# Patient Record
Sex: MALE | Race: WHITE | NOT HISPANIC OR LATINO | Employment: FULL TIME | URBAN - METROPOLITAN AREA
[De-identification: names, ages, dates, MRNs, and addresses within clinical notes are randomized per-mention and may not be internally consistent; named-entity substitution may affect disease eponyms.]

---

## 2018-06-30 ENCOUNTER — HOSPITAL ENCOUNTER (INPATIENT)
Facility: HOSPITAL | Age: 48
LOS: 1 days | Discharge: HOME | End: 2018-07-01
Attending: EMERGENCY MEDICINE | Admitting: INTERNAL MEDICINE

## 2018-06-30 ENCOUNTER — APPOINTMENT (OUTPATIENT)
Dept: GENERAL RADIOLOGY | Facility: HOSPITAL | Age: 48
End: 2018-06-30

## 2018-06-30 DIAGNOSIS — I50.9 ACUTE CONGESTIVE HEART FAILURE, UNSPECIFIED CONGESTIVE HEART FAILURE TYPE: Primary | ICD-10-CM

## 2018-06-30 DIAGNOSIS — N17.9 ACUTE RENAL FAILURE, UNSPECIFIED ACUTE RENAL FAILURE TYPE (HCC): ICD-10-CM

## 2018-06-30 DIAGNOSIS — I10 UNCONTROLLED HYPERTENSION: ICD-10-CM

## 2018-06-30 DIAGNOSIS — R77.8 ELEVATED TROPONIN: ICD-10-CM

## 2018-06-30 LAB
ALBUMIN SERPL-MCNC: 3.4 G/DL (ref 3.5–5.2)
ALBUMIN/GLOB SERPL: 0.9 G/DL
ALP SERPL-CCNC: 86 U/L (ref 39–117)
ALT SERPL W P-5'-P-CCNC: 17 U/L (ref 1–41)
ANION GAP SERPL CALCULATED.3IONS-SCNC: 11.9 MMOL/L
AST SERPL-CCNC: 10 U/L (ref 1–40)
BASOPHILS # BLD AUTO: 0.03 10*3/MM3 (ref 0–0.2)
BASOPHILS NFR BLD AUTO: 0.3 % (ref 0–1.5)
BILIRUB SERPL-MCNC: 0.6 MG/DL (ref 0.1–1.2)
BUN BLD-MCNC: 30 MG/DL (ref 6–20)
BUN/CREAT SERPL: 16.4 (ref 7–25)
CALCIUM SPEC-SCNC: 9.2 MG/DL (ref 8.6–10.5)
CHLORIDE SERPL-SCNC: 104 MMOL/L (ref 98–107)
CO2 SERPL-SCNC: 26.1 MMOL/L (ref 22–29)
CREAT BLD-MCNC: 1.83 MG/DL (ref 0.76–1.27)
DEPRECATED RDW RBC AUTO: 43.7 FL (ref 37–54)
EOSINOPHIL # BLD AUTO: 0.1 10*3/MM3 (ref 0–0.7)
EOSINOPHIL NFR BLD AUTO: 1.2 % (ref 0.3–6.2)
ERYTHROCYTE [DISTWIDTH] IN BLOOD BY AUTOMATED COUNT: 15 % (ref 11.5–14.5)
GFR SERPL CREATININE-BSD FRML MDRD: 40 ML/MIN/1.73
GLOBULIN UR ELPH-MCNC: 3.7 GM/DL
GLUCOSE BLD-MCNC: 151 MG/DL (ref 65–99)
GLUCOSE BLDC GLUCOMTR-MCNC: 113 MG/DL (ref 70–130)
GLUCOSE BLDC GLUCOMTR-MCNC: 135 MG/DL (ref 70–130)
GLUCOSE BLDC GLUCOMTR-MCNC: 173 MG/DL (ref 70–130)
HBA1C MFR BLD: 5.9 % (ref 4.8–5.6)
HCT VFR BLD AUTO: 43.1 % (ref 40.4–52.2)
HGB BLD-MCNC: 13.2 G/DL (ref 13.7–17.6)
HOLD SPECIMEN: NORMAL
HOLD SPECIMEN: NORMAL
IMM GRANULOCYTES # BLD: 0.02 10*3/MM3 (ref 0–0.03)
IMM GRANULOCYTES NFR BLD: 0.2 % (ref 0–0.5)
LYMPHOCYTES # BLD AUTO: 1.13 10*3/MM3 (ref 0.9–4.8)
LYMPHOCYTES NFR BLD AUTO: 13.2 % (ref 19.6–45.3)
MCH RBC QN AUTO: 24.8 PG (ref 27–32.7)
MCHC RBC AUTO-ENTMCNC: 30.6 G/DL (ref 32.6–36.4)
MCV RBC AUTO: 80.9 FL (ref 79.8–96.2)
MONOCYTES # BLD AUTO: 0.63 10*3/MM3 (ref 0.2–1.2)
MONOCYTES NFR BLD AUTO: 7.3 % (ref 5–12)
NEUTROPHILS # BLD AUTO: 6.67 10*3/MM3 (ref 1.9–8.1)
NEUTROPHILS NFR BLD AUTO: 77.8 % (ref 42.7–76)
NT-PROBNP SERPL-MCNC: ABNORMAL PG/ML (ref 5–450)
PLATELET # BLD AUTO: 233 10*3/MM3 (ref 140–500)
PMV BLD AUTO: 11.1 FL (ref 6–12)
POTASSIUM BLD-SCNC: 4.1 MMOL/L (ref 3.5–5.2)
PROT SERPL-MCNC: 7.1 G/DL (ref 6–8.5)
RBC # BLD AUTO: 5.33 10*6/MM3 (ref 4.6–6)
SODIUM BLD-SCNC: 142 MMOL/L (ref 136–145)
TROPONIN T SERPL-MCNC: 0.03 NG/ML (ref 0–0.03)
WBC NRBC COR # BLD: 8.58 10*3/MM3 (ref 4.5–10.7)
WHOLE BLOOD HOLD SPECIMEN: NORMAL
WHOLE BLOOD HOLD SPECIMEN: NORMAL

## 2018-06-30 PROCEDURE — 93005 ELECTROCARDIOGRAM TRACING: CPT | Performed by: PHYSICIAN ASSISTANT

## 2018-06-30 PROCEDURE — 25010000002 HYDRALAZINE PER 20 MG

## 2018-06-30 PROCEDURE — 84484 ASSAY OF TROPONIN QUANT: CPT | Performed by: PHYSICIAN ASSISTANT

## 2018-06-30 PROCEDURE — 93005 ELECTROCARDIOGRAM TRACING: CPT | Performed by: INTERNAL MEDICINE

## 2018-06-30 PROCEDURE — 99285 EMERGENCY DEPT VISIT HI MDM: CPT

## 2018-06-30 PROCEDURE — 93010 ELECTROCARDIOGRAM REPORT: CPT | Performed by: INTERNAL MEDICINE

## 2018-06-30 PROCEDURE — 25010000002 FUROSEMIDE PER 20 MG: Performed by: INTERNAL MEDICINE

## 2018-06-30 PROCEDURE — 25010000002 FUROSEMIDE PER 20 MG: Performed by: EMERGENCY MEDICINE

## 2018-06-30 PROCEDURE — 99223 1ST HOSP IP/OBS HIGH 75: CPT | Performed by: INTERNAL MEDICINE

## 2018-06-30 PROCEDURE — 71045 X-RAY EXAM CHEST 1 VIEW: CPT

## 2018-06-30 PROCEDURE — 85025 COMPLETE CBC W/AUTO DIFF WBC: CPT | Performed by: PHYSICIAN ASSISTANT

## 2018-06-30 PROCEDURE — 83880 ASSAY OF NATRIURETIC PEPTIDE: CPT | Performed by: PHYSICIAN ASSISTANT

## 2018-06-30 PROCEDURE — 83036 HEMOGLOBIN GLYCOSYLATED A1C: CPT | Performed by: EMERGENCY MEDICINE

## 2018-06-30 PROCEDURE — 80053 COMPREHEN METABOLIC PANEL: CPT | Performed by: PHYSICIAN ASSISTANT

## 2018-06-30 PROCEDURE — 82962 GLUCOSE BLOOD TEST: CPT

## 2018-06-30 RX ORDER — FUROSEMIDE 10 MG/ML
40 INJECTION INTRAMUSCULAR; INTRAVENOUS ONCE
Status: COMPLETED | OUTPATIENT
Start: 2018-06-30 | End: 2018-06-30

## 2018-06-30 RX ORDER — HYDRALAZINE HYDROCHLORIDE 10 MG/1
10 TABLET, FILM COATED ORAL 3 TIMES DAILY
COMMUNITY
End: 2018-07-01 | Stop reason: HOSPADM

## 2018-06-30 RX ORDER — HYDRALAZINE HYDROCHLORIDE 20 MG/ML
INJECTION INTRAMUSCULAR; INTRAVENOUS
Status: COMPLETED
Start: 2018-06-30 | End: 2018-06-30

## 2018-06-30 RX ORDER — SODIUM CHLORIDE 0.9 % (FLUSH) 0.9 %
1-10 SYRINGE (ML) INJECTION AS NEEDED
Status: DISCONTINUED | OUTPATIENT
Start: 2018-06-30 | End: 2018-07-01 | Stop reason: HOSPADM

## 2018-06-30 RX ORDER — NITROGLYCERIN 20 MG/100ML
5-200 INJECTION INTRAVENOUS
Status: DISCONTINUED | OUTPATIENT
Start: 2018-06-30 | End: 2018-07-01 | Stop reason: HOSPADM

## 2018-06-30 RX ORDER — NITROGLYCERIN 0.4 MG/1
0.4 TABLET SUBLINGUAL
Status: DISCONTINUED | OUTPATIENT
Start: 2018-06-30 | End: 2018-07-01 | Stop reason: HOSPADM

## 2018-06-30 RX ORDER — FUROSEMIDE 80 MG
80 TABLET ORAL 2 TIMES DAILY
COMMUNITY
End: 2018-07-01 | Stop reason: HOSPADM

## 2018-06-30 RX ORDER — ECHINACEA PURPUREA EXTRACT 125 MG
2 TABLET ORAL AS NEEDED
Status: DISCONTINUED | OUTPATIENT
Start: 2018-06-30 | End: 2018-07-01 | Stop reason: HOSPADM

## 2018-06-30 RX ORDER — ACETAMINOPHEN 325 MG/1
650 TABLET ORAL EVERY 4 HOURS PRN
Status: DISCONTINUED | OUTPATIENT
Start: 2018-06-30 | End: 2018-07-01 | Stop reason: HOSPADM

## 2018-06-30 RX ORDER — CARVEDILOL 25 MG/1
25 TABLET ORAL EVERY 12 HOURS SCHEDULED
Status: DISCONTINUED | OUTPATIENT
Start: 2018-06-30 | End: 2018-07-01 | Stop reason: HOSPADM

## 2018-06-30 RX ORDER — METOCLOPRAMIDE 5 MG/1
5 TABLET ORAL 4 TIMES DAILY
COMMUNITY
End: 2018-07-01 | Stop reason: HOSPADM

## 2018-06-30 RX ORDER — HYDRALAZINE HYDROCHLORIDE 20 MG/ML
10 INJECTION INTRAMUSCULAR; INTRAVENOUS ONCE
Status: COMPLETED | OUTPATIENT
Start: 2018-06-30 | End: 2018-06-30

## 2018-06-30 RX ORDER — SODIUM CHLORIDE 0.9 % (FLUSH) 0.9 %
10 SYRINGE (ML) INJECTION AS NEEDED
Status: DISCONTINUED | OUTPATIENT
Start: 2018-06-30 | End: 2018-07-01 | Stop reason: HOSPADM

## 2018-06-30 RX ORDER — CARVEDILOL 12.5 MG/1
12.5 TABLET ORAL EVERY 12 HOURS SCHEDULED
Status: DISCONTINUED | OUTPATIENT
Start: 2018-06-30 | End: 2018-06-30

## 2018-06-30 RX ORDER — GLIMEPIRIDE 2 MG/1
2 TABLET ORAL
COMMUNITY
End: 2018-07-01 | Stop reason: HOSPADM

## 2018-06-30 RX ORDER — ISOSORBIDE MONONITRATE 60 MG/1
60 TABLET, EXTENDED RELEASE ORAL
Status: DISCONTINUED | OUTPATIENT
Start: 2018-06-30 | End: 2018-07-01 | Stop reason: HOSPADM

## 2018-06-30 RX ORDER — ASPIRIN 81 MG/1
81 TABLET, CHEWABLE ORAL DAILY
COMMUNITY
End: 2019-01-01 | Stop reason: SDUPTHER

## 2018-06-30 RX ORDER — FUROSEMIDE 10 MG/ML
40 INJECTION INTRAMUSCULAR; INTRAVENOUS EVERY 12 HOURS
Status: DISCONTINUED | OUTPATIENT
Start: 2018-06-30 | End: 2018-07-01

## 2018-06-30 RX ORDER — AMLODIPINE BESYLATE 5 MG/1
5 TABLET ORAL
Status: DISCONTINUED | OUTPATIENT
Start: 2018-06-30 | End: 2018-07-01 | Stop reason: HOSPADM

## 2018-06-30 RX ADMIN — ISOSORBIDE MONONITRATE 60 MG: 60 TABLET, EXTENDED RELEASE ORAL at 17:28

## 2018-06-30 RX ADMIN — HYDRALAZINE HYDROCHLORIDE 10 MG: 20 INJECTION INTRAMUSCULAR; INTRAVENOUS at 12:38

## 2018-06-30 RX ADMIN — CARVEDILOL 12.5 MG: 12.5 TABLET, FILM COATED ORAL at 13:33

## 2018-06-30 RX ADMIN — CARVEDILOL 25 MG: 25 TABLET, FILM COATED ORAL at 19:51

## 2018-06-30 RX ADMIN — HYDRALAZINE HYDROCHLORIDE 75 MG: 50 TABLET, FILM COATED ORAL at 21:51

## 2018-06-30 RX ADMIN — NITROGLYCERIN 10 MCG/MIN: 20 INJECTION INTRAVENOUS at 09:31

## 2018-06-30 RX ADMIN — FUROSEMIDE 40 MG: 10 INJECTION, SOLUTION INTRAMUSCULAR; INTRAVENOUS at 12:42

## 2018-06-30 RX ADMIN — AMLODIPINE BESYLATE 5 MG: 5 TABLET ORAL at 17:28

## 2018-06-30 RX ADMIN — Medication 10 MG: at 12:38

## 2018-06-30 RX ADMIN — FUROSEMIDE 40 MG: 10 INJECTION, SOLUTION INTRAMUSCULAR; INTRAVENOUS at 09:27

## 2018-07-01 ENCOUNTER — APPOINTMENT (OUTPATIENT)
Dept: CARDIOLOGY | Facility: HOSPITAL | Age: 48
End: 2018-07-01
Attending: INTERNAL MEDICINE

## 2018-07-01 VITALS
BODY MASS INDEX: 36.64 KG/M2 | DIASTOLIC BLOOD PRESSURE: 81 MMHG | RESPIRATION RATE: 20 BRPM | SYSTOLIC BLOOD PRESSURE: 127 MMHG | WEIGHT: 261.69 LBS | OXYGEN SATURATION: 95 % | HEIGHT: 71 IN | HEART RATE: 72 BPM | TEMPERATURE: 98 F

## 2018-07-01 LAB
ANION GAP SERPL CALCULATED.3IONS-SCNC: 12.9 MMOL/L
BASOPHILS # BLD AUTO: 0.01 10*3/MM3 (ref 0–0.2)
BASOPHILS NFR BLD AUTO: 0.1 % (ref 0–1.5)
BH CV ECHO MEAS - ACS: 2.1 CM
BH CV ECHO MEAS - AO MEAN PG (FULL): 2 MMHG
BH CV ECHO MEAS - AO MEAN PG: 3 MMHG
BH CV ECHO MEAS - AO ROOT AREA (BSA CORRECTED): 1.3
BH CV ECHO MEAS - AO ROOT AREA: 7.1 CM^2
BH CV ECHO MEAS - AO ROOT DIAM: 3 CM
BH CV ECHO MEAS - AO V2 MAX: 124 CM/SEC
BH CV ECHO MEAS - AO V2 MEAN: 86.1 CM/SEC
BH CV ECHO MEAS - AO V2 VTI: 24.1 CM
BH CV ECHO MEAS - AVA(I,A): 2.2 CM^2
BH CV ECHO MEAS - AVA(I,D): 2.2 CM^2
BH CV ECHO MEAS - BSA(HAYCOCK): 2.5 M^2
BH CV ECHO MEAS - BSA: 2.4 M^2
BH CV ECHO MEAS - BZI_BMI: 36.4 KILOGRAMS/M^2
BH CV ECHO MEAS - BZI_METRIC_HEIGHT: 180.3 CM
BH CV ECHO MEAS - BZI_METRIC_WEIGHT: 118.4 KG
BH CV ECHO MEAS - CONTRAST EF (2CH): 48.4 ML/M^2
BH CV ECHO MEAS - CONTRAST EF 4CH: 37.6 ML/M^2
BH CV ECHO MEAS - EDV(CUBED): 287.5 ML
BH CV ECHO MEAS - EDV(MOD-SP2): 192 ML
BH CV ECHO MEAS - EDV(MOD-SP4): 170 ML
BH CV ECHO MEAS - EDV(TEICH): 223.6 ML
BH CV ECHO MEAS - EF(CUBED): 61.5 %
BH CV ECHO MEAS - EF(MOD-BP): 42 %
BH CV ECHO MEAS - EF(MOD-SP2): 48.4 %
BH CV ECHO MEAS - EF(MOD-SP4): 37.6 %
BH CV ECHO MEAS - EF(TEICH): 51.9 %
BH CV ECHO MEAS - ESV(CUBED): 110.6 ML
BH CV ECHO MEAS - ESV(MOD-SP2): 99 ML
BH CV ECHO MEAS - ESV(MOD-SP4): 106 ML
BH CV ECHO MEAS - ESV(TEICH): 107.5 ML
BH CV ECHO MEAS - FS: 27.3 %
BH CV ECHO MEAS - IVS/LVPW: 1
BH CV ECHO MEAS - IVSD: 1.2 CM
BH CV ECHO MEAS - LAT PEAK E' VEL: 11 CM/SEC
BH CV ECHO MEAS - LV DIASTOLIC VOL/BSA (35-75): 72 ML/M^2
BH CV ECHO MEAS - LV MASS(C)D: 367.9 GRAMS
BH CV ECHO MEAS - LV MASS(C)DI: 155.8 GRAMS/M^2
BH CV ECHO MEAS - LV MEAN PG: 1 MMHG
BH CV ECHO MEAS - LV SYSTOLIC VOL/BSA (12-30): 44.9 ML/M^2
BH CV ECHO MEAS - LV V1 MAX: 85 CM/SEC
BH CV ECHO MEAS - LV V1 MEAN: 49 CM/SEC
BH CV ECHO MEAS - LV V1 VTI: 16.5 CM
BH CV ECHO MEAS - LVIDD: 6.6 CM
BH CV ECHO MEAS - LVIDS: 4.8 CM
BH CV ECHO MEAS - LVLD AP2: 8.8 CM
BH CV ECHO MEAS - LVLD AP4: 9.2 CM
BH CV ECHO MEAS - LVLS AP2: 7.3 CM
BH CV ECHO MEAS - LVLS AP4: 8.4 CM
BH CV ECHO MEAS - LVOT AREA (M): 3.1 CM^2
BH CV ECHO MEAS - LVOT AREA: 3.1 CM^2
BH CV ECHO MEAS - LVOT DIAM: 2 CM
BH CV ECHO MEAS - LVPWD: 1.2 CM
BH CV ECHO MEAS - MED PEAK E' VEL: 6 CM/SEC
BH CV ECHO MEAS - MR MAX PG: 154.2 MMHG
BH CV ECHO MEAS - MR MAX VEL: 620.5 CM/SEC
BH CV ECHO MEAS - MV A DUR: 0.09 SEC
BH CV ECHO MEAS - MV A MAX VEL: 51.8 CM/SEC
BH CV ECHO MEAS - MV DEC SLOPE: 984 CM/SEC^2
BH CV ECHO MEAS - MV DEC TIME: 0.14 SEC
BH CV ECHO MEAS - MV E MAX VEL: 145 CM/SEC
BH CV ECHO MEAS - MV E/A: 2.8
BH CV ECHO MEAS - MV MEAN PG: 3 MMHG
BH CV ECHO MEAS - MV P1/2T MAX VEL: 151 CM/SEC
BH CV ECHO MEAS - MV P1/2T: 44.9 MSEC
BH CV ECHO MEAS - MV V2 MEAN: 70.4 CM/SEC
BH CV ECHO MEAS - MV V2 VTI: 29.6 CM
BH CV ECHO MEAS - MVA P1/2T LCG: 1.5 CM^2
BH CV ECHO MEAS - MVA(P1/2T): 4.9 CM^2
BH CV ECHO MEAS - MVA(VTI): 1.8 CM^2
BH CV ECHO MEAS - PA ACC SLOPE: 5.1 CM/SEC^2
BH CV ECHO MEAS - PA ACC TIME: 0.13 SEC
BH CV ECHO MEAS - PA MAX PG (FULL): 0.99 MMHG
BH CV ECHO MEAS - PA MAX PG: 2.5 MMHG
BH CV ECHO MEAS - PA PR(ACCEL): 18.7 MMHG
BH CV ECHO MEAS - PA V2 MAX: 78.7 CM/SEC
BH CV ECHO MEAS - PVA(V,A): 6.2 CM^2
BH CV ECHO MEAS - PVA(V,D): 6.2 CM^2
BH CV ECHO MEAS - QP/QS: 2.1
BH CV ECHO MEAS - RAP SYSTOLE: 15 MMHG
BH CV ECHO MEAS - RV MAX PG: 1.5 MMHG
BH CV ECHO MEAS - RV MEAN PG: 1 MMHG
BH CV ECHO MEAS - RV V1 MAX: 60.9 CM/SEC
BH CV ECHO MEAS - RV V1 MEAN: 40.6 CM/SEC
BH CV ECHO MEAS - RV V1 VTI: 13.8 CM
BH CV ECHO MEAS - RVOT AREA: 8 CM^2
BH CV ECHO MEAS - RVOT DIAM: 3.2 CM
BH CV ECHO MEAS - RVSP: 45.9 MMHG
BH CV ECHO MEAS - SI(AO): 72.1 ML/M^2
BH CV ECHO MEAS - SI(CUBED): 74.9 ML/M^2
BH CV ECHO MEAS - SI(LVOT): 22 ML/M^2
BH CV ECHO MEAS - SI(MOD-SP2): 39.4 ML/M^2
BH CV ECHO MEAS - SI(MOD-SP4): 27.1 ML/M^2
BH CV ECHO MEAS - SI(TEICH): 49.2 ML/M^2
BH CV ECHO MEAS - SV(AO): 170.4 ML
BH CV ECHO MEAS - SV(CUBED): 176.9 ML
BH CV ECHO MEAS - SV(LVOT): 51.8 ML
BH CV ECHO MEAS - SV(MOD-SP2): 93 ML
BH CV ECHO MEAS - SV(MOD-SP4): 64 ML
BH CV ECHO MEAS - SV(RVOT): 111 ML
BH CV ECHO MEAS - SV(TEICH): 116.1 ML
BH CV ECHO MEAS - TAPSE (>1.6): 1.5 CM2
BH CV ECHO MEAS - TR MAX VEL: 278 CM/SEC
BH CV ECHO MEASUREMENTS AVERAGE E/E' RATIO: 17.06
BH CV VAS BP RIGHT ARM: NORMAL MMHG
BH CV XLRA - RV BASE: 4.3 CM
BH CV XLRA - TDI S': 9 CM/SEC
BUN BLD-MCNC: 30 MG/DL (ref 6–20)
BUN/CREAT SERPL: 13.3 (ref 7–25)
CALCIUM SPEC-SCNC: 8.3 MG/DL (ref 8.6–10.5)
CHLORIDE SERPL-SCNC: 102 MMOL/L (ref 98–107)
CO2 SERPL-SCNC: 25.1 MMOL/L (ref 22–29)
CREAT BLD-MCNC: 2.25 MG/DL (ref 0.76–1.27)
DEPRECATED RDW RBC AUTO: 44 FL (ref 37–54)
EOSINOPHIL # BLD AUTO: 0.13 10*3/MM3 (ref 0–0.7)
EOSINOPHIL NFR BLD AUTO: 1.4 % (ref 0.3–6.2)
ERYTHROCYTE [DISTWIDTH] IN BLOOD BY AUTOMATED COUNT: 15 % (ref 11.5–14.5)
GFR SERPL CREATININE-BSD FRML MDRD: 32 ML/MIN/1.73
GLUCOSE BLD-MCNC: 113 MG/DL (ref 65–99)
GLUCOSE BLDC GLUCOMTR-MCNC: 131 MG/DL (ref 70–130)
GLUCOSE BLDC GLUCOMTR-MCNC: 142 MG/DL (ref 70–130)
HCT VFR BLD AUTO: 36.9 % (ref 40.4–52.2)
HGB BLD-MCNC: 11 G/DL (ref 13.7–17.6)
IMM GRANULOCYTES # BLD: 0 10*3/MM3 (ref 0–0.03)
IMM GRANULOCYTES NFR BLD: 0 % (ref 0–0.5)
LEFT ATRIUM VOLUME INDEX: 50 ML/M2
LV EF 2D ECHO EST: 50 %
LYMPHOCYTES # BLD AUTO: 1.45 10*3/MM3 (ref 0.9–4.8)
LYMPHOCYTES NFR BLD AUTO: 15.1 % (ref 19.6–45.3)
MAXIMAL PREDICTED HEART RATE: 174 BPM
MCH RBC QN AUTO: 24.2 PG (ref 27–32.7)
MCHC RBC AUTO-ENTMCNC: 29.8 G/DL (ref 32.6–36.4)
MCV RBC AUTO: 81.3 FL (ref 79.8–96.2)
MONOCYTES # BLD AUTO: 0.83 10*3/MM3 (ref 0.2–1.2)
MONOCYTES NFR BLD AUTO: 8.7 % (ref 5–12)
NEUTROPHILS # BLD AUTO: 7.16 10*3/MM3 (ref 1.9–8.1)
NEUTROPHILS NFR BLD AUTO: 74.7 % (ref 42.7–76)
PLATELET # BLD AUTO: 216 10*3/MM3 (ref 140–500)
PMV BLD AUTO: 11.5 FL (ref 6–12)
POTASSIUM BLD-SCNC: 3.6 MMOL/L (ref 3.5–5.2)
RBC # BLD AUTO: 4.54 10*6/MM3 (ref 4.6–6)
SODIUM BLD-SCNC: 140 MMOL/L (ref 136–145)
STRESS TARGET HR: 148 BPM
WBC NRBC COR # BLD: 9.58 10*3/MM3 (ref 4.5–10.7)

## 2018-07-01 PROCEDURE — 25010000002 FUROSEMIDE PER 20 MG: Performed by: INTERNAL MEDICINE

## 2018-07-01 PROCEDURE — 93306 TTE W/DOPPLER COMPLETE: CPT | Performed by: INTERNAL MEDICINE

## 2018-07-01 PROCEDURE — 80048 BASIC METABOLIC PNL TOTAL CA: CPT | Performed by: INTERNAL MEDICINE

## 2018-07-01 PROCEDURE — 82962 GLUCOSE BLOOD TEST: CPT

## 2018-07-01 PROCEDURE — 85025 COMPLETE CBC W/AUTO DIFF WBC: CPT | Performed by: INTERNAL MEDICINE

## 2018-07-01 PROCEDURE — 93306 TTE W/DOPPLER COMPLETE: CPT

## 2018-07-01 PROCEDURE — 99239 HOSP IP/OBS DSCHRG MGMT >30: CPT | Performed by: INTERNAL MEDICINE

## 2018-07-01 RX ORDER — CARVEDILOL 25 MG/1
25 TABLET ORAL EVERY 12 HOURS SCHEDULED
Qty: 60 TABLET | Refills: 1 | Status: SHIPPED | OUTPATIENT
Start: 2018-07-01 | End: 2018-08-07

## 2018-07-01 RX ORDER — AMLODIPINE BESYLATE 5 MG/1
5 TABLET ORAL
Qty: 30 TABLET | Refills: 1 | Status: SHIPPED | OUTPATIENT
Start: 2018-07-02 | End: 2018-08-07

## 2018-07-01 RX ORDER — ISOSORBIDE MONONITRATE 60 MG/1
60 TABLET, EXTENDED RELEASE ORAL
Qty: 30 TABLET | Refills: 1 | Status: SHIPPED | OUTPATIENT
Start: 2018-07-02 | End: 2018-08-07

## 2018-07-01 RX ORDER — FUROSEMIDE 40 MG/1
40 TABLET ORAL DAILY
Qty: 30 TABLET | Refills: 1 | Status: SHIPPED | OUTPATIENT
Start: 2018-07-01 | End: 2018-08-07

## 2018-07-01 RX ORDER — FUROSEMIDE 40 MG/1
40 TABLET ORAL DAILY
Status: DISCONTINUED | OUTPATIENT
Start: 2018-07-01 | End: 2018-07-01 | Stop reason: HOSPADM

## 2018-07-01 RX ORDER — HYDRALAZINE HYDROCHLORIDE 25 MG/1
75 TABLET, FILM COATED ORAL EVERY 8 HOURS SCHEDULED
Qty: 90 TABLET | Refills: 3 | Status: SHIPPED | OUTPATIENT
Start: 2018-07-01 | End: 2018-07-17

## 2018-07-01 RX ADMIN — FUROSEMIDE 40 MG: 10 INJECTION, SOLUTION INTRAMUSCULAR; INTRAVENOUS at 00:26

## 2018-07-01 RX ADMIN — AMLODIPINE BESYLATE 5 MG: 5 TABLET ORAL at 09:23

## 2018-07-01 RX ADMIN — CARVEDILOL 25 MG: 25 TABLET, FILM COATED ORAL at 09:23

## 2018-07-01 RX ADMIN — HYDRALAZINE HYDROCHLORIDE 75 MG: 50 TABLET, FILM COATED ORAL at 05:42

## 2018-07-01 RX ADMIN — ISOSORBIDE MONONITRATE 60 MG: 60 TABLET, EXTENDED RELEASE ORAL at 09:23

## 2018-07-01 RX ADMIN — FUROSEMIDE 40 MG: 40 TABLET ORAL at 11:05

## 2018-07-01 NOTE — DISCHARGE INSTR - APPOINTMENTS
Go to Dr. Padmaja Lee's clinical at [address] tomorrow morning at 9am. Please take the prescription for your lab work (BMP) with out to that office.  Make an appointment with Dr. Padmaja Lee for Tuesday July 3rd at 9am for lab work and office visit at 9am and 930am, while in the office tomorrow.    Make a follow-up appointment with [facility] Cece Szymanski in 2 weeks, call them at [phone].

## 2018-07-01 NOTE — DISCHARGE INSTR - DIET
Eat a low potassium Diet, see hand out attached from the [employer] on low potassium diet recommendations.

## 2018-07-01 NOTE — DISCHARGE SUMMARY
Date of Discharge:  7/1/2018  Date of Admit: 6/30/2018    Discharge Diagnosis:Active Problems:    Acute congestive heart failure      Hospital Course:     He was admitted with severe dyspnea due to chf, htn. His echo showed LVEF 50% with severe eccentric MR.      He has CKD and Dr. Lee saw him.  His BP is better.      Procedures Performed         Consults     Date and Time Order Name Status Description    7/1/2018 0919 Inpatient Nephrology Consult Completed     6/30/2018 0928 LCG (on-call MD unless specified) Completed           Pertinent Test Results:   .      Discharge Physical Exam:    General Appearance No acute distress   Neck No adenopathy, supple, trachea midline, no thyromegaly, no carotid bruit, no JVD   Lungs Clear to auscultation,respirations regular, even and unlabored   Heart Regular rhythm and normal rate, normal S1 and S2, no murmur, no gallop, no rub, no click   Chest wall No abnormalities observed   Abdomen Normal bowel sounds, no masses, no hepatomegaly, soft   Extremities Moves all extremities well, no edema, no cyanosis, no redness   Neurological Alert and oriented x 3     Discharge Medications     Discharge Medications      New Medications      Instructions Start Date   amLODIPine 5 MG tablet  Commonly known as:  NORVASC   5 mg, Oral, Every 24 Hours Scheduled   Start Date:  7/2/2018     carvedilol 25 MG tablet  Commonly known as:  COREG   25 mg, Oral, Every 12 Hours Scheduled      isosorbide mononitrate 60 MG 24 hr tablet  Commonly known as:  IMDUR   60 mg, Oral, Every 24 Hours Scheduled   Start Date:  7/2/2018        Changes to Medications      Instructions Start Date   furosemide 40 MG tablet  Commonly known as:  LASIX  What changed:  · medication strength  · how much to take  · when to take this   40 mg, Oral, Daily      hydrALAZINE 25 MG tablet  Commonly known as:  APRESOLINE  What changed:  · medication strength  · how much to take  · when to take this   75 mg, Oral, Every 8 Hours  Scheduled         Continue These Medications      Instructions Start Date   aspirin 81 MG chewable tablet   81 mg, Oral, Daily         Stop These Medications    glimepiride 2 MG tablet  Commonly known as:  AMARYL     metoclopramide 5 MG tablet  Commonly known as:  REGLAN     Unable to find            Discharge Diet:     Activity at Discharge:     Discharge disposition: home    Condition on Discharge: stable    Follow-up Appointments  No future appointments.     See ozzie marquez in 2 weeks at [phone]    Test Results Pending at Discharge       Meggan Hinton MD  07/01/18  10:51 AM    33min spent in reviewing records, discussion and examination of the patient and discussion with other members of the patient's medical team.     Dictated utilizing Dragon dictation

## 2018-07-01 NOTE — CONSULTS
"  Referring Provider: Meggan Hinton MD  Reason for Consultation: DESHAWN    Subjective     Chief complaint   Chief Complaint   Patient presents with   • Shortness of Breath     pt reports soa starting last night. reports hospitalized earlier this year for CHF.    • Generalized Body Aches     pt reports \"my whole body is tense\"       History of present illness:     48 Y/O WM with PMH of HTN , CKD and CHF who presented with increasing dyspnea. He was told recently that he has kidney disease. He was admitted recently in [place] for CHF. BP was 192/126 on arrival. Patient was started on nitro drip initially and then oral medications. BP is much better today but Scr went up to 2.3 mg/dl from 1.8 mg/dl on admission. He wants to go home to get his belongings!. He has a job to start tomorrow and he feels that he has to leave today. No CP or SOA. Feels better.    Past Medical History:   Diagnosis Date   • CHF (congestive heart failure)    • Diabetes mellitus    • Hypertension      History reviewed. No pertinent surgical history.  History reviewed. No pertinent family history.  Social History   Substance Use Topics   • Smoking status: Current Every Day Smoker     Packs/day: 0.50     Types: Cigarettes   • Smokeless tobacco: Not on file   • Alcohol use No     Prescriptions Prior to Admission   Medication Sig Dispense Refill Last Dose   • aspirin 81 MG chewable tablet Chew 81 mg Daily.   6/29/2018 at Unknown time   • furosemide (LASIX) 80 MG tablet Take 80 mg by mouth 2 (Two) Times a Day.   Past Week at Unknown time   • hydrALAZINE (APRESOLINE) 10 MG tablet Take 10 mg by mouth 3 (Three) Times a Day.   Past Week at Unknown time   • metoclopramide (REGLAN) 5 MG tablet Take 5 mg by mouth 4 (Four) Times a Day.   Past Week at Unknown time   • Unable to find 1 each Daily. Med Name: nature's own potassium   6/29/2018 at Unknown time   • glimepiride (AMARYL) 2 MG tablet Take 2 mg by mouth Every Morning Before Breakfast.   More than a " "month at Unknown time     Allergies:  Patient has no known allergies.    Review of Systems    14 points ROS were conducted and were all negative except what is per HPI    Objective     Vital Signs  Temp:  [98 °F (36.7 °C)-98.7 °F (37.1 °C)] 98 °F (36.7 °C)  Heart Rate:  [] 69  Resp:  [20] 20  BP: (122-205)/() 130/84    Flowsheet Rows      First Filed Value   Admission Height  180.3 cm (71\") Documented at 06/30/2018 0801   Admission Weight  121 kg (266 lb) Documented at 06/30/2018 0801           No intake/output data recorded.  I/O last 3 completed shifts:  In: 724 [P.O.:490; I.V.:234]  Out: 3825 [Urine:3825]    Intake/Output Summary (Last 24 hours) at 07/01/18 0959  Last data filed at 07/01/18 0500   Gross per 24 hour   Intake              724 ml   Output             3825 ml   Net            -3101 ml       Physical Exam:     General Appearance:    Alert, cooperative, in no acute distress   Head:    Normocephalic, without obvious abnormality, atraumatic   Eyes:            Lids and lashes normal, conjunctivae and sclerae normal, no   icterus, no pallor, corneas clear, PERRLA   Ears:    Ears appear intact with no abnormalities noted   Throat:   No oral lesions, no thrush, oral mucosa moist   Neck:   No adenopathy, supple, trachea midline, no thyromegaly, no   carotid bruit, no JVD   Back:     No kyphosis present, no scoliosis present, no skin lesions,      erythema or scars, no tenderness to percussion or                   palpation,   range of motion normal   Lungs:     Clear to auscultation,respirations regular, even and                  unlabored    Heart:    Regular rhythm and normal rate, normal S1 and S2, no            murmur, no gallop, no rub, no click   Chest Wall:    No abnormalities observed   Abdomen:     Normal bowel sounds, no masses, no organomegaly, soft        non-tender, non-distended, no guarding, no rebound                tenderness   Rectal:     Deferred   Extremities:   Moves all " "extremities well, no edema, no cyanosis, no             redness   Pulses:   Pulses palpable and equal bilaterally   Skin:   No bleeding, bruising or rash   Lymph nodes:   No palpable adenopathy   Neurologic:   Cranial nerves 2 - 12 grossly intact, sensation intact, DTR       present and equal bilaterally       Results Review:    Results from last 7 days  Lab Units 07/01/18  0544 06/30/18  0844   SODIUM mmol/L 140 142   POTASSIUM mmol/L 3.6 4.1   CHLORIDE mmol/L 102 104   CO2 mmol/L 25.1 26.1   BUN mg/dL 30* 30*   CREATININE mg/dL 2.25* 1.83*   CALCIUM mg/dL 8.3* 9.2   BILIRUBIN mg/dL  --  0.6   ALK PHOS U/L  --  86   ALT (SGPT) U/L  --  17   AST (SGOT) U/L  --  10   GLUCOSE mg/dL 113* 151*       Estimated Creatinine Clearance: 53.8 mL/min (A) (by C-G formula based on SCr of 2.25 mg/dL (H)).            Results from last 7 days  Lab Units 07/01/18  0613 06/30/18  0844   WBC 10*3/mm3 9.58 8.58   HEMOGLOBIN g/dL 11.0* 13.2*   PLATELETS 10*3/mm3 216 233             Active Medications    amLODIPine 5 mg Oral Q24H   carvedilol 25 mg Oral Q12H   furosemide 40 mg Oral Daily   hydrALAZINE 75 mg Oral Q8H   isosorbide mononitrate 60 mg Oral Q24H       nitroglycerin 5-200 mcg/min Last Rate: Stopped (07/01/18 0442)       Assessment/Plan     1. DESHAWN on CKD : Likely due to decreased renal blood flow due to improvement of his BP. Not sure if patient is reliable enough to continue management as an outpatient. He wants to go home. If patient to go home, He will need BMP on Monday and Tuesday with renal follow up On Tuesday of this week! Advised to avoid nephrotoxic meds. Surveillance labs. D/W Dr Hinton and ICU staff    2. CKD III likley due to HTN NS and possibly DGS  3. HTN urgency: BP is much better with current TX.  4. CHF\" Continue to diurese.  5. Tobacco use     Padmaaj Lee MD  07/01/18  9:59 AM              "

## 2018-07-17 ENCOUNTER — OFFICE VISIT (OUTPATIENT)
Dept: CARDIOLOGY | Facility: CLINIC | Age: 48
End: 2018-07-17

## 2018-07-17 VITALS
HEIGHT: 71 IN | HEART RATE: 81 BPM | BODY MASS INDEX: 36.71 KG/M2 | WEIGHT: 262.2 LBS | SYSTOLIC BLOOD PRESSURE: 148 MMHG | DIASTOLIC BLOOD PRESSURE: 80 MMHG

## 2018-07-17 DIAGNOSIS — I34.0 NON-RHEUMATIC MITRAL REGURGITATION: Primary | ICD-10-CM

## 2018-07-17 DIAGNOSIS — F17.210 CIGARETTE SMOKER: ICD-10-CM

## 2018-07-17 DIAGNOSIS — L84 FOOT CALLUS: ICD-10-CM

## 2018-07-17 DIAGNOSIS — I10 ESSENTIAL HYPERTENSION: ICD-10-CM

## 2018-07-17 DIAGNOSIS — N18.30 STAGE III CHRONIC KIDNEY DISEASE (HCC): ICD-10-CM

## 2018-07-17 DIAGNOSIS — I50.41 ACUTE COMBINED SYSTOLIC AND DIASTOLIC CONGESTIVE HEART FAILURE (HCC): ICD-10-CM

## 2018-07-17 PROCEDURE — 99214 OFFICE O/P EST MOD 30 MIN: CPT | Performed by: NURSE PRACTITIONER

## 2018-07-17 PROCEDURE — 93000 ELECTROCARDIOGRAM COMPLETE: CPT | Performed by: NURSE PRACTITIONER

## 2018-07-17 RX ORDER — HYDRALAZINE HYDROCHLORIDE 100 MG/1
100 TABLET, FILM COATED ORAL 3 TIMES DAILY
Qty: 90 TABLET | Refills: 1 | Status: SHIPPED | OUTPATIENT
Start: 2018-07-17 | End: 2018-08-07 | Stop reason: DRUGHIGH

## 2018-07-17 NOTE — PROGRESS NOTES
Date of Office Visit: 2018  Encounter Provider: MINGO Mancini  Place of Service: [facility] CARDIOLOGY  Patient Name: Nico King  :1970    Chief Complaint   Patient presents with   • Congestive Heart Failure   :     HPI: Nico King is a 47 y.o. male who presents today for hospital follow-up.  He is new patient to me and his previous records have been reviewed.  He has a history of heart failure, hypertension, and tobacco use.      He recently moved from [place] to [place] and had been out of his furosemide and hydralazine.  He presented to the [facility] emergency department on  shortness of breath.  His blood pressure was elevated at 192/126 with a heart rate of 121, troponin was 0.034, pro-BMP 15,796 and creatinine 1.83.  Chest x-ray showed cardiomegaly with vascular engorgement.  He was admitted to the hospital for further diuresis.  An echocardiogram was obtained which revealed an EF of 50%, mild left ventricular hypertrophy, aortic valve calcification, left atrium moderately to severely dilated, trace to mild tricuspid valve regurgitation, mild pulmonary hypertension, and severe eccentric mitral valve regurgitation.  His creatinine level was elevated and he was felt to have acute kidney injury on chronic kidney disease he was recommended to have a repeat BMP at the beginning of this week and to follow-up with nephrology on 7/3.  New medications were amlodipine 5 mg daily, carvedilol 25 mg twice daily, isosorbide 60 mg daily, hydralazine 75 mg every 8 hours, and furosemide 40 mg daily.  He was recommended to follow-up with me in 2 weeks.    He presents today for follow-up.  He did follow-up with Dr. Ochoa on 7/3/18 and his blood pressure was 130/96 in their office.  He was diagnosed with stage III moderate chronic kidney disease and he was advised to follow a low-sodium diet, avoid NSAIDs, and blood pressure goal less  than 140/80.    He presents today for follow-up.  He does have chronic lower extremity edema and a cough.  He denies chest pain, shortness of air, PND, orthopnea, palpitations, dizziness, or syncope.  His blood pressure is elevated today in the office and he has been compliant with all of his medications.  He was asking to be referred to a podiatrist for callous/possible fungus.    The following portion of the patient's history were reviewed and updated as appropriate: past medical history, past surgical history, past social history, past family history, allergies, current medications, and problem list.    Past Medical History:   Diagnosis Date   • CHF (congestive heart failure) (CMS/[facility])    • Cigarette smoker    • Diabetes mellitus (CMS/[facility])    • Hypertension    • Mitral regurgitation     severe eccentric per echo 7/2018   • Pleural effusion 06/30/2018    Bilateral, small per x-ray   • Stage III chronic kidney disease        Past Surgical History:   Procedure Laterality Date   • HIP SURGERY         Social History     Social History   • Marital status: Single     Spouse name: N/A   • Number of children: N/A   • Years of education: N/A     Occupational History   • Not on file.     Social History Main Topics   • Smoking status: Current Every Day Smoker     Packs/day: 0.50     Types: Cigarettes   • Smokeless tobacco: Current User     Types: Chew      Comment: caffeine use   • Alcohol use No   • Drug use: No   • Sexual activity: Defer       Family History   Problem Relation Age of Onset   • Hypertension Mother    • Atrial fibrillation Father    • Hypertension Father        Review of Systems   Constitution: Negative for chills, diaphoresis, fever, malaise/fatigue, night sweats, weight gain and weight loss.   HENT: Negative for hearing loss, nosebleeds, sore throat and tinnitus.    Eyes: Negative for blurred vision, double vision, pain and visual disturbance.   Cardiovascular: Positive for leg swelling. Negative for chest  "pain, claudication, cyanosis, dyspnea on exertion, irregular heartbeat, near-syncope, orthopnea, palpitations, paroxysmal nocturnal dyspnea and syncope.   Respiratory: Positive for cough. Negative for hemoptysis, shortness of breath, snoring and wheezing.    Endocrine: Negative for cold intolerance, heat intolerance and polyuria.   Hematologic/Lymphatic: Negative for bleeding problem. Does not bruise/bleed easily.   Skin: Negative for color change, dry skin, flushing and itching.   Musculoskeletal: Negative for falls, joint pain, joint swelling, muscle cramps, muscle weakness and myalgias.   Gastrointestinal: Negative for abdominal pain, constipation, heartburn, melena, nausea and vomiting.   Genitourinary: Negative for dysuria and hematuria.   Neurological: Negative for excessive daytime sleepiness, dizziness, light-headedness, loss of balance, numbness, paresthesias, seizures and vertigo.   Psychiatric/Behavioral: Negative for altered mental status, depression, memory loss and substance abuse. The patient does not have insomnia and is not nervous/anxious.    Allergic/Immunologic: Negative for environmental allergies.       No Known Allergies      Current Outpatient Prescriptions:   •  amLODIPine (NORVASC) 5 MG tablet, Take 1 tablet by mouth Daily., Disp: 30 tablet, Rfl: 1  •  aspirin 81 MG chewable tablet, Chew 81 mg Daily., Disp: , Rfl:   •  carvedilol (COREG) 25 MG tablet, Take 1 tablet by mouth Every 12 (Twelve) Hours., Disp: 60 tablet, Rfl: 1  •  furosemide (LASIX) 40 MG tablet, Take 1 tablet by mouth Daily., Disp: 30 tablet, Rfl: 1  •  hydrALAZINE (APRESOLINE) 25 MG tablet, Take 3 tablets by mouth Every 8 (Eight) Hours., Disp: 90 tablet, Rfl: 3  •  isosorbide mononitrate (IMDUR) 60 MG 24 hr tablet, Take 1 tablet by mouth Daily., Disp: 30 tablet, Rfl: 1      Objective:     Vitals:    07/17/18 1031   BP: 148/80   Pulse: 81   Weight: 119 kg (262 lb 3.2 oz)   Height: 180.3 cm (71\")     Body mass index is 36.57 " kg/m².    PHYSICAL EXAM:    Vitals Reviewed.   General Appearance: No acute distress, well developed and well nourished. Obese.   Eyes: Conjunctiva and lids: No erythema, swelling, or discharge. Sclera non-icteric.   HENT: Atraumatic, normocephalic. External eyes, ears, and nose normal. No hearing loss noted. Mucous membranes normal. Lips not cyanotic. Neck supple with no tenderness.  Respiratory: No signs of respiratory distress. Respiration rhythm and depth normal.   Clear to auscultation. No rales, crackles, rhonchi, or wheezing auscultated.   Cardiovascular:  Jugular Venous Pressure: Normal  Heart Rate and Rhythm: Normal, Heart Sounds: Normal S1 and S2. No S3 or S4 noted.  Murmurs: No murmurs noted. No rubs, thrills, or gallops.   Arterial Pulses: Carotid pulses normal. No carotid bruit noted. Posterior tibialis and dorsalis pedis pulses normal.   Lower Extremities: +1 bilateral lower extremity edema noted.  Gastrointestinal:  Abdomen soft, non-distended, non-tender. Normal bowel sounds. No hepatomegaly.   Musculoskeletal: Normal movement of extremities  Skin and Nails: General appearance normal. No pallor, cyanosis, diaphoresis. Skin temperature normal. No clubbing of fingernails.   Psychiatric: Patient alert and oriented to person, place, and time. Speech and behavior appropriate. Normal mood and affect.       ECG 12 Lead  Date/Time: 7/17/2018 10:26 AM  Performed by: VICTORINA GOLDBERG  Authorized by: VICTORINA GOLDBERG   Comparison: compared with previous ECG from 6/30/2018  Similar to previous ECG  Rhythm: sinus rhythm  Rate: normal  BPM: 81  ST Segments: ST segments normal  T Waves: T waves normal  Other findings: LAE  Clinical impression: non-specific ECG              Assessment:       Diagnosis Plan   1. Non-rheumatic mitral regurgitation     2. Acute combined systolic and diastolic congestive heart failure (CMS/HCC)     3. Stage III chronic kidney disease     4. Essential hypertension     5. Cigarette smoker      6. Foot callus            Plan:       1. Mitral Regurgitation: Noted to be severe per echocardiogram June 2018.  I discussed with Dr. Meggan Hinton and she is going to discuss proceeding with a transesophageal echocardiogram during his office appointment in August.  At some point the valve will need to be repaired/replace.    2.  Acute on chronic systolic and diastolic heart failure: Appears well compensated on his current medication regimen.  He does have lower extremity edema present which may be multifactorial secondary to heart failure, hypertension, chronic kidney disease, obesity, calcium channel blocker, and sodium in his diet.  He would benefit from reduction of sodium in his diet and elevation of legs.    Heart Failure  Assessment  • NYHA class II - There is slight limitation of physical activity. The patient is comfortable at rest, but physical activity results in fatigue, palpitations or shortness of breath.  • Beta blocker prescribed  • Diuretics prescribed  • Left ventricular function is normal by qualitative assessment    Plan  • The patient has received heart failure education on the following topics: dietary sodium restriction, medication instructions, symptom management and weight monitoring        3.  Chronic Kidney Disease: Currently followed by Dr. Ochoa.  Kidney function was repeated at his office on 7/2 which showed a BUN of 37 and creatinine 2.42.    4.  Hypertension: We'll increase his hydralazine to 100 mg 3 times daily for blood pressure goal of less than 140/80 per nephrology's recommendations.    5.  Nicotine dependence: He would highly benefit from abstaining from cigarette smoking.    6.  Foot Callus: He showed me 2 calluses on the bottom of his right foot and has requested a referral to podiatry.    7.  PCP: I have given him the PCP referral line phone number for [facility] to obtain an primary care physician since he is new in the [facility].    8.  Follow up  with Dr. Hinton as scheduled in August.    As always, it has been a pleasure to participate in your patient's care.      Sincerely,         MINGO Campoverde

## 2018-08-07 ENCOUNTER — OFFICE VISIT (OUTPATIENT)
Dept: CARDIOLOGY | Facility: CLINIC | Age: 48
End: 2018-08-07

## 2018-08-07 VITALS
WEIGHT: 250 LBS | BODY MASS INDEX: 35 KG/M2 | SYSTOLIC BLOOD PRESSURE: 160 MMHG | HEIGHT: 71 IN | HEART RATE: 84 BPM | DIASTOLIC BLOOD PRESSURE: 80 MMHG

## 2018-08-07 DIAGNOSIS — F17.210 CIGARETTE SMOKER: ICD-10-CM

## 2018-08-07 DIAGNOSIS — I10 ESSENTIAL HYPERTENSION: ICD-10-CM

## 2018-08-07 DIAGNOSIS — I34.0 NON-RHEUMATIC MITRAL REGURGITATION: Primary | ICD-10-CM

## 2018-08-07 DIAGNOSIS — N18.30 STAGE III CHRONIC KIDNEY DISEASE (HCC): ICD-10-CM

## 2018-08-07 PROBLEM — I50.9 ACUTE CONGESTIVE HEART FAILURE (HCC): Status: RESOLVED | Noted: 2018-06-30 | Resolved: 2018-08-07

## 2018-08-07 PROCEDURE — 93000 ELECTROCARDIOGRAM COMPLETE: CPT | Performed by: INTERNAL MEDICINE

## 2018-08-07 PROCEDURE — 99214 OFFICE O/P EST MOD 30 MIN: CPT | Performed by: INTERNAL MEDICINE

## 2018-08-07 RX ORDER — ISOSORBIDE MONONITRATE 60 MG/1
60 TABLET, EXTENDED RELEASE ORAL
Qty: 90 TABLET | Refills: 3 | Status: SHIPPED | OUTPATIENT
Start: 2018-08-07 | End: 2019-01-01 | Stop reason: SDUPTHER

## 2018-08-07 RX ORDER — CARVEDILOL 25 MG/1
25 TABLET ORAL EVERY 12 HOURS SCHEDULED
Qty: 180 TABLET | Refills: 3 | Status: SHIPPED | OUTPATIENT
Start: 2018-08-07 | End: 2018-08-28 | Stop reason: SDUPTHER

## 2018-08-07 RX ORDER — AMLODIPINE BESYLATE 5 MG/1
5 TABLET ORAL NIGHTLY
Qty: 90 TABLET | Refills: 3 | Status: SHIPPED | OUTPATIENT
Start: 2018-08-07 | End: 2018-08-28 | Stop reason: SDUPTHER

## 2018-08-07 RX ORDER — CARVEDILOL 25 MG/1
25 TABLET ORAL EVERY 12 HOURS SCHEDULED
Qty: 180 TABLET | Refills: 3 | Status: SHIPPED | OUTPATIENT
Start: 2018-08-07 | End: 2018-08-07 | Stop reason: SDUPTHER

## 2018-08-07 RX ORDER — HYDRALAZINE HYDROCHLORIDE 50 MG/1
1 TABLET, FILM COATED ORAL 3 TIMES DAILY
COMMUNITY
Start: 2018-08-02 | End: 2018-08-07

## 2018-08-07 RX ORDER — ISOSORBIDE MONONITRATE 60 MG/1
60 TABLET, EXTENDED RELEASE ORAL
Qty: 90 TABLET | Refills: 3 | Status: SHIPPED | OUTPATIENT
Start: 2018-08-07 | End: 2018-08-07 | Stop reason: SDUPTHER

## 2018-08-07 RX ORDER — FUROSEMIDE 40 MG/1
40 TABLET ORAL 2 TIMES DAILY
Qty: 180 TABLET | Refills: 3 | Status: SHIPPED | OUTPATIENT
Start: 2018-08-07 | End: 2019-01-01 | Stop reason: SDUPTHER

## 2018-08-07 RX ORDER — AMLODIPINE BESYLATE 5 MG/1
5 TABLET ORAL NIGHTLY
Qty: 90 TABLET | Refills: 3 | Status: SHIPPED | OUTPATIENT
Start: 2018-08-07 | End: 2018-08-07 | Stop reason: SDUPTHER

## 2018-08-07 RX ORDER — FUROSEMIDE 40 MG/1
40 TABLET ORAL 2 TIMES DAILY
Qty: 180 TABLET | Refills: 3 | Status: SHIPPED | OUTPATIENT
Start: 2018-08-07 | End: 2018-08-07 | Stop reason: SDUPTHER

## 2018-08-07 RX ORDER — HYDRALAZINE HYDROCHLORIDE 100 MG/1
50 TABLET, FILM COATED ORAL 3 TIMES DAILY
Qty: 90 TABLET | Refills: 11 | COMMUNITY
Start: 2018-08-07 | End: 2019-01-01 | Stop reason: SDUPTHER

## 2018-08-07 NOTE — PROGRESS NOTES
Date of Office Visit: 2018  Encounter Provider: Meggan Hinton MD  Place of Service: [facility] CARDIOLOGY  Patient Name: Nico King  :1970      Patient ID:  Nico King is a 47 y.o. male is here for  followup for MR, HTN.         History of Present Illness    In 2018, he moved from [place] to [place] and had been out of his furosemide and hydralazine.  He presented to the [facility] emergency department on 2018 with shortness of breath.  His blood pressure was elevated at 192/126 with a heart rate of 121, troponin was 0.034, pro-BMP 15,796 and creatinine 1.83.  Chest x-ray showed cardiomegaly with vascular engorgement.  He was admitted to the hospital for further diuresis.  An echocardiogram was obtained which revealed an EF of 50%, mild left ventricular hypertrophy, aortic valve calcification, left atrium moderately to severely dilated, trace to mild tricuspid valve regurgitation, mild pulmonary hypertension, and severe eccentric mitral valve regurgitation.  His creatinine level was elevated and he was felt to have acute kidney injury on chronic kidney disease he was recommended to have a repeat BMP at the beginning of this week and to follow-up with nephrology on 7/3.  New medications were amlodipine 5 mg daily, carvedilol 25 mg twice daily, isosorbide 60 mg daily, hydralazine 75 mg every 8 hours, and furosemide 40 mg daily.       He did follow-up with Dr. Ochoa on 7/3/18 and his blood pressure was 130/96 in their office.  He was diagnosed with stage III moderate chronic kidney disease and he was advised to follow a low-sodium diet, avoid NSAIDs, and blood pressure goal less than 140/80.    He has no chest pain.  He's noticed swelling during the day.  Once he gets home, he urinates quite a bit once he gets his legs up.  He doesn't feels heart racing or skipping.  Had no chest pain or pressure.  He is taking his medications as  directed but he's not avoiding sodium.  He states he will try harder.  He has no orthopnea or PND.     Past Medical History:   Diagnosis Date   • DESHAWN (acute kidney injury) (CMS/[facility])    • CHF (congestive heart failure) (CMS/[facility])    • Cigarette smoker    • Diabetes mellitus (CMS/HCC)    • Foot callus    • Hypertension    • Mitral regurgitation     severe eccentric per echo 7/2018   • Pleural effusion 06/30/2018    Bilateral, small per x-ray   • Stage III chronic kidney disease          Past Surgical History:   Procedure Laterality Date   • HIP SURGERY         Current Outpatient Prescriptions on File Prior to Visit   Medication Sig Dispense Refill   • amLODIPine (NORVASC) 5 MG tablet Take 1 tablet by mouth Daily. 30 tablet 1   • aspirin 81 MG chewable tablet Chew 81 mg Daily.     • carvedilol (COREG) 25 MG tablet Take 1 tablet by mouth Every 12 (Twelve) Hours. 60 tablet 1   • furosemide (LASIX) 40 MG tablet Take 1 tablet by mouth Daily. (Patient taking differently: Take 40 mg by mouth 2 (Two) Times a Day.) 30 tablet 1   • isosorbide mononitrate (IMDUR) 60 MG 24 hr tablet Take 1 tablet by mouth Daily. 30 tablet 1   • [DISCONTINUED] hydrALAZINE (APRESOLINE) 100 MG tablet Take 1 tablet by mouth 3 (Three) Times a Day. 90 tablet 1     No current facility-administered medications on file prior to visit.        Social History     Social History   • Marital status: Single     Spouse name: N/A   • Number of children: N/A   • Years of education: N/A     Occupational History   • Not on file.     Social History Main Topics   • Smoking status: Current Every Day Smoker     Packs/day: 0.50     Types: Cigarettes   • Smokeless tobacco: Current User     Types: Chew      Comment: caffeine use   • Alcohol use No   • Drug use: No   • Sexual activity: Defer     Other Topics Concern   • Not on file     Social History Narrative   • No narrative on file           Review of Systems   Constitution: Negative.   HENT: Negative for congestion.   "  Eyes: Negative for vision loss in left eye and vision loss in right eye.   Respiratory: Negative.  Negative for cough, hemoptysis, shortness of breath, sleep disturbances due to breathing, snoring, sputum production and wheezing.    Endocrine: Negative.    Hematologic/Lymphatic: Negative.    Skin: Negative for poor wound healing and rash.   Musculoskeletal: Negative for falls, gout, muscle cramps and myalgias.   Gastrointestinal: Negative for abdominal pain, diarrhea, dysphagia, hematemesis, melena, nausea and vomiting.   Neurological: Negative for excessive daytime sleepiness, dizziness, headaches, light-headedness, loss of balance, seizures and vertigo.   Psychiatric/Behavioral: Negative for depression and substance abuse. The patient is not nervous/anxious.        Procedures    ECG 12 Lead  Date/Time: 8/7/2018 11:00 AM  Performed by: DYLAN SALMON  Authorized by: DYLAN SALMON   Comparison: compared with previous ECG   Similar to previous ECG  Rhythm: sinus rhythm  Clinical impression: normal ECG                Objective:      Vitals:    08/07/18 1051   BP: 160/80   BP Location: Left arm   Patient Position: Sitting   Pulse: 84   Weight: 113 kg (250 lb)   Height: 180.3 cm (71\")     Body mass index is 34.87 kg/m².    Physical Exam   Constitutional: He is oriented to person, place, and time. He appears well-developed and well-nourished. No distress.   HENT:   Head: Normocephalic and atraumatic.   Eyes: Conjunctivae are normal. No scleral icterus.   Neck: Neck supple. No JVD present. Carotid bruit is not present. No thyromegaly present.   Cardiovascular: Normal rate, regular rhythm, S1 normal, S2 normal, normal heart sounds and intact distal pulses.   No extrasystoles are present. PMI is not displaced.  Exam reveals no gallop.    No murmur heard.  Pulses:       Carotid pulses are 2+ on the right side, and 2+ on the left side.       Radial pulses are 2+ on the right side, and 2+ on the left side.       "  Dorsalis pedis pulses are 2+ on the right side, and 2+ on the left side.        Posterior tibial pulses are 2+ on the right side, and 2+ on the left side.   Pulmonary/Chest: Effort normal and breath sounds normal. No respiratory distress. He has no wheezes. He has no rhonchi. He has no rales. He exhibits no tenderness.   Abdominal: Soft. Bowel sounds are normal. He exhibits no distension, no abdominal bruit and no mass. There is no tenderness.   Musculoskeletal: He exhibits no edema or deformity.   Lymphadenopathy:     He has no cervical adenopathy.   Neurological: He is alert and oriented to person, place, and time. No cranial nerve deficit.   Skin: Skin is warm and dry. No rash noted. He is not diaphoretic. No cyanosis. No pallor. Nails show no clubbing.   Psychiatric: He has a normal mood and affect. Judgment normal.   Vitals reviewed.      Lab Review:       Assessment:      Diagnosis Plan   1. Non-rheumatic mitral regurgitation     2. Essential hypertension     3. Stage III chronic kidney disease     4. Cigarette smoker       1. Hypertension, still elevated.  Increase hydralazine to 100mg TID. Take amlodipine at night.   2. Severe MR, repeat echo in 3 months.   3. Stage 3 CKD.   4. Tobacco use.   5. Probable CONCEPCION, needs sleep study.      Plan:       Refer to internal medicine.  See ozzie in 3 months.

## 2018-08-09 ENCOUNTER — TELEPHONE (OUTPATIENT)
Dept: CARDIOLOGY | Facility: CLINIC | Age: 48
End: 2018-08-09

## 2018-08-09 NOTE — TELEPHONE ENCOUNTER
Please call patient to see if he received a podiatrist appointment.  I placed a referral on his last visit.  Thank you

## 2018-08-16 NOTE — TELEPHONE ENCOUNTER
I left a message for patient to return my call.  If I don't hear from him by Tuesday I will send a letter.

## 2018-08-29 ENCOUNTER — TELEPHONE (OUTPATIENT)
Dept: CARDIOLOGY | Facility: CLINIC | Age: 48
End: 2018-08-29

## 2018-08-29 DIAGNOSIS — L84 FOOT CALLUS: Primary | ICD-10-CM

## 2018-08-29 RX ORDER — AMLODIPINE BESYLATE 5 MG/1
TABLET ORAL
Qty: 30 TABLET | Refills: 1 | Status: SHIPPED | OUTPATIENT
Start: 2018-08-29 | End: 2018-10-17 | Stop reason: SDUPTHER

## 2018-08-29 RX ORDER — CARVEDILOL 25 MG/1
TABLET ORAL
Qty: 60 TABLET | Refills: 1 | Status: SHIPPED | OUTPATIENT
Start: 2018-08-29 | End: 2018-10-17 | Stop reason: SDUPTHER

## 2018-08-29 NOTE — TELEPHONE ENCOUNTER
Referral was placed.  Please ask patient to call me with name of physician and appointment time when he does arrange.  Thank you

## 2018-08-29 NOTE — TELEPHONE ENCOUNTER
"----- Message from Keysha Yan MA sent at 8/28/2018  3:27 PM EDT -----  Regarding: referral to podiatry  Cece,  Can you please put in another referral for Mr. King to see a podiatrist?  Apparently the referral that was placed back in July pt was unable to make the appt.  He would like to call and reschedule but the referral's status is \"closed\"  ThanksAnthony"

## 2018-10-16 ENCOUNTER — TELEPHONE (OUTPATIENT)
Dept: CARDIOLOGY | Facility: CLINIC | Age: 48
End: 2018-10-16

## 2018-10-16 NOTE — TELEPHONE ENCOUNTER
I placed a referral for podiatry in the past.  Please call patient to see if he ever received a podiatry referral appointment.  Thank you

## 2018-10-16 NOTE — TELEPHONE ENCOUNTER
----- Message from MINGO Bueno sent at 8/29/2018  3:50 PM EDT -----    Follow up on podiatry appointment

## 2018-10-17 RX ORDER — CARVEDILOL 25 MG/1
25 TABLET ORAL 2 TIMES DAILY
Qty: 180 TABLET | Refills: 1 | Status: SHIPPED | OUTPATIENT
Start: 2018-10-17 | End: 2019-01-01 | Stop reason: SDUPTHER

## 2018-10-17 RX ORDER — AMLODIPINE BESYLATE 5 MG/1
5 TABLET ORAL DAILY
Qty: 90 TABLET | Refills: 1 | Status: SHIPPED | OUTPATIENT
Start: 2018-10-17 | End: 2019-01-01 | Stop reason: SDUPTHER

## 2018-10-17 NOTE — TELEPHONE ENCOUNTER
Pt returned the call.  He states he did not make the podiatry appointment.  He would like to see his new PCP first.  He has a follow up appointment with you on 11/26/18. -arti

## 2018-11-19 ENCOUNTER — HOSPITAL ENCOUNTER (EMERGENCY)
Facility: HOSPITAL | Age: 48
Discharge: HOME | End: 2018-11-19
Attending: EMERGENCY MEDICINE | Admitting: EMERGENCY MEDICINE

## 2018-11-19 VITALS
BODY MASS INDEX: 39 KG/M2 | TEMPERATURE: 99.1 F | HEART RATE: 83 BPM | DIASTOLIC BLOOD PRESSURE: 87 MMHG | SYSTOLIC BLOOD PRESSURE: 124 MMHG | WEIGHT: 278.6 LBS | RESPIRATION RATE: 16 BRPM | HEIGHT: 71 IN | OXYGEN SATURATION: 96 %

## 2018-11-19 DIAGNOSIS — K04.7 PERIAPICAL ABSCESS WITH FACIAL INVOLVEMENT: Primary | ICD-10-CM

## 2018-11-19 PROCEDURE — 99283 EMERGENCY DEPT VISIT LOW MDM: CPT

## 2018-11-19 RX ORDER — AMOXICILLIN AND CLAVULANATE POTASSIUM 875; 125 MG/1; MG/1
1 TABLET, FILM COATED ORAL 2 TIMES DAILY
Qty: 14 TABLET | Refills: 0 | Status: SHIPPED | OUTPATIENT
Start: 2018-11-19 | End: 2018-11-26

## 2018-11-19 NOTE — DISCHARGE INSTRUCTIONS
[facility] List  [facility]   [address]   [phone]  Based on Income (Monday-Friday)   Appointment ONLY 8am-1pm   $15 minimum    [facility]   [address]   [phone]  Based on Income (Monday-Friday)   [facility]   $12-24 minimum    [facility]   [address]   [phone]  By appointment ONLY   Must call by 8:30 am to obtain an appointment for the next day   $50 minimum    [facility]   [address]   [phone]  Must be homeless to be seen    [facility]   [address]   [phone]  Walk in and appointments   7 days a week 9am-9pm   $83 minimum    [facility]   [address]   [phone]  Walk in and appointments   9am-4pm   $50 minimum   Passport and other insurances accepted    [facility]   [address]   [phone]  Walk in and appointments   9am-4pm   $50 minimum   Passport and other insurances accepted    [address] [place] [address] Dental   [address]   [phone]  Walk in and appointments   9am-4pm   $50 minimum   Passport and other insurances accepted    [facility]   [phone]  Walk in and appointments   9am-4pm   $50 minimum   Passport and other insurances accepted    [address]   [phone]  Walk in and appointments   9am-4pm   $50 minimum   Passport and other insurances accepted

## 2018-11-19 NOTE — ED NOTES
Pt arrives with c/o right cheek/jaw swelling that started yesterday. Pt denies dental pain.      Becka Mejias RN  11/19/18 7947

## 2018-11-19 NOTE — ED NOTES
"Pt c/o R jaw and facial swelling since Saturday, 11/17/18 with subjective fever, denies pain, N/V.  On assessment, r facial swelling apparent; oropharynx pink with moist mucus membranes; open dental sai with brown discoloration noted r second molar; surrounding gum erythematous.  When interrogated, pt states: \"I've had that for years now.\"  Pt denies being seen regularly by dentist.     Ze Albarado RN  11/19/18 0912    "

## 2018-11-19 NOTE — ED NOTES
"On reassessment of pt, pt states: \"I think it popped.\"  When asked for clarification, pt points to R face, states: \"I can feel it draining into my mouth.\"  On reassessment of oropharynx, scant purulent drainage noted from R molar; pt provided with emesis bag for evacuation of discharge.  Pt denies pain or discomfort.     Ze Albarado RN  11/19/18 0932    "

## 2018-11-19 NOTE — ED PROVIDER NOTES
EMERGENCY DEPARTMENT ENCOUNTER    Room Number:  32/32  Date seen:  11/19/2018  Time seen: 9:23 AM  PCP: Provider, No Known  Historian: patient      HPI:  Chief Complaint: R facial swelling  A complete HPI/ROS/PMH/PSH/SH/FH are unobtainable due to: n/a  Context: Nico King is a 48 y.o. male who presents to the ED c/o R facial swelling which he first noticed yesterday.  Pt reports associated drainage which began 15 minutes ago.  Pt c/o subjective fever and nausea, but denies vision changes.  Pt denies diabetes.    Location: R facial swelling  Radiation: none  Quality: swelling  Intensity/Severity: moderate  Duration: 1 day  Onset quality: gradual  Timing: constant  Progression: unchanged  Aggravating Factors: none stated  Alleviating Factors: none stated  Previous Episodes: none  Treatment before arrival: none  Associated Symptoms: subjective fever, nausea, and drainage from site of swelling    PAST MEDICAL HISTORY  Active Ambulatory Problems     Diagnosis Date Noted   • Mitral regurgitation    • Cigarette smoker    • Essential hypertension 07/17/2018   • Stage III chronic kidney disease (CMS/HCC)    • Foot callus 07/17/2018     Resolved Ambulatory Problems     Diagnosis Date Noted   • Acute congestive heart failure (CMS/HCC) 06/30/2018     Past Medical History:   Diagnosis Date   • DESHAWN (acute kidney injury) (CMS/HCC)    • CHF (congestive heart failure) (CMS/HCC)    • Cigarette smoker    • Diabetes mellitus (CMS/HCC)    • Foot callus    • Hypertension    • Mitral regurgitation    • Pleural effusion 06/30/2018   • Stage III chronic kidney disease (CMS/HCC)          PAST SURGICAL HISTORY  Past Surgical History:   Procedure Laterality Date   • HIP SURGERY           FAMILY HISTORY  Family History   Problem Relation Age of Onset   • Hypertension Mother    • Atrial fibrillation Father    • Hypertension Father          SOCIAL HISTORY  Social History     Socioeconomic History   • Marital status: Single     Spouse  name: Not on file   • Number of children: Not on file   • Years of education: Not on file   • Highest education level: Not on file   Social Needs   • Financial resource strain: Not on file   • Food insecurity - worry: Not on file   • Food insecurity - inability: Not on file   • Transportation needs - medical: Not on file   • Transportation needs - non-medical: Not on file   Occupational History   • Not on file   Tobacco Use   • Smoking status: Current Every Day Smoker     Packs/day: 0.50     Types: Cigarettes   • Smokeless tobacco: Current User     Types: Chew   • Tobacco comment: caffeine use   Substance and Sexual Activity   • Alcohol use: No   • Drug use: No   • Sexual activity: Defer   Other Topics Concern   • Not on file   Social History Narrative   • Not on file         ALLERGIES  Patient has no known allergies.        REVIEW OF SYSTEMS  Review of Systems   Constitutional: Positive for fever (subjective).   HENT: Positive for facial swelling (R sided, associated drainage x15 minutes). Negative for sore throat.    Eyes: Negative for visual disturbance.   Respiratory: Negative for shortness of breath.    Cardiovascular: Negative for chest pain.   Gastrointestinal: Positive for nausea. Negative for abdominal pain.   Endocrine: Negative for polyuria.   Genitourinary: Negative for dysuria.   Musculoskeletal: Negative for neck pain.   Skin: Negative for rash.   Neurological: Negative for headaches.   All other systems reviewed and are negative.           PHYSICAL EXAM  ED Triage Vitals   Temp Heart Rate Resp BP SpO2   11/19/18 0840 11/19/18 0840 11/19/18 0840 11/19/18 0840 11/19/18 0840   99.1 °F (37.3 °C) 101 20 146/81 95 %      Temp src Heart Rate Source Patient Position BP Location FiO2 (%)   11/19/18 0840 -- 11/19/18 0840 11/19/18 0840 --   Tympanic  Lying Right arm          GENERAL: not distressed  HENT: nares patent  Swelling to R cheek with active purulent discharge from 1st molar  Multiple carries  EYES: no  scleral icterus, EOMI  CV: regular rhythm, regular rate  RESPIRATORY: normal effort  MUSCULOSKELETAL: no deformity  NEURO: alert, LOCO, FC  SKIN: warm, dry    Vital signs and nursing notes reviewed.              PROCEDURES  Procedures            PROGRESS AND CONSULTS     0935  Initial encounter.  Discussed with pt plan for discharge with augmentin for his periapical abscess.  Discussed no need to perform I&D since pt's abscess is currently draining.  Advised pt f/u with PCP referral and dentist.  Pt understands and agrees with the plan, all questions answered.        MEDICAL DECISION MAKING      MDM  Number of Diagnoses or Management Options  Periapical abscess with facial involvement:   Diagnosis management comments: Right facial abscess with spontaneous drainage. Start abx. Gave good return precautions. D/c home. Follow up with dentist.     Patient Progress  Patient progress: stable             DIAGNOSIS  Final diagnoses:   Periapical abscess with facial involvement         DISPOSITION  DISCHARGE    Patient discharged in stable condition.    Reviewed implications of results, diagnosis, meds, responsibility to follow up, warning signs and symptoms of possible worsening, potential complications and reasons to return to ER.    Patient/Family voiced understanding of above instructions.    Discussed plan for discharge, as there is no emergent indication for admission. Patient referred to primary care provider for BP management due to today's BP. Pt/family is agreeable and understands need for follow up and repeat testing.  Pt is aware that discharge does not mean that nothing is wrong but it indicates no emergency is present that requires admission and they must continue care with follow-up as given below or physician of their choice.     FOLLOW-UP  PATIENT LIAISON [facility] [id number] [phone]  Schedule an appointment as soon as possible for a visit   As needed         Medication List      New  Prescriptions    amoxicillin-clavulanate 875-125 MG per tablet  Commonly known as:  AUGMENTIN  Take 1 tablet by mouth 2 (Two) Times a Day for 7 days.                      Latest Documented Vital Signs:  As of 9:40 AM  BP- 146/81 HR- 101 Temp- 99.1 °F (37.3 °C) (Tympanic) O2 sat- 95%        --  Documentation assistance provided by alessandro Arriaza for Dr. Khari MD.  Information recorded by the scribe was done at my direction and has been verified and validated by me.                 Patricia Arriaza  11/19/18 6367       Renard Lucia II, MD  11/19/18 4803

## 2018-11-26 ENCOUNTER — OFFICE VISIT (OUTPATIENT)
Dept: CARDIOLOGY | Facility: CLINIC | Age: 48
End: 2018-11-26

## 2018-11-26 VITALS
HEIGHT: 71 IN | WEIGHT: 283 LBS | DIASTOLIC BLOOD PRESSURE: 80 MMHG | HEART RATE: 76 BPM | BODY MASS INDEX: 39.62 KG/M2 | SYSTOLIC BLOOD PRESSURE: 170 MMHG

## 2018-11-26 DIAGNOSIS — I10 ESSENTIAL HYPERTENSION: ICD-10-CM

## 2018-11-26 DIAGNOSIS — N18.30 STAGE III CHRONIC KIDNEY DISEASE (HCC): ICD-10-CM

## 2018-11-26 DIAGNOSIS — R29.818 SUSPECTED SLEEP APNEA: ICD-10-CM

## 2018-11-26 DIAGNOSIS — F17.210 CIGARETTE SMOKER: ICD-10-CM

## 2018-11-26 DIAGNOSIS — I34.0 NON-RHEUMATIC MITRAL REGURGITATION: Primary | ICD-10-CM

## 2018-11-26 DIAGNOSIS — L84 FOOT CALLUS: ICD-10-CM

## 2018-11-26 PROCEDURE — 99214 OFFICE O/P EST MOD 30 MIN: CPT | Performed by: NURSE PRACTITIONER

## 2018-11-26 PROCEDURE — 93000 ELECTROCARDIOGRAM COMPLETE: CPT | Performed by: NURSE PRACTITIONER

## 2018-11-26 NOTE — PROGRESS NOTES
Date of Office Visit: 2018  Encounter Provider: MINGO Mancini  Place of Service: [facility] CARDIOLOGY  Patient Name: Nico King  :1970      Chief Complaint   Patient presents with   • Non-rheumatic mitral regurgitation     3 month follow up   :     Dear Dr. Schmitz,     HPI: Nico King is a pleasant 48 y.o. male who presents today for three month follow up.  He has a history of hypertension, severe mitral valve regurgitation, stage III chronic kidney disease, cigarette smoking, and diabetes mellitus.  He was last seen in the office by Dr. Meggan Hinton in August and his blood pressure was elevated 160/80.  She asked him to take his amlodipine at nighttime and increase hydralazine to 100 mg 3 times daily.  She felt that he may need a sleep study and a repeat echocardiogram in 3 months.    He presents today for his three-month follow-up.  His blood pressure today is 170/80.  I discussed with him his medications and he was not aware that Dr. Hinton increased his hydralazine in August.  He saw his nephrologist last week who increased his hydralazine to 100 mg 3 times a day and he has not picked up the new prescription.  He says he follows a low-sodium diet and stated that he could probably walk more often.  He has continued to smoke cigarettes.  He denies chest pain, shortness of air, PND, orthopnea, palpitations, dizziness, or syncope.  He continues to have bilateral lower extremity edema which is unchanged and a cough.  I asked him about possible sleep apnea symptoms and he doesn't know if he snores at nighttime.    Past Medical History:   Diagnosis Date   • DESHAWN (acute kidney injury) (CMS/[facility])    • CHF (congestive heart failure) (CMS/HCC)    • Cigarette smoker    • Diabetes mellitus (CMS/HCC)    • Foot callus    • Hypertension    • Mitral regurgitation     severe eccentric per echo 2018   • Pleural effusion 2018    Bilateral,  small per x-ray   • Stage III chronic kidney disease (CMS/HCC)        Past Surgical History:   Procedure Laterality Date   • HIP SURGERY         Social History     Socioeconomic History   • Marital status: Single     Spouse name: Not on file   • Number of children: Not on file   • Years of education: Not on file   • Highest education level: Not on file   Social Needs   • Financial resource strain: Not on file   • Food insecurity - worry: Not on file   • Food insecurity - inability: Not on file   • Transportation needs - medical: Not on file   • Transportation needs - non-medical: Not on file   Occupational History   • Not on file   Tobacco Use   • Smoking status: Current Every Day Smoker     Packs/day: 0.50     Types: Cigarettes   • Smokeless tobacco: Current User     Types: Chew   • Tobacco comment: caffeine use   Substance and Sexual Activity   • Alcohol use: No   • Drug use: No   • Sexual activity: Defer   Other Topics Concern   • Not on file   Social History Narrative   • Not on file       Family History   Problem Relation Age of Onset   • Hypertension Mother    • Atrial fibrillation Father    • Hypertension Father        The following portion of the patient's history were reviewed and updated as appropriate: past medical history, past surgical history, past social history, past family history, allergies, current medications, and problem list.    Review of Systems   Constitution: Negative for chills, diaphoresis, fever, malaise/fatigue, night sweats, weight gain and weight loss.   HENT: Negative for hearing loss, nosebleeds, sore throat and tinnitus.    Eyes: Negative for blurred vision, double vision, pain and visual disturbance.   Cardiovascular: Positive for leg swelling. Negative for chest pain, claudication, cyanosis, dyspnea on exertion, irregular heartbeat, near-syncope, orthopnea, palpitations, paroxysmal nocturnal dyspnea and syncope.   Respiratory: Positive for cough. Negative for hemoptysis, shortness  "of breath, snoring and wheezing.    Endocrine: Negative for cold intolerance, heat intolerance and polyuria.   Hematologic/Lymphatic: Negative for bleeding problem. Does not bruise/bleed easily.   Skin: Negative for color change, dry skin, flushing and itching.   Musculoskeletal: Negative for falls, joint pain, joint swelling, muscle cramps, muscle weakness and myalgias.   Gastrointestinal: Negative for abdominal pain, constipation, heartburn, melena, nausea and vomiting.   Genitourinary: Negative for dysuria and hematuria.   Neurological: Negative for excessive daytime sleepiness, dizziness, light-headedness, loss of balance, numbness, paresthesias, seizures and vertigo.   Psychiatric/Behavioral: Negative for altered mental status, depression, memory loss and substance abuse. The patient does not have insomnia and is not nervous/anxious.    Allergic/Immunologic: Negative for environmental allergies.       No Known Allergies      Current Outpatient Medications:   •  amLODIPine (NORVASC) 5 MG tablet, Take 1 tablet by mouth Daily., Disp: 90 tablet, Rfl: 1  •  amoxicillin-clavulanate (AUGMENTIN) 875-125 MG per tablet, Take 1 tablet by mouth 2 (Two) Times a Day for 7 days., Disp: 14 tablet, Rfl: 0  •  aspirin 81 MG chewable tablet, Chew 81 mg Daily., Disp: , Rfl:   •  carvedilol (COREG) 25 MG tablet, Take 1 tablet by mouth 2 (Two) Times a Day., Disp: 180 tablet, Rfl: 1  •  furosemide (LASIX) 40 MG tablet, Take 1 tablet by mouth 2 (Two) Times a Day., Disp: 180 tablet, Rfl: 3  •  hydrALAZINE (APRESOLINE) 100 MG tablet, Take 50 mg by mouth 3 (Three) Times a Day., Disp: 90 tablet, Rfl: 11  •  isosorbide mononitrate (IMDUR) 60 MG 24 hr tablet, Take 1 tablet by mouth Daily., Disp: 90 tablet, Rfl: 3        Objective:     Vitals:    11/26/18 0906   BP: 170/80   BP Location: Left arm   Pulse: 76   Weight: 128 kg (283 lb)   Height: 180.3 cm (71\")     Body mass index is 39.47 kg/m².    PHYSICAL EXAM:    Vitals Reviewed.   General " Appearance: No acute distress, well developed and well nourished.  Obese.  Eyes: Conjunctiva and lids: No erythema, swelling, or discharge. Sclera non-icteric.  Glasses.   HENT: Atraumatic, normocephalic. External eyes, ears, and nose normal. No hearing loss noted. Mucous membranes normal. Lips not cyanotic. Neck supple with no tenderness.  Respiratory: No signs of respiratory distress. Respiration rhythm and depth normal.   Clear to auscultation. No rales, crackles, rhonchi, or wheezing auscultated.   Cardiovascular:  Jugular Venous Pressure: Normal  Heart Rate and Rhythm: Normal, Heart Sounds: Normal S1 and S2. No S3 or S4 noted.  Murmurs: No murmurs noted. No rubs, thrills, or gallops.   Arterial Pulses: Carotid pulses normal. No carotid bruit noted. Posterior tibialis and dorsalis pedis pulses normal.   Lower Extremities: +1 pitting lower extremity edema noted.  Gastrointestinal:  Abdomen soft, non-distended, non-tender. Normal bowel sounds. No hepatomegaly.   Musculoskeletal: Normal movement of extremities  Skin and Nails: General appearance normal. No pallor, cyanosis, diaphoresis. Skin temperature normal. No clubbing of fingernails.   Psychiatric: Patient alert and oriented to person, place, and time. Speech and behavior appropriate. Normal mood and affect.       ECG 12 Lead  Date/Time: 11/26/2018 9:08 AM  Performed by: Cece Szymanski APRN  Authorized by: Cece Szymanski APRN   Comparison: compared with previous ECG from 8/7/2018  Similar to previous ECG  Rhythm: sinus rhythm  Rate: normal  BPM: 76  Conduction: conduction normal  ST Segments: ST segments normal  T Waves: T waves normal  QRS axis: normal  Clinical impression: normal ECG              Assessment:       Diagnosis Plan   1. Non-rheumatic mitral regurgitation  Adult Transthoracic Echo Complete W/ Cont if Necessary Per Protocol    Ambulatory Referral to Sleep Medicine   2. Essential hypertension  Ambulatory Referral to Sleep Medicine   3. Stage  III chronic kidney disease (CMS/[facility])  Ambulatory Referral to Sleep Medicine   4. Cigarette smoker     5. Suspected sleep apnea  Ambulatory Referral to Sleep Medicine   6. Foot callus            Plan:       1. Mitral Regurgitation: Severe per echocardiogram June 2018.  Dr. Hinton recommended a repeat echocardiogram and this is scheduled in December.    2. Hypertension: His blood pressure is elevated today.  He did not realize that he was to increase his hydralazine to 100 mg 3 times daily back in August.  He is going to  the new prescription from his nephrologist and start the increase hydralazine.  I have asked him to monitor his blood pressure at home and if it remains elevated above 140/90 needs to contact one of his physicians for further recommendations (nephrology, cardiology, or internal medicine).  I discussed with him how important blood pressure management is for him with his kidney disease and mitral valve disease.  I've also recommended weight loss, low sodium diet, and exercise.    3.  Stage III Chronic Kidney Disease: Followed by Dr. Lee.     4. Cigarette Smoker: He would highly benefit from abstaining from cigarette smoking.    5. Suspected Sleep Apnea: I have placed a referral for sleep apnea evaluation.    6. Foot Callus: On his last visit with me in the summer, he showed me this large foot callus and referred him to podiatry.  He never went to that visit because of the co-pay.  I asked him to let me know if you would like me to place another podiatry referral and he wants to talk about it with his internal medicine doctor.    7.  Follow up with Dr. Hinton in 3 months, unless otherwise needed sooner.    As always, it has been a pleasure to participate in your patient's care. Thank you.       Sincerely,         MINGO Campoverde

## 2018-12-13 ENCOUNTER — TELEPHONE (OUTPATIENT)
Dept: CARDIOLOGY | Facility: CLINIC | Age: 48
End: 2018-12-13

## 2018-12-13 NOTE — TELEPHONE ENCOUNTER
----- Message from MINGO Bueno sent at 11/26/2018 10:00 AM EST -----    Follow up on echo & BP that day  Follow up on sleep apnea referral

## 2018-12-19 NOTE — TELEPHONE ENCOUNTER
He said he never had the echocardiogram completed because he had to go out of town.  He is going to reschedule the echocardiogram now with my .  He has not pursued the sleep apnea evaluation.

## 2019-01-01 ENCOUNTER — HOSPITAL ENCOUNTER (EMERGENCY)
Facility: HOSPITAL | Age: 49
Discharge: LEFT BEFORE DISCHARGE | End: 2019-10-17
Attending: EMERGENCY MEDICINE | Admitting: EMERGENCY MEDICINE

## 2019-01-01 ENCOUNTER — APPOINTMENT (OUTPATIENT)
Dept: GENERAL RADIOLOGY | Facility: HOSPITAL | Age: 49
End: 2019-01-01

## 2019-01-01 VITALS
TEMPERATURE: 97.5 F | HEART RATE: 105 BPM | HEIGHT: 71 IN | WEIGHT: 300 LBS | RESPIRATION RATE: 18 BRPM | BODY MASS INDEX: 42 KG/M2 | SYSTOLIC BLOOD PRESSURE: 209 MMHG | DIASTOLIC BLOOD PRESSURE: 107 MMHG | OXYGEN SATURATION: 98 %

## 2019-01-01 DIAGNOSIS — N17.9 AKI (ACUTE KIDNEY INJURY) (HCC): Primary | ICD-10-CM

## 2019-01-01 DIAGNOSIS — N18.9 CHRONIC KIDNEY DISEASE, UNSPECIFIED CKD STAGE: ICD-10-CM

## 2019-01-01 DIAGNOSIS — I10 HYPERTENSION NOT AT GOAL: ICD-10-CM

## 2019-01-01 DIAGNOSIS — I50.9 ACUTE ON CHRONIC CONGESTIVE HEART FAILURE, UNSPECIFIED HEART FAILURE TYPE (HCC): ICD-10-CM

## 2019-01-01 DIAGNOSIS — Z91.14 NONCOMPLIANCE WITH MEDICATIONS: ICD-10-CM

## 2019-01-01 LAB
ALBUMIN SERPL-MCNC: 3 G/DL (ref 3.5–5.2)
ALBUMIN/GLOB SERPL: 0.8 G/DL
ALP SERPL-CCNC: 106 U/L (ref 39–117)
ALT SERPL W P-5'-P-CCNC: 11 U/L (ref 1–41)
ANION GAP SERPL CALCULATED.3IONS-SCNC: 16.8 MMOL/L (ref 5–15)
AST SERPL-CCNC: 14 U/L (ref 1–40)
BASOPHILS # BLD AUTO: 0.06 10*3/MM3 (ref 0–0.2)
BASOPHILS NFR BLD AUTO: 0.6 % (ref 0–1.5)
BILIRUB SERPL-MCNC: 0.5 MG/DL (ref 0.2–1.2)
BUN BLD-MCNC: 49 MG/DL (ref 6–20)
BUN/CREAT SERPL: 12.6 (ref 7–25)
CALCIUM SPEC-SCNC: 8.6 MG/DL (ref 8.6–10.5)
CHLORIDE SERPL-SCNC: 105 MMOL/L (ref 98–107)
CO2 SERPL-SCNC: 18.2 MMOL/L (ref 22–29)
CREAT BLD-MCNC: 3.9 MG/DL (ref 0.76–1.27)
DEPRECATED RDW RBC AUTO: 43.6 FL (ref 37–54)
EOSINOPHIL # BLD AUTO: 0.16 10*3/MM3 (ref 0–0.4)
EOSINOPHIL NFR BLD AUTO: 1.7 % (ref 0.3–6.2)
ERYTHROCYTE [DISTWIDTH] IN BLOOD BY AUTOMATED COUNT: 15.2 % (ref 12.3–15.4)
GFR SERPL CREATININE-BSD FRML MDRD: 17 ML/MIN/1.73
GLOBULIN UR ELPH-MCNC: 3.9 GM/DL
GLUCOSE BLD-MCNC: 115 MG/DL (ref 65–99)
HCT VFR BLD AUTO: 37 % (ref 37.5–51)
HGB BLD-MCNC: 11.9 G/DL (ref 13–17.7)
IMM GRANULOCYTES # BLD AUTO: 0.03 10*3/MM3 (ref 0–0.05)
IMM GRANULOCYTES NFR BLD AUTO: 0.3 % (ref 0–0.5)
LIPASE SERPL-CCNC: 35 U/L (ref 13–60)
LYMPHOCYTES # BLD AUTO: 1.33 10*3/MM3 (ref 0.7–3.1)
LYMPHOCYTES NFR BLD AUTO: 14.2 % (ref 19.6–45.3)
MCH RBC QN AUTO: 25.9 PG (ref 26.6–33)
MCHC RBC AUTO-ENTMCNC: 32.2 G/DL (ref 31.5–35.7)
MCV RBC AUTO: 80.4 FL (ref 79–97)
MONOCYTES # BLD AUTO: 0.7 10*3/MM3 (ref 0.1–0.9)
MONOCYTES NFR BLD AUTO: 7.5 % (ref 5–12)
NEUTROPHILS # BLD AUTO: 7.1 10*3/MM3 (ref 1.7–7)
NEUTROPHILS NFR BLD AUTO: 75.7 % (ref 42.7–76)
NRBC BLD AUTO-RTO: 0 /100 WBC (ref 0–0.2)
NT-PROBNP SERPL-MCNC: ABNORMAL PG/ML (ref 5–450)
PLATELET # BLD AUTO: 212 10*3/MM3 (ref 140–450)
PMV BLD AUTO: 11.1 FL (ref 6–12)
POTASSIUM BLD-SCNC: 4.3 MMOL/L (ref 3.5–5.2)
PROT SERPL-MCNC: 6.9 G/DL (ref 6–8.5)
RBC # BLD AUTO: 4.6 10*6/MM3 (ref 4.14–5.8)
SODIUM BLD-SCNC: 140 MMOL/L (ref 136–145)
TROPONIN T SERPL-MCNC: 0.1 NG/ML (ref 0–0.03)
WBC NRBC COR # BLD: 9.38 10*3/MM3 (ref 3.4–10.8)

## 2019-01-01 PROCEDURE — 25010000002 HYDRALAZINE PER 20 MG: Performed by: EMERGENCY MEDICINE

## 2019-01-01 PROCEDURE — 85025 COMPLETE CBC W/AUTO DIFF WBC: CPT | Performed by: EMERGENCY MEDICINE

## 2019-01-01 PROCEDURE — 93010 ELECTROCARDIOGRAM REPORT: CPT | Performed by: INTERNAL MEDICINE

## 2019-01-01 PROCEDURE — 25010000002 FUROSEMIDE PER 20 MG: Performed by: EMERGENCY MEDICINE

## 2019-01-01 PROCEDURE — 71046 X-RAY EXAM CHEST 2 VIEWS: CPT

## 2019-01-01 PROCEDURE — 83690 ASSAY OF LIPASE: CPT | Performed by: EMERGENCY MEDICINE

## 2019-01-01 PROCEDURE — 93005 ELECTROCARDIOGRAM TRACING: CPT | Performed by: EMERGENCY MEDICINE

## 2019-01-01 PROCEDURE — 96375 TX/PRO/DX INJ NEW DRUG ADDON: CPT

## 2019-01-01 PROCEDURE — 83880 ASSAY OF NATRIURETIC PEPTIDE: CPT | Performed by: EMERGENCY MEDICINE

## 2019-01-01 PROCEDURE — 84484 ASSAY OF TROPONIN QUANT: CPT | Performed by: EMERGENCY MEDICINE

## 2019-01-01 PROCEDURE — 99284 EMERGENCY DEPT VISIT MOD MDM: CPT

## 2019-01-01 PROCEDURE — 80053 COMPREHEN METABOLIC PANEL: CPT | Performed by: EMERGENCY MEDICINE

## 2019-01-01 PROCEDURE — 96374 THER/PROPH/DIAG INJ IV PUSH: CPT

## 2019-01-01 RX ORDER — ISOSORBIDE MONONITRATE 60 MG/1
60 TABLET, EXTENDED RELEASE ORAL
Qty: 90 TABLET | Refills: 0 | Status: SHIPPED | OUTPATIENT
Start: 2019-01-01 | End: 2020-01-01 | Stop reason: HOSPADM

## 2019-01-01 RX ORDER — FUROSEMIDE 40 MG/1
40 TABLET ORAL 2 TIMES DAILY
Qty: 180 TABLET | Refills: 0 | Status: SHIPPED | OUTPATIENT
Start: 2019-01-01 | End: 2020-01-01 | Stop reason: HOSPADM

## 2019-01-01 RX ORDER — ASPIRIN 81 MG/1
81 TABLET, CHEWABLE ORAL DAILY
Qty: 90 TABLET | Refills: 0 | Status: SHIPPED | OUTPATIENT
Start: 2019-01-01 | End: 2020-01-01

## 2019-01-01 RX ORDER — HYDRALAZINE HYDROCHLORIDE 20 MG/ML
20 INJECTION INTRAMUSCULAR; INTRAVENOUS ONCE
Status: COMPLETED | OUTPATIENT
Start: 2019-01-01 | End: 2019-01-01

## 2019-01-01 RX ORDER — HYDRALAZINE HYDROCHLORIDE 100 MG/1
50 TABLET, FILM COATED ORAL 3 TIMES DAILY
Qty: 90 TABLET | Refills: 0 | Status: SHIPPED | OUTPATIENT
Start: 2019-01-01 | End: 2020-01-01 | Stop reason: HOSPADM

## 2019-01-01 RX ORDER — FUROSEMIDE 10 MG/ML
40 INJECTION INTRAMUSCULAR; INTRAVENOUS ONCE
Status: COMPLETED | OUTPATIENT
Start: 2019-01-01 | End: 2019-01-01

## 2019-01-01 RX ORDER — CARVEDILOL 25 MG/1
25 TABLET ORAL 2 TIMES DAILY
Qty: 180 TABLET | Refills: 0 | Status: SHIPPED | OUTPATIENT
Start: 2019-01-01 | End: 2020-01-01 | Stop reason: HOSPADM

## 2019-01-01 RX ORDER — AMLODIPINE BESYLATE 5 MG/1
5 TABLET ORAL DAILY
Qty: 90 TABLET | Refills: 0 | Status: SHIPPED | OUTPATIENT
Start: 2019-01-01 | End: 2020-01-01 | Stop reason: HOSPADM

## 2019-01-01 RX ORDER — SODIUM CHLORIDE 0.9 % (FLUSH) 0.9 %
10 SYRINGE (ML) INJECTION AS NEEDED
Status: DISCONTINUED | OUTPATIENT
Start: 2019-01-01 | End: 2019-01-01 | Stop reason: HOSPADM

## 2019-01-01 RX ADMIN — HYDRALAZINE HYDROCHLORIDE 20 MG: 20 INJECTION INTRAMUSCULAR; INTRAVENOUS at 14:55

## 2019-01-01 RX ADMIN — FUROSEMIDE 40 MG: 10 INJECTION, SOLUTION INTRAMUSCULAR; INTRAVENOUS at 14:53

## 2019-01-14 ENCOUNTER — TELEPHONE (OUTPATIENT)
Dept: CARDIOLOGY | Facility: CLINIC | Age: 49
End: 2019-01-14

## 2019-01-14 ENCOUNTER — APPOINTMENT (OUTPATIENT)
Dept: SLEEP MEDICINE | Facility: HOSPITAL | Age: 49
End: 2019-01-14
Attending: INTERNAL MEDICINE

## 2019-01-14 NOTE — TELEPHONE ENCOUNTER
----- Message from MINGO Bueno sent at 12/19/2018  3:45 PM EST -----    Follow-up on echocardiogram and blood pressure

## 2019-10-17 NOTE — ED TRIAGE NOTES
Patient presents to er via private vehicle from home.  Patient is reporting dyspnea and abdominal distention for four months.

## 2019-10-17 NOTE — DISCHARGE INSTRUCTIONS
You have elected to leave the hospital today AGAINST MEDICAL ADVICE.  You may return to the emergency department at any time should your symptoms worsen or you would like to be reevaluated.  Your prescription refills have been called into [facility] pharmacy.  Please call your cardiologist and nephrologist tomorrow to arrange close follow-up.

## 2019-10-17 NOTE — ED PROVIDER NOTES
EMERGENCY DEPARTMENT ENCOUNTER    Room Number:  07/07  Date seen:  10/17/2019  Time seen: 1:32 PM  PCP: Provider, No Known  Historian: patient      HPI:  Chief Complaint: shortness of breath  A complete HPI/ROS/PMH/PSH/SH/FH are unobtainable due to: nothing  Context: Nico King is a 49 y.o. male with hx of congestive heart failure, chronic kidney disease, and diabetes mellitus who presents to the ED c/o mild shortness of breath for the past two months. The patient states the shortness of breath was gradual in onset. The patient reports the shortness of breath is constant. The patient states the shortness of breath is exacerbated with lying flat and exertion. The patient also complains of worsening of chronic pedal edema and abdominal distention. The patient denies associated chest pain. The patient states he has been out of all of his prescribed medications for the past two months. The patient states he has not taken his prescribed Hydralazine 50 mg TID, Imdur 60 mg daily, Lasix 40 mg BID, Coreg 25 mg BID, and Norvasc 5 mg daily. The patient denies hx of COPD. The patient admits to smoking. There are no other complaints at this time.      He arrived to the ED with a blood pressure of 223/151.        PAST MEDICAL HISTORY  Active Ambulatory Problems     Diagnosis Date Noted   • Mitral regurgitation    • Cigarette smoker    • Essential hypertension 07/17/2018   • Stage III chronic kidney disease (CMS/HCC)    • Foot callus 07/17/2018     Resolved Ambulatory Problems     Diagnosis Date Noted   • Acute congestive heart failure (CMS/HCC) 06/30/2018     Past Medical History:   Diagnosis Date   • DESHAWN (acute kidney injury) (CMS/HCC)    • CHF (congestive heart failure) (CMS/HCC)    • Cigarette smoker    • Diabetes mellitus (CMS/HCC)    • Foot callus    • Hypertension    • Mitral regurgitation    • Pleural effusion 06/30/2018   • Stage III chronic kidney disease (CMS/HCC)          PAST SURGICAL HISTORY  Past  Surgical History:   Procedure Laterality Date   • HIP SURGERY           FAMILY HISTORY  Family History   Problem Relation Age of Onset   • Hypertension Mother    • Atrial fibrillation Father    • Hypertension Father          SOCIAL HISTORY  Social History     Socioeconomic History   • Marital status: Single     Spouse name: Not on file   • Number of children: Not on file   • Years of education: Not on file   • Highest education level: Not on file   Tobacco Use   • Smoking status: Current Every Day Smoker     Packs/day: 0.50     Types: Cigarettes   • Smokeless tobacco: Current User     Types: Chew   • Tobacco comment: caffeine use   Substance and Sexual Activity   • Alcohol use: No   • Drug use: No   • Sexual activity: Defer         ALLERGIES  Patient has no known allergies.        REVIEW OF SYSTEMS  Review of Systems   Constitutional: Negative for diaphoresis and fever.   HENT: Negative for congestion.    Eyes: Negative for visual disturbance.   Respiratory: Positive for shortness of breath.    Cardiovascular: Positive for leg swelling (of BLE). Negative for chest pain and palpitations.   Gastrointestinal: Positive for abdominal distention. Negative for blood in stool and vomiting.   Endocrine: Negative for polyuria.   Genitourinary: Negative for flank pain.   Musculoskeletal: Negative for joint swelling.   Skin: Negative for wound.   Neurological: Negative for seizures.   Hematological: Negative for adenopathy.   Psychiatric/Behavioral: Negative for sleep disturbance.            PHYSICAL EXAM  ED Triage Vitals [10/17/19 1234]   Temp Heart Rate Resp BP SpO2   97.5 °F (36.4 °C) 108 18 -- 95 %      Temp src Heart Rate Source Patient Position BP Location FiO2 (%)   Tympanic -- -- -- --         GENERAL: no acute distress  HENT: nares patent  EYES: no scleral icterus  CV: regular rhythm, normal rate, no murmur  RESPIRATORY: normal effort, diminished breath sounds at the bases bilaterally  ABDOMEN: soft, mild edema of the  anterior abdominal wall, non-tender throughout  MUSCULOSKELETAL: no deformity, 2+ pitting edema of BLE  NEURO: alert, moves all extremities, follows commands  SKIN: warm, dry    Vital signs and nursing notes reviewed.          LAB RESULTS  Recent Results (from the past 24 hour(s))   Comprehensive Metabolic Panel    Collection Time: 10/17/19  2:53 PM   Result Value Ref Range    Glucose 115 (H) 65 - 99 mg/dL    BUN 49 (H) 6 - 20 mg/dL    Creatinine 3.90 (H) 0.76 - 1.27 mg/dL    Sodium 140 136 - 145 mmol/L    Potassium 4.3 3.5 - 5.2 mmol/L    Chloride 105 98 - 107 mmol/L    CO2 18.2 (L) 22.0 - 29.0 mmol/L    Calcium 8.6 8.6 - 10.5 mg/dL    Total Protein 6.9 6.0 - 8.5 g/dL    Albumin 3.00 (L) 3.50 - 5.20 g/dL    ALT (SGPT) 11 1 - 41 U/L    AST (SGOT) 14 1 - 40 U/L    Alkaline Phosphatase 106 39 - 117 U/L    Total Bilirubin 0.5 0.2 - 1.2 mg/dL    eGFR Non African Amer 17 (L) >60 mL/min/1.73    Globulin 3.9 gm/dL    A/G Ratio 0.8 g/dL    BUN/Creatinine Ratio 12.6 7.0 - 25.0    Anion Gap 16.8 (H) 5.0 - 15.0 mmol/L   Lipase    Collection Time: 10/17/19  2:53 PM   Result Value Ref Range    Lipase 35 13 - 60 U/L   BNP    Collection Time: 10/17/19  2:53 PM   Result Value Ref Range    proBNP 30,621.0 (H) 5.0 - 450.0 pg/mL   Troponin    Collection Time: 10/17/19  2:53 PM   Result Value Ref Range    Troponin T 0.102 (C) 0.000 - 0.030 ng/mL   CBC Auto Differential    Collection Time: 10/17/19  2:53 PM   Result Value Ref Range    WBC 9.38 3.40 - 10.80 10*3/mm3    RBC 4.60 4.14 - 5.80 10*6/mm3    Hemoglobin 11.9 (L) 13.0 - 17.7 g/dL    Hematocrit 37.0 (L) 37.5 - 51.0 %    MCV 80.4 79.0 - 97.0 fL    MCH 25.9 (L) 26.6 - 33.0 pg    MCHC 32.2 31.5 - 35.7 g/dL    RDW 15.2 12.3 - 15.4 %    RDW-SD 43.6 37.0 - 54.0 fl    MPV 11.1 6.0 - 12.0 fL    Platelets 212 140 - 450 10*3/mm3    Neutrophil % 75.7 42.7 - 76.0 %    Lymphocyte % 14.2 (L) 19.6 - 45.3 %    Monocyte % 7.5 5.0 - 12.0 %    Eosinophil % 1.7 0.3 - 6.2 %    Basophil % 0.6 0.0 - 1.5  %    Immature Grans % 0.3 0.0 - 0.5 %    Neutrophils, Absolute 7.10 (H) 1.70 - 7.00 10*3/mm3    Lymphocytes, Absolute 1.33 0.70 - 3.10 10*3/mm3    Monocytes, Absolute 0.70 0.10 - 0.90 10*3/mm3    Eosinophils, Absolute 0.16 0.00 - 0.40 10*3/mm3    Basophils, Absolute 0.06 0.00 - 0.20 10*3/mm3    Immature Grans, Absolute 0.03 0.00 - 0.05 10*3/mm3    nRBC 0.0 0.0 - 0.2 /100 WBC       Ordered the above labs and reviewed the results.        RADIOLOGY  Xr Chest 2 View    Result Date: 10/17/2019  TWO-VIEW CHEST  HISTORY: Shortness of breath.  FINDINGS: There is cardiomegaly with mild vascular congestion and small to moderate-sized bilateral pleural effusions and consistent with congestive heart failure that is actually quite similar to an exam dating back to 06/30/2018.  This report was finalized on 10/17/2019 2:13 PM by Dr. Ovi Maier M.D.        Ordered the above noted radiological studies. Reviewed by me in PACS.          PROCEDURES  Procedures        EKG:           EKG time: 1542  Rhythm/Rate: Sinus tach 105  P waves and WA: Normal  QRS, axis: Normal axis, nonspecific IVCD  ST and T waves: No acute ischemic changes    Interpreted Contemporaneously by me, independently viewed  Similar compared to prior 6/30/2018              MEDICATIONS GIVEN IN ER  Medications   sodium chloride 0.9 % flush 10 mL (not administered)   hydrALAZINE (APRESOLINE) injection 20 mg (20 mg Intravenous Given 10/17/19 1455)   furosemide (LASIX) injection 40 mg (40 mg Intravenous Given 10/17/19 1453)                   PROGRESS AND CONSULTS  ED Course as of Oct 17 1556   Thu Oct 17, 2019   1547 I discussed the patient's laboratory and chest x-ray results including his slightly elevated troponin, worsening renal function compared to 1 year ago.  I recommended admission for blood pressure control, correction of acute kidney injury, and diuresis.  Patient prefers to be discharged home, citing that he has a cat at home that he needs to take care of.   I explained the risk of not being admitted including worsening renal failure, worsening volume overload, respiratory failure, and possible death.  He understands and request to be discharged home.  I will still provide him with prescriptions for his home medications that he has been out of for several weeks.  I advised him to follow-up with his cardiologist, his kidney doctor, and also a primary care physician.  I also recommended that he return to the ER at any time should his symptoms worsen.  He voiced understanding.  He has agreed to sign out AGAINST MEDICAL ADVICE.  [JR]      ED Course User Index  [JR] Jake Sanchez MD     1311 Ordered CXR, EKG, and blood work for further evaluation.    1342 Ordered Lasix to treat his fluid retention and Hydralazine to treat his hypertension.      MEDICAL DECISION MAKING    49-year-old male with history of hypertension, mitral regurgitation, chronic kidney disease, presents today with complaint of orthopnea, swelling.  He has been noncompliant with all of his medications for approximately the last 2 months.  He has diminished breath sounds at the lung bases bilaterally with 2+ pitting edema of the lower extremities.  His chest x-ray also shows small bilateral pleural effusions and mild vascular congestion.  He is not hypoxic on room air.  His labs today remarkable for acute on chronic renal insufficiency.  Slight elevation of his cardiac troponin without ischemic changes on EKG.  This is likely related to volume overload and renal insufficiency.  His BNP is also markedly elevated.  I recommended admission to the hospital for further blood pressure control, diuresis, and correction of acute kidney injury.  Patient is adamant that he would like to be discharged home.  He has agreed to leave AGAINST MEDICAL ADVICE.  Nonetheless I have called in his prescriptions to [facility] pharmacy.  I urged him to follow-up closely with his cardiologist and nephrologist.  Also advised  him to return to the emergency department at any time should he like a further evaluation of his symptoms.    MDM  Number of Diagnoses or Management Options     Amount and/or Complexity of Data Reviewed  Clinical lab tests: ordered and reviewed  Tests in the radiology section of CPT®: ordered and reviewed (Xr Chest 2 View    Result Date: 10/17/2019  Narrative: TWO-VIEW CHEST  HISTORY: Shortness of breath.  FINDINGS: There is cardiomegaly with mild vascular congestion and small to moderate-sized bilateral pleural effusions and consistent with congestive heart failure that is actually quite similar to an exam dating back to 06/30/2018.  This report was finalized on 10/17/2019 2:13 PM by Dr. Ovi Maier M.D.)  Tests in the medicine section of CPT®: reviewed and ordered (See procedure notes for EKG interpretation.)  Decide to obtain previous medical records or to obtain history from someone other than the patient: yes (Epic)  Review and summarize past medical records: yes (The patient was last hospitalized at [facility] in 06/2018 for acute congestive heart failure.)  Independent visualization of images, tracings, or specimens: yes    Patient Progress  Patient progress: stable             DIAGNOSIS  Final diagnoses:   DESHAWN (acute kidney injury) (CMS/HCC)   Chronic kidney disease, unspecified CKD stage   Acute on chronic congestive heart failure, unspecified heart failure type (CMS/[facility])   Hypertension not at goal   Noncompliance with medications         DISPOSITION  Leaving AGAINST MEDICAL ADVICE.            Latest Documented Vital Signs:  As of 3:56 PM  BP- (!) 201/110 HR- 102 Temp- 97.5 °F (36.4 °C) (Tympanic) O2 sat- 98%        --  Documentation assistance provided by alessandro Mcclain for Dr. DAXA Sanchez MD.  Information recorded by the alessandro was done at my direction and has been verified and validated by me.      Please note that portions of this were completed with a voice recognition program.       Sarahi  Katherine  10/17/19 1505       Jake Sanchez MD  10/17/19 [id number]

## 2020-01-01 ENCOUNTER — ANTICOAGULATION VISIT (OUTPATIENT)
Dept: PHARMACY | Facility: HOSPITAL | Age: 50
End: 2020-01-01

## 2020-01-01 ENCOUNTER — APPOINTMENT (OUTPATIENT)
Dept: CARDIOLOGY | Facility: HOSPITAL | Age: 50
End: 2020-01-01

## 2020-01-01 ENCOUNTER — APPOINTMENT (OUTPATIENT)
Dept: CT IMAGING | Facility: HOSPITAL | Age: 50
End: 2020-01-01

## 2020-01-01 ENCOUNTER — TELEPHONE (OUTPATIENT)
Dept: CARDIOLOGY | Facility: CLINIC | Age: 50
End: 2020-01-01

## 2020-01-01 ENCOUNTER — ANESTHESIA EVENT (OUTPATIENT)
Dept: PERIOP | Facility: HOSPITAL | Age: 50
End: 2020-01-01

## 2020-01-01 ENCOUNTER — HOSPITAL ENCOUNTER (INPATIENT)
Facility: HOSPITAL | Age: 50
LOS: 1 days | End: 2020-07-24
Attending: INTERNAL MEDICINE | Admitting: INTERNAL MEDICINE

## 2020-01-01 ENCOUNTER — APPOINTMENT (OUTPATIENT)
Dept: GENERAL RADIOLOGY | Facility: HOSPITAL | Age: 50
End: 2020-01-01

## 2020-01-01 ENCOUNTER — ANCILLARY PROCEDURE (OUTPATIENT)
Dept: PERIOP | Facility: HOSPITAL | Age: 50
End: 2020-01-01

## 2020-01-01 ENCOUNTER — HOSPITAL ENCOUNTER (INPATIENT)
Facility: HOSPITAL | Age: 50
LOS: 14 days | Discharge: NURSING, REHAB OR LONG-TERM CARE | End: 2020-03-26
Attending: EMERGENCY MEDICINE | Admitting: INTERNAL MEDICINE

## 2020-01-01 ENCOUNTER — TELEPHONE (OUTPATIENT)
Dept: PHARMACY | Facility: HOSPITAL | Age: 50
End: 2020-01-01

## 2020-01-01 ENCOUNTER — TELEPHONE (OUTPATIENT)
Dept: CARDIAC REHAB | Facility: HOSPITAL | Age: 50
End: 2020-01-01

## 2020-01-01 ENCOUNTER — HOSPITAL ENCOUNTER (INPATIENT)
Facility: HOSPITAL | Age: 50
LOS: 10 days | End: 2020-07-23
Attending: EMERGENCY MEDICINE | Admitting: HOSPITALIST

## 2020-01-01 ENCOUNTER — APPOINTMENT (OUTPATIENT)
Dept: NEUROLOGY | Facility: HOSPITAL | Age: 50
End: 2020-01-01

## 2020-01-01 ENCOUNTER — ANESTHESIA (OUTPATIENT)
Dept: PERIOP | Facility: HOSPITAL | Age: 50
End: 2020-01-01

## 2020-01-01 ENCOUNTER — TELEPHONE (OUTPATIENT)
Dept: INTERNAL MEDICINE | Age: 50
End: 2020-01-01

## 2020-01-01 ENCOUNTER — OFFICE VISIT (OUTPATIENT)
Dept: CARDIAC SURGERY | Facility: CLINIC | Age: 50
End: 2020-01-01

## 2020-01-01 ENCOUNTER — APPOINTMENT (OUTPATIENT)
Dept: PHARMACY | Facility: HOSPITAL | Age: 50
End: 2020-01-01

## 2020-01-01 ENCOUNTER — OFFICE VISIT (OUTPATIENT)
Dept: CARDIOLOGY | Facility: CLINIC | Age: 50
End: 2020-01-01

## 2020-01-01 ENCOUNTER — APPOINTMENT (OUTPATIENT)
Dept: MRI IMAGING | Facility: HOSPITAL | Age: 50
End: 2020-01-01

## 2020-01-01 ENCOUNTER — APPOINTMENT (OUTPATIENT)
Dept: RESPIRATORY THERAPY | Facility: HOSPITAL | Age: 50
End: 2020-01-01

## 2020-01-01 ENCOUNTER — TELEPHONE (OUTPATIENT)
Dept: CARDIAC SURGERY | Facility: CLINIC | Age: 50
End: 2020-01-01

## 2020-01-01 VITALS
WEIGHT: 231.92 LBS | HEART RATE: 105 BPM | HEIGHT: 71 IN | OXYGEN SATURATION: 98 % | BODY MASS INDEX: 32.47 KG/M2 | RESPIRATION RATE: 24 BRPM | DIASTOLIC BLOOD PRESSURE: 55 MMHG | SYSTOLIC BLOOD PRESSURE: 99 MMHG | TEMPERATURE: 101.2 F

## 2020-01-01 VITALS — OXYGEN SATURATION: 98 % | DIASTOLIC BLOOD PRESSURE: 61 MMHG | SYSTOLIC BLOOD PRESSURE: 91 MMHG | TEMPERATURE: 100.5 F

## 2020-01-01 VITALS
RESPIRATION RATE: 16 BRPM | OXYGEN SATURATION: 96 % | BODY MASS INDEX: 35.17 KG/M2 | SYSTOLIC BLOOD PRESSURE: 126 MMHG | HEIGHT: 71 IN | WEIGHT: 251.2 LBS | TEMPERATURE: 98.5 F | HEART RATE: 87 BPM | DIASTOLIC BLOOD PRESSURE: 79 MMHG

## 2020-01-01 VITALS
DIASTOLIC BLOOD PRESSURE: 102 MMHG | HEART RATE: 89 BPM | BODY MASS INDEX: 34.72 KG/M2 | HEIGHT: 71 IN | WEIGHT: 248 LBS | SYSTOLIC BLOOD PRESSURE: 158 MMHG

## 2020-01-01 VITALS
BODY MASS INDEX: 34.85 KG/M2 | SYSTOLIC BLOOD PRESSURE: 166 MMHG | HEART RATE: 91 BPM | WEIGHT: 248.9 LBS | DIASTOLIC BLOOD PRESSURE: 109 MMHG | HEIGHT: 71 IN

## 2020-01-01 DIAGNOSIS — G89.18 POST-OPERATIVE PAIN: ICD-10-CM

## 2020-01-01 DIAGNOSIS — T45.511A POISONING BY WARFARIN SODIUM, ACCIDENTAL OR UNINTENTIONAL, INITIAL ENCOUNTER: ICD-10-CM

## 2020-01-01 DIAGNOSIS — I10 ESSENTIAL HYPERTENSION: ICD-10-CM

## 2020-01-01 DIAGNOSIS — Z95.2 STATUS POST MITRAL VALVE REPLACEMENT: ICD-10-CM

## 2020-01-01 DIAGNOSIS — I34.0 NONRHEUMATIC MITRAL VALVE REGURGITATION: Primary | ICD-10-CM

## 2020-01-01 DIAGNOSIS — N18.6 ESRD ON HEMODIALYSIS (HCC): ICD-10-CM

## 2020-01-01 DIAGNOSIS — Z99.2 ESRD ON HEMODIALYSIS (HCC): ICD-10-CM

## 2020-01-01 DIAGNOSIS — Z95.2 S/P MVR (MITRAL VALVE REPLACEMENT): Primary | ICD-10-CM

## 2020-01-01 DIAGNOSIS — I50.23 ACUTE ON CHRONIC SYSTOLIC CONGESTIVE HEART FAILURE (HCC): ICD-10-CM

## 2020-01-01 DIAGNOSIS — Z95.2 S/P MVR (MITRAL VALVE REPLACEMENT): ICD-10-CM

## 2020-01-01 DIAGNOSIS — I50.21 SYSTOLIC CHF, ACUTE (HCC): ICD-10-CM

## 2020-01-01 DIAGNOSIS — I34.0 NONRHEUMATIC MITRAL VALVE REGURGITATION: ICD-10-CM

## 2020-01-01 DIAGNOSIS — D73.3 SPLENIC ABSCESS: Primary | ICD-10-CM

## 2020-01-01 DIAGNOSIS — N04.9 ANASARCA ASSOCIATED WITH DISORDER OF KIDNEY: Primary | ICD-10-CM

## 2020-01-01 DIAGNOSIS — Z98.890 STATUS POST TRICUSPID VALVE REPAIR: ICD-10-CM

## 2020-01-01 DIAGNOSIS — Z98.890 S/P TVR (TRICUSPID VALVE REPAIR): ICD-10-CM

## 2020-01-01 DIAGNOSIS — N17.9 AKI (ACUTE KIDNEY INJURY) (HCC): ICD-10-CM

## 2020-01-01 LAB
ABO + RH BLD: NORMAL
ABO GROUP BLD: NORMAL
ACT BLD: 109 SECONDS (ref 82–152)
ACT BLD: 131 SECONDS (ref 82–152)
ACT BLD: 307 SECONDS (ref 82–152)
ACT BLD: 445 SECONDS (ref 82–152)
ACT BLD: 478 SECONDS (ref 82–152)
ALBUMIN SERPL-MCNC: 2.1 G/DL (ref 3.5–5.2)
ALBUMIN SERPL-MCNC: 2.2 G/DL (ref 3.5–5.2)
ALBUMIN SERPL-MCNC: 2.3 G/DL (ref 3.5–5.2)
ALBUMIN SERPL-MCNC: 2.4 G/DL (ref 3.5–5.2)
ALBUMIN SERPL-MCNC: 2.5 G/DL (ref 3.5–5.2)
ALBUMIN SERPL-MCNC: 2.6 G/DL (ref 3.5–5.2)
ALBUMIN SERPL-MCNC: 2.6 G/DL (ref 3.5–5.2)
ALBUMIN SERPL-MCNC: 2.7 G/DL (ref 3.5–5.2)
ALBUMIN SERPL-MCNC: 2.8 G/DL (ref 3.5–5.2)
ALBUMIN SERPL-MCNC: 2.8 G/DL (ref 3.5–5.2)
ALBUMIN SERPL-MCNC: 2.9 G/DL (ref 3.5–5.2)
ALBUMIN/GLOB SERPL: 0.7 G/DL
ALBUMIN/GLOB SERPL: 0.8 G/DL
ALBUMIN/GLOB SERPL: 0.9 G/DL
ALP SERPL-CCNC: 102 U/L (ref 39–117)
ALP SERPL-CCNC: 47 U/L (ref 39–117)
ALP SERPL-CCNC: 55 U/L (ref 39–117)
ALP SERPL-CCNC: 61 U/L (ref 39–117)
ALP SERPL-CCNC: 64 U/L (ref 39–117)
ALP SERPL-CCNC: 68 U/L (ref 39–117)
ALP SERPL-CCNC: 69 U/L (ref 39–117)
ALP SERPL-CCNC: 71 U/L (ref 39–117)
ALP SERPL-CCNC: 77 U/L (ref 39–117)
ALP SERPL-CCNC: 86 U/L (ref 39–117)
ALP SERPL-CCNC: 89 U/L (ref 39–117)
ALP SERPL-CCNC: 89 U/L (ref 39–117)
ALT SERPL W P-5'-P-CCNC: 18 U/L (ref 1–41)
ALT SERPL W P-5'-P-CCNC: 18 U/L (ref 1–41)
ALT SERPL W P-5'-P-CCNC: 24 U/L (ref 1–41)
ALT SERPL W P-5'-P-CCNC: 29 U/L (ref 1–41)
ALT SERPL W P-5'-P-CCNC: 29 U/L (ref 1–41)
ALT SERPL W P-5'-P-CCNC: 35 U/L (ref 1–41)
ALT SERPL W P-5'-P-CCNC: 44 U/L (ref 1–41)
ALT SERPL W P-5'-P-CCNC: 51 U/L (ref 1–41)
ALT SERPL W P-5'-P-CCNC: 54 U/L (ref 1–41)
ALT SERPL W P-5'-P-CCNC: 74 U/L (ref 1–41)
ALT SERPL W P-5'-P-CCNC: 8 U/L (ref 1–41)
ALT SERPL W P-5'-P-CCNC: 84 U/L (ref 1–41)
AMMONIA BLD-SCNC: 29 UMOL/L (ref 16–60)
ANION GAP SERPL CALCULATED.3IONS-SCNC: 11.4 MMOL/L (ref 5–15)
ANION GAP SERPL CALCULATED.3IONS-SCNC: 11.8 MMOL/L (ref 5–15)
ANION GAP SERPL CALCULATED.3IONS-SCNC: 12.6 MMOL/L (ref 5–15)
ANION GAP SERPL CALCULATED.3IONS-SCNC: 13.3 MMOL/L (ref 5–15)
ANION GAP SERPL CALCULATED.3IONS-SCNC: 13.3 MMOL/L (ref 5–15)
ANION GAP SERPL CALCULATED.3IONS-SCNC: 13.6 MMOL/L (ref 5–15)
ANION GAP SERPL CALCULATED.3IONS-SCNC: 13.8 MMOL/L (ref 5–15)
ANION GAP SERPL CALCULATED.3IONS-SCNC: 14.1 MMOL/L (ref 5–15)
ANION GAP SERPL CALCULATED.3IONS-SCNC: 14.1 MMOL/L (ref 5–15)
ANION GAP SERPL CALCULATED.3IONS-SCNC: 14.6 MMOL/L (ref 5–15)
ANION GAP SERPL CALCULATED.3IONS-SCNC: 14.7 MMOL/L (ref 5–15)
ANION GAP SERPL CALCULATED.3IONS-SCNC: 14.7 MMOL/L (ref 5–15)
ANION GAP SERPL CALCULATED.3IONS-SCNC: 15.4 MMOL/L (ref 5–15)
ANION GAP SERPL CALCULATED.3IONS-SCNC: 15.5 MMOL/L (ref 5–15)
ANION GAP SERPL CALCULATED.3IONS-SCNC: 15.9 MMOL/L (ref 5–15)
ANION GAP SERPL CALCULATED.3IONS-SCNC: 16 MMOL/L (ref 5–15)
ANION GAP SERPL CALCULATED.3IONS-SCNC: 16.1 MMOL/L (ref 5–15)
ANION GAP SERPL CALCULATED.3IONS-SCNC: 16.5 MMOL/L (ref 5–15)
ANION GAP SERPL CALCULATED.3IONS-SCNC: 17.2 MMOL/L (ref 5–15)
ANION GAP SERPL CALCULATED.3IONS-SCNC: 17.5 MMOL/L (ref 5–15)
ANION GAP SERPL CALCULATED.3IONS-SCNC: 18 MMOL/L (ref 5–15)
ANION GAP SERPL CALCULATED.3IONS-SCNC: 19.4 MMOL/L (ref 5–15)
ANION GAP SERPL CALCULATED.3IONS-SCNC: 20.5 MMOL/L (ref 5–15)
ANION GAP SERPL CALCULATED.3IONS-SCNC: 21.5 MMOL/L (ref 5–15)
ANION GAP SERPL CALCULATED.3IONS-SCNC: 22.3 MMOL/L (ref 5–15)
ANION GAP SERPL CALCULATED.3IONS-SCNC: 24 MMOL/L (ref 5–15)
ANION GAP SERPL CALCULATED.3IONS-SCNC: 24.4 MMOL/L (ref 5–15)
ANION GAP SERPL CALCULATED.3IONS-SCNC: 8.8 MMOL/L (ref 5–15)
ANISOCYTOSIS BLD QL: ABNORMAL
AORTIC DIMENSIONLESS INDEX: 0.6 (DI)
AORTIC DIMENSIONLESS INDEX: 0.7 (DI)
APTT PPP: 120.7 SECONDS (ref 22.7–35.4)
APTT PPP: 32.6 SECONDS (ref 22.7–35.4)
APTT PPP: 35 SECONDS (ref 22.7–35.4)
APTT PPP: 35 SECONDS (ref 22.7–35.4)
APTT PPP: 35.1 SECONDS (ref 22.7–35.4)
APTT PPP: 35.2 SECONDS (ref 22.7–35.4)
APTT PPP: 36.1 SECONDS (ref 22.7–35.4)
APTT PPP: 37.7 SECONDS (ref 22.7–35.4)
APTT PPP: 37.8 SECONDS (ref 22.7–35.4)
APTT PPP: 38 SECONDS (ref 22.7–35.4)
APTT PPP: 39.3 SECONDS (ref 22.7–35.4)
APTT PPP: 42.4 SECONDS (ref 22.7–35.4)
APTT PPP: 44.9 SECONDS (ref 22.7–35.4)
APTT PPP: 50.3 SECONDS (ref 22.7–35.4)
APTT PPP: 50.6 SECONDS (ref 22.7–35.4)
APTT PPP: 52.2 SECONDS (ref 22.7–35.4)
APTT PPP: 54.3 SECONDS (ref 22.7–35.4)
APTT PPP: 58.5 SECONDS (ref 22.7–35.4)
APTT PPP: 59.8 SECONDS (ref 22.7–35.4)
APTT PPP: 74 SECONDS (ref 22.7–35.4)
APTT PPP: 75.6 SECONDS (ref 22.7–35.4)
APTT PPP: 79.4 SECONDS (ref 22.7–35.4)
APTT PPP: 79.4 SECONDS (ref 22.7–35.4)
APTT PPP: 81.5 SECONDS (ref 22.7–35.4)
ARTERIAL PATENCY WRIST A: ABNORMAL
AST SERPL-CCNC: 121 U/L (ref 1–40)
AST SERPL-CCNC: 146 U/L (ref 1–40)
AST SERPL-CCNC: 16 U/L (ref 1–40)
AST SERPL-CCNC: 16 U/L (ref 1–40)
AST SERPL-CCNC: 18 U/L (ref 1–40)
AST SERPL-CCNC: 23 U/L (ref 1–40)
AST SERPL-CCNC: 31 U/L (ref 1–40)
AST SERPL-CCNC: 36 U/L (ref 1–40)
AST SERPL-CCNC: 47 U/L (ref 1–40)
AST SERPL-CCNC: 48 U/L (ref 1–40)
AST SERPL-CCNC: 9 U/L (ref 1–40)
AST SERPL-CCNC: 91 U/L (ref 1–40)
ATMOSPHERIC PRESS: 750.1 MMHG
ATMOSPHERIC PRESS: 750.9 MMHG
ATMOSPHERIC PRESS: 752.7 MMHG
ATMOSPHERIC PRESS: 758.1 MMHG
B PARAPERT DNA SPEC QL NAA+PROBE: NOT DETECTED
B PARAPERT DNA SPEC QL NAA+PROBE: NOT DETECTED
B PERT DNA SPEC QL NAA+PROBE: NOT DETECTED
B PERT DNA SPEC QL NAA+PROBE: NOT DETECTED
BACTERIA BLD CULT: NORMAL
BACTERIA SPEC AEROBE CULT: ABNORMAL
BACTERIA SPEC AEROBE CULT: NORMAL
BACTERIA SPEC AEROBE CULT: NORMAL
BACTERIA UR QL AUTO: ABNORMAL /HPF
BASE EXCESS BLDA CALC-SCNC: -0.8 MMOL/L (ref 0–2)
BASE EXCESS BLDA CALC-SCNC: -1 MMOL/L (ref -5–5)
BASE EXCESS BLDA CALC-SCNC: -2 MMOL/L (ref 0–2)
BASE EXCESS BLDA CALC-SCNC: -2.3 MMOL/L (ref 0–2)
BASE EXCESS BLDA CALC-SCNC: -3 MMOL/L (ref -5–5)
BASE EXCESS BLDA CALC-SCNC: 0 MMOL/L (ref -5–5)
BASE EXCESS BLDA CALC-SCNC: 0.9 MMOL/L (ref 0–2)
BASE EXCESS BLDA CALC-SCNC: 1 MMOL/L (ref -5–5)
BASE EXCESS BLDA CALC-SCNC: 2 MMOL/L (ref -5–5)
BASOPHILS # BLD AUTO: 0.02 10*3/MM3 (ref 0–0.2)
BASOPHILS # BLD AUTO: 0.02 10*3/MM3 (ref 0–0.2)
BASOPHILS # BLD AUTO: 0.03 10*3/MM3 (ref 0–0.2)
BASOPHILS # BLD AUTO: 0.04 10*3/MM3 (ref 0–0.2)
BASOPHILS # BLD AUTO: 0.05 10*3/MM3 (ref 0–0.2)
BASOPHILS # BLD AUTO: 0.06 10*3/MM3 (ref 0–0.2)
BASOPHILS # BLD AUTO: 0.06 10*3/MM3 (ref 0–0.2)
BASOPHILS # BLD AUTO: 0.07 10*3/MM3 (ref 0–0.2)
BASOPHILS # BLD AUTO: 0.08 10*3/MM3 (ref 0–0.2)
BASOPHILS NFR BLD AUTO: 0.1 % (ref 0–1.5)
BASOPHILS NFR BLD AUTO: 0.1 % (ref 0–1.5)
BASOPHILS NFR BLD AUTO: 0.2 % (ref 0–1.5)
BASOPHILS NFR BLD AUTO: 0.3 % (ref 0–1.5)
BASOPHILS NFR BLD AUTO: 0.5 % (ref 0–1.5)
BASOPHILS NFR BLD AUTO: 0.6 % (ref 0–1.5)
BASOPHILS NFR BLD AUTO: 0.7 % (ref 0–1.5)
BASOPHILS NFR BLD AUTO: 0.8 % (ref 0–1.5)
BDY SITE: ABNORMAL
BH BB BLOOD EXPIRATION DATE: NORMAL
BH BB BLOOD TYPE BARCODE: 600
BH BB BLOOD TYPE BARCODE: 6200
BH BB DISPENSE STATUS: NORMAL
BH BB PRODUCT CODE: NORMAL
BH BB UNIT NUMBER: NORMAL
BH CV ECHO MEAS - ACS: 1.8 CM
BH CV ECHO MEAS - ACS: 2.2 CM
BH CV ECHO MEAS - AO MAX PG (FULL): 3.5 MMHG
BH CV ECHO MEAS - AO MAX PG (FULL): 4.9 MMHG
BH CV ECHO MEAS - AO MAX PG: 5.9 MMHG
BH CV ECHO MEAS - AO MAX PG: 9.5 MMHG
BH CV ECHO MEAS - AO MEAN PG (FULL): 2 MMHG
BH CV ECHO MEAS - AO MEAN PG (FULL): 2 MMHG
BH CV ECHO MEAS - AO MEAN PG: 3 MMHG
BH CV ECHO MEAS - AO MEAN PG: 5 MMHG
BH CV ECHO MEAS - AO ROOT AREA (BSA CORRECTED): 1.4
BH CV ECHO MEAS - AO ROOT AREA: 10.8 CM^2
BH CV ECHO MEAS - AO ROOT DIAM: 3.7 CM
BH CV ECHO MEAS - AO V2 MAX: 121 CM/SEC
BH CV ECHO MEAS - AO V2 MAX: 154 CM/SEC
BH CV ECHO MEAS - AO V2 MEAN: 109 CM/SEC
BH CV ECHO MEAS - AO V2 MEAN: 80.7 CM/SEC
BH CV ECHO MEAS - AO V2 VTI: 20.8 CM
BH CV ECHO MEAS - AO V2 VTI: 22.4 CM
BH CV ECHO MEAS - ASC AORTA: 2.8 CM
BH CV ECHO MEAS - AVA(I,A): 2.3 CM^2
BH CV ECHO MEAS - AVA(I,A): 2.4 CM^2
BH CV ECHO MEAS - AVA(I,D): 2.3 CM^2
BH CV ECHO MEAS - AVA(I,D): 2.4 CM^2
BH CV ECHO MEAS - AVA(V,A): 2.4 CM^2
BH CV ECHO MEAS - AVA(V,A): 2.6 CM^2
BH CV ECHO MEAS - AVA(V,D): 2.4 CM^2
BH CV ECHO MEAS - AVA(V,D): 2.6 CM^2
BH CV ECHO MEAS - BSA(HAYCOCK): 0.76 M^2
BH CV ECHO MEAS - BSA(HAYCOCK): 2.4 M^2
BH CV ECHO MEAS - BSA(HAYCOCK): 2.8 M^2
BH CV ECHO MEAS - BSA: 0.87 M^2
BH CV ECHO MEAS - BSA: 2.3 M^2
BH CV ECHO MEAS - BSA: 2.6 M^2
BH CV ECHO MEAS - BZI_BMI: 34.4 KILOGRAMS/M^2
BH CV ECHO MEAS - BZI_BMI: 44.6 KILOGRAMS/M^2
BH CV ECHO MEAS - BZI_BMI: 6 KILOGRAMS/M^2
BH CV ECHO MEAS - BZI_METRIC_HEIGHT: 154.9 CM
BH CV ECHO MEAS - BZI_METRIC_HEIGHT: 177.8 CM
BH CV ECHO MEAS - BZI_METRIC_HEIGHT: 180.3 CM
BH CV ECHO MEAS - BZI_METRIC_WEIGHT: 108.9 KG
BH CV ECHO MEAS - BZI_METRIC_WEIGHT: 14.5 KG
BH CV ECHO MEAS - BZI_METRIC_WEIGHT: 145.2 KG
BH CV ECHO MEAS - CONTRAST EF 4CH: 54 CM2
BH CV ECHO MEAS - EDV(CUBED): 175.6 ML
BH CV ECHO MEAS - EDV(CUBED): 274.6 ML
BH CV ECHO MEAS - EDV(MOD-SP2): 168 ML
BH CV ECHO MEAS - EDV(MOD-SP2): 283 ML
BH CV ECHO MEAS - EDV(MOD-SP4): 202 ML
BH CV ECHO MEAS - EDV(MOD-SP4): 209 ML
BH CV ECHO MEAS - EDV(TEICH): 153.7 ML
BH CV ECHO MEAS - EDV(TEICH): 216 ML
BH CV ECHO MEAS - EF(CUBED): 37 %
BH CV ECHO MEAS - EF(CUBED): 42.7 %
BH CV ECHO MEAS - EF(MOD-BP): 33 %
BH CV ECHO MEAS - EF(MOD-BP): 53.9 %
BH CV ECHO MEAS - EF(MOD-SP2): 33.6 %
BH CV ECHO MEAS - EF(MOD-SP2): 52.4 %
BH CV ECHO MEAS - EF(MOD-SP4): 35.9 %
BH CV ECHO MEAS - EF(MOD-SP4): 54.5 %
BH CV ECHO MEAS - EF(TEICH): 30 %
BH CV ECHO MEAS - EF(TEICH): 34.6 %
BH CV ECHO MEAS - ESV(CUBED): 110.6 ML
BH CV ECHO MEAS - ESV(CUBED): 157.5 ML
BH CV ECHO MEAS - ESV(MOD-SP2): 188 ML
BH CV ECHO MEAS - ESV(MOD-SP2): 80 ML
BH CV ECHO MEAS - ESV(MOD-SP4): 134 ML
BH CV ECHO MEAS - ESV(MOD-SP4): 92 ML
BH CV ECHO MEAS - ESV(TEICH): 107.5 ML
BH CV ECHO MEAS - ESV(TEICH): 141.3 ML
BH CV ECHO MEAS - FS: 14.3 %
BH CV ECHO MEAS - FS: 16.9 %
BH CV ECHO MEAS - IVS/LVPW: 1
BH CV ECHO MEAS - IVS/LVPW: 1
BH CV ECHO MEAS - IVSD: 1.3 CM
BH CV ECHO MEAS - IVSD: 1.6 CM
BH CV ECHO MEAS - LAT PEAK E' VEL: 10 CM/SEC
BH CV ECHO MEAS - LAT PEAK E' VEL: 12 CM/SEC
BH CV ECHO MEAS - LV DIASTOLIC VOL/BSA (35-75): 81.2 ML/M^2
BH CV ECHO MEAS - LV DIASTOLIC VOL/BSA (35-75): 89.6 ML/M^2
BH CV ECHO MEAS - LV MASS(C)D: 399.1 GRAMS
BH CV ECHO MEAS - LV MASS(C)D: 421.5 GRAMS
BH CV ECHO MEAS - LV MASS(C)DI: 155 GRAMS/M^2
BH CV ECHO MEAS - LV MASS(C)DI: 186.9 GRAMS/M^2
BH CV ECHO MEAS - LV MAX PG: 2.4 MMHG
BH CV ECHO MEAS - LV MAX PG: 4.6 MMHG
BH CV ECHO MEAS - LV MEAN PG: 1 MMHG
BH CV ECHO MEAS - LV MEAN PG: 3 MMHG
BH CV ECHO MEAS - LV SYSTOLIC VOL/BSA (12-30): 40.8 ML/M^2
BH CV ECHO MEAS - LV SYSTOLIC VOL/BSA (12-30): 52 ML/M^2
BH CV ECHO MEAS - LV V1 MAX: 107 CM/SEC
BH CV ECHO MEAS - LV V1 MAX: 76.9 CM/SEC
BH CV ECHO MEAS - LV V1 MEAN: 52.1 CM/SEC
BH CV ECHO MEAS - LV V1 MEAN: 76.4 CM/SEC
BH CV ECHO MEAS - LV V1 VTI: 13 CM
BH CV ECHO MEAS - LV V1 VTI: 14.1 CM
BH CV ECHO MEAS - LVIDD: 5.6 CM
BH CV ECHO MEAS - LVIDD: 6.5 CM
BH CV ECHO MEAS - LVIDS: 4.8 CM
BH CV ECHO MEAS - LVIDS: 5.4 CM
BH CV ECHO MEAS - LVLD AP2: 10.3 CM
BH CV ECHO MEAS - LVLD AP2: 8.7 CM
BH CV ECHO MEAS - LVLD AP4: 9.1 CM
BH CV ECHO MEAS - LVLD AP4: 9.9 CM
BH CV ECHO MEAS - LVLS AP2: 7.9 CM
BH CV ECHO MEAS - LVLS AP2: 9.4 CM
BH CV ECHO MEAS - LVLS AP4: 8.2 CM
BH CV ECHO MEAS - LVLS AP4: 8.5 CM
BH CV ECHO MEAS - LVOT AREA (M): 3.5 CM^2
BH CV ECHO MEAS - LVOT AREA (M): 4.2 CM^2
BH CV ECHO MEAS - LVOT AREA: 3.5 CM^2
BH CV ECHO MEAS - LVOT AREA: 4.2 CM^2
BH CV ECHO MEAS - LVOT DIAM: 2.1 CM
BH CV ECHO MEAS - LVOT DIAM: 2.3 CM
BH CV ECHO MEAS - LVPWD: 1.3 CM
BH CV ECHO MEAS - LVPWD: 1.6 CM
BH CV ECHO MEAS - MED PEAK E' VEL: 5 CM/SEC
BH CV ECHO MEAS - MED PEAK E' VEL: 8.9 CM/SEC
BH CV ECHO MEAS - MR MAX PG: 117.1 MMHG
BH CV ECHO MEAS - MR MAX VEL: 541 CM/SEC
BH CV ECHO MEAS - MR MEAN PG: 71 MMHG
BH CV ECHO MEAS - MR MEAN VEL: 392 CM/SEC
BH CV ECHO MEAS - MR VTI: 165 CM
BH CV ECHO MEAS - MV A DUR: 0.13 SEC
BH CV ECHO MEAS - MV A MAX VEL: 173 CM/SEC
BH CV ECHO MEAS - MV A MAX VEL: 47.5 CM/SEC
BH CV ECHO MEAS - MV DEC SLOPE: 394 CM/SEC^2
BH CV ECHO MEAS - MV DEC SLOPE: 619 CM/SEC^2
BH CV ECHO MEAS - MV DEC TIME: 172 SEC
BH CV ECHO MEAS - MV DEC TIME: 250 SEC
BH CV ECHO MEAS - MV E MAX VEL: 178 CM/SEC
BH CV ECHO MEAS - MV E MAX VEL: 82.9 CM/SEC
BH CV ECHO MEAS - MV E/A: 1
BH CV ECHO MEAS - MV E/A: 1.7
BH CV ECHO MEAS - MV MAX PG: 17.6 MMHG
BH CV ECHO MEAS - MV MAX PG: 3.8 MMHG
BH CV ECHO MEAS - MV MEAN PG: 12 MMHG
BH CV ECHO MEAS - MV MEAN PG: 2 MMHG
BH CV ECHO MEAS - MV P1/2T MAX VEL: 106 CM/SEC
BH CV ECHO MEAS - MV P1/2T MAX VEL: 189 CM/SEC
BH CV ECHO MEAS - MV P1/2T: 78.8 MSEC
BH CV ECHO MEAS - MV P1/2T: 89.4 MSEC
BH CV ECHO MEAS - MV V2 MAX: 209 CM/SEC
BH CV ECHO MEAS - MV V2 MAX: 98.1 CM/SEC
BH CV ECHO MEAS - MV V2 MEAN: 165 CM/SEC
BH CV ECHO MEAS - MV V2 MEAN: 61.2 CM/SEC
BH CV ECHO MEAS - MV V2 VTI: 26.6 CM
BH CV ECHO MEAS - MV V2 VTI: 55.5 CM
BH CV ECHO MEAS - MVA P1/2T LCG: 1.2 CM^2
BH CV ECHO MEAS - MVA P1/2T LCG: 2.1 CM^2
BH CV ECHO MEAS - MVA(P1/2T): 2.5 CM^2
BH CV ECHO MEAS - MVA(P1/2T): 2.8 CM^2
BH CV ECHO MEAS - MVA(VTI): 0.88 CM^2
BH CV ECHO MEAS - MVA(VTI): 2 CM^2
BH CV ECHO MEAS - PA ACC TIME: 0.08 SEC
BH CV ECHO MEAS - PA ACC TIME: 0.1 SEC
BH CV ECHO MEAS - PA MAX PG (FULL): 1.7 MMHG
BH CV ECHO MEAS - PA MAX PG (FULL): 2 MMHG
BH CV ECHO MEAS - PA MAX PG: 2.5 MMHG
BH CV ECHO MEAS - PA MAX PG: 4 MMHG
BH CV ECHO MEAS - PA PR(ACCEL): 34 MMHG
BH CV ECHO MEAS - PA PR(ACCEL): 42.1 MMHG
BH CV ECHO MEAS - PA V2 MAX: 100 CM/SEC
BH CV ECHO MEAS - PA V2 MAX: 79.1 CM/SEC
BH CV ECHO MEAS - PULM A REVS DUR: 0.09 SEC
BH CV ECHO MEAS - PULM A REVS VEL: 20.8 CM/SEC
BH CV ECHO MEAS - PULM DIAS VEL: 38.8 CM/SEC
BH CV ECHO MEAS - PULM S/D: 1.1
BH CV ECHO MEAS - PULM SYS VEL: 42 CM/SEC
BH CV ECHO MEAS - PVA(V,A): 2.3 CM^2
BH CV ECHO MEAS - PVA(V,A): 4.3 CM^2
BH CV ECHO MEAS - PVA(V,D): 2.3 CM^2
BH CV ECHO MEAS - PVA(V,D): 4.3 CM^2
BH CV ECHO MEAS - QP/QS: 0.62
BH CV ECHO MEAS - QP/QS: 1.5
BH CV ECHO MEAS - RAP SYSTOLE: 3 MMHG
BH CV ECHO MEAS - RAP SYSTOLE: 3 MMHG
BH CV ECHO MEAS - RV MAX PG: 0.76 MMHG
BH CV ECHO MEAS - RV MAX PG: 2 MMHG
BH CV ECHO MEAS - RV MEAN PG: 0 MMHG
BH CV ECHO MEAS - RV MEAN PG: 1 MMHG
BH CV ECHO MEAS - RV V1 MAX: 43.6 CM/SEC
BH CV ECHO MEAS - RV V1 MAX: 70.2 CM/SEC
BH CV ECHO MEAS - RV V1 MEAN: 28.7 CM/SEC
BH CV ECHO MEAS - RV V1 MEAN: 53.2 CM/SEC
BH CV ECHO MEAS - RV V1 VTI: 11.7 CM
BH CV ECHO MEAS - RV V1 VTI: 8.1 CM
BH CV ECHO MEAS - RVOT AREA: 4.2 CM^2
BH CV ECHO MEAS - RVOT AREA: 6.2 CM^2
BH CV ECHO MEAS - RVOT DIAM: 2.3 CM
BH CV ECHO MEAS - RVOT DIAM: 2.8 CM
BH CV ECHO MEAS - RVSP: 29 MMHG
BH CV ECHO MEAS - SI(AO): 93.5 ML/M^2
BH CV ECHO MEAS - SI(CUBED): 28.8 ML/M^2
BH CV ECHO MEAS - SI(CUBED): 45.5 ML/M^2
BH CV ECHO MEAS - SI(LVOT): 21 ML/M^2
BH CV ECHO MEAS - SI(LVOT): 21.7 ML/M^2
BH CV ECHO MEAS - SI(MOD-SP2): 36.9 ML/M^2
BH CV ECHO MEAS - SI(MOD-SP2): 39 ML/M^2
BH CV ECHO MEAS - SI(MOD-SP4): 29.1 ML/M^2
BH CV ECHO MEAS - SI(MOD-SP4): 48.8 ML/M^2
BH CV ECHO MEAS - SI(TEICH): 20.5 ML/M^2
BH CV ECHO MEAS - SI(TEICH): 29 ML/M^2
BH CV ECHO MEAS - SV(AO): 240.8 ML
BH CV ECHO MEAS - SV(CUBED): 117.2 ML
BH CV ECHO MEAS - SV(CUBED): 65 ML
BH CV ECHO MEAS - SV(LVOT): 48.8 ML
BH CV ECHO MEAS - SV(LVOT): 54 ML
BH CV ECHO MEAS - SV(MOD-SP2): 88 ML
BH CV ECHO MEAS - SV(MOD-SP2): 95 ML
BH CV ECHO MEAS - SV(MOD-SP4): 110 ML
BH CV ECHO MEAS - SV(MOD-SP4): 75 ML
BH CV ECHO MEAS - SV(RVOT): 33.7 ML
BH CV ECHO MEAS - SV(RVOT): 72 ML
BH CV ECHO MEAS - SV(TEICH): 46.1 ML
BH CV ECHO MEAS - SV(TEICH): 74.7 ML
BH CV ECHO MEAS - TAPSE (>1.6): 1.5 CM
BH CV ECHO MEAS - TAPSE (>1.6): 2.5 CM
BH CV ECHO MEAS - TR MAX VEL: 254 CM/SEC
BH CV ECHO MEASUREMENTS AVERAGE E/E' RATIO: 11.05
BH CV ECHO MEASUREMENTS AVERAGE E/E' RATIO: 17.03
BH CV LOWER VASCULAR LEFT COMMON FEMORAL AUGMENT: NORMAL
BH CV LOWER VASCULAR LEFT COMMON FEMORAL AUGMENT: NORMAL
BH CV LOWER VASCULAR LEFT COMMON FEMORAL COMPETENT: NORMAL
BH CV LOWER VASCULAR LEFT COMMON FEMORAL COMPETENT: NORMAL
BH CV LOWER VASCULAR LEFT COMMON FEMORAL COMPRESS: NORMAL
BH CV LOWER VASCULAR LEFT COMMON FEMORAL COMPRESS: NORMAL
BH CV LOWER VASCULAR LEFT COMMON FEMORAL PHASIC: NORMAL
BH CV LOWER VASCULAR LEFT COMMON FEMORAL PHASIC: NORMAL
BH CV LOWER VASCULAR LEFT COMMON FEMORAL SPONT: NORMAL
BH CV LOWER VASCULAR LEFT COMMON FEMORAL SPONT: NORMAL
BH CV LOWER VASCULAR LEFT DISTAL FEMORAL COMPRESS: NORMAL
BH CV LOWER VASCULAR LEFT DISTAL FEMORAL COMPRESS: NORMAL
BH CV LOWER VASCULAR LEFT GASTRONEMIUS COMPRESS: NORMAL
BH CV LOWER VASCULAR LEFT GASTRONEMIUS COMPRESS: NORMAL
BH CV LOWER VASCULAR LEFT GREATER SAPH AK COMPRESS: NORMAL
BH CV LOWER VASCULAR LEFT GREATER SAPH AK COMPRESS: NORMAL
BH CV LOWER VASCULAR LEFT GREATER SAPH BK COMPRESS: NORMAL
BH CV LOWER VASCULAR LEFT GREATER SAPH BK COMPRESS: NORMAL
BH CV LOWER VASCULAR LEFT MID FEMORAL AUGMENT: NORMAL
BH CV LOWER VASCULAR LEFT MID FEMORAL AUGMENT: NORMAL
BH CV LOWER VASCULAR LEFT MID FEMORAL COMPETENT: NORMAL
BH CV LOWER VASCULAR LEFT MID FEMORAL COMPETENT: NORMAL
BH CV LOWER VASCULAR LEFT MID FEMORAL COMPRESS: NORMAL
BH CV LOWER VASCULAR LEFT MID FEMORAL COMPRESS: NORMAL
BH CV LOWER VASCULAR LEFT MID FEMORAL PHASIC: NORMAL
BH CV LOWER VASCULAR LEFT MID FEMORAL PHASIC: NORMAL
BH CV LOWER VASCULAR LEFT MID FEMORAL SPONT: NORMAL
BH CV LOWER VASCULAR LEFT MID FEMORAL SPONT: NORMAL
BH CV LOWER VASCULAR LEFT PERONEAL COMPRESS: NORMAL
BH CV LOWER VASCULAR LEFT PERONEAL COMPRESS: NORMAL
BH CV LOWER VASCULAR LEFT POPLITEAL AUGMENT: NORMAL
BH CV LOWER VASCULAR LEFT POPLITEAL AUGMENT: NORMAL
BH CV LOWER VASCULAR LEFT POPLITEAL COMPETENT: NORMAL
BH CV LOWER VASCULAR LEFT POPLITEAL COMPETENT: NORMAL
BH CV LOWER VASCULAR LEFT POPLITEAL COMPRESS: NORMAL
BH CV LOWER VASCULAR LEFT POPLITEAL COMPRESS: NORMAL
BH CV LOWER VASCULAR LEFT POPLITEAL PHASIC: NORMAL
BH CV LOWER VASCULAR LEFT POPLITEAL PHASIC: NORMAL
BH CV LOWER VASCULAR LEFT POPLITEAL SPONT: NORMAL
BH CV LOWER VASCULAR LEFT POPLITEAL SPONT: NORMAL
BH CV LOWER VASCULAR LEFT POSTERIOR TIBIAL COMPRESS: NORMAL
BH CV LOWER VASCULAR LEFT POSTERIOR TIBIAL COMPRESS: NORMAL
BH CV LOWER VASCULAR LEFT PROFUNDA FEMORAL COMPRESS: NORMAL
BH CV LOWER VASCULAR LEFT PROFUNDA FEMORAL COMPRESS: NORMAL
BH CV LOWER VASCULAR LEFT PROXIMAL FEMORAL COMPRESS: NORMAL
BH CV LOWER VASCULAR LEFT PROXIMAL FEMORAL COMPRESS: NORMAL
BH CV LOWER VASCULAR LEFT SAPHENOFEMORAL JUNCTION COMPRESS: NORMAL
BH CV LOWER VASCULAR LEFT SAPHENOFEMORAL JUNCTION COMPRESS: NORMAL
BH CV LOWER VASCULAR RIGHT COMMON FEMORAL AUGMENT: NORMAL
BH CV LOWER VASCULAR RIGHT COMMON FEMORAL AUGMENT: NORMAL
BH CV LOWER VASCULAR RIGHT COMMON FEMORAL COMPETENT: NORMAL
BH CV LOWER VASCULAR RIGHT COMMON FEMORAL COMPETENT: NORMAL
BH CV LOWER VASCULAR RIGHT COMMON FEMORAL COMPRESS: NORMAL
BH CV LOWER VASCULAR RIGHT COMMON FEMORAL COMPRESS: NORMAL
BH CV LOWER VASCULAR RIGHT COMMON FEMORAL PHASIC: NORMAL
BH CV LOWER VASCULAR RIGHT COMMON FEMORAL PHASIC: NORMAL
BH CV LOWER VASCULAR RIGHT COMMON FEMORAL SPONT: NORMAL
BH CV LOWER VASCULAR RIGHT COMMON FEMORAL SPONT: NORMAL
BH CV VAS BP LEFT ARM: NORMAL MMHG
BH CV VAS BP RIGHT ARM: NORMAL MMHG
BH CV VAS MEAS BASILIC ANTECUBITAL FOSSA LEFT: 0.08 CM
BH CV VAS MEAS BASILIC ANTECUBITAL FOSSA RIGHT: 0.19 CM
BH CV VAS MEAS BASILIC FOREARM LEFT - DIST: 0.06 CM
BH CV VAS MEAS BASILIC FOREARM LEFT - MID: 0.06 CM
BH CV VAS MEAS BASILIC FOREARM LEFT - PROX: 0.07 CM
BH CV VAS MEAS BASILIC FOREARM RIGHT - DIST: 0.05 CM
BH CV VAS MEAS BASILIC FOREARM RIGHT - MID: 0.17 CM
BH CV VAS MEAS BASILIC FOREARM RIGHT - PROX: 0.09 CM
BH CV VAS MEAS BASILIC UPPER ARM LEFT - DIST: 0.09 CM
BH CV VAS MEAS BASILIC UPPER ARM LEFT - MID: 0.13 CM
BH CV VAS MEAS BASILIC UPPER ARM LEFT - PROX: 0.09 CM
BH CV VAS MEAS BASILIC UPPER ARM RIGHT - DIST: 0.11 CM
BH CV VAS MEAS BASILIC UPPER ARM RIGHT - MID: 0.15 CM
BH CV VAS MEAS BASILIC UPPER ARM RIGHT - PROX: 0.22 CM
BH CV VAS MEAS CEPHALIC ANTECUBITAL FOSSA LEFT: 0.12 CM
BH CV VAS MEAS CEPHALIC ANTECUBITAL FOSSA RIGHT: 0.21 CM
BH CV VAS MEAS CEPHALIC FOREARM LEFT - DIST: 0.08 CM
BH CV VAS MEAS CEPHALIC FOREARM LEFT - MID: 0.11 CM
BH CV VAS MEAS CEPHALIC FOREARM LEFT - PROX: 0.1 CM
BH CV VAS MEAS CEPHALIC FOREARM RIGHT - DIST: 0.07 CM
BH CV VAS MEAS CEPHALIC FOREARM RIGHT - MID: 0.1 CM
BH CV VAS MEAS CEPHALIC FOREARM RIGHT - PROX: 0.1 CM
BH CV VAS MEAS CEPHALIC UPPER ARM LEFT - DIST: 0.08 CM
BH CV VAS MEAS CEPHALIC UPPER ARM LEFT - MID: 0.12 CM
BH CV VAS MEAS CEPHALIC UPPER ARM LEFT - PROX: 0.17 CM
BH CV VAS MEAS CEPHALIC UPPER ARM RIGHT - DIST: 0.09 CM
BH CV VAS MEAS CEPHALIC UPPER ARM RIGHT - MID: 0.09 CM
BH CV VAS MEAS CEPHALIC UPPER ARM RIGHT - PROX: 0.16 CM
BH CV VAS MEAS RADIAL UPPER ARM LEFT - DIST: 0.15 CM
BH CV VAS MEAS RADIAL UPPER ARM LEFT - MID: 0.19 CM
BH CV VAS MEAS RADIAL UPPER ARM LEFT - PROX: 0.24 CM
BH CV VAS MEAS RADIAL UPPER ARM RIGHT - DIST: 0.13 CM
BH CV VAS MEAS RADIAL UPPER ARM RIGHT - MID: 0.15 CM
BH CV VAS MEAS RADIAL UPPER ARM RIGHT - PROX: 0.2 CM
BH CV XLRA - RV BASE: 3.9 CM
BH CV XLRA - RV BASE: 4.9 CM
BH CV XLRA - RV LENGTH: 8 CM
BH CV XLRA - RV MID: 3.6 CM
BH CV XLRA - TDI S': 10 CM/SEC
BH CV XLRA - TDI S': 11 CM/SEC
BH CV XLRA MEAS LEFT DIST CCA EDV: -19.9 CM/SEC
BH CV XLRA MEAS LEFT DIST CCA PSV: -65.9 CM/SEC
BH CV XLRA MEAS LEFT DIST ICA EDV: -26.6 CM/SEC
BH CV XLRA MEAS LEFT DIST ICA PSV: -62.1 CM/SEC
BH CV XLRA MEAS LEFT ICA/CCA RATIO: 0.92
BH CV XLRA MEAS LEFT MID ICA EDV: -22.8 CM/SEC
BH CV XLRA MEAS LEFT MID ICA PSV: -59.7 CM/SEC
BH CV XLRA MEAS LEFT PROX CCA EDV: 18.4 CM/SEC
BH CV XLRA MEAS LEFT PROX CCA PSV: 71.5 CM/SEC
BH CV XLRA MEAS LEFT PROX ECA EDV: -9.9 CM/SEC
BH CV XLRA MEAS LEFT PROX ECA PSV: -91.3 CM/SEC
BH CV XLRA MEAS LEFT PROX ICA EDV: -18 CM/SEC
BH CV XLRA MEAS LEFT PROX ICA PSV: -50.3 CM/SEC
BH CV XLRA MEAS LEFT PROX SCLA PSV: 145 CM/SEC
BH CV XLRA MEAS LEFT VERTEBRAL A EDV: 14.2 CM/SEC
BH CV XLRA MEAS LEFT VERTEBRAL A PSV: 39.4 CM/SEC
BH CV XLRA MEAS RIGHT DIST CCA EDV: -13.5 CM/SEC
BH CV XLRA MEAS RIGHT DIST CCA PSV: -50.4 CM/SEC
BH CV XLRA MEAS RIGHT DIST ICA EDV: -29 CM/SEC
BH CV XLRA MEAS RIGHT DIST ICA PSV: -77.1 CM/SEC
BH CV XLRA MEAS RIGHT ICA/CCA RATIO: 1.5
BH CV XLRA MEAS RIGHT MID ICA EDV: 26.2 CM/SEC
BH CV XLRA MEAS RIGHT MID ICA PSV: 67.2 CM/SEC
BH CV XLRA MEAS RIGHT PROX CCA EDV: 12.3 CM/SEC
BH CV XLRA MEAS RIGHT PROX CCA PSV: 63.3 CM/SEC
BH CV XLRA MEAS RIGHT PROX ECA EDV: -8.8 CM/SEC
BH CV XLRA MEAS RIGHT PROX ECA PSV: -62.1 CM/SEC
BH CV XLRA MEAS RIGHT PROX ICA EDV: -14.9 CM/SEC
BH CV XLRA MEAS RIGHT PROX ICA PSV: -58 CM/SEC
BH CV XLRA MEAS RIGHT PROX SCLA PSV: 75 CM/SEC
BH CV XLRA MEAS RIGHT VERTEBRAL A EDV: 19.1 CM/SEC
BH CV XLRA MEAS RIGHT VERTEBRAL A PSV: 59.4 CM/SEC
BILIRUB SERPL-MCNC: 0.3 MG/DL (ref 0.2–1.2)
BILIRUB SERPL-MCNC: 0.4 MG/DL (ref 0.2–1.2)
BILIRUB SERPL-MCNC: 0.4 MG/DL (ref 0.2–1.2)
BILIRUB SERPL-MCNC: 1.2 MG/DL (ref 0–1.2)
BILIRUB SERPL-MCNC: 1.4 MG/DL (ref 0–1.2)
BILIRUB SERPL-MCNC: 1.8 MG/DL (ref 0–1.2)
BILIRUB SERPL-MCNC: 1.8 MG/DL (ref 0–1.2)
BILIRUB SERPL-MCNC: 1.9 MG/DL (ref 0–1.2)
BILIRUB SERPL-MCNC: 1.9 MG/DL (ref 0–1.2)
BILIRUB SERPL-MCNC: 2 MG/DL (ref 0–1.2)
BILIRUB SERPL-MCNC: 2.1 MG/DL (ref 0–1.2)
BILIRUB SERPL-MCNC: 2.1 MG/DL (ref 0–1.2)
BILIRUB UR QL STRIP: NEGATIVE
BLD GP AB SCN SERPL QL: NEGATIVE
BUN BLD-MCNC: 20 MG/DL (ref 6–20)
BUN BLD-MCNC: 32 MG/DL (ref 6–20)
BUN BLD-MCNC: 32 MG/DL (ref 6–20)
BUN BLD-MCNC: 35 MG/DL (ref 6–20)
BUN BLD-MCNC: 37 MG/DL (ref 6–20)
BUN BLD-MCNC: 38 MG/DL (ref 6–20)
BUN BLD-MCNC: 39 MG/DL (ref 6–20)
BUN BLD-MCNC: 40 MG/DL (ref 6–20)
BUN BLD-MCNC: 43 MG/DL (ref 6–20)
BUN BLD-MCNC: 50 MG/DL (ref 6–20)
BUN BLD-MCNC: 57 MG/DL (ref 6–20)
BUN BLD-MCNC: 70 MG/DL (ref 6–20)
BUN BLD-MCNC: 74 MG/DL (ref 6–20)
BUN BLD-MCNC: 75 MG/DL (ref 6–20)
BUN BLD-MCNC: 78 MG/DL (ref 6–20)
BUN BLD-MCNC: 79 MG/DL (ref 6–20)
BUN BLD-MCNC: 82 MG/DL (ref 6–20)
BUN SERPL-MCNC: 101 MG/DL (ref 6–20)
BUN SERPL-MCNC: 109 MG/DL (ref 6–20)
BUN SERPL-MCNC: 110 MG/DL (ref 6–20)
BUN SERPL-MCNC: 25 MG/DL (ref 6–20)
BUN SERPL-MCNC: 39 MG/DL (ref 6–20)
BUN SERPL-MCNC: 40 MG/DL (ref 6–20)
BUN SERPL-MCNC: 58 MG/DL (ref 6–20)
BUN SERPL-MCNC: 63 MG/DL (ref 6–20)
BUN SERPL-MCNC: 65 MG/DL (ref 6–20)
BUN SERPL-MCNC: 76 MG/DL (ref 6–20)
BUN SERPL-MCNC: 94 MG/DL (ref 6–20)
BUN/CREAT SERPL: 10 (ref 7–25)
BUN/CREAT SERPL: 10.1 (ref 7–25)
BUN/CREAT SERPL: 10.3 (ref 7–25)
BUN/CREAT SERPL: 10.5 (ref 7–25)
BUN/CREAT SERPL: 10.5 (ref 7–25)
BUN/CREAT SERPL: 10.7 (ref 7–25)
BUN/CREAT SERPL: 11 (ref 7–25)
BUN/CREAT SERPL: 11.2 (ref 7–25)
BUN/CREAT SERPL: 11.2 (ref 7–25)
BUN/CREAT SERPL: 11.7 (ref 7–25)
BUN/CREAT SERPL: 11.9 (ref 7–25)
BUN/CREAT SERPL: 12.5 (ref 7–25)
BUN/CREAT SERPL: 12.6 (ref 7–25)
BUN/CREAT SERPL: 5.8 (ref 7–25)
BUN/CREAT SERPL: 6.3 (ref 7–25)
BUN/CREAT SERPL: 7.1 (ref 7–25)
BUN/CREAT SERPL: 7.5 (ref 7–25)
BUN/CREAT SERPL: 7.7 (ref 7–25)
BUN/CREAT SERPL: 7.8 (ref 7–25)
BUN/CREAT SERPL: 7.9 (ref 7–25)
BUN/CREAT SERPL: 8.4 (ref 7–25)
BUN/CREAT SERPL: 8.4 (ref 7–25)
BUN/CREAT SERPL: 8.5 (ref 7–25)
BUN/CREAT SERPL: 8.7 (ref 7–25)
BUN/CREAT SERPL: 8.8 (ref 7–25)
BUN/CREAT SERPL: 8.9 (ref 7–25)
BUN/CREAT SERPL: 9 (ref 7–25)
BUN/CREAT SERPL: 9.6 (ref 7–25)
C PNEUM DNA NPH QL NAA+NON-PROBE: NOT DETECTED
C PNEUM DNA NPH QL NAA+NON-PROBE: NOT DETECTED
CA-I BLD-MCNC: 4.4 MG/DL (ref 4.6–5.4)
CA-I BLD-MCNC: 4.7 MG/DL (ref 4.6–5.4)
CA-I SERPL ISE-MCNC: 1.1 MMOL/L (ref 1.15–1.35)
CA-I SERPL ISE-MCNC: 1.17 MMOL/L (ref 1.15–1.35)
CALCIUM SPEC-SCNC: 6.9 MG/DL (ref 8.6–10.5)
CALCIUM SPEC-SCNC: 6.9 MG/DL (ref 8.6–10.5)
CALCIUM SPEC-SCNC: 7 MG/DL (ref 8.6–10.5)
CALCIUM SPEC-SCNC: 7.3 MG/DL (ref 8.6–10.5)
CALCIUM SPEC-SCNC: 7.3 MG/DL (ref 8.6–10.5)
CALCIUM SPEC-SCNC: 7.4 MG/DL (ref 8.6–10.5)
CALCIUM SPEC-SCNC: 7.4 MG/DL (ref 8.6–10.5)
CALCIUM SPEC-SCNC: 7.5 MG/DL (ref 8.6–10.5)
CALCIUM SPEC-SCNC: 7.6 MG/DL (ref 8.6–10.5)
CALCIUM SPEC-SCNC: 7.7 MG/DL (ref 8.6–10.5)
CALCIUM SPEC-SCNC: 7.7 MG/DL (ref 8.6–10.5)
CALCIUM SPEC-SCNC: 7.8 MG/DL (ref 8.6–10.5)
CALCIUM SPEC-SCNC: 7.9 MG/DL (ref 8.6–10.5)
CALCIUM SPEC-SCNC: 8 MG/DL (ref 8.6–10.5)
CALCIUM SPEC-SCNC: 8 MG/DL (ref 8.6–10.5)
CALCIUM SPEC-SCNC: 8.1 MG/DL (ref 8.6–10.5)
CALCIUM SPEC-SCNC: 8.2 MG/DL (ref 8.6–10.5)
CALCIUM SPEC-SCNC: 8.2 MG/DL (ref 8.6–10.5)
CALCIUM SPEC-SCNC: 8.3 MG/DL (ref 8.6–10.5)
CALCIUM SPEC-SCNC: 8.5 MG/DL (ref 8.6–10.5)
CHLORIDE SERPL-SCNC: 100 MMOL/L (ref 98–107)
CHLORIDE SERPL-SCNC: 101 MMOL/L (ref 98–107)
CHLORIDE SERPL-SCNC: 101 MMOL/L (ref 98–107)
CHLORIDE SERPL-SCNC: 102 MMOL/L (ref 98–107)
CHLORIDE SERPL-SCNC: 102 MMOL/L (ref 98–107)
CHLORIDE SERPL-SCNC: 103 MMOL/L (ref 98–107)
CHLORIDE SERPL-SCNC: 103 MMOL/L (ref 98–107)
CHLORIDE SERPL-SCNC: 104 MMOL/L (ref 98–107)
CHLORIDE SERPL-SCNC: 104 MMOL/L (ref 98–107)
CHLORIDE SERPL-SCNC: 105 MMOL/L (ref 98–107)
CHLORIDE SERPL-SCNC: 106 MMOL/L (ref 98–107)
CHLORIDE SERPL-SCNC: 107 MMOL/L (ref 98–107)
CHLORIDE SERPL-SCNC: 108 MMOL/L (ref 98–107)
CHLORIDE SERPL-SCNC: 94 MMOL/L (ref 98–107)
CHLORIDE SERPL-SCNC: 95 MMOL/L (ref 98–107)
CHLORIDE SERPL-SCNC: 96 MMOL/L (ref 98–107)
CHLORIDE SERPL-SCNC: 96 MMOL/L (ref 98–107)
CHLORIDE SERPL-SCNC: 97 MMOL/L (ref 98–107)
CHLORIDE SERPL-SCNC: 98 MMOL/L (ref 98–107)
CHLORIDE SERPL-SCNC: 99 MMOL/L (ref 98–107)
CHLORIDE SERPL-SCNC: 99 MMOL/L (ref 98–107)
CHOLEST SERPL-MCNC: 120 MG/DL (ref 0–200)
CHOLEST SERPL-MCNC: 129 MG/DL (ref 0–200)
CLARITY UR: CLEAR
CLOSE TME COLL+ADP + EPINEP PNL BLD: 94 %
CO2 BLDA-SCNC: 24 MMOL/L (ref 24–29)
CO2 BLDA-SCNC: 27 MMOL/L (ref 24–29)
CO2 BLDA-SCNC: 28 MMOL/L (ref 24–29)
CO2 BLDA-SCNC: 28 MMOL/L (ref 24–29)
CO2 BLDA-SCNC: 29 MMOL/L (ref 24–29)
CO2 SERPL-SCNC: 14.4 MMOL/L (ref 22–29)
CO2 SERPL-SCNC: 14.9 MMOL/L (ref 22–29)
CO2 SERPL-SCNC: 15.7 MMOL/L (ref 22–29)
CO2 SERPL-SCNC: 15.9 MMOL/L (ref 22–29)
CO2 SERPL-SCNC: 16 MMOL/L (ref 22–29)
CO2 SERPL-SCNC: 16.6 MMOL/L (ref 22–29)
CO2 SERPL-SCNC: 16.9 MMOL/L (ref 22–29)
CO2 SERPL-SCNC: 17.1 MMOL/L (ref 22–29)
CO2 SERPL-SCNC: 17.5 MMOL/L (ref 22–29)
CO2 SERPL-SCNC: 17.5 MMOL/L (ref 22–29)
CO2 SERPL-SCNC: 17.7 MMOL/L (ref 22–29)
CO2 SERPL-SCNC: 19 MMOL/L (ref 22–29)
CO2 SERPL-SCNC: 20 MMOL/L (ref 22–29)
CO2 SERPL-SCNC: 20.2 MMOL/L (ref 22–29)
CO2 SERPL-SCNC: 20.4 MMOL/L (ref 22–29)
CO2 SERPL-SCNC: 20.5 MMOL/L (ref 22–29)
CO2 SERPL-SCNC: 20.6 MMOL/L (ref 22–29)
CO2 SERPL-SCNC: 20.8 MMOL/L (ref 22–29)
CO2 SERPL-SCNC: 21.4 MMOL/L (ref 22–29)
CO2 SERPL-SCNC: 22.3 MMOL/L (ref 22–29)
CO2 SERPL-SCNC: 22.3 MMOL/L (ref 22–29)
CO2 SERPL-SCNC: 22.5 MMOL/L (ref 22–29)
CO2 SERPL-SCNC: 22.5 MMOL/L (ref 22–29)
CO2 SERPL-SCNC: 23.7 MMOL/L (ref 22–29)
CO2 SERPL-SCNC: 25.2 MMOL/L (ref 22–29)
CO2 SERPL-SCNC: 27.2 MMOL/L (ref 22–29)
COLOR UR: YELLOW
CREAT BLD-MCNC: 3.43 MG/DL (ref 0.76–1.27)
CREAT BLD-MCNC: 4.17 MG/DL (ref 0.76–1.27)
CREAT BLD-MCNC: 4.35 MG/DL (ref 0.76–1.27)
CREAT BLD-MCNC: 4.37 MG/DL (ref 0.76–1.27)
CREAT BLD-MCNC: 4.38 MG/DL (ref 0.76–1.27)
CREAT BLD-MCNC: 4.84 MG/DL (ref 0.76–1.27)
CREAT BLD-MCNC: 4.95 MG/DL (ref 0.76–1.27)
CREAT BLD-MCNC: 5.04 MG/DL (ref 0.76–1.27)
CREAT BLD-MCNC: 5.31 MG/DL (ref 0.76–1.27)
CREAT BLD-MCNC: 5.68 MG/DL (ref 0.76–1.27)
CREAT BLD-MCNC: 6.45 MG/DL (ref 0.76–1.27)
CREAT BLD-MCNC: 6.95 MG/DL (ref 0.76–1.27)
CREAT BLD-MCNC: 7.07 MG/DL (ref 0.76–1.27)
CREAT BLD-MCNC: 7.15 MG/DL (ref 0.76–1.27)
CREAT BLD-MCNC: 7.2 MG/DL (ref 0.76–1.27)
CREAT BLD-MCNC: 7.28 MG/DL (ref 0.76–1.27)
CREAT BLD-MCNC: 7.44 MG/DL (ref 0.76–1.27)
CREAT SERPL-MCNC: 3.16 MG/DL (ref 0.76–1.27)
CREAT SERPL-MCNC: 4.65 MG/DL (ref 0.76–1.27)
CREAT SERPL-MCNC: 4.76 MG/DL (ref 0.76–1.27)
CREAT SERPL-MCNC: 5.98 MG/DL (ref 0.76–1.27)
CREAT SERPL-MCNC: 6.38 MG/DL (ref 0.76–1.27)
CREAT SERPL-MCNC: 6.57 MG/DL (ref 0.76–1.27)
CREAT SERPL-MCNC: 6.75 MG/DL (ref 0.76–1.27)
CREAT SERPL-MCNC: 8.01 MG/DL (ref 0.76–1.27)
CREAT SERPL-MCNC: 8.41 MG/DL (ref 0.76–1.27)
CREAT SERPL-MCNC: 8.7 MG/DL (ref 0.76–1.27)
CREAT SERPL-MCNC: 9.39 MG/DL (ref 0.76–1.27)
CROSSMATCH INTERPRETATION: NORMAL
CYTO UR: NORMAL
D-LACTATE SERPL-SCNC: 1.7 MMOL/L (ref 0.5–2)
DEPRECATED RDW RBC AUTO: 45.3 FL (ref 37–54)
DEPRECATED RDW RBC AUTO: 45.3 FL (ref 37–54)
DEPRECATED RDW RBC AUTO: 45.4 FL (ref 37–54)
DEPRECATED RDW RBC AUTO: 45.5 FL (ref 37–54)
DEPRECATED RDW RBC AUTO: 45.6 FL (ref 37–54)
DEPRECATED RDW RBC AUTO: 45.7 FL (ref 37–54)
DEPRECATED RDW RBC AUTO: 45.9 FL (ref 37–54)
DEPRECATED RDW RBC AUTO: 46 FL (ref 37–54)
DEPRECATED RDW RBC AUTO: 46.2 FL (ref 37–54)
DEPRECATED RDW RBC AUTO: 46.3 FL (ref 37–54)
DEPRECATED RDW RBC AUTO: 46.4 FL (ref 37–54)
DEPRECATED RDW RBC AUTO: 46.5 FL (ref 37–54)
DEPRECATED RDW RBC AUTO: 46.7 FL (ref 37–54)
DEPRECATED RDW RBC AUTO: 47.3 FL (ref 37–54)
DEPRECATED RDW RBC AUTO: 47.7 FL (ref 37–54)
DEPRECATED RDW RBC AUTO: 47.9 FL (ref 37–54)
DEPRECATED RDW RBC AUTO: 48 FL (ref 37–54)
DEPRECATED RDW RBC AUTO: 48.1 FL (ref 37–54)
DEPRECATED RDW RBC AUTO: 48.6 FL (ref 37–54)
DEPRECATED RDW RBC AUTO: 49.1 FL (ref 37–54)
DEPRECATED RDW RBC AUTO: 49.5 FL (ref 37–54)
DEPRECATED RDW RBC AUTO: 51.2 FL (ref 37–54)
DEPRECATED RDW RBC AUTO: 51.5 FL (ref 37–54)
DEPRECATED RDW RBC AUTO: 54.2 FL (ref 37–54)
DEPRECATED RDW RBC AUTO: 54.3 FL (ref 37–54)
DEPRECATED RDW RBC AUTO: 54.5 FL (ref 37–54)
DEPRECATED RDW RBC AUTO: 56 FL (ref 37–54)
DEPRECATED RDW RBC AUTO: 57.7 FL (ref 37–54)
EOSINOPHIL # BLD AUTO: 0 10*3/MM3 (ref 0–0.4)
EOSINOPHIL # BLD AUTO: 0 10*3/MM3 (ref 0–0.4)
EOSINOPHIL # BLD AUTO: 0.01 10*3/MM3 (ref 0–0.4)
EOSINOPHIL # BLD AUTO: 0.02 10*3/MM3 (ref 0–0.4)
EOSINOPHIL # BLD AUTO: 0.03 10*3/MM3 (ref 0–0.4)
EOSINOPHIL # BLD AUTO: 0.06 10*3/MM3 (ref 0–0.4)
EOSINOPHIL # BLD AUTO: 0.09 10*3/MM3 (ref 0–0.4)
EOSINOPHIL # BLD AUTO: 0.15 10*3/MM3 (ref 0–0.4)
EOSINOPHIL # BLD AUTO: 0.16 10*3/MM3 (ref 0–0.4)
EOSINOPHIL # BLD AUTO: 0.17 10*3/MM3 (ref 0–0.4)
EOSINOPHIL # BLD AUTO: 0.22 10*3/MM3 (ref 0–0.4)
EOSINOPHIL # BLD AUTO: 0.23 10*3/MM3 (ref 0–0.4)
EOSINOPHIL # BLD AUTO: 0.29 10*3/MM3 (ref 0–0.4)
EOSINOPHIL # BLD AUTO: 0.32 10*3/MM3 (ref 0–0.4)
EOSINOPHIL # BLD AUTO: 0.35 10*3/MM3 (ref 0–0.4)
EOSINOPHIL # BLD AUTO: 0.36 10*3/MM3 (ref 0–0.4)
EOSINOPHIL NFR BLD AUTO: 0 % (ref 0.3–6.2)
EOSINOPHIL NFR BLD AUTO: 0.1 % (ref 0.3–6.2)
EOSINOPHIL NFR BLD AUTO: 0.2 % (ref 0.3–6.2)
EOSINOPHIL NFR BLD AUTO: 0.3 % (ref 0.3–6.2)
EOSINOPHIL NFR BLD AUTO: 0.5 % (ref 0.3–6.2)
EOSINOPHIL NFR BLD AUTO: 1.2 % (ref 0.3–6.2)
EOSINOPHIL NFR BLD AUTO: 1.8 % (ref 0.3–6.2)
EOSINOPHIL NFR BLD AUTO: 1.9 % (ref 0.3–6.2)
EOSINOPHIL NFR BLD AUTO: 2.8 % (ref 0.3–6.2)
EOSINOPHIL NFR BLD AUTO: 3.3 % (ref 0.3–6.2)
EOSINOPHIL NFR BLD AUTO: 3.5 % (ref 0.3–6.2)
EOSINOPHIL NFR BLD AUTO: 3.7 % (ref 0.3–6.2)
EOSINOPHIL NFR BLD AUTO: 3.9 % (ref 0.3–6.2)
EOSINOPHIL NFR BLD AUTO: 4 % (ref 0.3–6.2)
ERYTHROCYTE [DISTWIDTH] IN BLOOD BY AUTOMATED COUNT: 16.2 % (ref 12.3–15.4)
ERYTHROCYTE [DISTWIDTH] IN BLOOD BY AUTOMATED COUNT: 16.3 % (ref 12.3–15.4)
ERYTHROCYTE [DISTWIDTH] IN BLOOD BY AUTOMATED COUNT: 16.4 % (ref 12.3–15.4)
ERYTHROCYTE [DISTWIDTH] IN BLOOD BY AUTOMATED COUNT: 16.5 % (ref 12.3–15.4)
ERYTHROCYTE [DISTWIDTH] IN BLOOD BY AUTOMATED COUNT: 16.6 % (ref 12.3–15.4)
ERYTHROCYTE [DISTWIDTH] IN BLOOD BY AUTOMATED COUNT: 16.7 % (ref 12.3–15.4)
ERYTHROCYTE [DISTWIDTH] IN BLOOD BY AUTOMATED COUNT: 16.8 % (ref 12.3–15.4)
ERYTHROCYTE [DISTWIDTH] IN BLOOD BY AUTOMATED COUNT: 16.9 % (ref 12.3–15.4)
ERYTHROCYTE [DISTWIDTH] IN BLOOD BY AUTOMATED COUNT: 16.9 % (ref 12.3–15.4)
ERYTHROCYTE [DISTWIDTH] IN BLOOD BY AUTOMATED COUNT: 17 % (ref 12.3–15.4)
ERYTHROCYTE [DISTWIDTH] IN BLOOD BY AUTOMATED COUNT: 17 % (ref 12.3–15.4)
ERYTHROCYTE [DISTWIDTH] IN BLOOD BY AUTOMATED COUNT: 17.1 % (ref 12.3–15.4)
ERYTHROCYTE [DISTWIDTH] IN BLOOD BY AUTOMATED COUNT: 17.1 % (ref 12.3–15.4)
ERYTHROCYTE [DISTWIDTH] IN BLOOD BY AUTOMATED COUNT: 17.2 % (ref 12.3–15.4)
ERYTHROCYTE [DISTWIDTH] IN BLOOD BY AUTOMATED COUNT: 17.2 % (ref 12.3–15.4)
ERYTHROCYTE [DISTWIDTH] IN BLOOD BY AUTOMATED COUNT: 17.3 % (ref 12.3–15.4)
ERYTHROCYTE [DISTWIDTH] IN BLOOD BY AUTOMATED COUNT: 17.3 % (ref 12.3–15.4)
ERYTHROCYTE [DISTWIDTH] IN BLOOD BY AUTOMATED COUNT: 17.6 % (ref 12.3–15.4)
ERYTHROCYTE [DISTWIDTH] IN BLOOD BY AUTOMATED COUNT: 18.1 % (ref 12.3–15.4)
ERYTHROCYTE [DISTWIDTH] IN BLOOD BY AUTOMATED COUNT: 18.2 % (ref 12.3–15.4)
ERYTHROCYTE [DISTWIDTH] IN BLOOD BY AUTOMATED COUNT: 18.3 % (ref 12.3–15.4)
ERYTHROCYTE [DISTWIDTH] IN BLOOD BY AUTOMATED COUNT: 18.4 % (ref 12.3–15.4)
ERYTHROCYTE [DISTWIDTH] IN BLOOD BY AUTOMATED COUNT: 18.6 % (ref 12.3–15.4)
ERYTHROCYTE [DISTWIDTH] IN BLOOD BY AUTOMATED COUNT: 18.6 % (ref 12.3–15.4)
FERRITIN SERPL-MCNC: 1291 NG/ML (ref 30–400)
FERRITIN SERPL-MCNC: 70.2 NG/ML (ref 30–400)
FIBRINOGEN PPP-MCNC: 264 MG/DL (ref 219–464)
FLUAV H1 2009 PAND RNA NPH QL NAA+PROBE: NOT DETECTED
FLUAV H1 2009 PAND RNA NPH QL NAA+PROBE: NOT DETECTED
FLUAV H1 HA GENE NPH QL NAA+PROBE: NOT DETECTED
FLUAV H1 HA GENE NPH QL NAA+PROBE: NOT DETECTED
FLUAV H3 RNA NPH QL NAA+PROBE: NOT DETECTED
FLUAV H3 RNA NPH QL NAA+PROBE: NOT DETECTED
FLUAV SUBTYP SPEC NAA+PROBE: NOT DETECTED
FLUAV SUBTYP SPEC NAA+PROBE: NOT DETECTED
FLUBV RNA ISLT QL NAA+PROBE: NOT DETECTED
FLUBV RNA ISLT QL NAA+PROBE: NOT DETECTED
FOLATE SERPL-MCNC: 6.09 NG/ML (ref 4.78–24.2)
GFR SERPL CREATININE-BSD FRML MDRD: 10 ML/MIN/1.73
GFR SERPL CREATININE-BSD FRML MDRD: 11 ML/MIN/1.73
GFR SERPL CREATININE-BSD FRML MDRD: 12 ML/MIN/1.73
GFR SERPL CREATININE-BSD FRML MDRD: 12 ML/MIN/1.73
GFR SERPL CREATININE-BSD FRML MDRD: 13 ML/MIN/1.73
GFR SERPL CREATININE-BSD FRML MDRD: 14 ML/MIN/1.73
GFR SERPL CREATININE-BSD FRML MDRD: 15 ML/MIN/1.73
GFR SERPL CREATININE-BSD FRML MDRD: 19 ML/MIN/1.73
GFR SERPL CREATININE-BSD FRML MDRD: 21 ML/MIN/1.73
GFR SERPL CREATININE-BSD FRML MDRD: 6 ML/MIN/1.73
GFR SERPL CREATININE-BSD FRML MDRD: 7 ML/MIN/1.73
GFR SERPL CREATININE-BSD FRML MDRD: 8 ML/MIN/1.73
GFR SERPL CREATININE-BSD FRML MDRD: 9 ML/MIN/1.73
GFR SERPL CREATININE-BSD FRML MDRD: ABNORMAL ML/MIN/{1.73_M2}
GLOBULIN UR ELPH-MCNC: 2.5 GM/DL
GLOBULIN UR ELPH-MCNC: 2.7 GM/DL
GLOBULIN UR ELPH-MCNC: 3.2 GM/DL
GLOBULIN UR ELPH-MCNC: 3.2 GM/DL
GLOBULIN UR ELPH-MCNC: 3.3 GM/DL
GLOBULIN UR ELPH-MCNC: 3.4 GM/DL
GLOBULIN UR ELPH-MCNC: 3.5 GM/DL
GLOBULIN UR ELPH-MCNC: 3.6 GM/DL
GLOBULIN UR ELPH-MCNC: 4 GM/DL
GLOBULIN UR ELPH-MCNC: 4.1 GM/DL
GLUCOSE BLD-MCNC: 100 MG/DL (ref 65–99)
GLUCOSE BLD-MCNC: 100 MG/DL (ref 65–99)
GLUCOSE BLD-MCNC: 103 MG/DL (ref 65–99)
GLUCOSE BLD-MCNC: 104 MG/DL (ref 65–99)
GLUCOSE BLD-MCNC: 113 MG/DL (ref 65–99)
GLUCOSE BLD-MCNC: 113 MG/DL (ref 65–99)
GLUCOSE BLD-MCNC: 114 MG/DL (ref 65–99)
GLUCOSE BLD-MCNC: 114 MG/DL (ref 65–99)
GLUCOSE BLD-MCNC: 115 MG/DL (ref 65–99)
GLUCOSE BLD-MCNC: 120 MG/DL (ref 65–99)
GLUCOSE BLD-MCNC: 121 MG/DL (ref 65–99)
GLUCOSE BLD-MCNC: 122 MG/DL (ref 65–99)
GLUCOSE BLD-MCNC: 124 MG/DL (ref 65–99)
GLUCOSE BLD-MCNC: 143 MG/DL (ref 65–99)
GLUCOSE BLD-MCNC: 75 MG/DL (ref 65–99)
GLUCOSE BLD-MCNC: 90 MG/DL (ref 65–99)
GLUCOSE BLD-MCNC: 94 MG/DL (ref 65–99)
GLUCOSE BLDC GLUCOMTR-MCNC: 100 MG/DL (ref 70–130)
GLUCOSE BLDC GLUCOMTR-MCNC: 101 MG/DL (ref 70–130)
GLUCOSE BLDC GLUCOMTR-MCNC: 102 MG/DL (ref 70–130)
GLUCOSE BLDC GLUCOMTR-MCNC: 102 MG/DL (ref 70–130)
GLUCOSE BLDC GLUCOMTR-MCNC: 103 MG/DL (ref 70–130)
GLUCOSE BLDC GLUCOMTR-MCNC: 103 MG/DL (ref 70–130)
GLUCOSE BLDC GLUCOMTR-MCNC: 105 MG/DL (ref 70–130)
GLUCOSE BLDC GLUCOMTR-MCNC: 108 MG/DL (ref 70–130)
GLUCOSE BLDC GLUCOMTR-MCNC: 109 MG/DL (ref 70–130)
GLUCOSE BLDC GLUCOMTR-MCNC: 112 MG/DL (ref 70–130)
GLUCOSE BLDC GLUCOMTR-MCNC: 115 MG/DL (ref 70–130)
GLUCOSE BLDC GLUCOMTR-MCNC: 116 MG/DL (ref 70–130)
GLUCOSE BLDC GLUCOMTR-MCNC: 117 MG/DL (ref 70–130)
GLUCOSE BLDC GLUCOMTR-MCNC: 119 MG/DL (ref 70–130)
GLUCOSE BLDC GLUCOMTR-MCNC: 119 MG/DL (ref 70–130)
GLUCOSE BLDC GLUCOMTR-MCNC: 120 MG/DL (ref 70–130)
GLUCOSE BLDC GLUCOMTR-MCNC: 120 MG/DL (ref 70–130)
GLUCOSE BLDC GLUCOMTR-MCNC: 121 MG/DL (ref 70–130)
GLUCOSE BLDC GLUCOMTR-MCNC: 121 MG/DL (ref 70–130)
GLUCOSE BLDC GLUCOMTR-MCNC: 122 MG/DL (ref 70–130)
GLUCOSE BLDC GLUCOMTR-MCNC: 122 MG/DL (ref 70–130)
GLUCOSE BLDC GLUCOMTR-MCNC: 123 MG/DL (ref 70–130)
GLUCOSE BLDC GLUCOMTR-MCNC: 123 MG/DL (ref 70–130)
GLUCOSE BLDC GLUCOMTR-MCNC: 124 MG/DL (ref 70–130)
GLUCOSE BLDC GLUCOMTR-MCNC: 124 MG/DL (ref 70–130)
GLUCOSE BLDC GLUCOMTR-MCNC: 125 MG/DL (ref 70–130)
GLUCOSE BLDC GLUCOMTR-MCNC: 126 MG/DL (ref 70–130)
GLUCOSE BLDC GLUCOMTR-MCNC: 126 MG/DL (ref 70–130)
GLUCOSE BLDC GLUCOMTR-MCNC: 127 MG/DL (ref 70–130)
GLUCOSE BLDC GLUCOMTR-MCNC: 130 MG/DL (ref 70–130)
GLUCOSE BLDC GLUCOMTR-MCNC: 130 MG/DL (ref 70–130)
GLUCOSE BLDC GLUCOMTR-MCNC: 131 MG/DL (ref 70–130)
GLUCOSE BLDC GLUCOMTR-MCNC: 131 MG/DL (ref 70–130)
GLUCOSE BLDC GLUCOMTR-MCNC: 133 MG/DL (ref 70–130)
GLUCOSE BLDC GLUCOMTR-MCNC: 133 MG/DL (ref 70–130)
GLUCOSE BLDC GLUCOMTR-MCNC: 135 MG/DL (ref 70–130)
GLUCOSE BLDC GLUCOMTR-MCNC: 135 MG/DL (ref 70–130)
GLUCOSE BLDC GLUCOMTR-MCNC: 136 MG/DL (ref 70–130)
GLUCOSE BLDC GLUCOMTR-MCNC: 136 MG/DL (ref 70–130)
GLUCOSE BLDC GLUCOMTR-MCNC: 137 MG/DL (ref 70–130)
GLUCOSE BLDC GLUCOMTR-MCNC: 137 MG/DL (ref 70–130)
GLUCOSE BLDC GLUCOMTR-MCNC: 138 MG/DL (ref 70–130)
GLUCOSE BLDC GLUCOMTR-MCNC: 139 MG/DL (ref 70–130)
GLUCOSE BLDC GLUCOMTR-MCNC: 141 MG/DL (ref 70–130)
GLUCOSE BLDC GLUCOMTR-MCNC: 142 MG/DL (ref 70–130)
GLUCOSE BLDC GLUCOMTR-MCNC: 146 MG/DL (ref 70–130)
GLUCOSE BLDC GLUCOMTR-MCNC: 148 MG/DL (ref 70–130)
GLUCOSE BLDC GLUCOMTR-MCNC: 149 MG/DL (ref 70–130)
GLUCOSE BLDC GLUCOMTR-MCNC: 150 MG/DL (ref 70–130)
GLUCOSE BLDC GLUCOMTR-MCNC: 151 MG/DL (ref 70–130)
GLUCOSE BLDC GLUCOMTR-MCNC: 152 MG/DL (ref 70–130)
GLUCOSE BLDC GLUCOMTR-MCNC: 154 MG/DL (ref 70–130)
GLUCOSE BLDC GLUCOMTR-MCNC: 156 MG/DL (ref 70–130)
GLUCOSE BLDC GLUCOMTR-MCNC: 157 MG/DL (ref 70–130)
GLUCOSE BLDC GLUCOMTR-MCNC: 158 MG/DL (ref 70–130)
GLUCOSE BLDC GLUCOMTR-MCNC: 161 MG/DL (ref 70–130)
GLUCOSE BLDC GLUCOMTR-MCNC: 165 MG/DL (ref 70–130)
GLUCOSE BLDC GLUCOMTR-MCNC: 166 MG/DL (ref 70–130)
GLUCOSE BLDC GLUCOMTR-MCNC: 178 MG/DL (ref 70–130)
GLUCOSE BLDC GLUCOMTR-MCNC: 179 MG/DL (ref 70–130)
GLUCOSE BLDC GLUCOMTR-MCNC: 185 MG/DL (ref 70–130)
GLUCOSE BLDC GLUCOMTR-MCNC: 188 MG/DL (ref 70–130)
GLUCOSE BLDC GLUCOMTR-MCNC: 207 MG/DL (ref 70–130)
GLUCOSE BLDC GLUCOMTR-MCNC: 236 MG/DL (ref 70–130)
GLUCOSE BLDC GLUCOMTR-MCNC: 90 MG/DL (ref 70–130)
GLUCOSE BLDC GLUCOMTR-MCNC: 94 MG/DL (ref 70–130)
GLUCOSE BLDC GLUCOMTR-MCNC: 96 MG/DL (ref 70–130)
GLUCOSE BLDC GLUCOMTR-MCNC: 96 MG/DL (ref 70–130)
GLUCOSE BLDC GLUCOMTR-MCNC: 97 MG/DL (ref 70–130)
GLUCOSE SERPL-MCNC: 121 MG/DL (ref 65–99)
GLUCOSE SERPL-MCNC: 127 MG/DL (ref 65–99)
GLUCOSE SERPL-MCNC: 133 MG/DL (ref 65–99)
GLUCOSE SERPL-MCNC: 140 MG/DL (ref 65–99)
GLUCOSE SERPL-MCNC: 144 MG/DL (ref 65–99)
GLUCOSE SERPL-MCNC: 155 MG/DL (ref 65–99)
GLUCOSE SERPL-MCNC: 183 MG/DL (ref 65–99)
GLUCOSE SERPL-MCNC: 184 MG/DL (ref 65–99)
GLUCOSE SERPL-MCNC: 193 MG/DL (ref 65–99)
GLUCOSE SERPL-MCNC: 90 MG/DL (ref 65–99)
GLUCOSE SERPL-MCNC: 99 MG/DL (ref 65–99)
GLUCOSE UR STRIP-MCNC: ABNORMAL MG/DL
GRAM STN SPEC: ABNORMAL
HADV DNA SPEC NAA+PROBE: NOT DETECTED
HADV DNA SPEC NAA+PROBE: NOT DETECTED
HBA1C MFR BLD: 6 % (ref 4.8–5.6)
HBA1C MFR BLD: 6.5 % (ref 4.8–5.6)
HBV SURFACE AG SERPL QL IA: NORMAL
HCO3 BLDA-SCNC: 21.6 MMOL/L (ref 22–28)
HCO3 BLDA-SCNC: 22.9 MMOL/L (ref 22–26)
HCO3 BLDA-SCNC: 24.3 MMOL/L (ref 22–28)
HCO3 BLDA-SCNC: 24.9 MMOL/L (ref 22–28)
HCO3 BLDA-SCNC: 25.3 MMOL/L (ref 22–26)
HCO3 BLDA-SCNC: 26.2 MMOL/L (ref 22–26)
HCO3 BLDA-SCNC: 26.4 MMOL/L (ref 22–28)
HCO3 BLDA-SCNC: 26.6 MMOL/L (ref 22–26)
HCO3 BLDA-SCNC: 27.9 MMOL/L (ref 22–26)
HCOV 229E RNA SPEC QL NAA+PROBE: NOT DETECTED
HCOV 229E RNA SPEC QL NAA+PROBE: NOT DETECTED
HCOV HKU1 RNA SPEC QL NAA+PROBE: NOT DETECTED
HCOV HKU1 RNA SPEC QL NAA+PROBE: NOT DETECTED
HCOV NL63 RNA SPEC QL NAA+PROBE: NOT DETECTED
HCOV NL63 RNA SPEC QL NAA+PROBE: NOT DETECTED
HCOV OC43 RNA SPEC QL NAA+PROBE: NOT DETECTED
HCOV OC43 RNA SPEC QL NAA+PROBE: NOT DETECTED
HCT VFR BLD AUTO: 23.3 % (ref 37.5–51)
HCT VFR BLD AUTO: 23.3 % (ref 37.5–51)
HCT VFR BLD AUTO: 23.5 % (ref 37.5–51)
HCT VFR BLD AUTO: 23.7 % (ref 37.5–51)
HCT VFR BLD AUTO: 23.9 % (ref 37.5–51)
HCT VFR BLD AUTO: 24.2 % (ref 37.5–51)
HCT VFR BLD AUTO: 24.4 % (ref 37.5–51)
HCT VFR BLD AUTO: 25 % (ref 37.5–51)
HCT VFR BLD AUTO: 25.3 % (ref 37.5–51)
HCT VFR BLD AUTO: 27.6 % (ref 37.5–51)
HCT VFR BLD AUTO: 28 % (ref 37.5–51)
HCT VFR BLD AUTO: 28.9 % (ref 37.5–51)
HCT VFR BLD AUTO: 29 % (ref 37.5–51)
HCT VFR BLD AUTO: 29.1 % (ref 37.5–51)
HCT VFR BLD AUTO: 29.6 % (ref 37.5–51)
HCT VFR BLD AUTO: 29.8 % (ref 37.5–51)
HCT VFR BLD AUTO: 30 % (ref 37.5–51)
HCT VFR BLD AUTO: 30.2 % (ref 37.5–51)
HCT VFR BLD AUTO: 31 % (ref 37.5–51)
HCT VFR BLD AUTO: 32 % (ref 37.5–51)
HCT VFR BLD AUTO: 32.1 % (ref 37.5–51)
HCT VFR BLD AUTO: 32.1 % (ref 37.5–51)
HCT VFR BLD AUTO: 32.5 % (ref 37.5–51)
HCT VFR BLD AUTO: 32.9 % (ref 37.5–51)
HCT VFR BLD AUTO: 33.9 % (ref 37.5–51)
HCT VFR BLD AUTO: 35.4 % (ref 37.5–51)
HCT VFR BLD AUTO: 36.6 % (ref 37.5–51)
HCT VFR BLD AUTO: 37.8 % (ref 37.5–51)
HCT VFR BLD AUTO: 42.7 % (ref 37.5–51)
HCT VFR BLDA CALC: 23 % (ref 38–51)
HCT VFR BLDA CALC: 26 % (ref 38–51)
HCT VFR BLDA CALC: 31 % (ref 38–51)
HDLC SERPL-MCNC: 12 MG/DL (ref 40–60)
HDLC SERPL-MCNC: 30 MG/DL (ref 40–60)
HGB BLD-MCNC: 10.2 G/DL (ref 13–17.7)
HGB BLD-MCNC: 10.9 G/DL (ref 13–17.7)
HGB BLD-MCNC: 10.9 G/DL (ref 13–17.7)
HGB BLD-MCNC: 11.6 G/DL (ref 13–17.7)
HGB BLD-MCNC: 13.1 G/DL (ref 13–17.7)
HGB BLD-MCNC: 7 G/DL (ref 13–17.7)
HGB BLD-MCNC: 7.1 G/DL (ref 13–17.7)
HGB BLD-MCNC: 7.2 G/DL (ref 13–17.7)
HGB BLD-MCNC: 7.3 G/DL (ref 13–17.7)
HGB BLD-MCNC: 7.5 G/DL (ref 13–17.7)
HGB BLD-MCNC: 7.6 G/DL (ref 13–17.7)
HGB BLD-MCNC: 7.7 G/DL (ref 13–17.7)
HGB BLD-MCNC: 7.8 G/DL (ref 13–17.7)
HGB BLD-MCNC: 7.9 G/DL (ref 13–17.7)
HGB BLD-MCNC: 8.7 G/DL (ref 13–17.7)
HGB BLD-MCNC: 8.7 G/DL (ref 13–17.7)
HGB BLD-MCNC: 8.9 G/DL (ref 13–17.7)
HGB BLD-MCNC: 9.1 G/DL (ref 13–17.7)
HGB BLD-MCNC: 9.2 G/DL (ref 13–17.7)
HGB BLD-MCNC: 9.2 G/DL (ref 13–17.7)
HGB BLD-MCNC: 9.3 G/DL (ref 13–17.7)
HGB BLD-MCNC: 9.4 G/DL (ref 13–17.7)
HGB BLD-MCNC: 9.5 G/DL (ref 13–17.7)
HGB BLD-MCNC: 9.7 G/DL (ref 13–17.7)
HGB BLD-MCNC: 9.7 G/DL (ref 13–17.7)
HGB BLD-MCNC: 9.8 G/DL (ref 13–17.7)
HGB BLD-MCNC: 9.9 G/DL (ref 13–17.7)
HGB BLDA-MCNC: 10.5 G/DL (ref 12–17)
HGB BLDA-MCNC: 7.8 G/DL (ref 12–17)
HGB BLDA-MCNC: 8.8 G/DL (ref 12–17)
HGB UR QL STRIP.AUTO: ABNORMAL
HMPV RNA NPH QL NAA+NON-PROBE: NOT DETECTED
HMPV RNA NPH QL NAA+NON-PROBE: NOT DETECTED
HOLD SPECIMEN: NORMAL
HOROWITZ INDEX BLD+IHG-RTO: 100 %
HOROWITZ INDEX BLD+IHG-RTO: 30 %
HOROWITZ INDEX BLD+IHG-RTO: 30 %
HPIV1 RNA SPEC QL NAA+PROBE: NOT DETECTED
HPIV1 RNA SPEC QL NAA+PROBE: NOT DETECTED
HPIV2 RNA SPEC QL NAA+PROBE: NOT DETECTED
HPIV2 RNA SPEC QL NAA+PROBE: NOT DETECTED
HPIV3 RNA NPH QL NAA+PROBE: NOT DETECTED
HPIV3 RNA NPH QL NAA+PROBE: NOT DETECTED
HPIV4 P GENE NPH QL NAA+PROBE: NOT DETECTED
HPIV4 P GENE NPH QL NAA+PROBE: NOT DETECTED
HYALINE CASTS UR QL AUTO: ABNORMAL /LPF
IMM GRANULOCYTES # BLD AUTO: 0.02 10*3/MM3 (ref 0–0.05)
IMM GRANULOCYTES # BLD AUTO: 0.03 10*3/MM3 (ref 0–0.05)
IMM GRANULOCYTES # BLD AUTO: 0.03 10*3/MM3 (ref 0–0.05)
IMM GRANULOCYTES # BLD AUTO: 0.04 10*3/MM3 (ref 0–0.05)
IMM GRANULOCYTES # BLD AUTO: 0.05 10*3/MM3 (ref 0–0.05)
IMM GRANULOCYTES # BLD AUTO: 0.05 10*3/MM3 (ref 0–0.05)
IMM GRANULOCYTES # BLD AUTO: 0.06 10*3/MM3 (ref 0–0.05)
IMM GRANULOCYTES # BLD AUTO: 0.06 10*3/MM3 (ref 0–0.05)
IMM GRANULOCYTES # BLD AUTO: 0.07 10*3/MM3 (ref 0–0.05)
IMM GRANULOCYTES # BLD AUTO: 0.08 10*3/MM3 (ref 0–0.05)
IMM GRANULOCYTES # BLD AUTO: 0.09 10*3/MM3 (ref 0–0.05)
IMM GRANULOCYTES # BLD AUTO: 0.12 10*3/MM3 (ref 0–0.05)
IMM GRANULOCYTES # BLD AUTO: 0.13 10*3/MM3 (ref 0–0.05)
IMM GRANULOCYTES # BLD AUTO: 0.13 10*3/MM3 (ref 0–0.05)
IMM GRANULOCYTES # BLD AUTO: 0.14 10*3/MM3 (ref 0–0.05)
IMM GRANULOCYTES # BLD AUTO: 0.2 10*3/MM3 (ref 0–0.05)
IMM GRANULOCYTES # BLD AUTO: 0.31 10*3/MM3 (ref 0–0.05)
IMM GRANULOCYTES # BLD AUTO: 0.51 10*3/MM3 (ref 0–0.05)
IMM GRANULOCYTES # BLD AUTO: 0.59 10*3/MM3 (ref 0–0.05)
IMM GRANULOCYTES NFR BLD AUTO: 0.4 % (ref 0–0.5)
IMM GRANULOCYTES NFR BLD AUTO: 0.5 % (ref 0–0.5)
IMM GRANULOCYTES NFR BLD AUTO: 0.6 % (ref 0–0.5)
IMM GRANULOCYTES NFR BLD AUTO: 0.7 % (ref 0–0.5)
IMM GRANULOCYTES NFR BLD AUTO: 0.8 % (ref 0–0.5)
IMM GRANULOCYTES NFR BLD AUTO: 0.9 % (ref 0–0.5)
IMM GRANULOCYTES NFR BLD AUTO: 0.9 % (ref 0–0.5)
IMM GRANULOCYTES NFR BLD AUTO: 1 % (ref 0–0.5)
IMM GRANULOCYTES NFR BLD AUTO: 1.3 % (ref 0–0.5)
IMM GRANULOCYTES NFR BLD AUTO: 2 % (ref 0–0.5)
IMM GRANULOCYTES NFR BLD AUTO: 2.3 % (ref 0–0.5)
INR PPP: 1.28 (ref 0.9–1.1)
INR PPP: 1.31 (ref 0.9–1.1)
INR PPP: 1.37 (ref 0.9–1.1)
INR PPP: 1.55 (ref 0.9–1.1)
INR PPP: 1.56 (ref 0.9–1.1)
INR PPP: 1.58 (ref 0.9–1.1)
INR PPP: 1.6 (ref 0.91–1.09)
INR PPP: 1.6 (ref 0.91–1.09)
INR PPP: 1.6 (ref 0.9–1.1)
INR PPP: 1.67 (ref 0.9–1.1)
INR PPP: 1.7 (ref 0.91–1.09)
INR PPP: 1.73 (ref 0.9–1.1)
INR PPP: 1.8
INR PPP: 1.9 (ref 0.91–1.09)
INR PPP: 1.9 (ref 0.91–1.09)
INR PPP: 1.9 (ref 0.9–1.1)
INR PPP: 1.91 (ref 0.9–1.1)
INR PPP: 2.1
INR PPP: 2.16 (ref 0.9–1.1)
INR PPP: 2.2 (ref 0.91–1.09)
INR PPP: 2.2 (ref 0.91–1.09)
INR PPP: 2.44 (ref 0.9–1.1)
INR PPP: 2.49 (ref 0.9–1.1)
INR PPP: 2.58 (ref 0.9–1.1)
INR PPP: 2.68 (ref 0.9–1.1)
INR PPP: 2.7 (ref 0.91–1.09)
INR PPP: 2.7 (ref 0.91–1.09)
INR PPP: 2.78 (ref 0.9–1.1)
INR PPP: 2.85 (ref 0.9–1.1)
INR PPP: 2.89 (ref 0.9–1.1)
INR PPP: 3 (ref 0.91–1.09)
INR PPP: 3.54 (ref 0.9–1.1)
INR PPP: 3.75 (ref 0.9–1.1)
INR PPP: 3.82 (ref 0.9–1.1)
INR PPP: 4.1 (ref 0.91–1.09)
INR PPP: >10 (ref 0.9–1.1)
IRON 24H UR-MRATE: 21 MCG/DL (ref 59–158)
IRON 24H UR-MRATE: 49 MCG/DL (ref 59–158)
IRON SATN MFR SERPL: 22 % (ref 20–50)
IRON SATN MFR SERPL: 7 % (ref 20–50)
ISOLATED FROM: ABNORMAL
KETONES UR QL STRIP: NEGATIVE
LAB AP CASE REPORT: NORMAL
LDLC SERPL CALC-MCNC: 63 MG/DL (ref 0–100)
LDLC SERPL CALC-MCNC: 68 MG/DL (ref 0–100)
LDLC/HDLC SERPL: 2.27 {RATIO}
LDLC/HDLC SERPL: 5.28 {RATIO}
LEFT ARM BP: NORMAL MMHG
LEFT ATRIUM VOLUME INDEX: 37 ML/M2
LEFT ATRIUM VOLUME INDEX: 38 ML/M2
LEUKOCYTE ESTERASE UR QL STRIP.AUTO: NEGATIVE
LIPASE SERPL-CCNC: 31 U/L (ref 13–60)
LYMPHOCYTES # BLD AUTO: 0.55 10*3/MM3 (ref 0.7–3.1)
LYMPHOCYTES # BLD AUTO: 0.57 10*3/MM3 (ref 0.7–3.1)
LYMPHOCYTES # BLD AUTO: 0.61 10*3/MM3 (ref 0.7–3.1)
LYMPHOCYTES # BLD AUTO: 0.75 10*3/MM3 (ref 0.7–3.1)
LYMPHOCYTES # BLD AUTO: 0.8 10*3/MM3 (ref 0.7–3.1)
LYMPHOCYTES # BLD AUTO: 0.84 10*3/MM3 (ref 0.7–3.1)
LYMPHOCYTES # BLD AUTO: 0.86 10*3/MM3 (ref 0.7–3.1)
LYMPHOCYTES # BLD AUTO: 0.86 10*3/MM3 (ref 0.7–3.1)
LYMPHOCYTES # BLD AUTO: 0.89 10*3/MM3 (ref 0.7–3.1)
LYMPHOCYTES # BLD AUTO: 0.91 10*3/MM3 (ref 0.7–3.1)
LYMPHOCYTES # BLD AUTO: 0.92 10*3/MM3 (ref 0.7–3.1)
LYMPHOCYTES # BLD AUTO: 0.93 10*3/MM3 (ref 0.7–3.1)
LYMPHOCYTES # BLD AUTO: 0.95 10*3/MM3 (ref 0.7–3.1)
LYMPHOCYTES # BLD AUTO: 0.99 10*3/MM3 (ref 0.7–3.1)
LYMPHOCYTES # BLD AUTO: 1.04 10*3/MM3 (ref 0.7–3.1)
LYMPHOCYTES # BLD AUTO: 1.04 10*3/MM3 (ref 0.7–3.1)
LYMPHOCYTES # BLD AUTO: 1.07 10*3/MM3 (ref 0.7–3.1)
LYMPHOCYTES # BLD AUTO: 1.13 10*3/MM3 (ref 0.7–3.1)
LYMPHOCYTES # BLD AUTO: 1.16 10*3/MM3 (ref 0.7–3.1)
LYMPHOCYTES # BLD AUTO: 1.3 10*3/MM3 (ref 0.7–3.1)
LYMPHOCYTES # BLD AUTO: 1.33 10*3/MM3 (ref 0.7–3.1)
LYMPHOCYTES # BLD AUTO: 1.34 10*3/MM3 (ref 0.7–3.1)
LYMPHOCYTES # BLD AUTO: 1.58 10*3/MM3 (ref 0.7–3.1)
LYMPHOCYTES # BLD MANUAL: 0.22 10*3/MM3 (ref 0.7–3.1)
LYMPHOCYTES NFR BLD AUTO: 10.3 % (ref 19.6–45.3)
LYMPHOCYTES NFR BLD AUTO: 10.6 % (ref 19.6–45.3)
LYMPHOCYTES NFR BLD AUTO: 10.6 % (ref 19.6–45.3)
LYMPHOCYTES NFR BLD AUTO: 10.8 % (ref 19.6–45.3)
LYMPHOCYTES NFR BLD AUTO: 10.8 % (ref 19.6–45.3)
LYMPHOCYTES NFR BLD AUTO: 11 % (ref 19.6–45.3)
LYMPHOCYTES NFR BLD AUTO: 12.7 % (ref 19.6–45.3)
LYMPHOCYTES NFR BLD AUTO: 15.5 % (ref 19.6–45.3)
LYMPHOCYTES NFR BLD AUTO: 17.1 % (ref 19.6–45.3)
LYMPHOCYTES NFR BLD AUTO: 3.5 % (ref 19.6–45.3)
LYMPHOCYTES NFR BLD AUTO: 3.8 % (ref 19.6–45.3)
LYMPHOCYTES NFR BLD AUTO: 4.4 % (ref 19.6–45.3)
LYMPHOCYTES NFR BLD AUTO: 4.5 % (ref 19.6–45.3)
LYMPHOCYTES NFR BLD AUTO: 5.2 % (ref 19.6–45.3)
LYMPHOCYTES NFR BLD AUTO: 5.6 % (ref 19.6–45.3)
LYMPHOCYTES NFR BLD AUTO: 5.8 % (ref 19.6–45.3)
LYMPHOCYTES NFR BLD AUTO: 6 % (ref 19.6–45.3)
LYMPHOCYTES NFR BLD AUTO: 6.1 % (ref 19.6–45.3)
LYMPHOCYTES NFR BLD AUTO: 6.3 % (ref 19.6–45.3)
LYMPHOCYTES NFR BLD AUTO: 8.1 % (ref 19.6–45.3)
LYMPHOCYTES NFR BLD AUTO: 9 % (ref 19.6–45.3)
LYMPHOCYTES NFR BLD MANUAL: 1 % (ref 19.6–45.3)
LYMPHOCYTES NFR BLD MANUAL: 1 % (ref 5–12)
M PNEUMO IGG SER IA-ACNC: NOT DETECTED
M PNEUMO IGG SER IA-ACNC: NOT DETECTED
MAGNESIUM SERPL-MCNC: 1.7 MG/DL (ref 1.6–2.6)
MAGNESIUM SERPL-MCNC: 1.8 MG/DL (ref 1.6–2.6)
MAGNESIUM SERPL-MCNC: 1.9 MG/DL (ref 1.6–2.6)
MAGNESIUM SERPL-MCNC: 2 MG/DL (ref 1.6–2.6)
MAGNESIUM SERPL-MCNC: 2.1 MG/DL (ref 1.6–2.6)
MAGNESIUM SERPL-MCNC: 2.2 MG/DL (ref 1.6–2.6)
MAGNESIUM SERPL-MCNC: 2.3 MG/DL (ref 1.6–2.6)
MAXIMAL PREDICTED HEART RATE: 172 BPM
MAXIMAL PREDICTED HEART RATE: 172 BPM
MCH RBC QN AUTO: 23 PG (ref 26.6–33)
MCH RBC QN AUTO: 23.1 PG (ref 26.6–33)
MCH RBC QN AUTO: 23.2 PG (ref 26.6–33)
MCH RBC QN AUTO: 23.3 PG (ref 26.6–33)
MCH RBC QN AUTO: 23.4 PG (ref 26.6–33)
MCH RBC QN AUTO: 23.4 PG (ref 26.6–33)
MCH RBC QN AUTO: 23.6 PG (ref 26.6–33)
MCH RBC QN AUTO: 23.6 PG (ref 26.6–33)
MCH RBC QN AUTO: 23.7 PG (ref 26.6–33)
MCH RBC QN AUTO: 23.9 PG (ref 26.6–33)
MCH RBC QN AUTO: 24 PG (ref 26.6–33)
MCH RBC QN AUTO: 24.3 PG (ref 26.6–33)
MCH RBC QN AUTO: 24.5 PG (ref 26.6–33)
MCH RBC QN AUTO: 24.9 PG (ref 26.6–33)
MCH RBC QN AUTO: 25 PG (ref 26.6–33)
MCH RBC QN AUTO: 25.4 PG (ref 26.6–33)
MCH RBC QN AUTO: 25.4 PG (ref 26.6–33)
MCH RBC QN AUTO: 25.5 PG (ref 26.6–33)
MCH RBC QN AUTO: 25.6 PG (ref 26.6–33)
MCH RBC QN AUTO: 25.7 PG (ref 26.6–33)
MCH RBC QN AUTO: 25.7 PG (ref 26.6–33)
MCH RBC QN AUTO: 25.8 PG (ref 26.6–33)
MCHC RBC AUTO-ENTMCNC: 29.8 G/DL (ref 31.5–35.7)
MCHC RBC AUTO-ENTMCNC: 29.9 G/DL (ref 31.5–35.7)
MCHC RBC AUTO-ENTMCNC: 30 G/DL (ref 31.5–35.7)
MCHC RBC AUTO-ENTMCNC: 30.1 G/DL (ref 31.5–35.7)
MCHC RBC AUTO-ENTMCNC: 30.2 G/DL (ref 31.5–35.7)
MCHC RBC AUTO-ENTMCNC: 30.3 G/DL (ref 31.5–35.7)
MCHC RBC AUTO-ENTMCNC: 30.5 G/DL (ref 31.5–35.7)
MCHC RBC AUTO-ENTMCNC: 30.5 G/DL (ref 31.5–35.7)
MCHC RBC AUTO-ENTMCNC: 30.6 G/DL (ref 31.5–35.7)
MCHC RBC AUTO-ENTMCNC: 30.7 G/DL (ref 31.5–35.7)
MCHC RBC AUTO-ENTMCNC: 30.7 G/DL (ref 31.5–35.7)
MCHC RBC AUTO-ENTMCNC: 30.8 G/DL (ref 31.5–35.7)
MCHC RBC AUTO-ENTMCNC: 30.8 G/DL (ref 31.5–35.7)
MCHC RBC AUTO-ENTMCNC: 30.9 G/DL (ref 31.5–35.7)
MCHC RBC AUTO-ENTMCNC: 31.2 G/DL (ref 31.5–35.7)
MCHC RBC AUTO-ENTMCNC: 31.3 G/DL (ref 31.5–35.7)
MCHC RBC AUTO-ENTMCNC: 31.3 G/DL (ref 31.5–35.7)
MCHC RBC AUTO-ENTMCNC: 31.6 G/DL (ref 31.5–35.7)
MCHC RBC AUTO-ENTMCNC: 31.9 G/DL (ref 31.5–35.7)
MCHC RBC AUTO-ENTMCNC: 32.2 G/DL (ref 31.5–35.7)
MCHC RBC AUTO-ENTMCNC: 32.5 G/DL (ref 31.5–35.7)
MCHC RBC AUTO-ENTMCNC: 32.9 G/DL (ref 31.5–35.7)
MCHC RBC AUTO-ENTMCNC: 33.3 G/DL (ref 31.5–35.7)
MCV RBC AUTO: 75 FL (ref 79–97)
MCV RBC AUTO: 75.7 FL (ref 79–97)
MCV RBC AUTO: 76.1 FL (ref 79–97)
MCV RBC AUTO: 76.5 FL (ref 79–97)
MCV RBC AUTO: 76.5 FL (ref 79–97)
MCV RBC AUTO: 76.9 FL (ref 79–97)
MCV RBC AUTO: 77 FL (ref 79–97)
MCV RBC AUTO: 77.3 FL (ref 79–97)
MCV RBC AUTO: 77.3 FL (ref 79–97)
MCV RBC AUTO: 77.4 FL (ref 79–97)
MCV RBC AUTO: 77.4 FL (ref 79–97)
MCV RBC AUTO: 77.5 FL (ref 79–97)
MCV RBC AUTO: 77.6 FL (ref 79–97)
MCV RBC AUTO: 77.6 FL (ref 79–97)
MCV RBC AUTO: 77.7 FL (ref 79–97)
MCV RBC AUTO: 78 FL (ref 79–97)
MCV RBC AUTO: 78.5 FL (ref 79–97)
MCV RBC AUTO: 78.5 FL (ref 79–97)
MCV RBC AUTO: 79.1 FL (ref 79–97)
MCV RBC AUTO: 81.4 FL (ref 79–97)
MCV RBC AUTO: 81.6 FL (ref 79–97)
MCV RBC AUTO: 81.8 FL (ref 79–97)
MCV RBC AUTO: 82.9 FL (ref 79–97)
MCV RBC AUTO: 83.5 FL (ref 79–97)
MCV RBC AUTO: 83.9 FL (ref 79–97)
MCV RBC AUTO: 85.6 FL (ref 79–97)
MODALITY: ABNORMAL
MONOCYTES # BLD AUTO: 0.22 10*3/MM3 (ref 0.1–0.9)
MONOCYTES # BLD AUTO: 0.5 10*3/MM3 (ref 0.1–0.9)
MONOCYTES # BLD AUTO: 0.54 10*3/MM3 (ref 0.1–0.9)
MONOCYTES # BLD AUTO: 0.57 10*3/MM3 (ref 0.1–0.9)
MONOCYTES # BLD AUTO: 0.59 10*3/MM3 (ref 0.1–0.9)
MONOCYTES # BLD AUTO: 0.6 10*3/MM3 (ref 0.1–0.9)
MONOCYTES # BLD AUTO: 0.64 10*3/MM3 (ref 0.1–0.9)
MONOCYTES # BLD AUTO: 0.75 10*3/MM3 (ref 0.1–0.9)
MONOCYTES # BLD AUTO: 0.81 10*3/MM3 (ref 0.1–0.9)
MONOCYTES # BLD AUTO: 0.84 10*3/MM3 (ref 0.1–0.9)
MONOCYTES # BLD AUTO: 0.85 10*3/MM3 (ref 0.1–0.9)
MONOCYTES # BLD AUTO: 0.87 10*3/MM3 (ref 0.1–0.9)
MONOCYTES # BLD AUTO: 0.96 10*3/MM3 (ref 0.1–0.9)
MONOCYTES # BLD AUTO: 1.01 10*3/MM3 (ref 0.1–0.9)
MONOCYTES # BLD AUTO: 1.13 10*3/MM3 (ref 0.1–0.9)
MONOCYTES # BLD AUTO: 1.14 10*3/MM3 (ref 0.1–0.9)
MONOCYTES # BLD AUTO: 1.15 10*3/MM3 (ref 0.1–0.9)
MONOCYTES # BLD AUTO: 1.17 10*3/MM3 (ref 0.1–0.9)
MONOCYTES # BLD AUTO: 1.18 10*3/MM3 (ref 0.1–0.9)
MONOCYTES # BLD AUTO: 1.22 10*3/MM3 (ref 0.1–0.9)
MONOCYTES # BLD AUTO: 1.31 10*3/MM3 (ref 0.1–0.9)
MONOCYTES # BLD AUTO: 1.39 10*3/MM3 (ref 0.1–0.9)
MONOCYTES # BLD AUTO: 1.43 10*3/MM3 (ref 0.1–0.9)
MONOCYTES # BLD AUTO: 1.53 10*3/MM3 (ref 0.1–0.9)
MONOCYTES NFR BLD AUTO: 10.6 % (ref 5–12)
MONOCYTES NFR BLD AUTO: 11.5 % (ref 5–12)
MONOCYTES NFR BLD AUTO: 11.6 % (ref 5–12)
MONOCYTES NFR BLD AUTO: 11.8 % (ref 5–12)
MONOCYTES NFR BLD AUTO: 12 % (ref 5–12)
MONOCYTES NFR BLD AUTO: 12.7 % (ref 5–12)
MONOCYTES NFR BLD AUTO: 13 % (ref 5–12)
MONOCYTES NFR BLD AUTO: 15 % (ref 5–12)
MONOCYTES NFR BLD AUTO: 4.2 % (ref 5–12)
MONOCYTES NFR BLD AUTO: 4.3 % (ref 5–12)
MONOCYTES NFR BLD AUTO: 4.3 % (ref 5–12)
MONOCYTES NFR BLD AUTO: 5 % (ref 5–12)
MONOCYTES NFR BLD AUTO: 5 % (ref 5–12)
MONOCYTES NFR BLD AUTO: 5.1 % (ref 5–12)
MONOCYTES NFR BLD AUTO: 5.4 % (ref 5–12)
MONOCYTES NFR BLD AUTO: 5.9 % (ref 5–12)
MONOCYTES NFR BLD AUTO: 6.3 % (ref 5–12)
MONOCYTES NFR BLD AUTO: 6.7 % (ref 5–12)
MONOCYTES NFR BLD AUTO: 8 % (ref 5–12)
MONOCYTES NFR BLD AUTO: 8.8 % (ref 5–12)
MONOCYTES NFR BLD AUTO: 9.1 % (ref 5–12)
NEUTROPHILS # BLD AUTO: 11.42 10*3/MM3 (ref 1.7–7)
NEUTROPHILS # BLD AUTO: 12.01 10*3/MM3 (ref 1.7–7)
NEUTROPHILS # BLD AUTO: 21.88 10*3/MM3 (ref 1.7–7)
NEUTROPHILS # BLD AUTO: 3.08 10*3/MM3 (ref 1.7–7)
NEUTROPHILS # BLD AUTO: 3.27 10*3/MM3 (ref 1.7–7)
NEUTROPHILS # BLD AUTO: 3.3 10*3/MM3 (ref 1.7–7)
NEUTROPHILS # BLD AUTO: 4.7 10*3/MM3 (ref 1.7–7)
NEUTROPHILS # BLD AUTO: 5.25 10*3/MM3 (ref 1.7–7)
NEUTROPHILS # BLD AUTO: 5.47 10*3/MM3 (ref 1.7–7)
NEUTROPHILS # BLD AUTO: 5.64 10*3/MM3 (ref 1.7–7)
NEUTROPHILS # BLD AUTO: 7.13 10*3/MM3 (ref 1.7–7)
NEUTROPHILS # BLD AUTO: 7.61 10*3/MM3 (ref 1.7–7)
NEUTROPHILS # BLD AUTO: 7.94 10*3/MM3 (ref 1.7–7)
NEUTROPHILS # BLD AUTO: 9.1 10*3/MM3 (ref 1.7–7)
NEUTROPHILS NFR BLD AUTO: 12.71 10*3/MM3 (ref 1.7–7)
NEUTROPHILS NFR BLD AUTO: 15.02 10*3/MM3 (ref 1.7–7)
NEUTROPHILS NFR BLD AUTO: 15.83 10*3/MM3 (ref 1.7–7)
NEUTROPHILS NFR BLD AUTO: 16.85 10*3/MM3 (ref 1.7–7)
NEUTROPHILS NFR BLD AUTO: 16.87 10*3/MM3 (ref 1.7–7)
NEUTROPHILS NFR BLD AUTO: 18.09 10*3/MM3 (ref 1.7–7)
NEUTROPHILS NFR BLD AUTO: 20.29 10*3/MM3 (ref 1.7–7)
NEUTROPHILS NFR BLD AUTO: 22.66 10*3/MM3 (ref 1.7–7)
NEUTROPHILS NFR BLD AUTO: 22.97 10*3/MM3 (ref 1.7–7)
NEUTROPHILS NFR BLD AUTO: 23.61 10*3/MM3 (ref 1.7–7)
NEUTROPHILS NFR BLD AUTO: 66.6 % (ref 42.7–76)
NEUTROPHILS NFR BLD AUTO: 68.4 % (ref 42.7–76)
NEUTROPHILS NFR BLD AUTO: 70 % (ref 42.7–76)
NEUTROPHILS NFR BLD AUTO: 71.1 % (ref 42.7–76)
NEUTROPHILS NFR BLD AUTO: 71.3 % (ref 42.7–76)
NEUTROPHILS NFR BLD AUTO: 71.3 % (ref 42.7–76)
NEUTROPHILS NFR BLD AUTO: 74.2 % (ref 42.7–76)
NEUTROPHILS NFR BLD AUTO: 76.2 % (ref 42.7–76)
NEUTROPHILS NFR BLD AUTO: 77.3 % (ref 42.7–76)
NEUTROPHILS NFR BLD AUTO: 79.3 % (ref 42.7–76)
NEUTROPHILS NFR BLD AUTO: 79.4 % (ref 42.7–76)
NEUTROPHILS NFR BLD AUTO: 83.6 % (ref 42.7–76)
NEUTROPHILS NFR BLD AUTO: 85.6 % (ref 42.7–76)
NEUTROPHILS NFR BLD AUTO: 86.4 % (ref 42.7–76)
NEUTROPHILS NFR BLD AUTO: 86.5 % (ref 42.7–76)
NEUTROPHILS NFR BLD AUTO: 87.1 % (ref 42.7–76)
NEUTROPHILS NFR BLD AUTO: 87.8 % (ref 42.7–76)
NEUTROPHILS NFR BLD AUTO: 88.1 % (ref 42.7–76)
NEUTROPHILS NFR BLD AUTO: 88.1 % (ref 42.7–76)
NEUTROPHILS NFR BLD AUTO: 88.9 % (ref 42.7–76)
NEUTROPHILS NFR BLD AUTO: 89.6 % (ref 42.7–76)
NEUTROPHILS NFR BLD MANUAL: 98 % (ref 42.7–76)
NITRITE UR QL STRIP: NEGATIVE
NONSPECIFIC COLD ANTIBODY: NORMAL
NRBC BLD AUTO-RTO: 0 /100 WBC (ref 0–0.2)
NRBC BLD AUTO-RTO: 0.1 /100 WBC (ref 0–0.2)
NRBC BLD AUTO-RTO: 0.2 /100 WBC (ref 0–0.2)
NT-PROBNP SERPL-MCNC: ABNORMAL PG/ML (ref 0–450)
NT-PROBNP SERPL-MCNC: ABNORMAL PG/ML (ref 5–450)
NT-PROBNP SERPL-MCNC: ABNORMAL PG/ML (ref 5–450)
O2 A-A PPRESDIFF RESPIRATORY: 0.7 MMHG
O2 A-A PPRESDIFF RESPIRATORY: 0.8 MMHG
O2 A-A PPRESDIFF RESPIRATORY: 0.9 MMHG
PATH REPORT.FINAL DX SPEC: NORMAL
PATH REPORT.GROSS SPEC: NORMAL
PCO2 BLDA: 33.4 MM HG (ref 35–45)
PCO2 BLDA: 44.7 MM HG (ref 35–45)
PCO2 BLDA: 44.8 MM HG (ref 35–45)
PCO2 BLDA: 45.2 MM HG (ref 35–45)
PCO2 BLDA: 45.4 MM HG (ref 35–45)
PCO2 BLDA: 46.9 MM HG (ref 35–45)
PCO2 BLDA: 48.4 MM HG (ref 35–45)
PCO2 BLDA: 48.7 MM HG (ref 35–45)
PCO2 BLDA: 49.1 MM HG (ref 35–45)
PEEP RESPIRATORY: 10 CM[H2O]
PEEP RESPIRATORY: 10 CM[H2O]
PEEP RESPIRATORY: 5 CM[H2O]
PH BLDA: 7.32 PH UNITS (ref 7.35–7.45)
PH BLDA: 7.32 PH UNITS (ref 7.35–7.6)
PH BLDA: 7.33 PH UNITS (ref 7.35–7.6)
PH BLDA: 7.34 PH UNITS (ref 7.35–7.6)
PH BLDA: 7.35 PH UNITS (ref 7.35–7.45)
PH BLDA: 7.36 PH UNITS (ref 7.35–7.6)
PH BLDA: 7.37 PH UNITS (ref 7.35–7.45)
PH BLDA: 7.38 PH UNITS (ref 7.35–7.6)
PH BLDA: 7.42 PH UNITS (ref 7.35–7.45)
PH UR STRIP.AUTO: <=5 [PH] (ref 5–8)
PHOSPHATE SERPL-MCNC: 3.4 MG/DL (ref 2.5–4.5)
PHOSPHATE SERPL-MCNC: 3.5 MG/DL (ref 2.5–4.5)
PHOSPHATE SERPL-MCNC: 3.8 MG/DL (ref 2.5–4.5)
PHOSPHATE SERPL-MCNC: 3.9 MG/DL (ref 2.5–4.5)
PHOSPHATE SERPL-MCNC: 5.1 MG/DL (ref 2.5–4.5)
PHOSPHATE SERPL-MCNC: 5.2 MG/DL (ref 2.5–4.5)
PHOSPHATE SERPL-MCNC: 5.3 MG/DL (ref 2.5–4.5)
PHOSPHATE SERPL-MCNC: 5.9 MG/DL (ref 2.5–4.5)
PHOSPHATE SERPL-MCNC: 6.1 MG/DL (ref 2.5–4.5)
PHOSPHATE SERPL-MCNC: 6.2 MG/DL (ref 2.5–4.5)
PHOSPHATE SERPL-MCNC: 6.3 MG/DL (ref 2.5–4.5)
PHOSPHATE SERPL-MCNC: 6.4 MG/DL (ref 2.5–4.5)
PHOSPHATE SERPL-MCNC: 8.2 MG/DL (ref 2.5–4.5)
PLAT MORPH BLD: NORMAL
PLATELET # BLD AUTO: 101 10*3/MM3 (ref 140–450)
PLATELET # BLD AUTO: 106 10*3/MM3 (ref 140–450)
PLATELET # BLD AUTO: 119 10*3/MM3 (ref 140–450)
PLATELET # BLD AUTO: 123 10*3/MM3 (ref 140–450)
PLATELET # BLD AUTO: 131 10*3/MM3 (ref 140–450)
PLATELET # BLD AUTO: 134 10*3/MM3 (ref 140–450)
PLATELET # BLD AUTO: 144 10*3/MM3 (ref 140–450)
PLATELET # BLD AUTO: 156 10*3/MM3 (ref 140–450)
PLATELET # BLD AUTO: 158 10*3/MM3 (ref 140–450)
PLATELET # BLD AUTO: 161 10*3/MM3 (ref 140–450)
PLATELET # BLD AUTO: 167 10*3/MM3 (ref 140–450)
PLATELET # BLD AUTO: 168 10*3/MM3 (ref 140–450)
PLATELET # BLD AUTO: 172 10*3/MM3 (ref 140–450)
PLATELET # BLD AUTO: 179 10*3/MM3 (ref 140–450)
PLATELET # BLD AUTO: 180 10*3/MM3 (ref 140–450)
PLATELET # BLD AUTO: 183 10*3/MM3 (ref 140–450)
PLATELET # BLD AUTO: 185 10*3/MM3 (ref 140–450)
PLATELET # BLD AUTO: 192 10*3/MM3 (ref 140–450)
PLATELET # BLD AUTO: 193 10*3/MM3 (ref 140–450)
PLATELET # BLD AUTO: 219 10*3/MM3 (ref 140–450)
PLATELET # BLD AUTO: 222 10*3/MM3 (ref 140–450)
PLATELET # BLD AUTO: 226 10*3/MM3 (ref 140–450)
PLATELET # BLD AUTO: 228 10*3/MM3 (ref 140–450)
PLATELET # BLD AUTO: 34 10*3/MM3 (ref 140–450)
PLATELET # BLD AUTO: 42 10*3/MM3 (ref 140–450)
PLATELET # BLD AUTO: 51 10*3/MM3 (ref 140–450)
PLATELET # BLD AUTO: 86 10*3/MM3 (ref 140–450)
PLATELET # BLD AUTO: 89 10*3/MM3 (ref 140–450)
PMV BLD AUTO: 10 FL (ref 6–12)
PMV BLD AUTO: 10.1 FL (ref 6–12)
PMV BLD AUTO: 10.1 FL (ref 6–12)
PMV BLD AUTO: 10.3 FL (ref 6–12)
PMV BLD AUTO: 10.3 FL (ref 6–12)
PMV BLD AUTO: 10.5 FL (ref 6–12)
PMV BLD AUTO: 10.6 FL (ref 6–12)
PMV BLD AUTO: 10.7 FL (ref 6–12)
PMV BLD AUTO: 11.4 FL (ref 6–12)
PMV BLD AUTO: 11.6 FL (ref 6–12)
PMV BLD AUTO: 11.7 FL (ref 6–12)
PMV BLD AUTO: 9.5 FL (ref 6–12)
PMV BLD AUTO: 9.6 FL (ref 6–12)
PMV BLD AUTO: 9.7 FL (ref 6–12)
PMV BLD AUTO: 9.9 FL (ref 6–12)
PMV BLD AUTO: 9.9 FL (ref 6–12)
PMV BLD AUTO: ABNORMAL FL
PO2 BLDA: 114.7 MM HG (ref 80–100)
PO2 BLDA: 128.1 MM HG (ref 80–100)
PO2 BLDA: 32 MMHG (ref 80–105)
PO2 BLDA: 365 MMHG (ref 80–105)
PO2 BLDA: 39 MMHG (ref 80–105)
PO2 BLDA: 421 MMHG (ref 80–105)
PO2 BLDA: 449 MMHG (ref 80–105)
PO2 BLDA: 608.6 MM HG (ref 80–100)
PO2 BLDA: 67.9 MM HG (ref 80–100)
POIKILOCYTOSIS BLD QL SMEAR: ABNORMAL
POLYCHROMASIA BLD QL SMEAR: ABNORMAL
POTASSIUM BLD-SCNC: 3.6 MMOL/L (ref 3.5–5.2)
POTASSIUM BLD-SCNC: 3.7 MMOL/L (ref 3.5–5.2)
POTASSIUM BLD-SCNC: 3.7 MMOL/L (ref 3.5–5.2)
POTASSIUM BLD-SCNC: 3.9 MMOL/L (ref 3.5–5.2)
POTASSIUM BLD-SCNC: 3.9 MMOL/L (ref 3.5–5.2)
POTASSIUM BLD-SCNC: 4 MMOL/L (ref 3.5–5.2)
POTASSIUM BLD-SCNC: 4.2 MMOL/L (ref 3.5–5.2)
POTASSIUM BLD-SCNC: 4.3 MMOL/L (ref 3.5–5.2)
POTASSIUM BLD-SCNC: 4.4 MMOL/L (ref 3.5–5.2)
POTASSIUM BLD-SCNC: 4.4 MMOL/L (ref 3.5–5.2)
POTASSIUM BLD-SCNC: 4.6 MMOL/L (ref 3.5–5.2)
POTASSIUM BLD-SCNC: 4.6 MMOL/L (ref 3.5–5.2)
POTASSIUM BLD-SCNC: 4.8 MMOL/L (ref 3.5–5.2)
POTASSIUM BLD-SCNC: 4.9 MMOL/L (ref 3.5–5.2)
POTASSIUM BLD-SCNC: 4.9 MMOL/L (ref 3.5–5.2)
POTASSIUM BLD-SCNC: 5.1 MMOL/L (ref 3.5–5.2)
POTASSIUM BLD-SCNC: 5.3 MMOL/L (ref 3.5–5.2)
POTASSIUM BLDA-SCNC: 3.7 MMOL/L (ref 3.5–4.9)
POTASSIUM BLDA-SCNC: 3.8 MMOL/L (ref 3.5–4.9)
POTASSIUM BLDA-SCNC: 3.8 MMOL/L (ref 3.5–4.9)
POTASSIUM SERPL-SCNC: 3.6 MMOL/L (ref 3.5–5.2)
POTASSIUM SERPL-SCNC: 3.9 MMOL/L (ref 3.5–5.2)
POTASSIUM SERPL-SCNC: 4.2 MMOL/L (ref 3.5–5.2)
POTASSIUM SERPL-SCNC: 4.2 MMOL/L (ref 3.5–5.2)
POTASSIUM SERPL-SCNC: 4.3 MMOL/L (ref 3.5–5.2)
POTASSIUM SERPL-SCNC: 4.5 MMOL/L (ref 3.5–5.2)
POTASSIUM SERPL-SCNC: 4.6 MMOL/L (ref 3.5–5.2)
POTASSIUM SERPL-SCNC: 4.7 MMOL/L (ref 3.5–5.2)
POTASSIUM SERPL-SCNC: 5.4 MMOL/L (ref 3.5–5.2)
PROCALCITONIN SERPL-MCNC: 57.46 NG/ML (ref 0–0.25)
PROT SERPL-MCNC: 4.7 G/DL (ref 6–8.5)
PROT SERPL-MCNC: 5 G/DL (ref 6–8.5)
PROT SERPL-MCNC: 5.9 G/DL (ref 6–8.5)
PROT SERPL-MCNC: 6 G/DL (ref 6–8.5)
PROT SERPL-MCNC: 6 G/DL (ref 6–8.5)
PROT SERPL-MCNC: 6.1 G/DL (ref 6–8.5)
PROT SERPL-MCNC: 6.1 G/DL (ref 6–8.5)
PROT SERPL-MCNC: 6.2 G/DL (ref 6–8.5)
PROT SERPL-MCNC: 6.3 G/DL (ref 6–8.5)
PROT SERPL-MCNC: 6.4 G/DL (ref 6–8.5)
PROT SERPL-MCNC: 6.7 G/DL (ref 6–8.5)
PROT SERPL-MCNC: 6.8 G/DL (ref 6–8.5)
PROT UR QL STRIP: ABNORMAL
PROTHROMBIN TIME: 15.7 SECONDS (ref 11.7–14.2)
PROTHROMBIN TIME: 16 SECONDS (ref 11.7–14.2)
PROTHROMBIN TIME: 16.5 SECONDS (ref 11.7–14.2)
PROTHROMBIN TIME: 18.2 SECONDS (ref 11.7–14.2)
PROTHROMBIN TIME: 18.3 SECONDS (ref 11.7–14.2)
PROTHROMBIN TIME: 18.6 SECONDS (ref 11.7–14.2)
PROTHROMBIN TIME: 18.7 SECONDS (ref 11.7–14.2)
PROTHROMBIN TIME: 19.3 SECONDS (ref 11.7–14.2)
PROTHROMBIN TIME: 19.4 SECONDS (ref 10–13.8)
PROTHROMBIN TIME: 19.6 SECONDS (ref 10–13.8)
PROTHROMBIN TIME: 19.8 SECONDS (ref 11.7–14.2)
PROTHROMBIN TIME: 19.9 SECONDS (ref 10–13.8)
PROTHROMBIN TIME: 21.5 SECONDS (ref 11.7–14.2)
PROTHROMBIN TIME: 21.6 SECONDS (ref 11.7–14.2)
PROTHROMBIN TIME: 22.5 SECONDS (ref 10–13.8)
PROTHROMBIN TIME: 22.7 SECONDS (ref 10–13.8)
PROTHROMBIN TIME: 23.7 SECONDS (ref 11.7–14.2)
PROTHROMBIN TIME: 25.8 SECONDS (ref 11.7–14.2)
PROTHROMBIN TIME: 26.1 SECONDS (ref 11.7–14.2)
PROTHROMBIN TIME: 26.3 SECONDS (ref 10–13.8)
PROTHROMBIN TIME: 26.3 SECONDS (ref 10–13.8)
PROTHROMBIN TIME: 26.9 SECONDS (ref 11.7–14.2)
PROTHROMBIN TIME: 27.7 SECONDS (ref 11.7–14.2)
PROTHROMBIN TIME: 28.4 SECONDS (ref 11.7–14.2)
PROTHROMBIN TIME: 28.9 SECONDS (ref 11.7–14.2)
PROTHROMBIN TIME: 29.3 SECONDS (ref 11.7–14.2)
PROTHROMBIN TIME: 31.9 SECONDS (ref 10–13.8)
PROTHROMBIN TIME: 32.3 SECONDS (ref 10–13.8)
PROTHROMBIN TIME: 34.2 SECONDS (ref 11.7–14.2)
PROTHROMBIN TIME: 35.5 SECONDS (ref 10–13.8)
PROTHROMBIN TIME: 35.7 SECONDS (ref 11.7–14.2)
PROTHROMBIN TIME: 36.2 SECONDS (ref 11.7–14.2)
PROTHROMBIN TIME: 49.7 SECONDS (ref 10–13.8)
PROTHROMBIN TIME: >100 SECONDS (ref 11.7–14.2)
RBC # BLD AUTO: 2.8 10*6/MM3 (ref 4.14–5.8)
RBC # BLD AUTO: 2.81 10*6/MM3 (ref 4.14–5.8)
RBC # BLD AUTO: 2.84 10*6/MM3 (ref 4.14–5.8)
RBC # BLD AUTO: 2.85 10*6/MM3 (ref 4.14–5.8)
RBC # BLD AUTO: 2.92 10*6/MM3 (ref 4.14–5.8)
RBC # BLD AUTO: 2.99 10*6/MM3 (ref 4.14–5.8)
RBC # BLD AUTO: 3.11 10*6/MM3 (ref 4.14–5.8)
RBC # BLD AUTO: 3.12 10*6/MM3 (ref 4.14–5.8)
RBC # BLD AUTO: 3.62 10*6/MM3 (ref 4.14–5.8)
RBC # BLD AUTO: 3.68 10*6/MM3 (ref 4.14–5.8)
RBC # BLD AUTO: 3.68 10*6/MM3 (ref 4.14–5.8)
RBC # BLD AUTO: 3.74 10*6/MM3 (ref 4.14–5.8)
RBC # BLD AUTO: 3.75 10*6/MM3 (ref 4.14–5.8)
RBC # BLD AUTO: 3.83 10*6/MM3 (ref 4.14–5.8)
RBC # BLD AUTO: 3.86 10*6/MM3 (ref 4.14–5.8)
RBC # BLD AUTO: 3.87 10*6/MM3 (ref 4.14–5.8)
RBC # BLD AUTO: 3.97 10*6/MM3 (ref 4.14–5.8)
RBC # BLD AUTO: 4.05 10*6/MM3 (ref 4.14–5.8)
RBC # BLD AUTO: 4.09 10*6/MM3 (ref 4.14–5.8)
RBC # BLD AUTO: 4.14 10*6/MM3 (ref 4.14–5.8)
RBC # BLD AUTO: 4.2 10*6/MM3 (ref 4.14–5.8)
RBC # BLD AUTO: 4.23 10*6/MM3 (ref 4.14–5.8)
RBC # BLD AUTO: 4.28 10*6/MM3 (ref 4.14–5.8)
RBC # BLD AUTO: 4.43 10*6/MM3 (ref 4.14–5.8)
RBC # BLD AUTO: 4.57 10*6/MM3 (ref 4.14–5.8)
RBC # BLD AUTO: 4.69 10*6/MM3 (ref 4.14–5.8)
RBC # BLD AUTO: 4.78 10*6/MM3 (ref 4.14–5.8)
RBC # BLD AUTO: 5.52 10*6/MM3 (ref 4.14–5.8)
RBC # UR: ABNORMAL /HPF
REF LAB TEST METHOD: ABNORMAL
RH BLD: POSITIVE
RHINOVIRUS RNA SPEC NAA+PROBE: NOT DETECTED
RHINOVIRUS RNA SPEC NAA+PROBE: NOT DETECTED
RIGHT ARM BP: NORMAL MMHG
RSV RNA NPH QL NAA+NON-PROBE: NOT DETECTED
RSV RNA NPH QL NAA+NON-PROBE: NOT DETECTED
SAO2 % BLDA: 100 % (ref 95–98)
SAO2 % BLDA: 56 % (ref 95–98)
SAO2 % BLDA: 69 % (ref 95–98)
SAO2 % BLDCOA: 100 % (ref 92–99)
SAO2 % BLDCOA: 93.8 % (ref 92–99)
SAO2 % BLDCOA: 98.1 % (ref 92–99)
SAO2 % BLDCOA: 98.7 % (ref 92–99)
SARS-COV-2 RNA NPH QL NAA+NON-PROBE: NOT DETECTED
SARS-COV-2 RNA NPH QL NAA+NON-PROBE: NOT DETECTED
SET MECH RESP RATE: 14
SET MECH RESP RATE: 16
SODIUM BLD-SCNC: 133 MMOL/L (ref 136–145)
SODIUM BLD-SCNC: 134 MMOL/L (ref 136–145)
SODIUM BLD-SCNC: 135 MMOL/L (ref 136–145)
SODIUM BLD-SCNC: 135 MMOL/L (ref 136–145)
SODIUM BLD-SCNC: 136 MMOL/L (ref 136–145)
SODIUM BLD-SCNC: 138 MMOL/L (ref 136–145)
SODIUM BLD-SCNC: 139 MMOL/L (ref 136–145)
SODIUM BLD-SCNC: 140 MMOL/L (ref 136–145)
SODIUM SERPL-SCNC: 132 MMOL/L (ref 136–145)
SODIUM SERPL-SCNC: 135 MMOL/L (ref 136–145)
SODIUM SERPL-SCNC: 136 MMOL/L (ref 136–145)
SODIUM SERPL-SCNC: 138 MMOL/L (ref 136–145)
SODIUM SERPL-SCNC: 138 MMOL/L (ref 136–145)
SODIUM SERPL-SCNC: 139 MMOL/L (ref 136–145)
SODIUM SERPL-SCNC: 139 MMOL/L (ref 136–145)
SODIUM SERPL-SCNC: 140 MMOL/L (ref 136–145)
SODIUM SERPL-SCNC: 140 MMOL/L (ref 136–145)
SP GR UR STRIP: 1.03 (ref 1–1.03)
SQUAMOUS #/AREA URNS HPF: ABNORMAL /HPF
STRESS TARGET HR: 146 BPM
STRESS TARGET HR: 146 BPM
T&S EXPIRATION DATE: NORMAL
TIBC SERPL-MCNC: 225 MCG/DL (ref 298–536)
TIBC SERPL-MCNC: 307 MCG/DL (ref 298–536)
TOTAL RATE: 16 BREATHS/MINUTE
TOTAL RATE: 16 BREATHS/MINUTE
TOTAL RATE: 17 BREATHS/MINUTE
TOTAL RATE: 18 BREATHS/MINUTE
TRANSFERRIN SERPL-MCNC: 151 MG/DL (ref 200–360)
TRANSFERRIN SERPL-MCNC: 206 MG/DL (ref 200–360)
TRIGL SERPL-MCNC: 110 MG/DL (ref 0–150)
TRIGL SERPL-MCNC: 268 MG/DL (ref 0–150)
TROPONIN T SERPL-MCNC: 0.21 NG/ML (ref 0–0.03)
TROPONIN T SERPL-MCNC: 0.9 NG/ML (ref 0–0.03)
TROPONIN T SERPL-MCNC: 1.01 NG/ML (ref 0–0.03)
TROPONIN T SERPL-MCNC: 2.33 NG/ML (ref 0–0.03)
TSH SERPL DL<=0.05 MIU/L-ACNC: 2.04 UIU/ML (ref 0.27–4.2)
TSH SERPL DL<=0.05 MIU/L-ACNC: 2.55 UIU/ML (ref 0.27–4.2)
UNIT  ABO: NORMAL
UNIT  RH: NORMAL
UPPER ARTERIAL LEFT ARM BRACHIAL LENGTH: 0.46 CM
UPPER ARTERIAL RIGHT ARM BRACHIAL LENGTH: 0.52 CM
URATE SERPL-MCNC: 10.2 MG/DL (ref 3.4–7)
URATE SERPL-MCNC: 9.4 MG/DL (ref 3.4–7)
URATE SERPL-MCNC: 9.6 MG/DL (ref 3.4–7)
UROBILINOGEN UR QL STRIP: ABNORMAL
VANCOMYCIN SERPL-MCNC: 17 MCG/ML (ref 5–40)
VANCOMYCIN SERPL-MCNC: 17 MCG/ML (ref 5–40)
VANCOMYCIN SERPL-MCNC: 19.9 MCG/ML (ref 5–40)
VANCOMYCIN SERPL-MCNC: 20.9 MCG/ML (ref 5–40)
VANCOMYCIN SERPL-MCNC: 21.7 MCG/ML (ref 5–40)
VANCOMYCIN SERPL-MCNC: 22.7 MCG/ML (ref 5–40)
VANCOMYCIN SERPL-MCNC: 24.7 MCG/ML (ref 5–40)
VANCOMYCIN SERPL-MCNC: 25.1 MCG/ML (ref 5–40)
VANCOMYCIN SERPL-MCNC: 32.8 MCG/ML (ref 5–40)
VENTILATOR MODE: ABNORMAL
VENTILATOR MODE: AC
VENTILATOR MODE: AC
VIT B12 BLD-MCNC: 824 PG/ML (ref 211–946)
VLDLC SERPL-MCNC: 22 MG/DL (ref 5–40)
VLDLC SERPL-MCNC: 53.6 MG/DL (ref 5–40)
VT ON VENT VENT: 612 ML
VT ON VENT VENT: 650 ML
VT ON VENT VENT: 650 ML
WBC # BLD AUTO: 14.85 10*3/MM3 (ref 3.4–10.8)
WBC # BLD AUTO: 17.1 10*3/MM3 (ref 3.4–10.8)
WBC # BLD AUTO: 17.81 10*3/MM3 (ref 3.4–10.8)
WBC # BLD AUTO: 19.48 10*3/MM3 (ref 3.4–10.8)
WBC # BLD AUTO: 19.49 10*3/MM3 (ref 3.4–10.8)
WBC # BLD AUTO: 20.61 10*3/MM3 (ref 3.4–10.8)
WBC # BLD AUTO: 22.33 10*3/MM3 (ref 3.4–10.8)
WBC # BLD AUTO: 23.13 10*3/MM3 (ref 3.4–10.8)
WBC # BLD AUTO: 26.01 10*3/MM3 (ref 3.4–10.8)
WBC # BLD AUTO: 26.04 10*3/MM3 (ref 3.4–10.8)
WBC # BLD AUTO: 26.35 10*3/MM3 (ref 3.4–10.8)
WBC MORPH BLD: NORMAL
WBC NRBC COR # BLD: 10.68 10*3/MM3 (ref 3.4–10.8)
WBC NRBC COR # BLD: 11.94 10*3/MM3 (ref 3.4–10.8)
WBC NRBC COR # BLD: 12.95 10*3/MM3 (ref 3.4–10.8)
WBC NRBC COR # BLD: 14.37 10*3/MM3 (ref 3.4–10.8)
WBC NRBC COR # BLD: 4.33 10*3/MM3 (ref 3.4–10.8)
WBC NRBC COR # BLD: 4.68 10*3/MM3 (ref 3.4–10.8)
WBC NRBC COR # BLD: 4.83 10*3/MM3 (ref 3.4–10.8)
WBC NRBC COR # BLD: 4.91 10*3/MM3 (ref 3.4–10.8)
WBC NRBC COR # BLD: 5.23 10*3/MM3 (ref 3.4–10.8)
WBC NRBC COR # BLD: 5.24 10*3/MM3 (ref 3.4–10.8)
WBC NRBC COR # BLD: 5.93 10*3/MM3 (ref 3.4–10.8)
WBC NRBC COR # BLD: 7.38 10*3/MM3 (ref 3.4–10.8)
WBC NRBC COR # BLD: 7.81 10*3/MM3 (ref 3.4–10.8)
WBC NRBC COR # BLD: 7.93 10*3/MM3 (ref 3.4–10.8)
WBC NRBC COR # BLD: 8.93 10*3/MM3 (ref 3.4–10.8)
WBC NRBC COR # BLD: 8.97 10*3/MM3 (ref 3.4–10.8)
WBC NRBC COR # BLD: 9.85 10*3/MM3 (ref 3.4–10.8)
WBC UR QL AUTO: ABNORMAL /HPF
WHOLE BLOOD HOLD SPECIMEN: NORMAL

## 2020-01-01 PROCEDURE — 25010000003 MILRINONE LACTATE IN DEXTROSE 20-5 MG/100ML-% SOLUTION: Performed by: THORACIC SURGERY (CARDIOTHORACIC VASCULAR SURGERY)

## 2020-01-01 PROCEDURE — 80202 ASSAY OF VANCOMYCIN: CPT | Performed by: INTERNAL MEDICINE

## 2020-01-01 PROCEDURE — 94799 UNLISTED PULMONARY SVC/PX: CPT

## 2020-01-01 PROCEDURE — 99232 SBSQ HOSP IP/OBS MODERATE 35: CPT | Performed by: INTERNAL MEDICINE

## 2020-01-01 PROCEDURE — 86920 COMPATIBILITY TEST SPIN: CPT

## 2020-01-01 PROCEDURE — 99231 SBSQ HOSP IP/OBS SF/LOW 25: CPT | Performed by: INTERNAL MEDICINE

## 2020-01-01 PROCEDURE — 33464 VALVULOPLASTY TRICUSPID: CPT | Performed by: SPECIALIST/TECHNOLOGIST, OTHER

## 2020-01-01 PROCEDURE — P9041 ALBUMIN (HUMAN),5%, 50ML: HCPCS | Performed by: THORACIC SURGERY (CARDIOTHORACIC VASCULAR SURGERY)

## 2020-01-01 PROCEDURE — 83690 ASSAY OF LIPASE: CPT | Performed by: EMERGENCY MEDICINE

## 2020-01-01 PROCEDURE — 84484 ASSAY OF TROPONIN QUANT: CPT | Performed by: EMERGENCY MEDICINE

## 2020-01-01 PROCEDURE — 25010000002 VANCOMYCIN 1 G RECONSTITUTED SOLUTION

## 2020-01-01 PROCEDURE — P9017 PLASMA 1 DONOR FRZ W/IN 8 HR: HCPCS

## 2020-01-01 PROCEDURE — 5A1D70Z PERFORMANCE OF URINARY FILTRATION, INTERMITTENT, LESS THAN 6 HOURS PER DAY: ICD-10-PCS | Performed by: INTERNAL MEDICINE

## 2020-01-01 PROCEDURE — 25010000002 HEPARIN (PORCINE) PER 1000 UNITS: Performed by: INTERNAL MEDICINE

## 2020-01-01 PROCEDURE — 71250 CT THORAX DX C-: CPT

## 2020-01-01 PROCEDURE — 84145 PROCALCITONIN (PCT): CPT | Performed by: EMERGENCY MEDICINE

## 2020-01-01 PROCEDURE — 82746 ASSAY OF FOLIC ACID SERUM: CPT | Performed by: INTERNAL MEDICINE

## 2020-01-01 PROCEDURE — 93312 ECHO TRANSESOPHAGEAL: CPT

## 2020-01-01 PROCEDURE — 93010 ELECTROCARDIOGRAM REPORT: CPT | Performed by: INTERNAL MEDICINE

## 2020-01-01 PROCEDURE — 82962 GLUCOSE BLOOD TEST: CPT

## 2020-01-01 PROCEDURE — 99232 SBSQ HOSP IP/OBS MODERATE 35: CPT | Performed by: SURGERY

## 2020-01-01 PROCEDURE — 80053 COMPREHEN METABOLIC PANEL: CPT | Performed by: INTERNAL MEDICINE

## 2020-01-01 PROCEDURE — 85610 PROTHROMBIN TIME: CPT

## 2020-01-01 PROCEDURE — 85025 COMPLETE CBC W/AUTO DIFF WBC: CPT | Performed by: SURGERY

## 2020-01-01 PROCEDURE — 25010000002 VANCOMYCIN 750 MG RECONSTITUTED SOLUTION: Performed by: INTERNAL MEDICINE

## 2020-01-01 PROCEDURE — 25010000003 PHYTONADIONE 10 MG/ML SOLUTION: Performed by: SURGERY

## 2020-01-01 PROCEDURE — 80061 LIPID PANEL: CPT | Performed by: PSYCHIATRY & NEUROLOGY

## 2020-01-01 PROCEDURE — 36430 TRANSFUSION BLD/BLD COMPNT: CPT

## 2020-01-01 PROCEDURE — 25010000002 PIPERACILLIN SOD-TAZOBACTAM PER 1 G: Performed by: INTERNAL MEDICINE

## 2020-01-01 PROCEDURE — 70551 MRI BRAIN STEM W/O DYE: CPT

## 2020-01-01 PROCEDURE — 25010000002 HEPARIN (PORCINE) PER 1000 UNITS: Performed by: SURGERY

## 2020-01-01 PROCEDURE — 80048 BASIC METABOLIC PNL TOTAL CA: CPT | Performed by: THORACIC SURGERY (CARDIOTHORACIC VASCULAR SURGERY)

## 2020-01-01 PROCEDURE — 85610 PROTHROMBIN TIME: CPT | Performed by: HOSPITALIST

## 2020-01-01 PROCEDURE — 85730 THROMBOPLASTIN TIME PARTIAL: CPT | Performed by: SURGERY

## 2020-01-01 PROCEDURE — C1769 GUIDE WIRE: HCPCS | Performed by: INTERNAL MEDICINE

## 2020-01-01 PROCEDURE — 86870 RBC ANTIBODY IDENTIFICATION: CPT | Performed by: PHYSICIAN ASSISTANT

## 2020-01-01 PROCEDURE — 82947 ASSAY GLUCOSE BLOOD QUANT: CPT

## 2020-01-01 PROCEDURE — 82140 ASSAY OF AMMONIA: CPT | Performed by: HOSPITALIST

## 2020-01-01 PROCEDURE — 25010000002 ENOXAPARIN PER 10 MG: Performed by: THORACIC SURGERY (CARDIOTHORACIC VASCULAR SURGERY)

## 2020-01-01 PROCEDURE — G0463 HOSPITAL OUTPT CLINIC VISIT: HCPCS

## 2020-01-01 PROCEDURE — 71045 X-RAY EXAM CHEST 1 VIEW: CPT

## 2020-01-01 PROCEDURE — 25010000002 HEPARIN (PORCINE) PER 1000 UNITS: Performed by: ANESTHESIOLOGY

## 2020-01-01 PROCEDURE — 85025 COMPLETE CBC W/AUTO DIFF WBC: CPT | Performed by: INTERNAL MEDICINE

## 2020-01-01 PROCEDURE — 25010000002 FENTANYL CITRATE (PF) 100 MCG/2ML SOLUTION: Performed by: ANESTHESIOLOGY

## 2020-01-01 PROCEDURE — 25010000002 MIDAZOLAM PER 1 MG: Performed by: ANESTHESIOLOGY

## 2020-01-01 PROCEDURE — 99442 PR PHYS/QHP TELEPHONE EVALUATION 11-20 MIN: CPT | Performed by: NURSE PRACTITIONER

## 2020-01-01 PROCEDURE — 97161 PT EVAL LOW COMPLEX 20 MIN: CPT

## 2020-01-01 PROCEDURE — 85610 PROTHROMBIN TIME: CPT | Performed by: THORACIC SURGERY (CARDIOTHORACIC VASCULAR SURGERY)

## 2020-01-01 PROCEDURE — 02RG0JZ REPLACEMENT OF MITRAL VALVE WITH SYNTHETIC SUBSTITUTE, OPEN APPROACH: ICD-10-PCS | Performed by: THORACIC SURGERY (CARDIOTHORACIC VASCULAR SURGERY)

## 2020-01-01 PROCEDURE — 85347 COAGULATION TIME ACTIVATED: CPT

## 2020-01-01 PROCEDURE — 5A1D70Z PERFORMANCE OF URINARY FILTRATION, INTERMITTENT, LESS THAN 6 HOURS PER DAY: ICD-10-PCS | Performed by: HOSPITALIST

## 2020-01-01 PROCEDURE — 99024 POSTOP FOLLOW-UP VISIT: CPT | Performed by: NURSE PRACTITIONER

## 2020-01-01 PROCEDURE — 25010000003 PHYTONADIONE 10 MG/ML SOLUTION 1 ML AMPULE: Performed by: INTERNAL MEDICINE

## 2020-01-01 PROCEDURE — 25010000002 PROMETHAZINE PER 50 MG: Performed by: THORACIC SURGERY (CARDIOTHORACIC VASCULAR SURGERY)

## 2020-01-01 PROCEDURE — 85014 HEMATOCRIT: CPT | Performed by: SURGERY

## 2020-01-01 PROCEDURE — 25010000002 VANCOMYCIN 10 G RECONSTITUTED SOLUTION: Performed by: HOSPITALIST

## 2020-01-01 PROCEDURE — P9047 ALBUMIN (HUMAN), 25%, 50ML: HCPCS

## 2020-01-01 PROCEDURE — 25010000003 LEVETIRACETAM IN NACL 0.75% 1000 MG/100ML SOLUTION: Performed by: PSYCHIATRY & NEUROLOGY

## 2020-01-01 PROCEDURE — 83605 ASSAY OF LACTIC ACID: CPT | Performed by: EMERGENCY MEDICINE

## 2020-01-01 PROCEDURE — B246ZZ4 ULTRASONOGRAPHY OF RIGHT AND LEFT HEART, TRANSESOPHAGEAL: ICD-10-PCS | Performed by: ANESTHESIOLOGY

## 2020-01-01 PROCEDURE — 0202U NFCT DS 22 TRGT SARS-COV-2: CPT | Performed by: NURSE PRACTITIONER

## 2020-01-01 PROCEDURE — 97110 THERAPEUTIC EXERCISES: CPT

## 2020-01-01 PROCEDURE — 25010000002 ENOXAPARIN PER 10 MG: Performed by: NURSE PRACTITIONER

## 2020-01-01 PROCEDURE — 80069 RENAL FUNCTION PANEL: CPT | Performed by: INTERNAL MEDICINE

## 2020-01-01 PROCEDURE — 85610 PROTHROMBIN TIME: CPT | Performed by: SURGERY

## 2020-01-01 PROCEDURE — 36416 COLLJ CAPILLARY BLOOD SPEC: CPT

## 2020-01-01 PROCEDURE — 25010000002 MIDAZOLAM PER 1 MG: Performed by: REGISTERED NURSE

## 2020-01-01 PROCEDURE — 93005 ELECTROCARDIOGRAM TRACING: CPT | Performed by: INTERNAL MEDICINE

## 2020-01-01 PROCEDURE — 93325 DOPPLER ECHO COLOR FLOW MAPG: CPT | Performed by: INTERNAL MEDICINE

## 2020-01-01 PROCEDURE — 83735 ASSAY OF MAGNESIUM: CPT | Performed by: INTERNAL MEDICINE

## 2020-01-01 PROCEDURE — B2151ZZ FLUOROSCOPY OF LEFT HEART USING LOW OSMOLAR CONTRAST: ICD-10-PCS | Performed by: INTERNAL MEDICINE

## 2020-01-01 PROCEDURE — 25010000003 MILRINONE LACTATE 10 MG/10ML SOLUTION: Performed by: ANESTHESIOLOGY

## 2020-01-01 PROCEDURE — 25010000002 THIAMINE PER 100 MG: Performed by: HOSPITALIST

## 2020-01-01 PROCEDURE — 82803 BLOOD GASES ANY COMBINATION: CPT

## 2020-01-01 PROCEDURE — 93312 ECHO TRANSESOPHAGEAL: CPT | Performed by: INTERNAL MEDICINE

## 2020-01-01 PROCEDURE — 95816 EEG AWAKE AND DROWSY: CPT

## 2020-01-01 PROCEDURE — C1751 CATH, INF, PER/CENT/MIDLINE: HCPCS | Performed by: ANESTHESIOLOGY

## 2020-01-01 PROCEDURE — 36600 WITHDRAWAL OF ARTERIAL BLOOD: CPT

## 2020-01-01 PROCEDURE — 93306 TTE W/DOPPLER COMPLETE: CPT | Performed by: INTERNAL MEDICINE

## 2020-01-01 PROCEDURE — 99231 SBSQ HOSP IP/OBS SF/LOW 25: CPT | Performed by: SURGERY

## 2020-01-01 PROCEDURE — 93320 DOPPLER ECHO COMPLETE: CPT

## 2020-01-01 PROCEDURE — 25010000002 VANCOMYCIN PER 500 MG: Performed by: INTERNAL MEDICINE

## 2020-01-01 PROCEDURE — 25010000002 CEFEPIME PER 500 MG: Performed by: EMERGENCY MEDICINE

## 2020-01-01 PROCEDURE — 84100 ASSAY OF PHOSPHORUS: CPT | Performed by: INTERNAL MEDICINE

## 2020-01-01 PROCEDURE — 25010000002 AMIODARONE IN DEXTROSE 5% 360-4.14 MG/200ML-% SOLUTION: Performed by: THORACIC SURGERY (CARDIOTHORACIC VASCULAR SURGERY)

## 2020-01-01 PROCEDURE — 86900 BLOOD TYPING SEROLOGIC ABO: CPT | Performed by: PHYSICIAN ASSISTANT

## 2020-01-01 PROCEDURE — 76000 FLUOROSCOPY <1 HR PHYS/QHP: CPT

## 2020-01-01 PROCEDURE — 63710000001 INSULIN LISPRO (HUMAN) PER 5 UNITS: Performed by: INTERNAL MEDICINE

## 2020-01-01 PROCEDURE — 25010000002 ONDANSETRON PER 1 MG: Performed by: EMERGENCY MEDICINE

## 2020-01-01 PROCEDURE — 93325 DOPPLER ECHO COLOR FLOW MAPG: CPT

## 2020-01-01 PROCEDURE — 25010000002 ONDANSETRON PER 1 MG: Performed by: THORACIC SURGERY (CARDIOTHORACIC VASCULAR SURGERY)

## 2020-01-01 PROCEDURE — 70450 CT HEAD/BRAIN W/O DYE: CPT

## 2020-01-01 PROCEDURE — 99222 1ST HOSP IP/OBS MODERATE 55: CPT | Performed by: INTERNAL MEDICINE

## 2020-01-01 PROCEDURE — 25010000002 MIDAZOLAM PER 1 MG: Performed by: INTERNAL MEDICINE

## 2020-01-01 PROCEDURE — 85610 PROTHROMBIN TIME: CPT | Performed by: NURSE PRACTITIONER

## 2020-01-01 PROCEDURE — 25010000002 MORPHINE PER 10 MG: Performed by: INTERNAL MEDICINE

## 2020-01-01 PROCEDURE — 93986 DUP-SCAN HEMO COMPL UNI STD: CPT

## 2020-01-01 PROCEDURE — 85730 THROMBOPLASTIN TIME PARTIAL: CPT | Performed by: HOSPITALIST

## 2020-01-01 PROCEDURE — 25010000002 ONDANSETRON PER 1 MG: Performed by: HOSPITALIST

## 2020-01-01 PROCEDURE — 80061 LIPID PANEL: CPT | Performed by: SURGERY

## 2020-01-01 PROCEDURE — 82330 ASSAY OF CALCIUM: CPT | Performed by: THORACIC SURGERY (CARDIOTHORACIC VASCULAR SURGERY)

## 2020-01-01 PROCEDURE — 85730 THROMBOPLASTIN TIME PARTIAL: CPT | Performed by: INTERNAL MEDICINE

## 2020-01-01 PROCEDURE — 25010000002 CALCIUM GLUCONATE PER 10 ML: Performed by: THORACIC SURGERY (CARDIOTHORACIC VASCULAR SURGERY)

## 2020-01-01 PROCEDURE — 83540 ASSAY OF IRON: CPT | Performed by: INTERNAL MEDICINE

## 2020-01-01 PROCEDURE — 71046 X-RAY EXAM CHEST 2 VIEWS: CPT

## 2020-01-01 PROCEDURE — 99254 IP/OBS CNSLTJ NEW/EST MOD 60: CPT | Performed by: INTERNAL MEDICINE

## 2020-01-01 PROCEDURE — 93971 EXTREMITY STUDY: CPT

## 2020-01-01 PROCEDURE — 85730 THROMBOPLASTIN TIME PARTIAL: CPT | Performed by: NURSE PRACTITIONER

## 2020-01-01 PROCEDURE — 99024 POSTOP FOLLOW-UP VISIT: CPT | Performed by: THORACIC SURGERY (CARDIOTHORACIC VASCULAR SURGERY)

## 2020-01-01 PROCEDURE — 80202 ASSAY OF VANCOMYCIN: CPT | Performed by: HOSPITALIST

## 2020-01-01 PROCEDURE — 85007 BL SMEAR W/DIFF WBC COUNT: CPT | Performed by: EMERGENCY MEDICINE

## 2020-01-01 PROCEDURE — 80069 RENAL FUNCTION PANEL: CPT | Performed by: THORACIC SURGERY (CARDIOTHORACIC VASCULAR SURGERY)

## 2020-01-01 PROCEDURE — P9037 PLATE PHERES LEUKOREDU IRRAD: HCPCS

## 2020-01-01 PROCEDURE — 93985 DUP-SCAN HEMO COMPL BI STD: CPT

## 2020-01-01 PROCEDURE — 25010000002 FENTANYL CITRATE (PF) 100 MCG/2ML SOLUTION: Performed by: INTERNAL MEDICINE

## 2020-01-01 PROCEDURE — 83735 ASSAY OF MAGNESIUM: CPT | Performed by: THORACIC SURGERY (CARDIOTHORACIC VASCULAR SURGERY)

## 2020-01-01 PROCEDURE — 99255 IP/OBS CONSLTJ NEW/EST HI 80: CPT | Performed by: INTERNAL MEDICINE

## 2020-01-01 PROCEDURE — 99233 SBSQ HOSP IP/OBS HIGH 50: CPT | Performed by: INTERNAL MEDICINE

## 2020-01-01 PROCEDURE — 80048 BASIC METABOLIC PNL TOTAL CA: CPT | Performed by: NURSE PRACTITIONER

## 2020-01-01 PROCEDURE — 93005 ELECTROCARDIOGRAM TRACING: CPT | Performed by: THORACIC SURGERY (CARDIOTHORACIC VASCULAR SURGERY)

## 2020-01-01 PROCEDURE — 25010000002 NALOXONE PER 1 MG: Performed by: INTERNAL MEDICINE

## 2020-01-01 PROCEDURE — 82728 ASSAY OF FERRITIN: CPT | Performed by: INTERNAL MEDICINE

## 2020-01-01 PROCEDURE — 25010000002 LEVETIRACETAM IN NACL 0.82% 500 MG/100ML SOLUTION: Performed by: PSYCHIATRY & NEUROLOGY

## 2020-01-01 PROCEDURE — 86927 PLASMA FRESH FROZEN: CPT

## 2020-01-01 PROCEDURE — C1894 INTRO/SHEATH, NON-LASER: HCPCS | Performed by: INTERNAL MEDICINE

## 2020-01-01 PROCEDURE — 83880 ASSAY OF NATRIURETIC PEPTIDE: CPT | Performed by: SURGERY

## 2020-01-01 PROCEDURE — 97530 THERAPEUTIC ACTIVITIES: CPT

## 2020-01-01 PROCEDURE — 80053 COMPREHEN METABOLIC PANEL: CPT | Performed by: NURSE PRACTITIONER

## 2020-01-01 PROCEDURE — 02H633Z INSERTION OF INFUSION DEVICE INTO RIGHT ATRIUM, PERCUTANEOUS APPROACH: ICD-10-PCS | Performed by: SURGERY

## 2020-01-01 PROCEDURE — C1713 ANCHOR/SCREW BN/BN,TIS/BN: HCPCS | Performed by: THORACIC SURGERY (CARDIOTHORACIC VASCULAR SURGERY)

## 2020-01-01 PROCEDURE — 0202U NFCT DS 22 TRGT SARS-COV-2: CPT | Performed by: EMERGENCY MEDICINE

## 2020-01-01 PROCEDURE — 87185 SC STD ENZYME DETCJ PER NZM: CPT | Performed by: EMERGENCY MEDICINE

## 2020-01-01 PROCEDURE — 80053 COMPREHEN METABOLIC PANEL: CPT | Performed by: HOSPITALIST

## 2020-01-01 PROCEDURE — 87340 HEPATITIS B SURFACE AG IA: CPT | Performed by: INTERNAL MEDICINE

## 2020-01-01 PROCEDURE — 33430 REPLACEMENT OF MITRAL VALVE: CPT | Performed by: SPECIALIST/TECHNOLOGIST, OTHER

## 2020-01-01 PROCEDURE — 85730 THROMBOPLASTIN TIME PARTIAL: CPT | Performed by: THORACIC SURGERY (CARDIOTHORACIC VASCULAR SURGERY)

## 2020-01-01 PROCEDURE — 84484 ASSAY OF TROPONIN QUANT: CPT | Performed by: INTERNAL MEDICINE

## 2020-01-01 PROCEDURE — 93880 EXTRACRANIAL BILAT STUDY: CPT

## 2020-01-01 PROCEDURE — 99232 SBSQ HOSP IP/OBS MODERATE 35: CPT | Performed by: PSYCHIATRY & NEUROLOGY

## 2020-01-01 PROCEDURE — 83036 HEMOGLOBIN GLYCOSYLATED A1C: CPT | Performed by: HOSPITALIST

## 2020-01-01 PROCEDURE — 25010000002 CEFAZOLIN PER 500 MG: Performed by: NURSE PRACTITIONER

## 2020-01-01 PROCEDURE — 25010000002 PERFLUTREN (DEFINITY) 8.476 MG IN SODIUM CHLORIDE 0.9 % 10 ML INJECTION: Performed by: INTERNAL MEDICINE

## 2020-01-01 PROCEDURE — 85025 COMPLETE CBC W/AUTO DIFF WBC: CPT | Performed by: EMERGENCY MEDICINE

## 2020-01-01 PROCEDURE — 93320 DOPPLER ECHO COMPLETE: CPT | Performed by: INTERNAL MEDICINE

## 2020-01-01 PROCEDURE — C1750 CATH, HEMODIALYSIS,LONG-TERM: HCPCS | Performed by: SURGERY

## 2020-01-01 PROCEDURE — 25010000002 PHENYLEPHRINE 10 MG/ML SOLUTION 5 ML VIAL: Performed by: THORACIC SURGERY (CARDIOTHORACIC VASCULAR SURGERY)

## 2020-01-01 PROCEDURE — 25010000002 HEPARIN (PORCINE) 25000-0.45 UT/250ML-% SOLUTION: Performed by: SURGERY

## 2020-01-01 PROCEDURE — 25010000002 VANCOMYCIN 10 G RECONSTITUTED SOLUTION: Performed by: INTERNAL MEDICINE

## 2020-01-01 PROCEDURE — 87150 DNA/RNA AMPLIFIED PROBE: CPT | Performed by: EMERGENCY MEDICINE

## 2020-01-01 PROCEDURE — 95816 EEG AWAKE AND DROWSY: CPT | Performed by: PSYCHIATRY & NEUROLOGY

## 2020-01-01 PROCEDURE — 25010000002 IRON SUCROSE PER 1 MG: Performed by: INTERNAL MEDICINE

## 2020-01-01 PROCEDURE — 33647 REPAIR HEART SEPTUM DEFECTS: CPT | Performed by: THORACIC SURGERY (CARDIOTHORACIC VASCULAR SURGERY)

## 2020-01-01 PROCEDURE — 0JH63XZ INSERTION OF TUNNELED VASCULAR ACCESS DEVICE INTO CHEST SUBCUTANEOUS TISSUE AND FASCIA, PERCUTANEOUS APPROACH: ICD-10-PCS | Performed by: SURGERY

## 2020-01-01 PROCEDURE — 87076 CULTURE ANAEROBE IDENT EACH: CPT | Performed by: EMERGENCY MEDICINE

## 2020-01-01 PROCEDURE — 25010000002 ONDANSETRON PER 1 MG: Performed by: ANESTHESIOLOGY

## 2020-01-01 PROCEDURE — 85610 PROTHROMBIN TIME: CPT | Performed by: INTERNAL MEDICINE

## 2020-01-01 PROCEDURE — 87186 SC STD MICRODIL/AGAR DIL: CPT

## 2020-01-01 PROCEDURE — 84443 ASSAY THYROID STIM HORMONE: CPT | Performed by: NURSE PRACTITIONER

## 2020-01-01 PROCEDURE — 99284 EMERGENCY DEPT VISIT MOD MDM: CPT

## 2020-01-01 PROCEDURE — 25010000002 LORAZEPAM PER 2 MG: Performed by: INTERNAL MEDICINE

## 2020-01-01 PROCEDURE — 84443 ASSAY THYROID STIM HORMONE: CPT | Performed by: HOSPITALIST

## 2020-01-01 PROCEDURE — 93458 L HRT ARTERY/VENTRICLE ANGIO: CPT | Performed by: INTERNAL MEDICINE

## 2020-01-01 PROCEDURE — 80053 COMPREHEN METABOLIC PANEL: CPT | Performed by: EMERGENCY MEDICINE

## 2020-01-01 PROCEDURE — 85576 BLOOD PLATELET AGGREGATION: CPT | Performed by: SURGERY

## 2020-01-01 PROCEDURE — 25010000002 PROPOFOL 10 MG/ML EMULSION: Performed by: ANESTHESIOLOGY

## 2020-01-01 PROCEDURE — 63710000001 INSULIN LISPRO (HUMAN) PER 5 UNITS: Performed by: SURGERY

## 2020-01-01 PROCEDURE — 88305 TISSUE EXAM BY PATHOLOGIST: CPT | Performed by: THORACIC SURGERY (CARDIOTHORACIC VASCULAR SURGERY)

## 2020-01-01 PROCEDURE — 83880 ASSAY OF NATRIURETIC PEPTIDE: CPT

## 2020-01-01 PROCEDURE — 82607 VITAMIN B-12: CPT | Performed by: INTERNAL MEDICINE

## 2020-01-01 PROCEDURE — 99152 MOD SED SAME PHYS/QHP 5/>YRS: CPT | Performed by: INTERNAL MEDICINE

## 2020-01-01 PROCEDURE — 5A1221Z PERFORMANCE OF CARDIAC OUTPUT, CONTINUOUS: ICD-10-PCS | Performed by: THORACIC SURGERY (CARDIOTHORACIC VASCULAR SURGERY)

## 2020-01-01 PROCEDURE — 36415 COLL VENOUS BLD VENIPUNCTURE: CPT

## 2020-01-01 PROCEDURE — 86900 BLOOD TYPING SEROLOGIC ABO: CPT

## 2020-01-01 PROCEDURE — C1729 CATH, DRAINAGE: HCPCS | Performed by: THORACIC SURGERY (CARDIOTHORACIC VASCULAR SURGERY)

## 2020-01-01 PROCEDURE — 84550 ASSAY OF BLOOD/URIC ACID: CPT | Performed by: INTERNAL MEDICINE

## 2020-01-01 PROCEDURE — 85018 HEMOGLOBIN: CPT

## 2020-01-01 PROCEDURE — 99253 IP/OBS CNSLTJ NEW/EST LOW 45: CPT | Performed by: PSYCHIATRY & NEUROLOGY

## 2020-01-01 PROCEDURE — 86901 BLOOD TYPING SEROLOGIC RH(D): CPT | Performed by: PHYSICIAN ASSISTANT

## 2020-01-01 PROCEDURE — 25010000003 MILRINONE LACTATE 10 MG/10ML SOLUTION 10 ML VIAL: Performed by: ANESTHESIOLOGY

## 2020-01-01 PROCEDURE — 85018 HEMOGLOBIN: CPT | Performed by: SURGERY

## 2020-01-01 PROCEDURE — 4A023N7 MEASUREMENT OF CARDIAC SAMPLING AND PRESSURE, LEFT HEART, PERCUTANEOUS APPROACH: ICD-10-PCS | Performed by: INTERNAL MEDICINE

## 2020-01-01 PROCEDURE — 25010000002 DOPAMINE PER 40 MG: Performed by: NURSE PRACTITIONER

## 2020-01-01 PROCEDURE — 85610 PROTHROMBIN TIME: CPT | Performed by: EMERGENCY MEDICINE

## 2020-01-01 PROCEDURE — 25010000002 FUROSEMIDE PER 20 MG: Performed by: INTERNAL MEDICINE

## 2020-01-01 PROCEDURE — 25010000002 PHENYLEPHRINE PER 1 ML: Performed by: ANESTHESIOLOGY

## 2020-01-01 PROCEDURE — 25010000002 PROCHLORPERAZINE 10 MG/2ML SOLUTION: Performed by: INTERNAL MEDICINE

## 2020-01-01 PROCEDURE — 25010000002 PROTAMINE SULFATE PER 10 MG: Performed by: ANESTHESIOLOGY

## 2020-01-01 PROCEDURE — 80053 COMPREHEN METABOLIC PANEL: CPT

## 2020-01-01 PROCEDURE — 25010000002 CEFEPIME PER 500 MG: Performed by: HOSPITALIST

## 2020-01-01 PROCEDURE — 85014 HEMATOCRIT: CPT

## 2020-01-01 PROCEDURE — 97162 PT EVAL MOD COMPLEX 30 MIN: CPT

## 2020-01-01 PROCEDURE — 94010 BREATHING CAPACITY TEST: CPT

## 2020-01-01 PROCEDURE — 99233 SBSQ HOSP IP/OBS HIGH 50: CPT | Performed by: PSYCHIATRY & NEUROLOGY

## 2020-01-01 PROCEDURE — 25010000002 HYDRALAZINE PER 20 MG: Performed by: EMERGENCY MEDICINE

## 2020-01-01 PROCEDURE — 02Q50ZZ REPAIR ATRIAL SEPTUM, OPEN APPROACH: ICD-10-PCS | Performed by: THORACIC SURGERY (CARDIOTHORACIC VASCULAR SURGERY)

## 2020-01-01 PROCEDURE — 25010000003 CEFAZOLIN IN DEXTROSE 2-4 GM/100ML-% SOLUTION: Performed by: SURGERY

## 2020-01-01 PROCEDURE — 93005 ELECTROCARDIOGRAM TRACING: CPT

## 2020-01-01 PROCEDURE — C9803 HOPD COVID-19 SPEC COLLECT: HCPCS | Performed by: NURSE PRACTITIONER

## 2020-01-01 PROCEDURE — 87040 BLOOD CULTURE FOR BACTERIA: CPT | Performed by: EMERGENCY MEDICINE

## 2020-01-01 PROCEDURE — 93306 TTE W/DOPPLER COMPLETE: CPT

## 2020-01-01 PROCEDURE — 80053 COMPREHEN METABOLIC PANEL: CPT | Performed by: SURGERY

## 2020-01-01 PROCEDURE — 85027 COMPLETE CBC AUTOMATED: CPT | Performed by: INTERNAL MEDICINE

## 2020-01-01 PROCEDURE — 87040 BLOOD CULTURE FOR BACTERIA: CPT | Performed by: NURSE PRACTITIONER

## 2020-01-01 PROCEDURE — 84466 ASSAY OF TRANSFERRIN: CPT | Performed by: INTERNAL MEDICINE

## 2020-01-01 PROCEDURE — 25010000002 ALBUMIN HUMAN 5% PER 50 ML: Performed by: THORACIC SURGERY (CARDIOTHORACIC VASCULAR SURGERY)

## 2020-01-01 PROCEDURE — 93318 ECHO TRANSESOPHAGEAL INTRAOP: CPT | Performed by: ANESTHESIOLOGY

## 2020-01-01 PROCEDURE — 85025 COMPLETE CBC W/AUTO DIFF WBC: CPT | Performed by: THORACIC SURGERY (CARDIOTHORACIC VASCULAR SURGERY)

## 2020-01-01 PROCEDURE — 85025 COMPLETE CBC W/AUTO DIFF WBC: CPT

## 2020-01-01 PROCEDURE — 94002 VENT MGMT INPAT INIT DAY: CPT

## 2020-01-01 PROCEDURE — 83735 ASSAY OF MAGNESIUM: CPT | Performed by: HOSPITALIST

## 2020-01-01 PROCEDURE — 02UJ0JZ SUPPLEMENT TRICUSPID VALVE WITH SYNTHETIC SUBSTITUTE, OPEN APPROACH: ICD-10-PCS | Performed by: THORACIC SURGERY (CARDIOTHORACIC VASCULAR SURGERY)

## 2020-01-01 PROCEDURE — 25010000002 MORPHINE PER 10 MG: Performed by: EMERGENCY MEDICINE

## 2020-01-01 PROCEDURE — 74176 CT ABD & PELVIS W/O CONTRAST: CPT

## 2020-01-01 PROCEDURE — 25010000002 HEPARIN (PORCINE) PER 1000 UNITS

## 2020-01-01 PROCEDURE — 25010000002 IOPAMIDOL 61 % SOLUTION: Performed by: HOSPITALIST

## 2020-01-01 PROCEDURE — 33430 REPLACEMENT OF MITRAL VALVE: CPT | Performed by: THORACIC SURGERY (CARDIOTHORACIC VASCULAR SURGERY)

## 2020-01-01 PROCEDURE — 83036 HEMOGLOBIN GLYCOSYLATED A1C: CPT | Performed by: PHYSICIAN ASSISTANT

## 2020-01-01 PROCEDURE — 99233 SBSQ HOSP IP/OBS HIGH 50: CPT | Performed by: PHYSICIAN ASSISTANT

## 2020-01-01 PROCEDURE — 63710000001 INSULIN LISPRO (HUMAN) PER 5 UNITS: Performed by: HOSPITALIST

## 2020-01-01 PROCEDURE — 25010000003 LIDOCAINE 1 % SOLUTION 20 ML VIAL: Performed by: SURGERY

## 2020-01-01 PROCEDURE — 33464 VALVULOPLASTY TRICUSPID: CPT | Performed by: THORACIC SURGERY (CARDIOTHORACIC VASCULAR SURGERY)

## 2020-01-01 PROCEDURE — 86922 COMPATIBILITY TEST ANTIGLOB: CPT

## 2020-01-01 PROCEDURE — 25010000002 ALBUMIN HUMAN 25% PER 50 ML

## 2020-01-01 PROCEDURE — 93005 ELECTROCARDIOGRAM TRACING: CPT | Performed by: EMERGENCY MEDICINE

## 2020-01-01 PROCEDURE — 25010000002 CALCIUM GLUCONATE PER 10 ML: Performed by: NURSE PRACTITIONER

## 2020-01-01 PROCEDURE — 33647 REPAIR HEART SEPTUM DEFECTS: CPT | Performed by: SPECIALIST/TECHNOLOGIST, OTHER

## 2020-01-01 PROCEDURE — 25010000002 FUROSEMIDE PER 20 MG

## 2020-01-01 PROCEDURE — 86901 BLOOD TYPING SEROLOGIC RH(D): CPT

## 2020-01-01 PROCEDURE — 85025 COMPLETE CBC W/AUTO DIFF WBC: CPT | Performed by: HOSPITALIST

## 2020-01-01 PROCEDURE — 25010000002 PROPOFOL 10 MG/ML EMULSION: Performed by: REGISTERED NURSE

## 2020-01-01 PROCEDURE — 99152 MOD SED SAME PHYS/QHP 5/>YRS: CPT

## 2020-01-01 PROCEDURE — 25010000002 VITAMIN K1 1 MG/1 ML SOLUTION: Performed by: HOSPITALIST

## 2020-01-01 PROCEDURE — 70496 CT ANGIOGRAPHY HEAD: CPT

## 2020-01-01 PROCEDURE — 81001 URINALYSIS AUTO W/SCOPE: CPT | Performed by: SURGERY

## 2020-01-01 PROCEDURE — 85027 COMPLETE CBC AUTOMATED: CPT | Performed by: THORACIC SURGERY (CARDIOTHORACIC VASCULAR SURGERY)

## 2020-01-01 PROCEDURE — 94729 DIFFUSING CAPACITY: CPT

## 2020-01-01 PROCEDURE — 85384 FIBRINOGEN ACTIVITY: CPT | Performed by: THORACIC SURGERY (CARDIOTHORACIC VASCULAR SURGERY)

## 2020-01-01 PROCEDURE — 99254 IP/OBS CNSLTJ NEW/EST MOD 60: CPT | Performed by: SURGERY

## 2020-01-01 PROCEDURE — 0 IOPAMIDOL PER 1 ML: Performed by: INTERNAL MEDICINE

## 2020-01-01 PROCEDURE — 85025 COMPLETE CBC W/AUTO DIFF WBC: CPT | Performed by: NURSE PRACTITIONER

## 2020-01-01 PROCEDURE — 25010000002 PROPOFOL 10 MG/ML EMULSION: Performed by: THORACIC SURGERY (CARDIOTHORACIC VASCULAR SURGERY)

## 2020-01-01 PROCEDURE — 99232 SBSQ HOSP IP/OBS MODERATE 35: CPT | Performed by: NURSE PRACTITIONER

## 2020-01-01 PROCEDURE — 87077 CULTURE AEROBIC IDENTIFY: CPT

## 2020-01-01 PROCEDURE — 70498 CT ANGIOGRAPHY NECK: CPT

## 2020-01-01 PROCEDURE — 86850 RBC ANTIBODY SCREEN: CPT | Performed by: PHYSICIAN ASSISTANT

## 2020-01-01 PROCEDURE — 84484 ASSAY OF TROPONIN QUANT: CPT

## 2020-01-01 PROCEDURE — 83880 ASSAY OF NATRIURETIC PEPTIDE: CPT | Performed by: HOSPITALIST

## 2020-01-01 DEVICE — SS SUTURE, 3 PER SLEEVE
Type: IMPLANTABLE DEVICE | Site: CHEST WALL | Status: FUNCTIONAL
Brand: MYO/WIRE II

## 2020-01-01 DEVICE — IMPLANTABLE DEVICE: Type: IMPLANTABLE DEVICE | Site: HEART | Status: FUNCTIONAL

## 2020-01-01 DEVICE — STERN FIX SYS ZIPFIX PK/5: Type: IMPLANTABLE DEVICE | Site: CHEST WALL | Status: FUNCTIONAL

## 2020-01-01 DEVICE — COR-KNOT MINI® COMBO KITBASE PACKAGE TYPE - KITEACH STERILE PACKAGE KIT CONTAINS (2) SINGLE PATIENT USE COR-KNOT MINI® DEVICES AND (12) COR-KNOT® QUICK LOADS®.
Type: IMPLANTABLE DEVICE | Site: HEART | Status: FUNCTIONAL
Brand: COR-KNOT MINI®

## 2020-01-01 DEVICE — SS SUTURE, 4 PER SLEEVE
Type: IMPLANTABLE DEVICE | Site: CHEST WALL | Status: FUNCTIONAL
Brand: MYO/WIRE II

## 2020-01-01 RX ORDER — MORPHINE SULFATE 20 MG/ML
10 SOLUTION ORAL
Status: DISCONTINUED | OUTPATIENT
Start: 2020-01-01 | End: 2020-01-01 | Stop reason: HOSPADM

## 2020-01-01 RX ORDER — ACETAMINOPHEN 10 MG/ML
1000 INJECTION, SOLUTION INTRAVENOUS ONCE
Status: COMPLETED | OUTPATIENT
Start: 2020-01-01 | End: 2020-01-01

## 2020-01-01 RX ORDER — CARVEDILOL 25 MG/1
25 TABLET ORAL 2 TIMES DAILY WITH MEALS
Status: DISCONTINUED | OUTPATIENT
Start: 2020-01-01 | End: 2020-01-01

## 2020-01-01 RX ORDER — PROMETHAZINE HYDROCHLORIDE 25 MG/1
25 TABLET ORAL EVERY 4 HOURS PRN
Status: DISCONTINUED | OUTPATIENT
Start: 2020-01-01 | End: 2020-01-01 | Stop reason: HOSPADM

## 2020-01-01 RX ORDER — PROPOFOL 10 MG/ML
VIAL (ML) INTRAVENOUS CONTINUOUS PRN
Status: DISCONTINUED | OUTPATIENT
Start: 2020-01-01 | End: 2020-01-01 | Stop reason: SURG

## 2020-01-01 RX ORDER — HEPARIN SODIUM 1000 [USP'U]/ML
4000 INJECTION, SOLUTION INTRAVENOUS; SUBCUTANEOUS ONCE
Status: DISCONTINUED | OUTPATIENT
Start: 2020-01-01 | End: 2020-01-01

## 2020-01-01 RX ORDER — HALOPERIDOL 1 MG/1
2 TABLET ORAL EVERY 4 HOURS PRN
Status: CANCELLED | OUTPATIENT
Start: 2020-01-01

## 2020-01-01 RX ORDER — WARFARIN SODIUM 5 MG/1
TABLET ORAL
Qty: 30 TABLET
Start: 2020-01-01 | End: 2020-01-01 | Stop reason: SDUPTHER

## 2020-01-01 RX ORDER — MILRINONE LACTATE 0.2 MG/ML
.25-.75 INJECTION, SOLUTION INTRAVENOUS CONTINUOUS PRN
Status: DISCONTINUED | OUTPATIENT
Start: 2020-01-01 | End: 2020-01-01

## 2020-01-01 RX ORDER — ALBUMIN (HUMAN) 12.5 G/50ML
12.5 SOLUTION INTRAVENOUS AS NEEDED
Status: ACTIVE | OUTPATIENT
Start: 2020-01-01 | End: 2020-01-01

## 2020-01-01 RX ORDER — ACETAMINOPHEN 650 MG/1
650 SUPPOSITORY RECTAL EVERY 4 HOURS PRN
Status: DISCONTINUED | OUTPATIENT
Start: 2020-01-01 | End: 2020-01-01 | Stop reason: HOSPADM

## 2020-01-01 RX ORDER — PROMETHAZINE HYDROCHLORIDE 6.25 MG/5ML
25 SYRUP ORAL EVERY 4 HOURS PRN
Status: CANCELLED | OUTPATIENT
Start: 2020-01-01

## 2020-01-01 RX ORDER — ACETAMINOPHEN 325 MG/1
650 TABLET ORAL EVERY 4 HOURS
Status: ACTIVE | OUTPATIENT
Start: 2020-01-01 | End: 2020-01-01

## 2020-01-01 RX ORDER — ACETAMINOPHEN 650 MG/1
650 SUPPOSITORY RECTAL EVERY 4 HOURS
Status: ACTIVE | OUTPATIENT
Start: 2020-01-01 | End: 2020-01-01

## 2020-01-01 RX ORDER — LORAZEPAM 2 MG/ML
0.5 CONCENTRATE ORAL
Status: CANCELLED | OUTPATIENT
Start: 2020-01-01 | End: 2020-08-01

## 2020-01-01 RX ORDER — POTASSIUM CHLORIDE 7.45 MG/ML
10 INJECTION INTRAVENOUS
Status: DISCONTINUED | OUTPATIENT
Start: 2020-01-01 | End: 2020-01-01

## 2020-01-01 RX ORDER — DOPAMINE HYDROCHLORIDE 160 MG/100ML
3 INJECTION, SOLUTION INTRAVENOUS
Status: DISCONTINUED | OUTPATIENT
Start: 2020-01-01 | End: 2020-01-01

## 2020-01-01 RX ORDER — MORPHINE SULFATE 20 MG/ML
5 SOLUTION ORAL
Status: DISCONTINUED | OUTPATIENT
Start: 2020-01-01 | End: 2020-01-01 | Stop reason: HOSPADM

## 2020-01-01 RX ORDER — NITROGLYCERIN 0.4 MG/1
0.4 TABLET SUBLINGUAL
Status: DISCONTINUED | OUTPATIENT
Start: 2020-01-01 | End: 2020-01-01

## 2020-01-01 RX ORDER — SODIUM CHLORIDE 0.9 % (FLUSH) 0.9 %
10 SYRINGE (ML) INJECTION AS NEEDED
Status: DISCONTINUED | OUTPATIENT
Start: 2020-01-01 | End: 2020-01-01

## 2020-01-01 RX ORDER — MORPHINE SULFATE 4 MG/ML
4 INJECTION, SOLUTION INTRAMUSCULAR; INTRAVENOUS
Status: CANCELLED | OUTPATIENT
Start: 2020-01-01 | End: 2020-08-01

## 2020-01-01 RX ORDER — HYDROMORPHONE HCL 110MG/55ML
1.5 PATIENT CONTROLLED ANALGESIA SYRINGE INTRAVENOUS
Status: CANCELLED | OUTPATIENT
Start: 2020-01-01 | End: 2020-08-01

## 2020-01-01 RX ORDER — LABETALOL HYDROCHLORIDE 5 MG/ML
5 INJECTION, SOLUTION INTRAVENOUS
Status: DISCONTINUED | OUTPATIENT
Start: 2020-01-01 | End: 2020-01-01 | Stop reason: HOSPADM

## 2020-01-01 RX ORDER — HALOPERIDOL 2 MG/ML
1 SOLUTION ORAL EVERY 4 HOURS PRN
Status: DISCONTINUED | OUTPATIENT
Start: 2020-01-01 | End: 2020-01-01 | Stop reason: HOSPADM

## 2020-01-01 RX ORDER — BISACODYL 10 MG
10 SUPPOSITORY, RECTAL RECTAL DAILY PRN
Status: DISCONTINUED | OUTPATIENT
Start: 2020-01-01 | End: 2020-01-01 | Stop reason: HOSPADM

## 2020-01-01 RX ORDER — FERROUS SULFATE 325(65) MG
325 TABLET ORAL
Status: DISCONTINUED | OUTPATIENT
Start: 2020-01-01 | End: 2020-01-01

## 2020-01-01 RX ORDER — SODIUM CHLORIDE 9 MG/ML
9 INJECTION, SOLUTION INTRAVENOUS CONTINUOUS PRN
Status: DISCONTINUED | OUTPATIENT
Start: 2020-01-01 | End: 2020-01-01 | Stop reason: HOSPADM

## 2020-01-01 RX ORDER — LORAZEPAM 2 MG/ML
1 INJECTION INTRAMUSCULAR
Status: CANCELLED | OUTPATIENT
Start: 2020-01-01 | End: 2020-08-01

## 2020-01-01 RX ORDER — SODIUM CHLORIDE 9 MG/ML
30 INJECTION, SOLUTION INTRAVENOUS CONTINUOUS PRN
Status: DISCONTINUED | OUTPATIENT
Start: 2020-01-01 | End: 2020-01-01 | Stop reason: HOSPADM

## 2020-01-01 RX ORDER — LORAZEPAM 2 MG/ML
1 CONCENTRATE ORAL
Status: DISCONTINUED | OUTPATIENT
Start: 2020-01-01 | End: 2020-01-01 | Stop reason: HOSPADM

## 2020-01-01 RX ORDER — HALOPERIDOL 5 MG/ML
2 INJECTION INTRAMUSCULAR EVERY 4 HOURS PRN
Status: DISCONTINUED | OUTPATIENT
Start: 2020-01-01 | End: 2020-01-01 | Stop reason: HOSPADM

## 2020-01-01 RX ORDER — MORPHINE SULFATE 2 MG/ML
4 INJECTION, SOLUTION INTRAMUSCULAR; INTRAVENOUS
Status: DISCONTINUED | OUTPATIENT
Start: 2020-01-01 | End: 2020-01-01 | Stop reason: HOSPADM

## 2020-01-01 RX ORDER — MIDAZOLAM HYDROCHLORIDE 1 MG/ML
1 INJECTION INTRAMUSCULAR; INTRAVENOUS
Status: DISCONTINUED | OUTPATIENT
Start: 2020-01-01 | End: 2020-01-01 | Stop reason: HOSPADM

## 2020-01-01 RX ORDER — HYDROMORPHONE HCL 110MG/55ML
1.5 PATIENT CONTROLLED ANALGESIA SYRINGE INTRAVENOUS
Status: DISCONTINUED | OUTPATIENT
Start: 2020-01-01 | End: 2020-01-01 | Stop reason: HOSPADM

## 2020-01-01 RX ORDER — SODIUM CHLORIDE 9 MG/ML
50 INJECTION, SOLUTION INTRAVENOUS CONTINUOUS
Status: DISCONTINUED | OUTPATIENT
Start: 2020-01-01 | End: 2020-01-01

## 2020-01-01 RX ORDER — ACETAMINOPHEN 325 MG/1
650 TABLET ORAL EVERY 4 HOURS PRN
Status: DISCONTINUED | OUTPATIENT
Start: 2020-01-01 | End: 2020-01-01 | Stop reason: HOSPADM

## 2020-01-01 RX ORDER — PROMETHAZINE HYDROCHLORIDE 6.25 MG/5ML
12.5 SYRUP ORAL EVERY 4 HOURS PRN
Status: DISCONTINUED | OUTPATIENT
Start: 2020-01-01 | End: 2020-01-01 | Stop reason: HOSPADM

## 2020-01-01 RX ORDER — HEPARIN SODIUM 10000 [USP'U]/100ML
8.13 INJECTION, SOLUTION INTRAVENOUS
Status: DISCONTINUED | OUTPATIENT
Start: 2020-01-01 | End: 2020-01-01

## 2020-01-01 RX ORDER — ONDANSETRON 2 MG/ML
4 INJECTION INTRAMUSCULAR; INTRAVENOUS EVERY 6 HOURS PRN
Status: DISCONTINUED | OUTPATIENT
Start: 2020-01-01 | End: 2020-01-01 | Stop reason: HOSPADM

## 2020-01-01 RX ORDER — LORAZEPAM 2 MG/ML
2 CONCENTRATE ORAL
Status: DISCONTINUED | OUTPATIENT
Start: 2020-01-01 | End: 2020-01-01 | Stop reason: HOSPADM

## 2020-01-01 RX ORDER — NALOXONE HCL 0.4 MG/ML
0.2 VIAL (ML) INJECTION AS NEEDED
Status: DISCONTINUED | OUTPATIENT
Start: 2020-01-01 | End: 2020-01-01 | Stop reason: HOSPADM

## 2020-01-01 RX ORDER — WARFARIN SODIUM 5 MG/1
TABLET ORAL
Qty: 180 TABLET | Refills: 0 | Status: SHIPPED | OUTPATIENT
Start: 2020-01-01 | End: 2020-01-01 | Stop reason: SDUPTHER

## 2020-01-01 RX ORDER — ONDANSETRON 2 MG/ML
4 INJECTION INTRAMUSCULAR; INTRAVENOUS
Status: DISCONTINUED | OUTPATIENT
Start: 2020-01-01 | End: 2020-01-01

## 2020-01-01 RX ORDER — HALOPERIDOL 5 MG/ML
1 INJECTION INTRAMUSCULAR EVERY 4 HOURS PRN
Status: DISCONTINUED | OUTPATIENT
Start: 2020-01-01 | End: 2020-01-01 | Stop reason: HOSPADM

## 2020-01-01 RX ORDER — PROMETHAZINE HYDROCHLORIDE 25 MG/ML
12.5 INJECTION, SOLUTION INTRAMUSCULAR; INTRAVENOUS EVERY 4 HOURS PRN
Status: DISCONTINUED | OUTPATIENT
Start: 2020-01-01 | End: 2020-01-01 | Stop reason: HOSPADM

## 2020-01-01 RX ORDER — PROMETHAZINE HYDROCHLORIDE 6.25 MG/5ML
12.5 SYRUP ORAL EVERY 4 HOURS PRN
Status: CANCELLED | OUTPATIENT
Start: 2020-01-01

## 2020-01-01 RX ORDER — HYDRALAZINE HYDROCHLORIDE 10 MG/1
10 TABLET, FILM COATED ORAL EVERY 8 HOURS SCHEDULED
Status: DISCONTINUED | OUTPATIENT
Start: 2020-01-01 | End: 2020-01-01

## 2020-01-01 RX ORDER — HYDROCODONE BITARTRATE AND ACETAMINOPHEN 5; 325 MG/1; MG/1
1 TABLET ORAL EVERY 4 HOURS PRN
Qty: 42 TABLET | Refills: 0 | OUTPATIENT
Start: 2020-01-01 | End: 2020-01-01

## 2020-01-01 RX ORDER — LORAZEPAM 2 MG/ML
0.5 INJECTION INTRAMUSCULAR
Status: CANCELLED | OUTPATIENT
Start: 2020-01-01 | End: 2020-08-01

## 2020-01-01 RX ORDER — DIPHENHYDRAMINE HCL 25 MG
25 CAPSULE ORAL
Status: DISCONTINUED | OUTPATIENT
Start: 2020-01-01 | End: 2020-01-01 | Stop reason: HOSPADM

## 2020-01-01 RX ORDER — LORAZEPAM 2 MG/ML
2 CONCENTRATE ORAL
Status: CANCELLED | OUTPATIENT
Start: 2020-01-01 | End: 2020-08-01

## 2020-01-01 RX ORDER — PHYTONADIONE 10 MG/ML
10 INJECTION, EMULSION INTRAMUSCULAR; INTRAVENOUS; SUBCUTANEOUS ONCE
Status: DISCONTINUED | OUTPATIENT
Start: 2020-01-01 | End: 2020-01-01

## 2020-01-01 RX ORDER — MORPHINE SULFATE 4 MG/ML
4 INJECTION, SOLUTION INTRAMUSCULAR; INTRAVENOUS
Status: DISCONTINUED | OUTPATIENT
Start: 2020-01-01 | End: 2020-01-01 | Stop reason: HOSPADM

## 2020-01-01 RX ORDER — MIDAZOLAM HYDROCHLORIDE 1 MG/ML
INJECTION INTRAMUSCULAR; INTRAVENOUS
Status: COMPLETED | OUTPATIENT
Start: 2020-01-01 | End: 2020-01-01

## 2020-01-01 RX ORDER — PROMETHAZINE HYDROCHLORIDE 25 MG/1
12.5 TABLET ORAL EVERY 4 HOURS PRN
Status: DISCONTINUED | OUTPATIENT
Start: 2020-01-01 | End: 2020-01-01 | Stop reason: HOSPADM

## 2020-01-01 RX ORDER — ERYTHROMYCIN 250 MG/1
250 TABLET, COATED ORAL
Qty: 10 TABLET | Refills: 0
Start: 2020-01-01 | End: 2020-01-01 | Stop reason: HOSPADM

## 2020-01-01 RX ORDER — LORAZEPAM 2 MG/ML
2 INJECTION INTRAMUSCULAR
Status: DISCONTINUED | OUTPATIENT
Start: 2020-01-01 | End: 2020-01-01

## 2020-01-01 RX ORDER — CHLORHEXIDINE GLUCONATE 0.12 MG/ML
15 RINSE ORAL EVERY 12 HOURS SCHEDULED
Status: COMPLETED | OUTPATIENT
Start: 2020-01-01 | End: 2020-01-01

## 2020-01-01 RX ORDER — HALOPERIDOL 2 MG/ML
2 SOLUTION ORAL EVERY 4 HOURS PRN
Status: CANCELLED | OUTPATIENT
Start: 2020-01-01

## 2020-01-01 RX ORDER — ONDANSETRON 2 MG/ML
INJECTION INTRAMUSCULAR; INTRAVENOUS AS NEEDED
Status: DISCONTINUED | OUTPATIENT
Start: 2020-01-01 | End: 2020-01-01 | Stop reason: SURG

## 2020-01-01 RX ORDER — METOPROLOL SUCCINATE 25 MG/1
25 TABLET, EXTENDED RELEASE ORAL DAILY
Qty: 90 TABLET | Refills: 0
Start: 2020-01-01 | End: 2020-01-01 | Stop reason: SDUPTHER

## 2020-01-01 RX ORDER — OXYCODONE HYDROCHLORIDE 5 MG/1
10 TABLET ORAL EVERY 4 HOURS PRN
Status: DISCONTINUED | OUTPATIENT
Start: 2020-01-01 | End: 2020-01-01

## 2020-01-01 RX ORDER — MORPHINE SULFATE 2 MG/ML
4 INJECTION, SOLUTION INTRAMUSCULAR; INTRAVENOUS ONCE
Status: COMPLETED | OUTPATIENT
Start: 2020-01-01 | End: 2020-01-01

## 2020-01-01 RX ORDER — HYDROCODONE BITARTRATE AND ACETAMINOPHEN 7.5; 325 MG/1; MG/1
1 TABLET ORAL ONCE AS NEEDED
Status: DISCONTINUED | OUTPATIENT
Start: 2020-01-01 | End: 2020-01-01 | Stop reason: HOSPADM

## 2020-01-01 RX ORDER — LORAZEPAM 2 MG/ML
2 INJECTION INTRAMUSCULAR
Status: DISCONTINUED | OUTPATIENT
Start: 2020-01-01 | End: 2020-01-01 | Stop reason: HOSPADM

## 2020-01-01 RX ORDER — UBIDECARENONE 75 MG
50 CAPSULE ORAL DAILY
Status: ON HOLD | COMMUNITY
End: 2020-01-01

## 2020-01-01 RX ORDER — HEPARIN SODIUM 1000 [USP'U]/ML
4000 INJECTION, SOLUTION INTRAVENOUS; SUBCUTANEOUS AS NEEDED
Status: DISCONTINUED | OUTPATIENT
Start: 2020-01-01 | End: 2020-01-01

## 2020-01-01 RX ORDER — PANTOPRAZOLE SODIUM 40 MG/1
40 TABLET, DELAYED RELEASE ORAL DAILY
Start: 2020-01-01 | End: 2020-01-01

## 2020-01-01 RX ORDER — CHLORHEXIDINE GLUCONATE 500 MG/1
1 CLOTH TOPICAL EVERY 12 HOURS
Status: DISCONTINUED | OUTPATIENT
Start: 2020-01-01 | End: 2020-01-01

## 2020-01-01 RX ORDER — PROMETHAZINE HYDROCHLORIDE 25 MG/1
12.5 TABLET ORAL EVERY 6 HOURS PRN
Status: DISCONTINUED | OUTPATIENT
Start: 2020-01-01 | End: 2020-01-01 | Stop reason: HOSPADM

## 2020-01-01 RX ORDER — LORAZEPAM 1 MG/1
1 TABLET ORAL
Status: DISCONTINUED | OUTPATIENT
Start: 2020-01-01 | End: 2020-01-01

## 2020-01-01 RX ORDER — HYDRALAZINE HYDROCHLORIDE 20 MG/ML
5 INJECTION INTRAMUSCULAR; INTRAVENOUS
Status: DISCONTINUED | OUTPATIENT
Start: 2020-01-01 | End: 2020-01-01 | Stop reason: HOSPADM

## 2020-01-01 RX ORDER — MORPHINE SULFATE 2 MG/ML
2 INJECTION, SOLUTION INTRAMUSCULAR; INTRAVENOUS
Status: DISCONTINUED | OUTPATIENT
Start: 2020-01-01 | End: 2020-01-01 | Stop reason: HOSPADM

## 2020-01-01 RX ORDER — POLYETHYLENE GLYCOL 3350 17 G/17G
17 POWDER, FOR SOLUTION ORAL DAILY PRN
Start: 2020-01-01 | End: 2020-01-01

## 2020-01-01 RX ORDER — ALPRAZOLAM 0.25 MG/1
0.25 TABLET ORAL EVERY 8 HOURS PRN
Status: DISCONTINUED | OUTPATIENT
Start: 2020-01-01 | End: 2020-01-01 | Stop reason: HOSPADM

## 2020-01-01 RX ORDER — ASPIRIN 81 MG/1
81 TABLET ORAL DAILY
Status: DISCONTINUED | OUTPATIENT
Start: 2020-01-01 | End: 2020-01-01

## 2020-01-01 RX ORDER — HEPARIN SODIUM 1000 [USP'U]/ML
4000 INJECTION, SOLUTION INTRAVENOUS; SUBCUTANEOUS ONCE
Status: COMPLETED | OUTPATIENT
Start: 2020-01-01 | End: 2020-01-01

## 2020-01-01 RX ORDER — ATORVASTATIN CALCIUM 80 MG/1
80 TABLET, FILM COATED ORAL NIGHTLY
Status: DISCONTINUED | OUTPATIENT
Start: 2020-01-01 | End: 2020-01-01

## 2020-01-01 RX ORDER — HYDROCODONE BITARTRATE AND ACETAMINOPHEN 5; 325 MG/1; MG/1
1 TABLET ORAL EVERY 4 HOURS PRN
Qty: 42 TABLET | Refills: 0 | Status: SHIPPED | OUTPATIENT
Start: 2020-01-01 | End: 2020-01-01

## 2020-01-01 RX ORDER — BUMETANIDE 2 MG/1
4 TABLET ORAL DAILY
Status: DISCONTINUED | OUTPATIENT
Start: 2020-01-01 | End: 2020-01-01

## 2020-01-01 RX ORDER — HYDRALAZINE HYDROCHLORIDE 20 MG/ML
10 INJECTION INTRAMUSCULAR; INTRAVENOUS ONCE
Status: COMPLETED | OUTPATIENT
Start: 2020-01-01 | End: 2020-01-01

## 2020-01-01 RX ORDER — LIDOCAINE HYDROCHLORIDE 20 MG/ML
INJECTION, SOLUTION INFILTRATION; PERINEURAL AS NEEDED
Status: DISCONTINUED | OUTPATIENT
Start: 2020-01-01 | End: 2020-01-01 | Stop reason: HOSPADM

## 2020-01-01 RX ORDER — WARFARIN SODIUM 5 MG/1
TABLET ORAL
Qty: 50 TABLET | Refills: 0 | OUTPATIENT
Start: 2020-01-01

## 2020-01-01 RX ORDER — LIDOCAINE HYDROCHLORIDE 20 MG/ML
SOLUTION OROPHARYNGEAL
Status: COMPLETED | OUTPATIENT
Start: 2020-01-01 | End: 2020-01-01

## 2020-01-01 RX ORDER — POTASSIUM CHLORIDE 29.8 MG/ML
20 INJECTION INTRAVENOUS
Status: DISCONTINUED | OUTPATIENT
Start: 2020-01-01 | End: 2020-01-01

## 2020-01-01 RX ORDER — CYCLOBENZAPRINE HCL 10 MG
10 TABLET ORAL EVERY 8 HOURS PRN
Status: DISCONTINUED | OUTPATIENT
Start: 2020-01-01 | End: 2020-01-01 | Stop reason: HOSPADM

## 2020-01-01 RX ORDER — PROMETHAZINE HYDROCHLORIDE 25 MG/ML
25 INJECTION, SOLUTION INTRAMUSCULAR; INTRAVENOUS EVERY 4 HOURS PRN
Status: CANCELLED | OUTPATIENT
Start: 2020-01-01

## 2020-01-01 RX ORDER — PROMETHAZINE HYDROCHLORIDE 25 MG/1
25 TABLET ORAL ONCE AS NEEDED
Status: DISCONTINUED | OUTPATIENT
Start: 2020-01-01 | End: 2020-01-01 | Stop reason: HOSPADM

## 2020-01-01 RX ORDER — ERYTHROMYCIN 250 MG/1
250 TABLET, COATED ORAL
Status: DISCONTINUED | OUTPATIENT
Start: 2020-01-01 | End: 2020-01-01

## 2020-01-01 RX ORDER — LORAZEPAM 2 MG/ML
0.5 CONCENTRATE ORAL
Status: DISCONTINUED | OUTPATIENT
Start: 2020-01-01 | End: 2020-01-01 | Stop reason: HOSPADM

## 2020-01-01 RX ORDER — ACETAMINOPHEN 650 MG/1
650 SUPPOSITORY RECTAL EVERY 4 HOURS PRN
Status: DISCONTINUED | OUTPATIENT
Start: 2020-01-01 | End: 2020-01-01

## 2020-01-01 RX ORDER — METOPROLOL SUCCINATE 25 MG/1
12.5 TABLET, EXTENDED RELEASE ORAL
Qty: 45 TABLET | Refills: 0
Start: 2020-01-01 | End: 2020-01-01 | Stop reason: SDUPTHER

## 2020-01-01 RX ORDER — EPHEDRINE SULFATE 50 MG/ML
5 INJECTION, SOLUTION INTRAVENOUS ONCE AS NEEDED
Status: DISCONTINUED | OUTPATIENT
Start: 2020-01-01 | End: 2020-01-01 | Stop reason: HOSPADM

## 2020-01-01 RX ORDER — POTASSIUM CHLORIDE 1.5 G/1.77G
40 POWDER, FOR SOLUTION ORAL AS NEEDED
Status: DISCONTINUED | OUTPATIENT
Start: 2020-01-01 | End: 2020-01-01

## 2020-01-01 RX ORDER — DOXYCYCLINE 100 MG/1
100 CAPSULE ORAL EVERY 12 HOURS SCHEDULED
Status: COMPLETED | OUTPATIENT
Start: 2020-01-01 | End: 2020-01-01

## 2020-01-01 RX ORDER — HYDROCODONE BITARTRATE AND ACETAMINOPHEN 7.5; 325 MG/1; MG/1
1 TABLET ORAL EVERY 4 HOURS PRN
Status: DISCONTINUED | OUTPATIENT
Start: 2020-01-01 | End: 2020-01-01

## 2020-01-01 RX ORDER — HYDROMORPHONE HYDROCHLORIDE 1 MG/ML
0.5 INJECTION, SOLUTION INTRAMUSCULAR; INTRAVENOUS; SUBCUTANEOUS
Status: CANCELLED | OUTPATIENT
Start: 2020-01-01 | End: 2020-08-01

## 2020-01-01 RX ORDER — PROMETHAZINE HYDROCHLORIDE 25 MG/ML
25 INJECTION, SOLUTION INTRAMUSCULAR; INTRAVENOUS EVERY 4 HOURS PRN
Status: DISCONTINUED | OUTPATIENT
Start: 2020-01-01 | End: 2020-01-01 | Stop reason: HOSPADM

## 2020-01-01 RX ORDER — FENTANYL CITRATE 50 UG/ML
INJECTION, SOLUTION INTRAMUSCULAR; INTRAVENOUS
Status: COMPLETED | OUTPATIENT
Start: 2020-01-01 | End: 2020-01-01

## 2020-01-01 RX ORDER — WARFARIN SODIUM 3 MG/1
3 TABLET ORAL
Status: DISCONTINUED | OUTPATIENT
Start: 2020-01-01 | End: 2020-01-01

## 2020-01-01 RX ORDER — ACETAMINOPHEN 325 MG/1
650 TABLET ORAL ONCE AS NEEDED
Status: DISCONTINUED | OUTPATIENT
Start: 2020-01-01 | End: 2020-01-01 | Stop reason: HOSPADM

## 2020-01-01 RX ORDER — PROMETHAZINE HYDROCHLORIDE 12.5 MG/1
12.5 SUPPOSITORY RECTAL EVERY 4 HOURS PRN
Status: DISCONTINUED | OUTPATIENT
Start: 2020-01-01 | End: 2020-01-01 | Stop reason: HOSPADM

## 2020-01-01 RX ORDER — METOPROLOL SUCCINATE 25 MG/1
TABLET, EXTENDED RELEASE ORAL
Qty: 30 TABLET | Refills: 0 | OUTPATIENT
Start: 2020-01-01

## 2020-01-01 RX ORDER — PHYTONADIONE 2 MG/ML
5 INJECTION, EMULSION INTRAMUSCULAR; INTRAVENOUS; SUBCUTANEOUS ONCE
Status: COMPLETED | OUTPATIENT
Start: 2020-01-01 | End: 2020-01-01

## 2020-01-01 RX ORDER — FENTANYL CITRATE 50 UG/ML
50 INJECTION, SOLUTION INTRAMUSCULAR; INTRAVENOUS
Status: DISCONTINUED | OUTPATIENT
Start: 2020-01-01 | End: 2020-01-01 | Stop reason: HOSPADM

## 2020-01-01 RX ORDER — CHOLECALCIFEROL (VITAMIN D3) 125 MCG
5 CAPSULE ORAL NIGHTLY PRN
Status: DISCONTINUED | OUTPATIENT
Start: 2020-01-01 | End: 2020-01-01 | Stop reason: HOSPADM

## 2020-01-01 RX ORDER — DOPAMINE HYDROCHLORIDE 160 MG/100ML
2-20 INJECTION, SOLUTION INTRAVENOUS CONTINUOUS PRN
Status: DISCONTINUED | OUTPATIENT
Start: 2020-01-01 | End: 2020-01-01

## 2020-01-01 RX ORDER — CEFAZOLIN SODIUM IN 0.9 % NACL 3 G/100 ML
3 INTRAVENOUS SOLUTION, PIGGYBACK (ML) INTRAVENOUS EVERY 8 HOURS
Status: DISCONTINUED | OUTPATIENT
Start: 2020-01-01 | End: 2020-01-01 | Stop reason: DRUGHIGH

## 2020-01-01 RX ORDER — NICOTINE POLACRILEX 4 MG
15 LOZENGE BUCCAL
Status: DISCONTINUED | OUTPATIENT
Start: 2020-01-01 | End: 2020-01-01

## 2020-01-01 RX ORDER — LORAZEPAM 2 MG/ML
2 INJECTION INTRAMUSCULAR
Status: CANCELLED | OUTPATIENT
Start: 2020-01-01 | End: 2020-08-01

## 2020-01-01 RX ORDER — CEFAZOLIN SODIUM IN 0.9 % NACL 3 G/100 ML
3 INTRAVENOUS SOLUTION, PIGGYBACK (ML) INTRAVENOUS EVERY 24 HOURS
Status: COMPLETED | OUTPATIENT
Start: 2020-01-01 | End: 2020-01-01

## 2020-01-01 RX ORDER — ACETAMINOPHEN 160 MG/5ML
650 SOLUTION ORAL EVERY 4 HOURS
Status: DISPENSED | OUTPATIENT
Start: 2020-01-01 | End: 2020-01-01

## 2020-01-01 RX ORDER — SODIUM CHLORIDE 0.9 % (FLUSH) 0.9 %
3-10 SYRINGE (ML) INJECTION AS NEEDED
Status: DISCONTINUED | OUTPATIENT
Start: 2020-01-01 | End: 2020-01-01 | Stop reason: HOSPADM

## 2020-01-01 RX ORDER — NOREPINEPHRINE BIT/0.9 % NACL 8 MG/250ML
.02-.3 INFUSION BOTTLE (ML) INTRAVENOUS CONTINUOUS PRN
Status: DISCONTINUED | OUTPATIENT
Start: 2020-01-01 | End: 2020-01-01

## 2020-01-01 RX ORDER — MORPHINE SULFATE 20 MG/ML
20 SOLUTION ORAL
Status: DISCONTINUED | OUTPATIENT
Start: 2020-01-01 | End: 2020-01-01 | Stop reason: HOSPADM

## 2020-01-01 RX ORDER — HALOPERIDOL 1 MG/1
1 TABLET ORAL EVERY 4 HOURS PRN
Status: CANCELLED | OUTPATIENT
Start: 2020-01-01

## 2020-01-01 RX ORDER — AMOXICILLIN 250 MG
2 CAPSULE ORAL NIGHTLY
Status: DISCONTINUED | OUTPATIENT
Start: 2020-01-01 | End: 2020-01-01 | Stop reason: HOSPADM

## 2020-01-01 RX ORDER — FENTANYL CITRATE 50 UG/ML
INJECTION, SOLUTION INTRAMUSCULAR; INTRAVENOUS AS NEEDED
Status: DISCONTINUED | OUTPATIENT
Start: 2020-01-01 | End: 2020-01-01 | Stop reason: SURG

## 2020-01-01 RX ORDER — PROMETHAZINE HYDROCHLORIDE 25 MG/1
25 SUPPOSITORY RECTAL EVERY 4 HOURS PRN
Status: CANCELLED | OUTPATIENT
Start: 2020-01-01

## 2020-01-01 RX ORDER — HALOPERIDOL 2 MG/ML
1 SOLUTION ORAL EVERY 4 HOURS PRN
Status: CANCELLED | OUTPATIENT
Start: 2020-01-01

## 2020-01-01 RX ORDER — VANCOMYCIN HYDROCHLORIDE 1 G/200ML
1000 INJECTION, SOLUTION INTRAVENOUS ONCE
Status: COMPLETED | OUTPATIENT
Start: 2020-01-01 | End: 2020-01-01

## 2020-01-01 RX ORDER — SCOLOPAMINE TRANSDERMAL SYSTEM 1 MG/1
1 PATCH, EXTENDED RELEASE TRANSDERMAL
Status: CANCELLED | OUTPATIENT
Start: 2020-01-01

## 2020-01-01 RX ORDER — ISOSORBIDE MONONITRATE 60 MG/1
60 TABLET, EXTENDED RELEASE ORAL
Status: DISCONTINUED | OUTPATIENT
Start: 2020-01-01 | End: 2020-01-01

## 2020-01-01 RX ORDER — HALOPERIDOL 5 MG/ML
2 INJECTION INTRAMUSCULAR EVERY 4 HOURS PRN
Status: CANCELLED | OUTPATIENT
Start: 2020-01-01

## 2020-01-01 RX ORDER — ACETAMINOPHEN 160 MG/5ML
650 SOLUTION ORAL EVERY 4 HOURS PRN
Status: DISCONTINUED | OUTPATIENT
Start: 2020-01-01 | End: 2020-01-01 | Stop reason: HOSPADM

## 2020-01-01 RX ORDER — PROMETHAZINE HYDROCHLORIDE 25 MG/1
25 SUPPOSITORY RECTAL EVERY 4 HOURS PRN
Status: DISCONTINUED | OUTPATIENT
Start: 2020-01-01 | End: 2020-01-01 | Stop reason: HOSPADM

## 2020-01-01 RX ORDER — PROMETHAZINE HYDROCHLORIDE 25 MG/ML
12.5 INJECTION, SOLUTION INTRAMUSCULAR; INTRAVENOUS EVERY 6 HOURS PRN
Status: DISCONTINUED | OUTPATIENT
Start: 2020-01-01 | End: 2020-01-01 | Stop reason: HOSPADM

## 2020-01-01 RX ORDER — PROMETHAZINE HYDROCHLORIDE 6.25 MG/5ML
25 SYRUP ORAL EVERY 4 HOURS PRN
Status: DISCONTINUED | OUTPATIENT
Start: 2020-01-01 | End: 2020-01-01 | Stop reason: HOSPADM

## 2020-01-01 RX ORDER — CYCLOBENZAPRINE HCL 10 MG
10 TABLET ORAL EVERY 8 HOURS PRN
Start: 2020-01-01 | End: 2020-01-01

## 2020-01-01 RX ORDER — MORPHINE SULFATE 10 MG/ML
6 INJECTION INTRAMUSCULAR; INTRAVENOUS; SUBCUTANEOUS
Status: DISCONTINUED | OUTPATIENT
Start: 2020-01-01 | End: 2020-01-01 | Stop reason: HOSPADM

## 2020-01-01 RX ORDER — ACETAMINOPHEN 650 MG/1
650 SUPPOSITORY RECTAL EVERY 4 HOURS PRN
Status: CANCELLED | OUTPATIENT
Start: 2020-01-01

## 2020-01-01 RX ORDER — ONDANSETRON 2 MG/ML
4 INJECTION INTRAMUSCULAR; INTRAVENOUS EVERY 6 HOURS PRN
Status: DISCONTINUED | OUTPATIENT
Start: 2020-01-01 | End: 2020-01-01

## 2020-01-01 RX ORDER — DEXTROSE MONOHYDRATE 25 G/50ML
25 INJECTION, SOLUTION INTRAVENOUS
Status: DISCONTINUED | OUTPATIENT
Start: 2020-01-01 | End: 2020-01-01

## 2020-01-01 RX ORDER — HALOPERIDOL 1 MG/1
1 TABLET ORAL EVERY 4 HOURS PRN
Status: DISCONTINUED | OUTPATIENT
Start: 2020-01-01 | End: 2020-01-01 | Stop reason: HOSPADM

## 2020-01-01 RX ORDER — HEPARIN SODIUM 10000 [USP'U]/100ML
18 INJECTION, SOLUTION INTRAVENOUS
Status: DISCONTINUED | OUTPATIENT
Start: 2020-01-01 | End: 2020-01-01

## 2020-01-01 RX ORDER — PROMETHAZINE HYDROCHLORIDE 25 MG/ML
6.25 INJECTION, SOLUTION INTRAMUSCULAR; INTRAVENOUS
Status: DISCONTINUED | OUTPATIENT
Start: 2020-01-01 | End: 2020-01-01 | Stop reason: HOSPADM

## 2020-01-01 RX ORDER — HEPARIN SODIUM 10000 [USP'U]/100ML
22 INJECTION, SOLUTION INTRAVENOUS
Status: DISCONTINUED | OUTPATIENT
Start: 2020-01-01 | End: 2020-01-01

## 2020-01-01 RX ORDER — LORAZEPAM 2 MG/ML
0.5 INJECTION INTRAMUSCULAR
Status: DISCONTINUED | OUTPATIENT
Start: 2020-01-01 | End: 2020-01-01 | Stop reason: HOSPADM

## 2020-01-01 RX ORDER — SODIUM CHLORIDE 0.9 % (FLUSH) 0.9 %
10 SYRINGE (ML) INJECTION EVERY 12 HOURS SCHEDULED
Status: DISCONTINUED | OUTPATIENT
Start: 2020-01-01 | End: 2020-01-01

## 2020-01-01 RX ORDER — SODIUM CHLORIDE 9 MG/ML
30 INJECTION, SOLUTION INTRAVENOUS CONTINUOUS
Status: DISCONTINUED | OUTPATIENT
Start: 2020-01-01 | End: 2020-01-01

## 2020-01-01 RX ORDER — CEFAZOLIN SODIUM 2 G/100ML
2 INJECTION, SOLUTION INTRAVENOUS ONCE
Status: COMPLETED | OUTPATIENT
Start: 2020-01-01 | End: 2020-01-01

## 2020-01-01 RX ORDER — MAGNESIUM HYDROXIDE 1200 MG/15ML
LIQUID ORAL AS NEEDED
Status: DISCONTINUED | OUTPATIENT
Start: 2020-01-01 | End: 2020-01-01 | Stop reason: HOSPADM

## 2020-01-01 RX ORDER — NITROGLYCERIN 20 MG/100ML
5-200 INJECTION INTRAVENOUS
Status: DISCONTINUED | OUTPATIENT
Start: 2020-01-01 | End: 2020-01-01

## 2020-01-01 RX ORDER — DOCUSATE SODIUM 100 MG/1
100 CAPSULE, LIQUID FILLED ORAL 2 TIMES DAILY PRN
Status: DISCONTINUED | OUTPATIENT
Start: 2020-01-01 | End: 2020-01-01 | Stop reason: HOSPADM

## 2020-01-01 RX ORDER — POTASSIUM CHLORIDE 750 MG/1
40 CAPSULE, EXTENDED RELEASE ORAL AS NEEDED
Status: DISCONTINUED | OUTPATIENT
Start: 2020-01-01 | End: 2020-01-01

## 2020-01-01 RX ORDER — TRAMADOL HYDROCHLORIDE 50 MG/1
50 TABLET ORAL EVERY 12 HOURS PRN
Start: 2020-01-01 | End: 2020-01-01

## 2020-01-01 RX ORDER — HALOPERIDOL 1 MG/1
2 TABLET ORAL EVERY 4 HOURS PRN
Status: DISCONTINUED | OUTPATIENT
Start: 2020-01-01 | End: 2020-01-01 | Stop reason: HOSPADM

## 2020-01-01 RX ORDER — NOREPINEPHRINE BITARTRATE 1 MG/ML
INJECTION, SOLUTION INTRAVENOUS CONTINUOUS PRN
Status: DISCONTINUED | OUTPATIENT
Start: 2020-01-01 | End: 2020-01-01 | Stop reason: SURG

## 2020-01-01 RX ORDER — ACETAMINOPHEN 325 MG/1
650 TABLET ORAL EVERY 4 HOURS PRN
Status: DISCONTINUED | OUTPATIENT
Start: 2020-01-01 | End: 2020-01-01

## 2020-01-01 RX ORDER — SCOLOPAMINE TRANSDERMAL SYSTEM 1 MG/1
1 PATCH, EXTENDED RELEASE TRANSDERMAL
Status: DISCONTINUED | OUTPATIENT
Start: 2020-01-01 | End: 2020-01-01 | Stop reason: HOSPADM

## 2020-01-01 RX ORDER — HEPARIN SODIUM 1000 [USP'U]/ML
4000 INJECTION, SOLUTION INTRAVENOUS; SUBCUTANEOUS ONCE
Status: DISCONTINUED | OUTPATIENT
Start: 2020-01-01 | End: 2020-01-01 | Stop reason: HOSPADM

## 2020-01-01 RX ORDER — WARFARIN SODIUM 4 MG/1
4 TABLET ORAL
Status: DISCONTINUED | OUTPATIENT
Start: 2020-01-01 | End: 2020-01-01

## 2020-01-01 RX ORDER — CARVEDILOL 3.12 MG/1
3.12 TABLET ORAL EVERY 12 HOURS
Status: DISCONTINUED | OUTPATIENT
Start: 2020-01-01 | End: 2020-01-01

## 2020-01-01 RX ORDER — WARFARIN SODIUM 2 MG/1
2 TABLET ORAL
Status: DISCONTINUED | OUTPATIENT
Start: 2020-01-01 | End: 2020-01-01

## 2020-01-01 RX ORDER — METOPROLOL SUCCINATE 25 MG/1
25 TABLET, EXTENDED RELEASE ORAL DAILY
Qty: 90 TABLET | Refills: 2
Start: 2020-01-01 | End: 2020-01-01 | Stop reason: SDUPTHER

## 2020-01-01 RX ORDER — PROPOFOL 10 MG/ML
VIAL (ML) INTRAVENOUS AS NEEDED
Status: DISCONTINUED | OUTPATIENT
Start: 2020-01-01 | End: 2020-01-01 | Stop reason: SURG

## 2020-01-01 RX ORDER — FUROSEMIDE 10 MG/ML
80 INJECTION INTRAMUSCULAR; INTRAVENOUS EVERY 6 HOURS
Status: DISCONTINUED | OUTPATIENT
Start: 2020-01-01 | End: 2020-01-01

## 2020-01-01 RX ORDER — MIDAZOLAM HYDROCHLORIDE 1 MG/ML
INJECTION INTRAMUSCULAR; INTRAVENOUS AS NEEDED
Status: DISCONTINUED | OUTPATIENT
Start: 2020-01-01 | End: 2020-01-01 | Stop reason: SURG

## 2020-01-01 RX ORDER — MORPHINE SULFATE 2 MG/ML
4 INJECTION, SOLUTION INTRAMUSCULAR; INTRAVENOUS
Status: DISCONTINUED | OUTPATIENT
Start: 2020-01-01 | End: 2020-01-01

## 2020-01-01 RX ORDER — FENTANYL CITRATE 50 UG/ML
INJECTION, SOLUTION INTRAMUSCULAR; INTRAVENOUS AS NEEDED
Status: DISCONTINUED | OUTPATIENT
Start: 2020-01-01 | End: 2020-01-01 | Stop reason: HOSPADM

## 2020-01-01 RX ORDER — BISACODYL 10 MG
10 SUPPOSITORY, RECTAL RECTAL DAILY PRN
Start: 2020-01-01 | End: 2020-01-01

## 2020-01-01 RX ORDER — FAMOTIDINE 10 MG/ML
20 INJECTION, SOLUTION INTRAVENOUS ONCE
Status: COMPLETED | OUTPATIENT
Start: 2020-01-01 | End: 2020-01-01

## 2020-01-01 RX ORDER — HALOPERIDOL 2 MG/ML
2 SOLUTION ORAL EVERY 4 HOURS PRN
Status: DISCONTINUED | OUTPATIENT
Start: 2020-01-01 | End: 2020-01-01 | Stop reason: HOSPADM

## 2020-01-01 RX ORDER — ASPIRIN 81 MG/1
81 TABLET ORAL DAILY
COMMUNITY
End: 2020-01-01 | Stop reason: HOSPADM

## 2020-01-01 RX ORDER — HYDROMORPHONE HYDROCHLORIDE 1 MG/ML
0.5 INJECTION, SOLUTION INTRAMUSCULAR; INTRAVENOUS; SUBCUTANEOUS
Status: DISCONTINUED | OUTPATIENT
Start: 2020-01-01 | End: 2020-01-01 | Stop reason: HOSPADM

## 2020-01-01 RX ORDER — SUFENTANIL CITRATE 50 UG/ML
INJECTION EPIDURAL; INTRAVENOUS AS NEEDED
Status: DISCONTINUED | OUTPATIENT
Start: 2020-01-01 | End: 2020-01-01 | Stop reason: SURG

## 2020-01-01 RX ORDER — FLUMAZENIL 0.1 MG/ML
0.2 INJECTION INTRAVENOUS AS NEEDED
Status: DISCONTINUED | OUTPATIENT
Start: 2020-01-01 | End: 2020-01-01 | Stop reason: HOSPADM

## 2020-01-01 RX ORDER — METOCLOPRAMIDE HYDROCHLORIDE 5 MG/ML
10 INJECTION INTRAMUSCULAR; INTRAVENOUS EVERY 6 HOURS
Status: ACTIVE | OUTPATIENT
Start: 2020-01-01 | End: 2020-01-01

## 2020-01-01 RX ORDER — DIPHENOXYLATE HYDROCHLORIDE AND ATROPINE SULFATE 2.5; .025 MG/1; MG/1
1 TABLET ORAL
Status: DISCONTINUED | OUTPATIENT
Start: 2020-01-01 | End: 2020-01-01 | Stop reason: HOSPADM

## 2020-01-01 RX ORDER — PROMETHAZINE HYDROCHLORIDE 25 MG/ML
12.5 INJECTION, SOLUTION INTRAMUSCULAR; INTRAVENOUS EVERY 4 HOURS PRN
Status: CANCELLED | OUTPATIENT
Start: 2020-01-01

## 2020-01-01 RX ORDER — FUROSEMIDE 10 MG/ML
40 INJECTION INTRAMUSCULAR; INTRAVENOUS EVERY 6 HOURS PRN
Status: DISCONTINUED | OUTPATIENT
Start: 2020-01-01 | End: 2020-01-01

## 2020-01-01 RX ORDER — MORPHINE SULFATE 20 MG/ML
20 SOLUTION ORAL
Status: CANCELLED | OUTPATIENT
Start: 2020-01-01 | End: 2020-08-01

## 2020-01-01 RX ORDER — SODIUM CHLORIDE 9 MG/ML
INJECTION, SOLUTION INTRAVENOUS CONTINUOUS PRN
Status: COMPLETED | OUTPATIENT
Start: 2020-01-01 | End: 2020-01-01

## 2020-01-01 RX ORDER — ACETAMINOPHEN 325 MG/1
650 TABLET ORAL EVERY 4 HOURS PRN
Status: CANCELLED | OUTPATIENT
Start: 2020-01-01

## 2020-01-01 RX ORDER — NALOXONE HCL 0.4 MG/ML
VIAL (ML) INJECTION
Status: COMPLETED | OUTPATIENT
Start: 2020-01-01 | End: 2020-01-01

## 2020-01-01 RX ORDER — PROMETHAZINE HYDROCHLORIDE 25 MG/1
25 SUPPOSITORY RECTAL ONCE AS NEEDED
Status: DISCONTINUED | OUTPATIENT
Start: 2020-01-01 | End: 2020-01-01 | Stop reason: HOSPADM

## 2020-01-01 RX ORDER — WARFARIN SODIUM 5 MG/1
5 TABLET ORAL
Status: COMPLETED | OUTPATIENT
Start: 2020-01-01 | End: 2020-01-01

## 2020-01-01 RX ORDER — MORPHINE SULFATE 20 MG/ML
5 SOLUTION ORAL
Status: CANCELLED | OUTPATIENT
Start: 2020-01-01 | End: 2020-08-01

## 2020-01-01 RX ORDER — ALBUMIN (HUMAN) 12.5 G/50ML
12.5 SOLUTION INTRAVENOUS AS NEEDED
Status: DISCONTINUED | OUTPATIENT
Start: 2020-01-01 | End: 2020-01-01

## 2020-01-01 RX ORDER — DIPHENHYDRAMINE HYDROCHLORIDE 50 MG/ML
12.5 INJECTION INTRAMUSCULAR; INTRAVENOUS
Status: DISCONTINUED | OUTPATIENT
Start: 2020-01-01 | End: 2020-01-01 | Stop reason: HOSPADM

## 2020-01-01 RX ORDER — LORAZEPAM 2 MG/ML
1 INJECTION INTRAMUSCULAR
Status: DISCONTINUED | OUTPATIENT
Start: 2020-01-01 | End: 2020-01-01 | Stop reason: HOSPADM

## 2020-01-01 RX ORDER — WARFARIN SODIUM 6 MG/1
6 TABLET ORAL
Status: COMPLETED | OUTPATIENT
Start: 2020-01-01 | End: 2020-01-01

## 2020-01-01 RX ORDER — MILRINONE LACTATE 1 MG/ML
INJECTION, SOLUTION INTRAVENOUS AS NEEDED
Status: DISCONTINUED | OUTPATIENT
Start: 2020-01-01 | End: 2020-01-01 | Stop reason: SURG

## 2020-01-01 RX ORDER — FAMOTIDINE 10 MG/ML
20 INJECTION, SOLUTION INTRAVENOUS ONCE
Status: DISCONTINUED | OUTPATIENT
Start: 2020-01-01 | End: 2020-01-01 | Stop reason: HOSPADM

## 2020-01-01 RX ORDER — AMINOCAPROIC ACID 250 MG/ML
INJECTION, SOLUTION INTRAVENOUS AS NEEDED
Status: DISCONTINUED | OUTPATIENT
Start: 2020-01-01 | End: 2020-01-01 | Stop reason: SURG

## 2020-01-01 RX ORDER — HYDRALAZINE HYDROCHLORIDE 25 MG/1
25 TABLET, FILM COATED ORAL EVERY 8 HOURS SCHEDULED
Status: DISCONTINUED | OUTPATIENT
Start: 2020-01-01 | End: 2020-01-01

## 2020-01-01 RX ORDER — LORAZEPAM 2 MG/ML
1 CONCENTRATE ORAL
Status: CANCELLED | OUTPATIENT
Start: 2020-01-01 | End: 2020-08-01

## 2020-01-01 RX ORDER — METOPROLOL SUCCINATE 25 MG/1
12.5 TABLET, EXTENDED RELEASE ORAL
Status: DISCONTINUED | OUTPATIENT
Start: 2020-01-01 | End: 2020-01-01 | Stop reason: HOSPADM

## 2020-01-01 RX ORDER — HEPARIN SODIUM 1000 [USP'U]/ML
INJECTION, SOLUTION INTRAVENOUS; SUBCUTANEOUS AS NEEDED
Status: DISCONTINUED | OUTPATIENT
Start: 2020-01-01 | End: 2020-01-01 | Stop reason: SURG

## 2020-01-01 RX ORDER — WARFARIN SODIUM 5 MG/1
TABLET ORAL
Qty: 50 TABLET | Refills: 1 | Status: SHIPPED | OUTPATIENT
Start: 2020-01-01 | End: 2020-01-01 | Stop reason: SDUPTHER

## 2020-01-01 RX ORDER — PROCHLORPERAZINE EDISYLATE 5 MG/ML
10 INJECTION INTRAMUSCULAR; INTRAVENOUS EVERY 6 HOURS PRN
Status: DISCONTINUED | OUTPATIENT
Start: 2020-01-01 | End: 2020-01-01

## 2020-01-01 RX ORDER — LORAZEPAM 2 MG/ML
1 INJECTION INTRAMUSCULAR
Status: DISCONTINUED | OUTPATIENT
Start: 2020-01-01 | End: 2020-01-01

## 2020-01-01 RX ORDER — METOPROLOL SUCCINATE 25 MG/1
12.5 TABLET, EXTENDED RELEASE ORAL
Start: 2020-01-01 | End: 2020-01-01 | Stop reason: SDUPTHER

## 2020-01-01 RX ORDER — MORPHINE SULFATE 2 MG/ML
2 INJECTION, SOLUTION INTRAMUSCULAR; INTRAVENOUS
Status: CANCELLED | OUTPATIENT
Start: 2020-01-01 | End: 2020-08-01

## 2020-01-01 RX ORDER — LEVETIRACETAM 5 MG/ML
500 INJECTION INTRAVASCULAR EVERY 12 HOURS SCHEDULED
Status: DISCONTINUED | OUTPATIENT
Start: 2020-01-01 | End: 2020-01-01

## 2020-01-01 RX ORDER — HEPARIN SODIUM 10000 [USP'U]/100ML
9.17 INJECTION, SOLUTION INTRAVENOUS
Status: DISCONTINUED | OUTPATIENT
Start: 2020-01-01 | End: 2020-01-01

## 2020-01-01 RX ORDER — HEPARIN SODIUM 5000 [USP'U]/ML
40-80 INJECTION, SOLUTION INTRAVENOUS; SUBCUTANEOUS EVERY 6 HOURS PRN
Status: DISCONTINUED | OUTPATIENT
Start: 2020-01-01 | End: 2020-01-01

## 2020-01-01 RX ORDER — MIDAZOLAM HYDROCHLORIDE 1 MG/ML
INJECTION INTRAMUSCULAR; INTRAVENOUS AS NEEDED
Status: DISCONTINUED | OUTPATIENT
Start: 2020-01-01 | End: 2020-01-01 | Stop reason: HOSPADM

## 2020-01-01 RX ORDER — CEFAZOLIN SODIUM IN 0.9 % NACL 3 G/100 ML
3 INTRAVENOUS SOLUTION, PIGGYBACK (ML) INTRAVENOUS
Status: COMPLETED | OUTPATIENT
Start: 2020-01-01 | End: 2020-01-01

## 2020-01-01 RX ORDER — TRAMADOL HYDROCHLORIDE 50 MG/1
50 TABLET ORAL EVERY 12 HOURS PRN
Status: DISCONTINUED | OUTPATIENT
Start: 2020-01-01 | End: 2020-01-01 | Stop reason: HOSPADM

## 2020-01-01 RX ORDER — FUROSEMIDE 10 MG/ML
40 INJECTION INTRAMUSCULAR; INTRAVENOUS ONCE
Status: DISCONTINUED | OUTPATIENT
Start: 2020-01-01 | End: 2020-01-01

## 2020-01-01 RX ORDER — PHYTONADIONE 5 MG/1
10 TABLET ORAL ONCE
Status: COMPLETED | OUTPATIENT
Start: 2020-01-01 | End: 2020-01-01

## 2020-01-01 RX ORDER — PROMETHAZINE HYDROCHLORIDE 12.5 MG/1
12.5 SUPPOSITORY RECTAL EVERY 4 HOURS PRN
Status: CANCELLED | OUTPATIENT
Start: 2020-01-01

## 2020-01-01 RX ORDER — METOPROLOL SUCCINATE 25 MG/1
25 TABLET, EXTENDED RELEASE ORAL DAILY
Qty: 90 TABLET | Refills: 0 | Status: ON HOLD | OUTPATIENT
Start: 2020-01-01 | End: 2020-01-01

## 2020-01-01 RX ORDER — ONDANSETRON 2 MG/ML
4 INJECTION INTRAMUSCULAR; INTRAVENOUS EVERY 4 HOURS PRN
Status: DISCONTINUED | OUTPATIENT
Start: 2020-01-01 | End: 2020-01-01

## 2020-01-01 RX ORDER — WARFARIN SODIUM 5 MG/1
TABLET ORAL
Qty: 30 TABLET | Refills: 0 | Status: SHIPPED | OUTPATIENT
Start: 2020-01-01 | End: 2020-01-01 | Stop reason: SDUPTHER

## 2020-01-01 RX ORDER — WARFARIN SODIUM 5 MG/1
TABLET ORAL
Qty: 175 TABLET | Refills: 1 | Status: ON HOLD | OUTPATIENT
Start: 2020-01-01 | End: 2020-01-01

## 2020-01-01 RX ORDER — ONDANSETRON 2 MG/ML
4 INJECTION INTRAMUSCULAR; INTRAVENOUS ONCE
Status: COMPLETED | OUTPATIENT
Start: 2020-01-01 | End: 2020-01-01

## 2020-01-01 RX ORDER — MEPERIDINE HYDROCHLORIDE 25 MG/ML
25 INJECTION INTRAMUSCULAR; INTRAVENOUS; SUBCUTANEOUS EVERY 4 HOURS PRN
Status: ACTIVE | OUTPATIENT
Start: 2020-01-01 | End: 2020-01-01

## 2020-01-01 RX ORDER — LIDOCAINE HYDROCHLORIDE 10 MG/ML
0.5 INJECTION, SOLUTION EPIDURAL; INFILTRATION; INTRACAUDAL; PERINEURAL ONCE AS NEEDED
Status: DISCONTINUED | OUTPATIENT
Start: 2020-01-01 | End: 2020-01-01 | Stop reason: HOSPADM

## 2020-01-01 RX ORDER — CHLORHEXIDINE GLUCONATE 0.12 MG/ML
15 RINSE ORAL EVERY 12 HOURS
Status: DISCONTINUED | OUTPATIENT
Start: 2020-01-01 | End: 2020-01-01

## 2020-01-01 RX ORDER — ASPIRIN 81 MG/1
81 TABLET ORAL DAILY
Status: ON HOLD
Start: 2020-01-01 | End: 2020-01-01

## 2020-01-01 RX ORDER — SODIUM CHLORIDE 9 MG/ML
INJECTION, SOLUTION INTRAVENOUS
Status: COMPLETED | OUTPATIENT
Start: 2020-01-01 | End: 2020-01-01

## 2020-01-01 RX ORDER — OXYCODONE AND ACETAMINOPHEN 7.5; 325 MG/1; MG/1
1 TABLET ORAL ONCE AS NEEDED
Status: DISCONTINUED | OUTPATIENT
Start: 2020-01-01 | End: 2020-01-01 | Stop reason: HOSPADM

## 2020-01-01 RX ORDER — LIDOCAINE HYDROCHLORIDE 20 MG/ML
INJECTION, SOLUTION INFILTRATION; PERINEURAL AS NEEDED
Status: DISCONTINUED | OUTPATIENT
Start: 2020-01-01 | End: 2020-01-01 | Stop reason: SURG

## 2020-01-01 RX ORDER — PANTOPRAZOLE SODIUM 40 MG/1
40 TABLET, DELAYED RELEASE ORAL
Status: DISCONTINUED | OUTPATIENT
Start: 2020-01-01 | End: 2020-01-01 | Stop reason: HOSPADM

## 2020-01-01 RX ORDER — POLYETHYLENE GLYCOL 3350 17 G/17G
17 POWDER, FOR SOLUTION ORAL DAILY PRN
Status: DISCONTINUED | OUTPATIENT
Start: 2020-01-01 | End: 2020-01-01 | Stop reason: HOSPADM

## 2020-01-01 RX ORDER — ASPIRIN 81 MG/1
81 TABLET ORAL DAILY
Status: DISCONTINUED | OUTPATIENT
Start: 2020-01-01 | End: 2020-01-01 | Stop reason: HOSPADM

## 2020-01-01 RX ORDER — HYDROCODONE BITARTRATE AND ACETAMINOPHEN 5; 325 MG/1; MG/1
1 TABLET ORAL EVERY 4 HOURS PRN
Status: DISCONTINUED | OUTPATIENT
Start: 2020-01-01 | End: 2020-01-01 | Stop reason: HOSPADM

## 2020-01-01 RX ORDER — CHOLECALCIFEROL (VITAMIN D3) 125 MCG
5 CAPSULE ORAL NIGHTLY PRN
Start: 2020-01-01 | End: 2020-01-01

## 2020-01-01 RX ORDER — ALBUMIN, HUMAN INJ 5% 5 %
1500 SOLUTION INTRAVENOUS AS NEEDED
Status: DISCONTINUED | OUTPATIENT
Start: 2020-01-01 | End: 2020-01-01

## 2020-01-01 RX ORDER — ATORVASTATIN CALCIUM 20 MG/1
40 TABLET, FILM COATED ORAL NIGHTLY
Status: DISCONTINUED | OUTPATIENT
Start: 2020-01-01 | End: 2020-01-01

## 2020-01-01 RX ORDER — ROCURONIUM BROMIDE 10 MG/ML
INJECTION, SOLUTION INTRAVENOUS AS NEEDED
Status: DISCONTINUED | OUTPATIENT
Start: 2020-01-01 | End: 2020-01-01 | Stop reason: SURG

## 2020-01-01 RX ORDER — MIDAZOLAM HYDROCHLORIDE 1 MG/ML
2 INJECTION INTRAMUSCULAR; INTRAVENOUS
Status: DISCONTINUED | OUTPATIENT
Start: 2020-01-01 | End: 2020-01-01

## 2020-01-01 RX ORDER — PROMETHAZINE HYDROCHLORIDE 25 MG/1
25 TABLET ORAL EVERY 4 HOURS PRN
Status: CANCELLED | OUTPATIENT
Start: 2020-01-01

## 2020-01-01 RX ORDER — MORPHINE SULFATE 10 MG/ML
6 INJECTION INTRAMUSCULAR; INTRAVENOUS; SUBCUTANEOUS
Status: CANCELLED | OUTPATIENT
Start: 2020-01-01 | End: 2020-08-01

## 2020-01-01 RX ORDER — SODIUM CHLORIDE 9 MG/ML
INJECTION, SOLUTION INTRAVENOUS CONTINUOUS PRN
Status: DISCONTINUED | OUTPATIENT
Start: 2020-01-01 | End: 2020-01-01 | Stop reason: SURG

## 2020-01-01 RX ORDER — VANCOMYCIN HYDROCHLORIDE 1 G/200ML
10 INJECTION, SOLUTION INTRAVENOUS ONCE
Status: DISCONTINUED | OUTPATIENT
Start: 2020-01-01 | End: 2020-01-01

## 2020-01-01 RX ORDER — ONDANSETRON 2 MG/ML
4 INJECTION INTRAMUSCULAR; INTRAVENOUS ONCE AS NEEDED
Status: DISCONTINUED | OUTPATIENT
Start: 2020-01-01 | End: 2020-01-01 | Stop reason: HOSPADM

## 2020-01-01 RX ORDER — HYDROCODONE BITARTRATE AND ACETAMINOPHEN 5; 325 MG/1; MG/1
2 TABLET ORAL EVERY 4 HOURS PRN
Status: DISCONTINUED | OUTPATIENT
Start: 2020-01-01 | End: 2020-01-01

## 2020-01-01 RX ORDER — METOPROLOL SUCCINATE 25 MG/1
12.5 TABLET, EXTENDED RELEASE ORAL
Qty: 45 TABLET | Refills: 3
Start: 2020-01-01 | End: 2020-01-01 | Stop reason: SDUPTHER

## 2020-01-01 RX ORDER — CEFAZOLIN SODIUM IN 0.9 % NACL 3 G/100 ML
3 INTRAVENOUS SOLUTION, PIGGYBACK (ML) INTRAVENOUS
Status: DISCONTINUED | OUTPATIENT
Start: 2020-01-01 | End: 2020-01-01

## 2020-01-01 RX ORDER — ACETAMINOPHEN 160 MG/5ML
650 SOLUTION ORAL EVERY 4 HOURS PRN
Status: CANCELLED | OUTPATIENT
Start: 2020-01-01

## 2020-01-01 RX ORDER — PSEUDOEPHEDRINE HCL 30 MG
100 TABLET ORAL 2 TIMES DAILY PRN
Start: 2020-01-01 | End: 2020-01-01

## 2020-01-01 RX ORDER — ACETAMINOPHEN 325 MG/1
650 TABLET ORAL EVERY 4 HOURS PRN
Start: 2020-01-01 | End: 2020-01-01

## 2020-01-01 RX ORDER — HALOPERIDOL 5 MG/ML
1 INJECTION INTRAMUSCULAR EVERY 4 HOURS PRN
Status: CANCELLED | OUTPATIENT
Start: 2020-01-01

## 2020-01-01 RX ORDER — SODIUM CHLORIDE 0.9 % (FLUSH) 0.9 %
30 SYRINGE (ML) INJECTION ONCE AS NEEDED
Status: DISCONTINUED | OUTPATIENT
Start: 2020-01-01 | End: 2020-01-01 | Stop reason: HOSPADM

## 2020-01-01 RX ORDER — MORPHINE SULFATE 20 MG/ML
10 SOLUTION ORAL
Status: CANCELLED | OUTPATIENT
Start: 2020-01-01 | End: 2020-08-01

## 2020-01-01 RX ORDER — HEPARIN SODIUM 1000 [USP'U]/ML
INJECTION, SOLUTION INTRAVENOUS; SUBCUTANEOUS AS NEEDED
Status: DISCONTINUED | OUTPATIENT
Start: 2020-01-01 | End: 2020-01-01 | Stop reason: HOSPADM

## 2020-01-01 RX ORDER — DIPHENOXYLATE HYDROCHLORIDE AND ATROPINE SULFATE 2.5; .025 MG/1; MG/1
1 TABLET ORAL
Status: CANCELLED | OUTPATIENT
Start: 2020-01-01

## 2020-01-01 RX ORDER — HEPARIN SODIUM 1000 [USP'U]/ML
3800 INJECTION, SOLUTION INTRAVENOUS; SUBCUTANEOUS AS NEEDED
Status: DISCONTINUED | OUTPATIENT
Start: 2020-01-01 | End: 2020-01-01 | Stop reason: HOSPADM

## 2020-01-01 RX ORDER — BUMETANIDE 0.25 MG/ML
4 INJECTION INTRAMUSCULAR; INTRAVENOUS EVERY 8 HOURS
Status: COMPLETED | OUTPATIENT
Start: 2020-01-01 | End: 2020-01-01

## 2020-01-01 RX ORDER — NALOXONE HCL 0.4 MG/ML
0.4 VIAL (ML) INJECTION
Status: DISCONTINUED | OUTPATIENT
Start: 2020-01-01 | End: 2020-01-01 | Stop reason: HOSPADM

## 2020-01-01 RX ORDER — CLOTRIMAZOLE AND BETAMETHASONE DIPROPIONATE 10; .64 MG/G; MG/G
CREAM TOPICAL EVERY 12 HOURS SCHEDULED
Status: DISCONTINUED | OUTPATIENT
Start: 2020-01-01 | End: 2020-01-01

## 2020-01-01 RX ORDER — LEVETIRACETAM 10 MG/ML
1000 INJECTION INTRAVASCULAR EVERY 12 HOURS SCHEDULED
Status: DISCONTINUED | OUTPATIENT
Start: 2020-01-01 | End: 2020-01-01

## 2020-01-01 RX ORDER — PROMETHAZINE HYDROCHLORIDE 25 MG/ML
12.5 INJECTION, SOLUTION INTRAMUSCULAR; INTRAVENOUS ONCE AS NEEDED
Status: DISCONTINUED | OUTPATIENT
Start: 2020-01-01 | End: 2020-01-01 | Stop reason: HOSPADM

## 2020-01-01 RX ORDER — CHLORHEXIDINE GLUCONATE 0.12 MG/ML
15 RINSE ORAL EVERY 12 HOURS SCHEDULED
Status: DISCONTINUED | OUTPATIENT
Start: 2020-01-01 | End: 2020-01-01

## 2020-01-01 RX ORDER — MORPHINE SULFATE 2 MG/ML
1 INJECTION, SOLUTION INTRAMUSCULAR; INTRAVENOUS EVERY 4 HOURS PRN
Status: DISCONTINUED | OUTPATIENT
Start: 2020-01-01 | End: 2020-01-01 | Stop reason: HOSPADM

## 2020-01-01 RX ORDER — PROMETHAZINE HYDROCHLORIDE 25 MG/1
12.5 TABLET ORAL EVERY 4 HOURS PRN
Status: CANCELLED | OUTPATIENT
Start: 2020-01-01

## 2020-01-01 RX ORDER — PROTAMINE SULFATE 10 MG/ML
INJECTION, SOLUTION INTRAVENOUS AS NEEDED
Status: DISCONTINUED | OUTPATIENT
Start: 2020-01-01 | End: 2020-01-01 | Stop reason: SURG

## 2020-01-01 RX ORDER — MIDAZOLAM HYDROCHLORIDE 1 MG/ML
2 INJECTION INTRAMUSCULAR; INTRAVENOUS
Status: DISCONTINUED | OUTPATIENT
Start: 2020-01-01 | End: 2020-01-01 | Stop reason: HOSPADM

## 2020-01-01 RX ORDER — SODIUM CHLORIDE 0.9 % (FLUSH) 0.9 %
3 SYRINGE (ML) INJECTION EVERY 12 HOURS SCHEDULED
Status: DISCONTINUED | OUTPATIENT
Start: 2020-01-01 | End: 2020-01-01 | Stop reason: HOSPADM

## 2020-01-01 RX ORDER — LORAZEPAM 1 MG/1
2 TABLET ORAL
Status: DISCONTINUED | OUTPATIENT
Start: 2020-01-01 | End: 2020-01-01

## 2020-01-01 RX ORDER — LEVETIRACETAM 10 MG/ML
1000 INJECTION INTRAVASCULAR ONCE
Status: COMPLETED | OUTPATIENT
Start: 2020-01-01 | End: 2020-01-01

## 2020-01-01 RX ADMIN — HEPARIN SODIUM 21 UNITS/KG/HR: 10000 INJECTION, SOLUTION INTRAVENOUS at 14:26

## 2020-01-01 RX ADMIN — ATORVASTATIN CALCIUM 80 MG: 80 TABLET, FILM COATED ORAL at 20:17

## 2020-01-01 RX ADMIN — PHENYLEPHRINE HYDROCHLORIDE 0.5 MCG/KG/MIN: 10 INJECTION, SOLUTION INTRAMUSCULAR; INTRAVENOUS; SUBCUTANEOUS at 06:59

## 2020-01-01 RX ADMIN — DOCUSATE SODIUM 50MG AND SENNOSIDES 8.6MG 2 TABLET: 8.6; 5 TABLET, FILM COATED ORAL at 20:39

## 2020-01-01 RX ADMIN — PROMETHAZINE HYDROCHLORIDE 12.5 MG: 25 INJECTION INTRAMUSCULAR; INTRAVENOUS at 09:45

## 2020-01-01 RX ADMIN — ACETAMINOPHEN 650 MG: 650 SUPPOSITORY RECTAL at 15:06

## 2020-01-01 RX ADMIN — THIAMINE HYDROCHLORIDE 100 MG: 100 INJECTION, SOLUTION INTRAMUSCULAR; INTRAVENOUS at 20:19

## 2020-01-01 RX ADMIN — ASPIRIN 81 MG: 81 TABLET, COATED ORAL at 08:25

## 2020-01-01 RX ADMIN — ONDANSETRON 4 MG: 2 INJECTION INTRAMUSCULAR; INTRAVENOUS at 20:23

## 2020-01-01 RX ADMIN — SODIUM CHLORIDE, PRESERVATIVE FREE 10 ML: 5 INJECTION INTRAVENOUS at 04:50

## 2020-01-01 RX ADMIN — ONDANSETRON 4 MG: 2 INJECTION INTRAMUSCULAR; INTRAVENOUS at 19:58

## 2020-01-01 RX ADMIN — HYDRALAZINE HYDROCHLORIDE 10 MG: 10 TABLET, FILM COATED ORAL at 05:09

## 2020-01-01 RX ADMIN — TAZOBACTAM SODIUM AND PIPERACILLIN SODIUM 3.38 G: 375; 3 INJECTION, SOLUTION INTRAVENOUS at 09:58

## 2020-01-01 RX ADMIN — ATORVASTATIN CALCIUM 40 MG: 20 TABLET, FILM COATED ORAL at 20:18

## 2020-01-01 RX ADMIN — HYDRALAZINE HYDROCHLORIDE 10 MG: 10 TABLET, FILM COATED ORAL at 22:30

## 2020-01-01 RX ADMIN — CARVEDILOL 25 MG: 25 TABLET, FILM COATED ORAL at 19:15

## 2020-01-01 RX ADMIN — PHYTONADIONE 5 MG: 1 INJECTION, EMULSION INTRAMUSCULAR; INTRAVENOUS; SUBCUTANEOUS at 22:28

## 2020-01-01 RX ADMIN — AMINOCAPROIC ACID 10 G: 250 INJECTION, SOLUTION INTRAVENOUS at 10:02

## 2020-01-01 RX ADMIN — TAZOBACTAM SODIUM AND PIPERACILLIN SODIUM 3.38 G: 375; 3 INJECTION, SOLUTION INTRAVENOUS at 09:03

## 2020-01-01 RX ADMIN — ERYTHROMYCIN 250 MG: 250 TABLET, FILM COATED ORAL at 08:16

## 2020-01-01 RX ADMIN — ASPIRIN 81 MG: 81 TABLET, COATED ORAL at 08:28

## 2020-01-01 RX ADMIN — ISOSORBIDE MONONITRATE 60 MG: 60 TABLET ORAL at 10:34

## 2020-01-01 RX ADMIN — SODIUM CHLORIDE, PRESERVATIVE FREE 10 ML: 5 INJECTION INTRAVENOUS at 21:20

## 2020-01-01 RX ADMIN — METOPROLOL TARTRATE 12.5 MG: 25 TABLET ORAL at 05:54

## 2020-01-01 RX ADMIN — SUFENTANIL CITRATE 25 MCG: 50 INJECTION, SOLUTION EPIDURAL; INTRAVENOUS at 08:14

## 2020-01-01 RX ADMIN — CLOTRIMAZOLE AND BETAMETHASONE DIPROPIONATE: 10; .5 CREAM TOPICAL at 10:34

## 2020-01-01 RX ADMIN — SODIUM CHLORIDE, PRESERVATIVE FREE 10 ML: 5 INJECTION INTRAVENOUS at 10:31

## 2020-01-01 RX ADMIN — METOPROLOL SUCCINATE 12.5 MG: 25 TABLET, EXTENDED RELEASE ORAL at 13:21

## 2020-01-01 RX ADMIN — SODIUM CHLORIDE, PRESERVATIVE FREE 10 ML: 5 INJECTION INTRAVENOUS at 06:26

## 2020-01-01 RX ADMIN — FERROUS SULFATE TAB 325 MG (65 MG ELEMENTAL FE) 325 MG: 325 (65 FE) TAB at 09:06

## 2020-01-01 RX ADMIN — ERYTHROMYCIN 250 MG: 250 TABLET, FILM COATED ORAL at 12:21

## 2020-01-01 RX ADMIN — ERYTHROMYCIN 250 MG: 250 TABLET, FILM COATED ORAL at 09:33

## 2020-01-01 RX ADMIN — FOLIC ACID 1 MG: 5 INJECTION, SOLUTION INTRAMUSCULAR; INTRAVENOUS; SUBCUTANEOUS at 08:49

## 2020-01-01 RX ADMIN — MILRINONE LACTATE IN DEXTROSE 0.12 MCG/KG/MIN: 200 INJECTION, SOLUTION INTRAVENOUS at 04:18

## 2020-01-01 RX ADMIN — IRON SUCROSE 100 MG: 20 INJECTION, SOLUTION INTRAVENOUS at 23:49

## 2020-01-01 RX ADMIN — MIDAZOLAM 2 MG: 1 INJECTION INTRAMUSCULAR; INTRAVENOUS at 09:19

## 2020-01-01 RX ADMIN — TAZOBACTAM SODIUM AND PIPERACILLIN SODIUM 3.38 G: 375; 3 INJECTION, SOLUTION INTRAVENOUS at 09:41

## 2020-01-01 RX ADMIN — WARFARIN SODIUM 4 MG: 4 TABLET ORAL at 17:43

## 2020-01-01 RX ADMIN — HEPARIN SODIUM 3800 UNITS: 1000 INJECTION INTRAVENOUS; SUBCUTANEOUS at 01:52

## 2020-01-01 RX ADMIN — VANCOMYCIN HYDROCHLORIDE 1250 MG: 10 INJECTION, POWDER, LYOPHILIZED, FOR SOLUTION INTRAVENOUS at 18:03

## 2020-01-01 RX ADMIN — FUROSEMIDE 80 MG: 10 INJECTION, SOLUTION INTRAMUSCULAR; INTRAVENOUS at 16:08

## 2020-01-01 RX ADMIN — HEPARIN SODIUM 12 UNITS/KG/HR: 10000 INJECTION, SOLUTION INTRAVENOUS at 10:55

## 2020-01-01 RX ADMIN — CLOTRIMAZOLE AND BETAMETHASONE DIPROPIONATE: 10; .5 CREAM TOPICAL at 21:00

## 2020-01-01 RX ADMIN — HYDROCODONE BITARTRATE AND ACETAMINOPHEN 2 TABLET: 5; 325 TABLET ORAL at 00:04

## 2020-01-01 RX ADMIN — HEPARIN SODIUM 24 UNITS/KG/HR: 10000 INJECTION, SOLUTION INTRAVENOUS at 10:51

## 2020-01-01 RX ADMIN — FOLIC ACID 1 MG: 5 INJECTION, SOLUTION INTRAMUSCULAR; INTRAVENOUS; SUBCUTANEOUS at 20:39

## 2020-01-01 RX ADMIN — INSULIN LISPRO 2 UNITS: 100 INJECTION, SOLUTION INTRAVENOUS; SUBCUTANEOUS at 22:12

## 2020-01-01 RX ADMIN — THIAMINE HYDROCHLORIDE 100 MG: 100 INJECTION, SOLUTION INTRAMUSCULAR; INTRAVENOUS at 08:49

## 2020-01-01 RX ADMIN — SODIUM CHLORIDE, PRESERVATIVE FREE 10 ML: 5 INJECTION INTRAVENOUS at 10:34

## 2020-01-01 RX ADMIN — HYDRALAZINE HYDROCHLORIDE 25 MG: 25 TABLET, FILM COATED ORAL at 21:33

## 2020-01-01 RX ADMIN — TAZOBACTAM SODIUM AND PIPERACILLIN SODIUM 3.38 G: 375; 3 INJECTION, SOLUTION INTRAVENOUS at 20:40

## 2020-01-01 RX ADMIN — CARVEDILOL 25 MG: 25 TABLET, FILM COATED ORAL at 09:30

## 2020-01-01 RX ADMIN — WARFARIN 6 MG: 6 TABLET ORAL at 19:37

## 2020-01-01 RX ADMIN — FOLIC ACID 1 MG: 5 INJECTION, SOLUTION INTRAMUSCULAR; INTRAVENOUS; SUBCUTANEOUS at 10:37

## 2020-01-01 RX ADMIN — ONDANSETRON 4 MG: 2 INJECTION INTRAMUSCULAR; INTRAVENOUS at 10:55

## 2020-01-01 RX ADMIN — SODIUM CHLORIDE, PRESERVATIVE FREE 10 ML: 5 INJECTION INTRAVENOUS at 20:41

## 2020-01-01 RX ADMIN — DEXMEDETOMIDINE HYDROCHLORIDE 0.4 MCG/KG/HR: 100 INJECTION, SOLUTION, CONCENTRATE INTRAVENOUS at 12:10

## 2020-01-01 RX ADMIN — DOXYCYCLINE 100 MG: 100 CAPSULE ORAL at 10:33

## 2020-01-01 RX ADMIN — SODIUM CHLORIDE 750 MG: 900 INJECTION, SOLUTION INTRAVENOUS at 13:46

## 2020-01-01 RX ADMIN — MUPIROCIN 1 APPLICATION: 20 OINTMENT TOPICAL at 05:54

## 2020-01-01 RX ADMIN — CARVEDILOL 25 MG: 25 TABLET, FILM COATED ORAL at 20:24

## 2020-01-01 RX ADMIN — SODIUM CHLORIDE, PRESERVATIVE FREE 10 ML: 5 INJECTION INTRAVENOUS at 09:00

## 2020-01-01 RX ADMIN — PANTOPRAZOLE SODIUM 40 MG: 40 TABLET, DELAYED RELEASE ORAL at 06:03

## 2020-01-01 RX ADMIN — THIAMINE HYDROCHLORIDE 100 MG: 100 INJECTION, SOLUTION INTRAMUSCULAR; INTRAVENOUS at 11:23

## 2020-01-01 RX ADMIN — HEPARIN SODIUM 4000 UNITS: 1000 INJECTION, SOLUTION INTRAVENOUS; SUBCUTANEOUS at 13:28

## 2020-01-01 RX ADMIN — LEVETIRACETAM 500 MG: 5 INJECTION INTRAVENOUS at 11:10

## 2020-01-01 RX ADMIN — IOPAMIDOL 95 ML: 612 INJECTION, SOLUTION INTRAVENOUS at 15:24

## 2020-01-01 RX ADMIN — CEFAZOLIN 3 G: 10 INJECTION, POWDER, FOR SOLUTION INTRAVENOUS at 22:16

## 2020-01-01 RX ADMIN — ENOXAPARIN SODIUM 30 MG: 30 INJECTION SUBCUTANEOUS at 18:00

## 2020-01-01 RX ADMIN — SODIUM CHLORIDE, PRESERVATIVE FREE 10 ML: 5 INJECTION INTRAVENOUS at 20:44

## 2020-01-01 RX ADMIN — SODIUM CHLORIDE, PRESERVATIVE FREE 10 ML: 5 INJECTION INTRAVENOUS at 09:30

## 2020-01-01 RX ADMIN — SODIUM CHLORIDE, PRESERVATIVE FREE 10 ML: 5 INJECTION INTRAVENOUS at 20:28

## 2020-01-01 RX ADMIN — ENOXAPARIN SODIUM 30 MG: 30 INJECTION SUBCUTANEOUS at 18:41

## 2020-01-01 RX ADMIN — NALOXONE HYDROCHLORIDE 0.2 MG: 0.4 INJECTION, SOLUTION INTRAMUSCULAR; INTRAVENOUS; SUBCUTANEOUS at 12:27

## 2020-01-01 RX ADMIN — CARVEDILOL 25 MG: 25 TABLET, FILM COATED ORAL at 10:30

## 2020-01-01 RX ADMIN — THIAMINE HYDROCHLORIDE 100 MG: 100 INJECTION, SOLUTION INTRAMUSCULAR; INTRAVENOUS at 08:29

## 2020-01-01 RX ADMIN — PHYTONADIONE 5 MG: 10 INJECTION, EMULSION INTRAMUSCULAR; INTRAVENOUS; SUBCUTANEOUS at 13:16

## 2020-01-01 RX ADMIN — FENTANYL CITRATE 50 MCG: 50 INJECTION INTRAMUSCULAR; INTRAVENOUS at 06:49

## 2020-01-01 RX ADMIN — IRON SUCROSE 100 MG: 20 INJECTION, SOLUTION INTRAVENOUS at 18:00

## 2020-01-01 RX ADMIN — CEFAZOLIN 3 G: 1 INJECTION, POWDER, FOR SOLUTION INTRAMUSCULAR; INTRAVENOUS; PARENTERAL at 09:54

## 2020-01-01 RX ADMIN — FUROSEMIDE 80 MG: 10 INJECTION, SOLUTION INTRAMUSCULAR; INTRAVENOUS at 04:50

## 2020-01-01 RX ADMIN — OXYCODONE 10 MG: 5 TABLET ORAL at 15:56

## 2020-01-01 RX ADMIN — SODIUM CHLORIDE, PRESERVATIVE FREE 10 ML: 5 INJECTION INTRAVENOUS at 08:26

## 2020-01-01 RX ADMIN — MIDAZOLAM 1 MG: 1 INJECTION INTRAMUSCULAR; INTRAVENOUS at 06:58

## 2020-01-01 RX ADMIN — FOLIC ACID 1 MG: 5 INJECTION, SOLUTION INTRAMUSCULAR; INTRAVENOUS; SUBCUTANEOUS at 09:03

## 2020-01-01 RX ADMIN — ASPIRIN 81 MG: 81 TABLET, COATED ORAL at 10:34

## 2020-01-01 RX ADMIN — HYDRALAZINE HYDROCHLORIDE 25 MG: 25 TABLET, FILM COATED ORAL at 13:40

## 2020-01-01 RX ADMIN — MUPIROCIN 1 APPLICATION: 20 OINTMENT TOPICAL at 09:00

## 2020-01-01 RX ADMIN — ACETAMINOPHEN 650 MG: 325 TABLET, FILM COATED ORAL at 23:37

## 2020-01-01 RX ADMIN — SODIUM CHLORIDE 30 ML/HR: 9 INJECTION, SOLUTION INTRAVENOUS at 12:14

## 2020-01-01 RX ADMIN — MUPIROCIN 1 APPLICATION: 20 OINTMENT TOPICAL at 21:04

## 2020-01-01 RX ADMIN — MIDAZOLAM 2 MG: 1 INJECTION INTRAMUSCULAR; INTRAVENOUS at 10:33

## 2020-01-01 RX ADMIN — MUPIROCIN 1 APPLICATION: 20 OINTMENT TOPICAL at 20:18

## 2020-01-01 RX ADMIN — MIDAZOLAM 1 MG: 1 INJECTION INTRAMUSCULAR; INTRAVENOUS at 06:48

## 2020-01-01 RX ADMIN — DOCUSATE SODIUM 50MG AND SENNOSIDES 8.6MG 2 TABLET: 8.6; 5 TABLET, FILM COATED ORAL at 20:04

## 2020-01-01 RX ADMIN — CARVEDILOL 25 MG: 25 TABLET, FILM COATED ORAL at 09:16

## 2020-01-01 RX ADMIN — LEVETIRACETAM 1000 MG: 10 INJECTION INTRAVENOUS at 22:45

## 2020-01-01 RX ADMIN — LORAZEPAM 2 MG: 2 INJECTION INTRAMUSCULAR; INTRAVENOUS at 00:41

## 2020-01-01 RX ADMIN — SODIUM CHLORIDE, PRESERVATIVE FREE 10 ML: 5 INJECTION INTRAVENOUS at 09:04

## 2020-01-01 RX ADMIN — TAZOBACTAM SODIUM AND PIPERACILLIN SODIUM 3.38 G: 375; 3 INJECTION, SOLUTION INTRAVENOUS at 08:14

## 2020-01-01 RX ADMIN — LIDOCAINE HYDROCHLORIDE 10 ML: 20 SOLUTION ORAL; TOPICAL at 12:05

## 2020-01-01 RX ADMIN — LORAZEPAM 1 MG: 2 INJECTION INTRAMUSCULAR; INTRAVENOUS at 08:48

## 2020-01-01 RX ADMIN — SODIUM CHLORIDE, PRESERVATIVE FREE 10 ML: 5 INJECTION INTRAVENOUS at 20:19

## 2020-01-01 RX ADMIN — HYDROCODONE BITARTRATE AND ACETAMINOPHEN 1 TABLET: 5; 325 TABLET ORAL at 10:48

## 2020-01-01 RX ADMIN — CHLORHEXIDINE GLUCONATE 15 ML: 1.2 RINSE ORAL at 05:54

## 2020-01-01 RX ADMIN — BUMETANIDE 4 MG: 0.25 INJECTION INTRAMUSCULAR; INTRAVENOUS at 13:36

## 2020-01-01 RX ADMIN — TAZOBACTAM SODIUM AND PIPERACILLIN SODIUM 3.38 G: 375; 3 INJECTION, SOLUTION INTRAVENOUS at 10:32

## 2020-01-01 RX ADMIN — MUPIROCIN 1 APPLICATION: 20 OINTMENT TOPICAL at 20:04

## 2020-01-01 RX ADMIN — MUPIROCIN 1 APPLICATION: 20 OINTMENT TOPICAL at 20:07

## 2020-01-01 RX ADMIN — THIAMINE HYDROCHLORIDE 100 MG: 100 INJECTION, SOLUTION INTRAMUSCULAR; INTRAVENOUS at 09:30

## 2020-01-01 RX ADMIN — WARFARIN 5 MG: 5 TABLET ORAL at 15:29

## 2020-01-01 RX ADMIN — HEPARIN SODIUM 15 UNITS/KG/HR: 10000 INJECTION, SOLUTION INTRAVENOUS at 02:40

## 2020-01-01 RX ADMIN — MUPIROCIN 1 APPLICATION: 20 OINTMENT TOPICAL at 21:20

## 2020-01-01 RX ADMIN — CARVEDILOL 25 MG: 25 TABLET, FILM COATED ORAL at 10:34

## 2020-01-01 RX ADMIN — SODIUM CHLORIDE, PRESERVATIVE FREE 10 ML: 5 INJECTION INTRAVENOUS at 20:12

## 2020-01-01 RX ADMIN — ONDANSETRON 4 MG: 2 INJECTION INTRAMUSCULAR; INTRAVENOUS at 09:29

## 2020-01-01 RX ADMIN — CLOTRIMAZOLE AND BETAMETHASONE DIPROPIONATE: 10; .5 CREAM TOPICAL at 09:07

## 2020-01-01 RX ADMIN — ERYTHROMYCIN 250 MG: 250 TABLET, FILM COATED ORAL at 08:04

## 2020-01-01 RX ADMIN — PROPOFOL 150 MG: 10 INJECTION, EMULSION INTRAVENOUS at 07:02

## 2020-01-01 RX ADMIN — CLOTRIMAZOLE AND BETAMETHASONE DIPROPIONATE 1 APPLICATION: 10; .5 CREAM TOPICAL at 20:41

## 2020-01-01 RX ADMIN — CARVEDILOL 25 MG: 25 TABLET, FILM COATED ORAL at 18:29

## 2020-01-01 RX ADMIN — ONDANSETRON 4 MG: 2 INJECTION INTRAMUSCULAR; INTRAVENOUS at 03:20

## 2020-01-01 RX ADMIN — ERYTHROMYCIN 250 MG: 250 TABLET, FILM COATED ORAL at 21:45

## 2020-01-01 RX ADMIN — ERYTHROMYCIN 250 MG: 250 TABLET, FILM COATED ORAL at 08:06

## 2020-01-01 RX ADMIN — CEFAZOLIN SODIUM 2 G: 2 INJECTION, SOLUTION INTRAVENOUS at 10:36

## 2020-01-01 RX ADMIN — HYDRALAZINE HYDROCHLORIDE 10 MG: 20 INJECTION INTRAMUSCULAR; INTRAVENOUS at 16:39

## 2020-01-01 RX ADMIN — ASPIRIN 81 MG: 81 TABLET, COATED ORAL at 08:04

## 2020-01-01 RX ADMIN — BUMETANIDE 1 MG/HR: 0.25 INJECTION INTRAMUSCULAR; INTRAVENOUS at 18:30

## 2020-01-01 RX ADMIN — SODIUM CHLORIDE, PRESERVATIVE FREE 10 ML: 5 INJECTION INTRAVENOUS at 20:40

## 2020-01-01 RX ADMIN — SODIUM CHLORIDE 0.25 MCG/KG/MIN: 0.9 INJECTION, SOLUTION INTRAVENOUS at 08:52

## 2020-01-01 RX ADMIN — MIDAZOLAM 2 MG: 1 INJECTION INTRAMUSCULAR; INTRAVENOUS at 12:15

## 2020-01-01 RX ADMIN — MIDAZOLAM 1 MG: 1 INJECTION INTRAMUSCULAR; INTRAVENOUS at 12:19

## 2020-01-01 RX ADMIN — LIDOCAINE HYDROCHLORIDE 60 MG: 20 INJECTION, SOLUTION INFILTRATION; PERINEURAL at 10:37

## 2020-01-01 RX ADMIN — CLOTRIMAZOLE AND BETAMETHASONE DIPROPIONATE: 10; .5 CREAM TOPICAL at 12:45

## 2020-01-01 RX ADMIN — HEPARIN SODIUM 4000 UNITS: 1000 INJECTION INTRAVENOUS; SUBCUTANEOUS at 18:55

## 2020-01-01 RX ADMIN — CLOTRIMAZOLE AND BETAMETHASONE DIPROPIONATE: 10; .5 CREAM TOPICAL at 01:00

## 2020-01-01 RX ADMIN — FENTANYL CITRATE 50 MCG: 50 INJECTION INTRAMUSCULAR; INTRAVENOUS at 12:15

## 2020-01-01 RX ADMIN — WARFARIN SODIUM 4 MG: 4 TABLET ORAL at 18:12

## 2020-01-01 RX ADMIN — CEFAZOLIN 3 G: 10 INJECTION, POWDER, FOR SOLUTION INTRAVENOUS at 22:12

## 2020-01-01 RX ADMIN — SUFENTANIL CITRATE 25 MCG: 50 INJECTION, SOLUTION EPIDURAL; INTRAVENOUS at 09:54

## 2020-01-01 RX ADMIN — ERYTHROMYCIN 250 MG: 250 TABLET, FILM COATED ORAL at 17:43

## 2020-01-01 RX ADMIN — LEVETIRACETAM 500 MG: 5 INJECTION INTRAVENOUS at 20:39

## 2020-01-01 RX ADMIN — ACETAMINOPHEN 1000 MG: 10 INJECTION, SOLUTION INTRAVENOUS at 12:25

## 2020-01-01 RX ADMIN — CEFAZOLIN 3 G: 10 INJECTION, POWDER, FOR SOLUTION INTRAVENOUS at 21:38

## 2020-01-01 RX ADMIN — BUMETANIDE 4 MG: 2 TABLET ORAL at 09:06

## 2020-01-01 RX ADMIN — FOLIC ACID 1 MG: 5 INJECTION, SOLUTION INTRAMUSCULAR; INTRAVENOUS; SUBCUTANEOUS at 09:14

## 2020-01-01 RX ADMIN — ISOSORBIDE MONONITRATE 60 MG: 60 TABLET ORAL at 08:25

## 2020-01-01 RX ADMIN — SODIUM CHLORIDE, PRESERVATIVE FREE 10 ML: 5 INJECTION INTRAVENOUS at 22:39

## 2020-01-01 RX ADMIN — TAZOBACTAM SODIUM AND PIPERACILLIN SODIUM 3.38 G: 375; 3 INJECTION, SOLUTION INTRAVENOUS at 20:11

## 2020-01-01 RX ADMIN — ASPIRIN 81 MG: 81 TABLET, COATED ORAL at 08:16

## 2020-01-01 RX ADMIN — FENTANYL CITRATE 50 MCG: 50 INJECTION INTRAMUSCULAR; INTRAVENOUS at 06:59

## 2020-01-01 RX ADMIN — ISOSORBIDE MONONITRATE 60 MG: 60 TABLET ORAL at 12:46

## 2020-01-01 RX ADMIN — ERYTHROMYCIN 250 MG: 250 TABLET, FILM COATED ORAL at 08:47

## 2020-01-01 RX ADMIN — LEVETIRACETAM 1000 MG: 10 INJECTION INTRAVENOUS at 11:30

## 2020-01-01 RX ADMIN — TAZOBACTAM SODIUM AND PIPERACILLIN SODIUM 3.38 G: 375; 3 INJECTION, SOLUTION INTRAVENOUS at 20:19

## 2020-01-01 RX ADMIN — SUFENTANIL CITRATE 25 MCG: 50 INJECTION, SOLUTION EPIDURAL; INTRAVENOUS at 07:55

## 2020-01-01 RX ADMIN — BUMETANIDE 1 MG/HR: 0.25 INJECTION INTRAMUSCULAR; INTRAVENOUS at 06:21

## 2020-01-01 RX ADMIN — ACETAMINOPHEN 650 MG: 325 TABLET, FILM COATED ORAL at 11:14

## 2020-01-01 RX ADMIN — PROMETHAZINE HYDROCHLORIDE 12.5 MG: 25 INJECTION INTRAMUSCULAR; INTRAVENOUS at 22:58

## 2020-01-01 RX ADMIN — FENTANYL CITRATE 25 MCG: 50 INJECTION INTRAMUSCULAR; INTRAVENOUS at 12:19

## 2020-01-01 RX ADMIN — CHLORHEXIDINE GLUCONATE 15 ML: 1.2 RINSE ORAL at 18:41

## 2020-01-01 RX ADMIN — HYDRALAZINE HYDROCHLORIDE 10 MG: 10 TABLET, FILM COATED ORAL at 13:09

## 2020-01-01 RX ADMIN — TAZOBACTAM SODIUM AND PIPERACILLIN SODIUM 3.38 G: 375; 3 INJECTION, SOLUTION INTRAVENOUS at 08:05

## 2020-01-01 RX ADMIN — SUFENTANIL CITRATE 25 MCG: 50 INJECTION, SOLUTION EPIDURAL; INTRAVENOUS at 10:00

## 2020-01-01 RX ADMIN — PANTOPRAZOLE SODIUM 40 MG: 40 TABLET, DELAYED RELEASE ORAL at 06:15

## 2020-01-01 RX ADMIN — ERYTHROMYCIN 250 MG: 250 TABLET, FILM COATED ORAL at 10:32

## 2020-01-01 RX ADMIN — ROCURONIUM BROMIDE 50 MG: 10 INJECTION INTRAVENOUS at 07:02

## 2020-01-01 RX ADMIN — TAZOBACTAM SODIUM AND PIPERACILLIN SODIUM 3.38 G: 375; 3 INJECTION, SOLUTION INTRAVENOUS at 20:18

## 2020-01-01 RX ADMIN — LORAZEPAM 1 MG: 2 INJECTION INTRAMUSCULAR; INTRAVENOUS at 04:32

## 2020-01-01 RX ADMIN — VANCOMYCIN HYDROCHLORIDE 2250 MG: 10 INJECTION, POWDER, LYOPHILIZED, FOR SOLUTION INTRAVENOUS at 21:19

## 2020-01-01 RX ADMIN — PERFLUTREN 2.5 ML: 6.52 INJECTION, SUSPENSION INTRAVENOUS at 12:30

## 2020-01-01 RX ADMIN — OXYCODONE 10 MG: 5 TABLET ORAL at 11:14

## 2020-01-01 RX ADMIN — SODIUM CHLORIDE: 900 INJECTION, SOLUTION INTRAVENOUS at 06:45

## 2020-01-01 RX ADMIN — FUROSEMIDE 80 MG: 10 INJECTION, SOLUTION INTRAMUSCULAR; INTRAVENOUS at 21:19

## 2020-01-01 RX ADMIN — ISOSORBIDE MONONITRATE 60 MG: 60 TABLET ORAL at 09:16

## 2020-01-01 RX ADMIN — PHYTONADIONE 10 MG: 5 TABLET ORAL at 16:30

## 2020-01-01 RX ADMIN — HEPARIN SODIUM 22 UNITS/KG/HR: 10000 INJECTION, SOLUTION INTRAVENOUS at 04:02

## 2020-01-01 RX ADMIN — TAZOBACTAM SODIUM AND PIPERACILLIN SODIUM 3.38 G: 375; 3 INJECTION, SOLUTION INTRAVENOUS at 21:09

## 2020-01-01 RX ADMIN — SODIUM CHLORIDE, PRESERVATIVE FREE 10 ML: 5 INJECTION INTRAVENOUS at 10:35

## 2020-01-01 RX ADMIN — ASPIRIN 81 MG: 81 TABLET, COATED ORAL at 12:46

## 2020-01-01 RX ADMIN — MORPHINE SULFATE 4 MG: 4 INJECTION, SOLUTION INTRAMUSCULAR; INTRAVENOUS at 20:22

## 2020-01-01 RX ADMIN — MUPIROCIN: 20 OINTMENT TOPICAL at 20:22

## 2020-01-01 RX ADMIN — INSULIN LISPRO 2 UNITS: 100 INJECTION, SOLUTION INTRAVENOUS; SUBCUTANEOUS at 08:28

## 2020-01-01 RX ADMIN — AMINOCAPROIC ACID 10 G: 250 INJECTION, SOLUTION INTRAVENOUS at 07:56

## 2020-01-01 RX ADMIN — SODIUM CHLORIDE, PRESERVATIVE FREE 10 ML: 5 INJECTION INTRAVENOUS at 21:09

## 2020-01-01 RX ADMIN — ERYTHROMYCIN 250 MG: 250 TABLET, FILM COATED ORAL at 13:08

## 2020-01-01 RX ADMIN — HEPARIN SODIUM 24 UNITS/KG/HR: 10000 INJECTION, SOLUTION INTRAVENOUS at 00:01

## 2020-01-01 RX ADMIN — CARVEDILOL 25 MG: 25 TABLET, FILM COATED ORAL at 09:06

## 2020-01-01 RX ADMIN — CLOTRIMAZOLE AND BETAMETHASONE DIPROPIONATE: 10; .5 CREAM TOPICAL at 09:16

## 2020-01-01 RX ADMIN — PROPOFOL 120 MCG/KG/MIN: 10 INJECTION, EMULSION INTRAVENOUS at 10:37

## 2020-01-01 RX ADMIN — CEFEPIME HYDROCHLORIDE 2 G: 2 INJECTION, POWDER, FOR SOLUTION INTRAVENOUS at 19:55

## 2020-01-01 RX ADMIN — ENOXAPARIN SODIUM 30 MG: 30 INJECTION SUBCUTANEOUS at 17:43

## 2020-01-01 RX ADMIN — TAZOBACTAM SODIUM AND PIPERACILLIN SODIUM 3.38 G: 375; 3 INJECTION, SOLUTION INTRAVENOUS at 23:44

## 2020-01-01 RX ADMIN — DOXYCYCLINE 100 MG: 100 CAPSULE ORAL at 21:33

## 2020-01-01 RX ADMIN — SUFENTANIL CITRATE 25 MCG: 50 INJECTION, SOLUTION EPIDURAL; INTRAVENOUS at 09:17

## 2020-01-01 RX ADMIN — MORPHINE SULFATE 2 MG: 2 INJECTION, SOLUTION INTRAMUSCULAR; INTRAVENOUS at 04:33

## 2020-01-01 RX ADMIN — CLOTRIMAZOLE AND BETAMETHASONE DIPROPIONATE: 10; .5 CREAM TOPICAL at 21:34

## 2020-01-01 RX ADMIN — CALCIUM GLUCONATE 1 G: 98 INJECTION, SOLUTION INTRAVENOUS at 08:09

## 2020-01-01 RX ADMIN — LEVETIRACETAM 1000 MG: 10 INJECTION INTRAVENOUS at 10:36

## 2020-01-01 RX ADMIN — PROPOFOL 50 MCG/KG/MIN: 10 INJECTION, EMULSION INTRAVENOUS at 08:08

## 2020-01-01 RX ADMIN — CLOTRIMAZOLE AND BETAMETHASONE DIPROPIONATE: 10; .5 CREAM TOPICAL at 20:44

## 2020-01-01 RX ADMIN — BUMETANIDE 1 MG/HR: 0.25 INJECTION INTRAMUSCULAR; INTRAVENOUS at 15:34

## 2020-01-01 RX ADMIN — ACETAMINOPHEN 650 MG: 650 SUPPOSITORY RECTAL at 05:37

## 2020-01-01 RX ADMIN — LEVETIRACETAM 1000 MG: 10 INJECTION INTRAVENOUS at 06:24

## 2020-01-01 RX ADMIN — MUPIROCIN 1 APPLICATION: 20 OINTMENT TOPICAL at 08:06

## 2020-01-01 RX ADMIN — SODIUM CHLORIDE, PRESERVATIVE FREE 10 ML: 5 INJECTION INTRAVENOUS at 09:16

## 2020-01-01 RX ADMIN — ONDANSETRON 4 MG: 2 INJECTION INTRAMUSCULAR; INTRAVENOUS at 05:26

## 2020-01-01 RX ADMIN — ALPRAZOLAM 0.25 MG: 0.25 TABLET ORAL at 01:34

## 2020-01-01 RX ADMIN — DOCUSATE SODIUM 50MG AND SENNOSIDES 8.6MG 2 TABLET: 8.6; 5 TABLET, FILM COATED ORAL at 20:18

## 2020-01-01 RX ADMIN — ERYTHROMYCIN 250 MG: 250 TABLET, FILM COATED ORAL at 19:37

## 2020-01-01 RX ADMIN — ASPIRIN 81 MG: 81 TABLET, COATED ORAL at 08:06

## 2020-01-01 RX ADMIN — LEVETIRACETAM 1000 MG: 10 INJECTION INTRAVENOUS at 10:31

## 2020-01-01 RX ADMIN — FOLIC ACID 1 MG: 5 INJECTION, SOLUTION INTRAMUSCULAR; INTRAVENOUS; SUBCUTANEOUS at 08:29

## 2020-01-01 RX ADMIN — PROMETHAZINE HYDROCHLORIDE 12.5 MG: 25 INJECTION INTRAMUSCULAR; INTRAVENOUS at 18:12

## 2020-01-01 RX ADMIN — MORPHINE SULFATE 4 MG: 4 INJECTION, SOLUTION INTRAMUSCULAR; INTRAVENOUS at 00:41

## 2020-01-01 RX ADMIN — MUPIROCIN 1 APPLICATION: 20 OINTMENT TOPICAL at 09:33

## 2020-01-01 RX ADMIN — MUPIROCIN 1 APPLICATION: 20 OINTMENT TOPICAL at 20:39

## 2020-01-01 RX ADMIN — PROTAMINE SULFATE 50 MG: 10 INJECTION, SOLUTION INTRAVENOUS at 10:49

## 2020-01-01 RX ADMIN — SODIUM CHLORIDE: 9 INJECTION, SOLUTION INTRAVENOUS at 07:18

## 2020-01-01 RX ADMIN — TAZOBACTAM SODIUM AND PIPERACILLIN SODIUM 3.38 G: 375; 3 INJECTION, SOLUTION INTRAVENOUS at 23:47

## 2020-01-01 RX ADMIN — OXYCODONE 10 MG: 5 TABLET ORAL at 07:19

## 2020-01-01 RX ADMIN — LORAZEPAM 1 MG: 2 INJECTION INTRAMUSCULAR; INTRAVENOUS at 16:50

## 2020-01-01 RX ADMIN — SODIUM CHLORIDE 1 MCG/KG/HR: 900 INJECTION, SOLUTION INTRAVENOUS at 06:59

## 2020-01-01 RX ADMIN — HYDRALAZINE HYDROCHLORIDE 10 MG: 10 TABLET, FILM COATED ORAL at 14:01

## 2020-01-01 RX ADMIN — FOLIC ACID 1 MG: 5 INJECTION, SOLUTION INTRAMUSCULAR; INTRAVENOUS; SUBCUTANEOUS at 11:23

## 2020-01-01 RX ADMIN — FOLIC ACID 1 MG: 5 INJECTION, SOLUTION INTRAMUSCULAR; INTRAVENOUS; SUBCUTANEOUS at 08:47

## 2020-01-01 RX ADMIN — TAZOBACTAM SODIUM AND PIPERACILLIN SODIUM 3.38 G: 375; 3 INJECTION, SOLUTION INTRAVENOUS at 10:59

## 2020-01-01 RX ADMIN — BUMETANIDE 4 MG: 0.25 INJECTION INTRAMUSCULAR; INTRAVENOUS at 10:33

## 2020-01-01 RX ADMIN — ERYTHROMYCIN 250 MG: 250 TABLET, FILM COATED ORAL at 13:21

## 2020-01-01 RX ADMIN — ASPIRIN 81 MG: 81 TABLET, COATED ORAL at 09:00

## 2020-01-01 RX ADMIN — ONDANSETRON 4 MG: 2 INJECTION INTRAMUSCULAR; INTRAVENOUS at 18:13

## 2020-01-01 RX ADMIN — PANTOPRAZOLE SODIUM 40 MG: 40 TABLET, DELAYED RELEASE ORAL at 05:55

## 2020-01-01 RX ADMIN — FAMOTIDINE 20 MG: 10 INJECTION, SOLUTION INTRAVENOUS at 05:55

## 2020-01-01 RX ADMIN — PROPOFOL 30 MCG/KG/MIN: 10 INJECTION, EMULSION INTRAVENOUS at 12:19

## 2020-01-01 RX ADMIN — BUMETANIDE 1 MG/HR: 0.25 INJECTION INTRAMUSCULAR; INTRAVENOUS at 05:40

## 2020-01-01 RX ADMIN — CARVEDILOL 25 MG: 25 TABLET, FILM COATED ORAL at 08:25

## 2020-01-01 RX ADMIN — HEPARIN SODIUM 4000 UNITS: 1000 INJECTION INTRAVENOUS; SUBCUTANEOUS at 12:48

## 2020-01-01 RX ADMIN — MUPIROCIN 1 APPLICATION: 20 OINTMENT TOPICAL at 09:07

## 2020-01-01 RX ADMIN — SODIUM CHLORIDE, PRESERVATIVE FREE 10 ML: 5 INJECTION INTRAVENOUS at 21:05

## 2020-01-01 RX ADMIN — MORPHINE SULFATE 4 MG: 2 INJECTION, SOLUTION INTRAMUSCULAR; INTRAVENOUS at 18:15

## 2020-01-01 RX ADMIN — INSULIN LISPRO 2 UNITS: 100 INJECTION, SOLUTION INTRAVENOUS; SUBCUTANEOUS at 20:40

## 2020-01-01 RX ADMIN — HYDRALAZINE HYDROCHLORIDE 10 MG: 10 TABLET, FILM COATED ORAL at 17:41

## 2020-01-01 RX ADMIN — MORPHINE SULFATE 2 MG: 2 INJECTION, SOLUTION INTRAMUSCULAR; INTRAVENOUS at 15:31

## 2020-01-01 RX ADMIN — MIDAZOLAM 1 MG: 1 INJECTION INTRAMUSCULAR; INTRAVENOUS at 12:17

## 2020-01-01 RX ADMIN — PANTOPRAZOLE SODIUM 40 MG: 40 TABLET, DELAYED RELEASE ORAL at 06:11

## 2020-01-01 RX ADMIN — CLOTRIMAZOLE AND BETAMETHASONE DIPROPIONATE 1 APPLICATION: 10; .5 CREAM TOPICAL at 08:26

## 2020-01-01 RX ADMIN — LEVETIRACETAM 1000 MG: 10 INJECTION INTRAVENOUS at 20:11

## 2020-01-01 RX ADMIN — SUFENTANIL CITRATE 25 MCG: 50 INJECTION, SOLUTION EPIDURAL; INTRAVENOUS at 08:35

## 2020-01-01 RX ADMIN — MILRINONE LACTATE IN DEXTROSE 0.25 MCG/KG/MIN: 200 INJECTION, SOLUTION INTRAVENOUS at 17:21

## 2020-01-01 RX ADMIN — SODIUM CHLORIDE, PRESERVATIVE FREE 10 ML: 5 INJECTION INTRAVENOUS at 12:47

## 2020-01-01 RX ADMIN — VANCOMYCIN HYDROCHLORIDE 1000 MG: 1 INJECTION, SOLUTION INTRAVENOUS at 00:38

## 2020-01-01 RX ADMIN — ASPIRIN 81 MG: 81 TABLET, COATED ORAL at 09:07

## 2020-01-01 RX ADMIN — MUPIROCIN 1 APPLICATION: 20 OINTMENT TOPICAL at 08:47

## 2020-01-01 RX ADMIN — MORPHINE SULFATE 4 MG: 4 INJECTION, SOLUTION INTRAMUSCULAR; INTRAVENOUS at 16:51

## 2020-01-01 RX ADMIN — DOXYCYCLINE 100 MG: 100 CAPSULE ORAL at 21:19

## 2020-01-01 RX ADMIN — ERYTHROMYCIN 250 MG: 250 TABLET, FILM COATED ORAL at 16:14

## 2020-01-01 RX ADMIN — HEPARIN SODIUM 22 UNITS/KG/HR: 10000 INJECTION, SOLUTION INTRAVENOUS at 18:03

## 2020-01-01 RX ADMIN — HYDRALAZINE HYDROCHLORIDE 10 MG: 10 TABLET, FILM COATED ORAL at 21:52

## 2020-01-01 RX ADMIN — DOXYCYCLINE 100 MG: 100 CAPSULE ORAL at 12:46

## 2020-01-01 RX ADMIN — HYDRALAZINE HYDROCHLORIDE 25 MG: 25 TABLET, FILM COATED ORAL at 07:00

## 2020-01-01 RX ADMIN — SUFENTANIL CITRATE 25 MCG: 50 INJECTION, SOLUTION EPIDURAL; INTRAVENOUS at 09:38

## 2020-01-01 RX ADMIN — CEFEPIME HYDROCHLORIDE 2 G: 2 INJECTION, POWDER, FOR SOLUTION INTRAVENOUS at 20:27

## 2020-01-01 RX ADMIN — LEVETIRACETAM 1000 MG: 10 INJECTION INTRAVENOUS at 23:47

## 2020-01-01 RX ADMIN — HEPARIN SODIUM 9.17 UNITS/KG/HR: 10000 INJECTION, SOLUTION INTRAVENOUS at 17:30

## 2020-01-01 RX ADMIN — DOXYCYCLINE 100 MG: 100 CAPSULE ORAL at 13:43

## 2020-01-01 RX ADMIN — SODIUM CHLORIDE, PRESERVATIVE FREE 10 ML: 5 INJECTION INTRAVENOUS at 21:34

## 2020-01-01 RX ADMIN — ACETAMINOPHEN 650 MG: 325 TABLET, FILM COATED ORAL at 07:23

## 2020-01-01 RX ADMIN — PANTOPRAZOLE SODIUM 40 MG: 40 TABLET, DELAYED RELEASE ORAL at 06:12

## 2020-01-01 RX ADMIN — SODIUM CHLORIDE: 9 INJECTION, SOLUTION INTRAVENOUS at 10:28

## 2020-01-01 RX ADMIN — MUPIROCIN 1 APPLICATION: 20 OINTMENT TOPICAL at 08:04

## 2020-01-01 RX ADMIN — DOPAMINE HYDROCHLORIDE IN DEXTROSE 3 MCG/KG/MIN: 1.6 INJECTION, SOLUTION INTRAVENOUS at 09:08

## 2020-01-01 RX ADMIN — PERFLUTREN 2 ML: 6.52 INJECTION, SUSPENSION INTRAVENOUS at 15:15

## 2020-01-01 RX ADMIN — SUFENTANIL CITRATE 25 MCG: 50 INJECTION, SOLUTION EPIDURAL; INTRAVENOUS at 08:32

## 2020-01-01 RX ADMIN — HYDRALAZINE HYDROCHLORIDE 25 MG: 25 TABLET, FILM COATED ORAL at 21:19

## 2020-01-01 RX ADMIN — PHYTONADIONE 10 MG: 10 INJECTION, EMULSION INTRAMUSCULAR; INTRAVENOUS; SUBCUTANEOUS at 16:32

## 2020-01-01 RX ADMIN — DOXYCYCLINE 100 MG: 100 CAPSULE ORAL at 20:40

## 2020-01-01 RX ADMIN — DOXYCYCLINE 100 MG: 100 CAPSULE ORAL at 09:16

## 2020-01-01 RX ADMIN — ALPRAZOLAM 0.25 MG: 0.25 TABLET ORAL at 17:43

## 2020-01-01 RX ADMIN — CHLORHEXIDINE GLUCONATE 15 ML: 1.2 RINSE ORAL at 06:03

## 2020-01-01 RX ADMIN — HEPARIN SODIUM 25 UNITS/KG/HR: 10000 INJECTION, SOLUTION INTRAVENOUS at 20:22

## 2020-01-01 RX ADMIN — PROMETHAZINE HYDROCHLORIDE 12.5 MG: 25 INJECTION INTRAMUSCULAR; INTRAVENOUS at 17:58

## 2020-01-01 RX ADMIN — CLOTRIMAZOLE AND BETAMETHASONE DIPROPIONATE: 10; .5 CREAM TOPICAL at 22:36

## 2020-01-01 RX ADMIN — CALCIUM GLUCONATE 1 G: 98 INJECTION, SOLUTION INTRAVENOUS at 10:07

## 2020-01-01 RX ADMIN — LEVETIRACETAM 500 MG: 5 INJECTION INTRAVENOUS at 11:06

## 2020-01-01 RX ADMIN — HEPARIN SODIUM 3800 UNITS: 1000 INJECTION INTRAVENOUS; SUBCUTANEOUS at 14:19

## 2020-01-01 RX ADMIN — ONDANSETRON HYDROCHLORIDE 4 MG: 2 SOLUTION INTRAMUSCULAR; INTRAVENOUS at 10:22

## 2020-01-01 RX ADMIN — ROCURONIUM BROMIDE 10 MG: 10 INJECTION INTRAVENOUS at 09:23

## 2020-01-01 RX ADMIN — WARFARIN 3 MG: 3 TABLET ORAL at 18:00

## 2020-01-01 RX ADMIN — ENOXAPARIN SODIUM 30 MG: 30 INJECTION SUBCUTANEOUS at 18:12

## 2020-01-01 RX ADMIN — WARFARIN 2 MG: 2 TABLET ORAL at 18:41

## 2020-01-01 RX ADMIN — CARVEDILOL 25 MG: 25 TABLET, FILM COATED ORAL at 18:39

## 2020-01-01 RX ADMIN — SODIUM CHLORIDE, PRESERVATIVE FREE 10 ML: 5 INJECTION INTRAVENOUS at 21:04

## 2020-01-01 RX ADMIN — THIAMINE HYDROCHLORIDE 100 MG: 100 INJECTION, SOLUTION INTRAMUSCULAR; INTRAVENOUS at 10:37

## 2020-01-01 RX ADMIN — PANTOPRAZOLE SODIUM 40 MG: 40 TABLET, DELAYED RELEASE ORAL at 07:39

## 2020-01-01 RX ADMIN — ISOSORBIDE MONONITRATE 60 MG: 60 TABLET ORAL at 10:30

## 2020-01-01 RX ADMIN — HEPARIN SODIUM 4000 UNITS: 1000 INJECTION INTRAVENOUS; SUBCUTANEOUS at 19:15

## 2020-01-01 RX ADMIN — ASPIRIN 81 MG: 81 TABLET, COATED ORAL at 10:35

## 2020-01-01 RX ADMIN — HEPARIN SODIUM 25 UNITS/KG/HR: 10000 INJECTION, SOLUTION INTRAVENOUS at 04:58

## 2020-01-01 RX ADMIN — DOCUSATE SODIUM 50MG AND SENNOSIDES 8.6MG 2 TABLET: 8.6; 5 TABLET, FILM COATED ORAL at 20:07

## 2020-01-01 RX ADMIN — SODIUM CHLORIDE, PRESERVATIVE FREE 10 ML: 5 INJECTION INTRAVENOUS at 09:32

## 2020-01-01 RX ADMIN — DOXYCYCLINE 100 MG: 100 CAPSULE ORAL at 20:44

## 2020-01-01 RX ADMIN — ASPIRIN 81 MG: 81 TABLET, COATED ORAL at 09:06

## 2020-01-01 RX ADMIN — HEPARIN SODIUM 30000 UNITS: 1000 INJECTION, SOLUTION INTRAVENOUS; SUBCUTANEOUS at 08:07

## 2020-01-01 RX ADMIN — ASPIRIN 81 MG: 81 TABLET, COATED ORAL at 09:33

## 2020-01-01 RX ADMIN — MORPHINE SULFATE 2 MG: 2 INJECTION, SOLUTION INTRAMUSCULAR; INTRAVENOUS at 08:48

## 2020-01-01 RX ADMIN — SODIUM CHLORIDE, PRESERVATIVE FREE 10 ML: 5 INJECTION INTRAVENOUS at 10:33

## 2020-01-01 RX ADMIN — CALCIUM GLUCONATE 2 G: 98 INJECTION, SOLUTION INTRAVENOUS at 09:08

## 2020-01-01 RX ADMIN — HYDROCODONE BITARTRATE AND ACETAMINOPHEN 2 TABLET: 5; 325 TABLET ORAL at 03:30

## 2020-01-01 RX ADMIN — PHENYLEPHRINE HYDROCHLORIDE 0.6 MCG/KG/MIN: 10 INJECTION INTRAVENOUS at 08:52

## 2020-01-01 RX ADMIN — ERYTHROMYCIN 250 MG: 250 TABLET, FILM COATED ORAL at 20:04

## 2020-01-01 RX ADMIN — ERYTHROMYCIN 250 MG: 250 TABLET, FILM COATED ORAL at 16:01

## 2020-01-01 RX ADMIN — SODIUM CHLORIDE, PRESERVATIVE FREE 10 ML: 5 INJECTION INTRAVENOUS at 21:18

## 2020-01-01 RX ADMIN — ENOXAPARIN SODIUM 30 MG: 30 INJECTION SUBCUTANEOUS at 18:07

## 2020-01-01 RX ADMIN — AMIODARONE HYDROCHLORIDE 1 MG/MIN: 1.8 INJECTION, SOLUTION INTRAVENOUS at 06:15

## 2020-01-01 RX ADMIN — VANCOMYCIN HYDROCHLORIDE 2250 MG: 10 INJECTION, POWDER, LYOPHILIZED, FOR SOLUTION INTRAVENOUS at 22:34

## 2020-01-01 RX ADMIN — TAZOBACTAM SODIUM AND PIPERACILLIN SODIUM 3.38 G: 375; 3 INJECTION, SOLUTION INTRAVENOUS at 08:46

## 2020-01-01 RX ADMIN — PANTOPRAZOLE SODIUM 40 MG: 40 TABLET, DELAYED RELEASE ORAL at 06:27

## 2020-01-01 RX ADMIN — ONDANSETRON 4 MG: 2 INJECTION INTRAMUSCULAR; INTRAVENOUS at 21:00

## 2020-01-01 RX ADMIN — DOXYCYCLINE 100 MG: 100 CAPSULE ORAL at 21:00

## 2020-01-01 RX ADMIN — ISOSORBIDE MONONITRATE 60 MG: 60 TABLET ORAL at 09:11

## 2020-01-01 RX ADMIN — DOXYCYCLINE 100 MG: 100 CAPSULE ORAL at 09:06

## 2020-01-01 RX ADMIN — CEFAZOLIN 3 G: 1 INJECTION, POWDER, FOR SOLUTION INTRAMUSCULAR; INTRAVENOUS; PARENTERAL at 07:40

## 2020-01-01 RX ADMIN — LORAZEPAM 2 MG: 2 INJECTION INTRAMUSCULAR; INTRAVENOUS at 20:22

## 2020-01-01 RX ADMIN — MILRINONE LACTATE 5 MG: 1 INJECTION, SOLUTION INTRAVENOUS at 08:36

## 2020-01-01 RX ADMIN — ALBUMIN HUMAN 250 ML: 0.05 INJECTION, SOLUTION INTRAVENOUS at 17:10

## 2020-01-01 RX ADMIN — ERYTHROMYCIN 250 MG: 250 TABLET, FILM COATED ORAL at 11:41

## 2020-01-01 RX ADMIN — PROCHLORPERAZINE EDISYLATE 10 MG: 5 INJECTION INTRAMUSCULAR; INTRAVENOUS at 06:55

## 2020-01-01 RX ADMIN — TRAMADOL HYDROCHLORIDE 50 MG: 50 TABLET, FILM COATED ORAL at 17:43

## 2020-01-01 RX ADMIN — SODIUM CHLORIDE, PRESERVATIVE FREE 10 ML: 5 INJECTION INTRAVENOUS at 09:31

## 2020-01-01 RX ADMIN — MORPHINE SULFATE 2 MG: 2 INJECTION, SOLUTION INTRAMUSCULAR; INTRAVENOUS at 13:24

## 2020-01-01 RX ADMIN — THIAMINE HYDROCHLORIDE 100 MG: 100 INJECTION, SOLUTION INTRAMUSCULAR; INTRAVENOUS at 08:25

## 2020-01-01 RX ADMIN — ERYTHROMYCIN 250 MG: 250 TABLET, FILM COATED ORAL at 20:39

## 2020-01-01 RX ADMIN — BUMETANIDE 4 MG: 0.25 INJECTION INTRAMUSCULAR; INTRAVENOUS at 22:36

## 2020-01-01 RX ADMIN — CHLORHEXIDINE GLUCONATE 15 ML: 1.2 RINSE ORAL at 18:07

## 2020-01-01 RX ADMIN — FENTANYL CITRATE 25 MCG: 50 INJECTION INTRAMUSCULAR; INTRAVENOUS at 12:17

## 2020-01-01 RX ADMIN — MUPIROCIN 1 APPLICATION: 20 OINTMENT TOPICAL at 08:16

## 2020-01-01 RX ADMIN — CHLORHEXIDINE GLUCONATE 15 ML: 1.2 RINSE ORAL at 21:19

## 2020-01-01 RX ADMIN — GLYCOPYRROLATE 0.4 MG: 0.2 INJECTION, SOLUTION INTRAMUSCULAR; INTRAVITREAL at 15:31

## 2020-01-01 RX ADMIN — HEPARIN SODIUM 4000 UNITS: 1000 INJECTION, SOLUTION INTRAVENOUS; SUBCUTANEOUS at 18:18

## 2020-01-01 RX ADMIN — CARVEDILOL 25 MG: 25 TABLET, FILM COATED ORAL at 17:41

## 2020-01-01 RX ADMIN — FAMOTIDINE 20 MG: 10 INJECTION, SOLUTION INTRAVENOUS at 18:21

## 2020-01-01 RX ADMIN — SODIUM CHLORIDE 100 ML/HR: 9 INJECTION, SOLUTION INTRAVENOUS at 12:10

## 2020-01-01 RX ADMIN — NOREPINEPHRINE BITARTRATE 0.03 MCG/KG/MIN: 1 INJECTION, SOLUTION, CONCENTRATE INTRAVENOUS at 09:24

## 2020-01-01 RX ADMIN — LEVETIRACETAM 1000 MG: 10 INJECTION INTRAVENOUS at 10:25

## 2020-01-01 RX ADMIN — FUROSEMIDE 80 MG: 10 INJECTION, SOLUTION INTRAMUSCULAR; INTRAVENOUS at 09:38

## 2020-01-01 RX ADMIN — ASPIRIN 81 MG: 81 TABLET, COATED ORAL at 09:16

## 2020-01-01 RX ADMIN — HEPARIN SODIUM 22 UNITS/KG/HR: 10000 INJECTION, SOLUTION INTRAVENOUS at 02:15

## 2020-01-01 RX ADMIN — ONDANSETRON 4 MG: 2 INJECTION INTRAMUSCULAR; INTRAVENOUS at 20:12

## 2020-01-01 RX ADMIN — SODIUM CHLORIDE, PRESERVATIVE FREE 10 ML: 5 INJECTION INTRAVENOUS at 09:08

## 2020-01-01 RX ADMIN — BENZOCAINE, BUTAMBEN, AND TETRACAINE HYDROCHLORIDE 1 SPRAY: .028; .004; .004 AEROSOL, SPRAY TOPICAL at 12:11

## 2020-01-01 RX ADMIN — LORAZEPAM 1 MG: 2 INJECTION INTRAMUSCULAR; INTRAVENOUS at 13:24

## 2020-01-01 RX ADMIN — ALBUMIN HUMAN 250 ML: 0.05 INJECTION, SOLUTION INTRAVENOUS at 13:17

## 2020-01-01 RX ADMIN — ASPIRIN 81 MG: 81 TABLET, COATED ORAL at 08:47

## 2020-01-01 RX ADMIN — CARVEDILOL 25 MG: 25 TABLET, FILM COATED ORAL at 17:21

## 2020-01-01 RX ADMIN — LEVETIRACETAM 1000 MG: 10 INJECTION INTRAVENOUS at 21:09

## 2020-01-01 RX ADMIN — VANCOMYCIN HYDROCHLORIDE 2000 MG: 10 INJECTION, POWDER, LYOPHILIZED, FOR SOLUTION INTRAVENOUS at 16:28

## 2020-01-01 RX ADMIN — HEPARIN SODIUM 3800 UNITS: 1000 INJECTION INTRAVENOUS; SUBCUTANEOUS at 13:52

## 2020-01-01 RX ADMIN — SODIUM CHLORIDE, PRESERVATIVE FREE 10 ML: 5 INJECTION INTRAVENOUS at 21:00

## 2020-01-01 RX ADMIN — DOCUSATE SODIUM 50MG AND SENNOSIDES 8.6MG 2 TABLET: 8.6; 5 TABLET, FILM COATED ORAL at 21:04

## 2020-01-01 RX ADMIN — ERYTHROMYCIN 250 MG: 250 TABLET, FILM COATED ORAL at 20:07

## 2020-03-12 PROBLEM — N04.9 ANASARCA ASSOCIATED WITH DISORDER OF KIDNEY: Status: ACTIVE | Noted: 2020-01-01

## 2020-03-12 PROBLEM — M79.89 REDNESS AND SWELLING OF LOWER LEG: Status: ACTIVE | Noted: 2020-01-01

## 2020-03-12 PROBLEM — E11.9 DIABETES MELLITUS (HCC): Status: ACTIVE | Noted: 2020-01-01

## 2020-03-12 PROBLEM — R23.8 REDNESS AND SWELLING OF LOWER LEG: Status: ACTIVE | Noted: 2020-01-01

## 2020-03-12 NOTE — PLAN OF CARE
Pt admited from the er for edema and soa. Demarco admitting and Ewa consulted pt daily weight and srtict I/O

## 2020-03-12 NOTE — CONSULTS
Referring Provider: Ernestine  Reason for Consultation: DESHAWN on CKD IV    Subjective     Chief complaint   Chief Complaint   Patient presents with   • Edema       History of present illness:  48 yo wm with history of HTN, CKD IV with creatinine 3.0 10/19,  Severe MR, noncompliance with follow up and meds.  Presented with generalized edema and SOA.  Last seen in our office by Dr. Tao 11/18.  Creatinine 2-2.2 at that time .  Reports increase in peripheral edema over past month, SOA , abdominal girth all gradual, but much worse this week.  Also noted Left leg swelling more than right and getting very red and painful . No fever, but chills.  Scrotum swelling.   Making much less urine. No hematuria.  SOA with exertion.  Weight gain.  Not eating well.  Taking no meds for at least a month .   Bowels moving.   No chest pain.  Occasional cough last week .    Past Medical History:   Diagnosis Date   • DESHAWN (acute kidney injury) (CMS/HCC)    • CHF (congestive heart failure) (CMS/HCC)    • Cigarette smoker    • Diabetes mellitus (CMS/HCC)    • Foot callus    • Hypertension    • Mitral regurgitation     severe eccentric per echo 7/2018   • Pleural effusion 06/30/2018    Bilateral, small per x-ray   • Stage III chronic kidney disease (CMS/HCC)      Past Surgical History:   Procedure Laterality Date   • HIP SURGERY       Family History   Problem Relation Age of Onset   • Hypertension Mother    • Atrial fibrillation Father    • Hypertension Father        Social History     Tobacco Use   • Smoking status: Current Every Day Smoker     Packs/day: 0.50     Types: Cigarettes   • Smokeless tobacco: Current User     Types: Chew   • Tobacco comment: caffeine use   Substance Use Topics   • Alcohol use: No   • Drug use: No       (Not in a hospital admission)  Allergies:  Patient has no known allergies.    Review of Systems  See HPI    Objective     Vital Signs  Temp:  [97.4 °F (36.3 °C)] 97.4 °F (36.3 °C)  Heart Rate:  [108-136]  "108  Resp:  [18-20] 20  BP: (166-204)/(108-120) 166/120    Flowsheet Rows      First Filed Value   Admission Height  180.3 cm (71\") Documented at 03/12/2020 1350   Admission Weight  136 kg (300 lb) Documented at 03/12/2020 1350           No intake/output data recorded.  No intake/output data recorded.  No intake or output data in the 24 hours ending 03/12/20 1642    Physical Exam:  Very dishevelled.  Looks older than age.  Lying on stretcher.  Oral mucosa dry  Neck JVD at 45 degrees.  No thyromegaly  Heart RRR, tachy, no s3 .  4/6 systolic murmur.  Lungs bibasilar crackles. Wheezing audible when he stood up at bedside  Abd +bs, distended. Body wall edema. Nontender  Ext 2+ lower ext Right, LLE 3+. Erythema to just below knee, warmth, mild tenderness of shin.    scrotal and penis edema.  Skin LLE as described above. Venous stasis both lower ext.  Hyperkeratotic skin right plantar foot at 1st tarsal.  Multiple areas of scratches on torso.    Neuro awake, alert. Speech fluent.  Moves all 4 ext.     Results Review:  Results for orders placed or performed during the hospital encounter of 03/12/20   Comprehensive Metabolic Panel   Result Value Ref Range    Glucose 115 (H) 65 - 99 mg/dL    BUN 70 (H) 6 - 20 mg/dL    Creatinine 6.95 (H) 0.76 - 1.27 mg/dL    Sodium 136 136 - 145 mmol/L    Potassium 5.3 (H) 3.5 - 5.2 mmol/L    Chloride 104 98 - 107 mmol/L    CO2 15.9 (L) 22.0 - 29.0 mmol/L    Calcium 8.3 (L) 8.6 - 10.5 mg/dL    Total Protein 6.4 6.0 - 8.5 g/dL    Albumin 2.90 (L) 3.50 - 5.20 g/dL    ALT (SGPT) 74 (H) 1 - 41 U/L    AST (SGOT) 31 1 - 40 U/L    Alkaline Phosphatase 86 39 - 117 U/L    Total Bilirubin 0.4 0.2 - 1.2 mg/dL    eGFR Non African Amer 9 (L) >60 mL/min/1.73    eGFR  African Amer      Globulin 3.5 gm/dL    A/G Ratio 0.8 g/dL    BUN/Creatinine Ratio 10.1 7.0 - 25.0    Anion Gap 16.1 (H) 5.0 - 15.0 mmol/L   BNP   Result Value Ref Range    proBNP >70,000.0 (H) 5.0 - 450.0 pg/mL   Troponin   Result Value " Ref Range    Troponin T 0.211 (C) 0.000 - 0.030 ng/mL   CBC Auto Differential   Result Value Ref Range    WBC 5.93 3.40 - 10.80 10*3/mm3    RBC 5.52 4.14 - 5.80 10*6/mm3    Hemoglobin 13.1 13.0 - 17.7 g/dL    Hematocrit 42.7 37.5 - 51.0 %    MCV 77.4 (L) 79.0 - 97.0 fL    MCH 23.7 (L) 26.6 - 33.0 pg    MCHC 30.7 (L) 31.5 - 35.7 g/dL    RDW 16.8 (H) 12.3 - 15.4 %    RDW-SD 46.3 37.0 - 54.0 fl    MPV 10.0 6.0 - 12.0 fL    Platelets 222 140 - 450 10*3/mm3    Neutrophil % 79.3 (H) 42.7 - 76.0 %    Lymphocyte % 10.3 (L) 19.6 - 45.3 %    Monocyte % 9.1 5.0 - 12.0 %    Eosinophil % 0.3 0.3 - 6.2 %    Basophil % 0.5 0.0 - 1.5 %    Immature Grans % 0.5 0.0 - 0.5 %    Neutrophils, Absolute 4.70 1.70 - 7.00 10*3/mm3    Lymphocytes, Absolute 0.61 (L) 0.70 - 3.10 10*3/mm3    Monocytes, Absolute 0.54 0.10 - 0.90 10*3/mm3    Eosinophils, Absolute 0.02 0.00 - 0.40 10*3/mm3    Basophils, Absolute 0.03 0.00 - 0.20 10*3/mm3    Immature Grans, Absolute 0.03 0.00 - 0.05 10*3/mm3    nRBC 0.0 0.0 - 0.2 /100 WBC   Light Blue Top   Result Value Ref Range    Extra Tube hold for add-on    Green Top (Gel)   Result Value Ref Range    Extra Tube Hold for add-ons.    Lavender Top   Result Value Ref Range    Extra Tube hold for add-on    Gold Top - SST   Result Value Ref Range    Extra Tube Hold for add-ons.      Imaging Results (Last 72 Hours)     Procedure Component Value Units Date/Time    XR Chest 2 View [[id number]] Collected:  03/12/20 1441     Updated:  03/12/20 1446    Narrative:       XR CHEST 2 VW-     HISTORY: Male who is 49 years-old,  short of breath     TECHNIQUE: Frontal and lateral views of the chest     COMPARISON: 10/17/2019     FINDINGS: Heart size is borderline. Pulmonary vasculature is  unremarkable. Small bilateral basilar atelectasis/infiltrate and pleural  effusions. A similar appearance was present on the prior exam. No  pneumothorax. No acute osseous process.       Impression:       Small bilateral basilar  atelectasis/infiltrate and pleural  effusions, follow-up suggested. Borderline heart size.     This report was finalized on 3/12/2020 2:43 PM by Dr. Michael Platt M.D.                 furosemide 80 mg Intravenous Q6H          Assessment/Plan   1. Salma on CKD IV.  Hypertensive nephropathy.  Noncompliant with medical care. Suspect that he has progression to ESRD.  Significant volume excess, anasarca on exam.  Will try to diurese, but I have explained the severity of his renal disease to him.   I think that he will likely need dialysis this admission.  IV lasix,  Check hepatitis studies,  Vein mapping.  2. Cellulitis LLE.  DW Dr. Pal admitting him for LHA.  Starting Vanc.  Venous doppler ordered .  3. Severe MR, last echo 7/2018. Has loud murmur.  Repeat echo. Suspect cardiomyopathy.  Looks predominately like right heart failure.  Dr. Foss had referred him for sleep study for likely CONCEPCION, but he did not follow up .  4. HTN. Not controlled.  Diurese, start coreg 25 mg bid.  For now, avoid arterial dilators with his severe edema.  5. Medical noncompliance .    Plan:  1. IV lasix 80 mg every 6 hours  2. Echo  3. Doppler LLE  4. DW Dr. Pal regarding antibiotic and Vancomycin .  5. Vein mapping this admission.  6. Coreg 25 mg bid  7. Hepatitis B surface Ag in case dialysis needed.       Laurita Mcneil MD  03/12/20  16:42

## 2020-03-12 NOTE — ED PROVIDER NOTES
EMERGENCY DEPARTMENT ENCOUNTER    CHIEF COMPLAINT  Chief Complaint: Anasarca   History given by: Patient   History limited by: Nothing   Room Number: 10/10  PMD: Provider, No Known      HPI:  Pt is a 49 y.o. male who presents complaining of worsening anasarca that began 3-4 weeks ago. Pt is also c/o nausea, vomiting and SOA on exertion. Pt denies fever, chills, rash, sore throat and ear pain. Pt reports swelling is worst in abdomen, BLE and genitals. He reports being followed by  (nephrology) for CKD but is not on dialysis. He reports making a normal amount of urine over the past few weeks and affirms it has been clear not cloudy. Pt reports a hx of HTN, DM, CHF and CKD. He reports running out of all of his medications one month ago. He does not have a PCP, but normally sees cardiology and nephrology who refill his medications. Pt reports smoking cessation 5 weeks ago and denies alcohol use.        Duration:  3-4 weeks  Onset: Gradual   Timing: Constant   Location: Diffuse   Radiation: None  Quality: Swelling   Intensity/Severity: Moderate  Progression: Worsening   Associated Symptoms: Nausea, vomiting, SOA  Aggravating Factors: None  Alleviating Factors: None  Previous Episodes: Pt has a hx of CKD  Treatment before arrival: None    PAST MEDICAL HISTORY  Active Ambulatory Problems     Diagnosis Date Noted   • Mitral regurgitation    • Cigarette smoker    • Essential hypertension 07/17/2018   • Stage III chronic kidney disease (CMS/HCC)    • Foot callus 07/17/2018     Resolved Ambulatory Problems     Diagnosis Date Noted   • Acute congestive heart failure (CMS/HCC) 06/30/2018     Past Medical History:   Diagnosis Date   • DESHAWN (acute kidney injury) (CMS/HCC)    • CHF (congestive heart failure) (CMS/HCC)    • Diabetes mellitus (CMS/HCC)    • Hypertension    • Pleural effusion 06/30/2018       PAST SURGICAL HISTORY  Past Surgical History:   Procedure Laterality Date   • HIP SURGERY         FAMILY  HISTORY  Family History   Problem Relation Age of Onset   • Hypertension Mother    • Atrial fibrillation Father    • Hypertension Father        SOCIAL HISTORY  Social History     Socioeconomic History   • Marital status: Single     Spouse name: Not on file   • Number of children: Not on file   • Years of education: Not on file   • Highest education level: Not on file   Tobacco Use   • Smoking status: Current Every Day Smoker     Packs/day: 0.50     Types: Cigarettes   • Smokeless tobacco: Current User     Types: Chew   • Tobacco comment: caffeine use   Substance and Sexual Activity   • Alcohol use: No   • Drug use: No   • Sexual activity: Defer       ALLERGIES  Patient has no known allergies.    REVIEW OF SYSTEMS  Review of Systems   Constitutional: Negative for activity change, appetite change, chills and fever.   HENT: Negative for congestion, ear pain and sore throat.    Eyes: Negative.    Respiratory: Positive for shortness of breath. Negative for cough.    Cardiovascular: Negative for chest pain and leg swelling.   Gastrointestinal: Positive for nausea and vomiting. Negative for abdominal pain and diarrhea.   Endocrine: Negative.    Genitourinary: Negative for decreased urine volume and dysuria.   Musculoskeletal: Negative for neck pain.        Diffuse swelling   Skin: Negative for rash and wound.   Allergic/Immunologic: Negative.    Neurological: Negative for weakness, numbness and headaches.   Hematological: Negative.    Psychiatric/Behavioral: Negative.    All other systems reviewed and are negative.      PHYSICAL EXAM  ED Triage Vitals   Temp Heart Rate Resp BP SpO2   03/12/20 1348 03/12/20 1348 03/12/20 1348 03/12/20 1350 03/12/20 1348   97.4 °F (36.3 °C) (!) 136 18 (!) 204/108 91 %      Temp src Heart Rate Source Patient Position BP Location FiO2 (%)   03/12/20 1348 03/12/20 1412 -- -- --   Tympanic Monitor          Physical Exam   Constitutional: He is oriented to person, place, and time. No distress.    HENT:   Head: Normocephalic and atraumatic.   Mouth/Throat: Oropharynx is clear and moist.   Eyes: Pupils are equal, round, and reactive to light. EOM are normal.   Neck: Normal range of motion. Neck supple. JVD (3 cm) present.   Cardiovascular: Regular rhythm. Tachycardia present.   Murmur heard.   Systolic (L sternal border) murmur is present with a grade of 2/6.  Pulmonary/Chest: Tachypnea noted. He is in respiratory distress (mild). He has rales (bibasilar).   Abdominal: Soft. Bowel sounds are normal. He exhibits no distension. There is no tenderness. There is no rebound and no guarding.   Pitting edema in lower abdominal wall   Genitourinary:   Genitourinary Comments: Scrotal edema   Musculoskeletal: Normal range of motion.        Right lower leg: He exhibits edema.        Left lower leg: He exhibits edema.   3+ pedal edema to BLE   Neurological: He is alert and oriented to person, place, and time. He has normal sensation and normal strength.   Skin: Skin is warm and dry.   Psychiatric: Mood and affect normal.   Nursing note and vitals reviewed.      LAB RESULTS  Lab Results (last 24 hours)     Procedure Component Value Units Date/Time    CBC & Differential [[id number]] Collected:  03/12/20 1359    Specimen:  Blood Updated:  03/12/20 3478    Narrative:       The following orders were created for panel order CBC & Differential.  Procedure                               Abnormality         Status                     ---------                               -----------         ------                     CBC Auto Differential[[id number]]        Abnormal            Final result                 Please view results for these tests on the individual orders.    Comprehensive Metabolic Panel [[id number]]  (Abnormal) Collected:  03/12/20 1359    Specimen:  Blood Updated:  03/12/20 1431     Glucose 115 mg/dL      BUN 70 mg/dL      Creatinine 6.95 mg/dL      Sodium 136 mmol/L      Potassium 5.3 mmol/L      Chloride 104 mmol/L       CO2 15.9 mmol/L      Calcium 8.3 mg/dL      Total Protein 6.4 g/dL      Albumin 2.90 g/dL      ALT (SGPT) 74 U/L      AST (SGOT) 31 U/L      Alkaline Phosphatase 86 U/L      Total Bilirubin 0.4 mg/dL      eGFR Non African Amer 9 mL/min/1.73      Comment: <15 Indicative of kidney failure.        eGFR   Amer --     Comment: <15 Indicative of kidney failure.        Globulin 3.5 gm/dL      A/G Ratio 0.8 g/dL      BUN/Creatinine Ratio 10.1     Anion Gap 16.1 mmol/L     Narrative:       GFR Normal >60  Chronic Kidney Disease <60  Kidney Failure <15      BNP [[id number]]  (Abnormal) Collected:  03/12/20 1359    Specimen:  Blood Updated:  03/12/20 1453     proBNP >70,000.0 pg/mL     Narrative:       Among patients with dyspnea, NT-proBNP is highly sensitive for the detection of acute congestive heart failure. In addition NT-proBNP of <300 pg/ml effectively rules out acute congestive heart failure with 99% negative predictive value.    Results may be falsely decreased if patient taking Biotin.      Troponin [[id number]]  (Abnormal) Collected:  03/12/20 1359    Specimen:  Blood Updated:  03/12/20 1436     Troponin T 0.211 ng/mL     Narrative:       Troponin T Reference Range:  <= 0.03 ng/mL-   Negative for AMI  >0.03 ng/mL-     Abnormal for myocardial necrosis.  Clinicians would have to utilize clinical acumen, EKG, Troponin and serial changes to determine if it is an Acute Myocardial Infarction or myocardial injury due to an underlying chronic condition.       Results may be falsely decreased if patient taking Biotin.      CBC Auto Differential [[id number]]  (Abnormal) Collected:  03/12/20 1359    Specimen:  Blood Updated:  03/12/20 1408     WBC 5.93 10*3/mm3      RBC 5.52 10*6/mm3      Hemoglobin 13.1 g/dL      Hematocrit 42.7 %      MCV 77.4 fL      MCH 23.7 pg      MCHC 30.7 g/dL      RDW 16.8 %      RDW-SD 46.3 fl      MPV 10.0 fL      Platelets 222 10*3/mm3      Neutrophil % 79.3 %      Lymphocyte % 10.3 %       Monocyte % 9.1 %      Eosinophil % 0.3 %      Basophil % 0.5 %      Immature Grans % 0.5 %      Neutrophils, Absolute 4.70 10*3/mm3      Lymphocytes, Absolute 0.61 10*3/mm3      Monocytes, Absolute 0.54 10*3/mm3      Eosinophils, Absolute 0.02 10*3/mm3      Basophils, Absolute 0.03 10*3/mm3      Immature Grans, Absolute 0.03 10*3/mm3      nRBC 0.0 /100 WBC     Hepatitis B Surface Antigen [[id number]] Collected:  03/12/20 1359    Specimen:  Blood Updated:  03/12/20 1549    Narrative:       The following orders were created for panel order Hepatitis B Surface Antigen.  Procedure                               Abnormality         Status                     ---------                               -----------         ------                     Hepatitis B Surface Antigen[[id number]]                      In process                 Hep B Confirmation Tube[[id number]]                          In process                   Please view results for these tests on the individual orders.    Hepatitis B Surface Antigen [[id number]] Collected:  03/12/20 1359    Specimen:  Blood Updated:  03/12/20 1549    Hep B Confirmation Tube [[id number]] Collected:  03/12/20 1359    Specimen:  Blood Updated:  03/12/20 1549          I ordered the above labs and reviewed the results    RADIOLOGY  XR Chest 2 View   Final Result   Small bilateral basilar atelectasis/infiltrate and pleural   effusions, follow-up suggested. Borderline heart size.       This report was finalized on 3/12/2020 2:43 PM by Dr. Michael Platt M.D.               I ordered the above noted radiological studies. Interpreted by radiologist. Reviewed by me in PACS.       PROCEDURES  Procedures     EKG          EKG time: 1405   Rhythm/Rate: Sinus tachycardia, rate 117   P waves and FL: Nml  QRS, axis: LAD, Q waves anteriorly   ST and T waves: Nml     Interpreted Contemporaneously by me, independently viewed  Unchanged compared to prior 10/17/19        PROGRESS AND CONSULTS        1545: Spoke with  (nephrology) who agreed to consult.     1600:  (nephrology) in ED to evaluate pt     1617: Spoke with  ([facility]) who agreed to admit     MEDICAL DECISION MAKING  Results were reviewed/discussed with the patient and they were also made aware of online access. Pt also made aware that some labs, such as cultures, will not be resulted during ER visit and follow up with PMD is necessary.     MDM  Number of Diagnoses or Management Options  Acute on chronic systolic congestive heart failure (CMS/HCC):   DESHAWN (acute kidney injury) (CMS/HCC):   Anasarca associated with disorder of kidney:      Amount and/or Complexity of Data Reviewed  Clinical lab tests: ordered and reviewed (proBNP >70,000, troponin 0.211, BUN 70, creatinine 6.95)  Tests in the radiology section of CPT®: ordered and reviewed (XR chest showed Small bilateral basilar atelectasis/infiltrate and pleural effusions)  Tests in the medicine section of CPT®: ordered and reviewed (EKG- see procedure note for interpretation)  Decide to obtain previous medical records or to obtain history from someone other than the patient: yes (Epic)  Review and summarize past medical records: yes (Pt was seen in the ED on 10/17/19 with c/o SOA. He was found to be volume overloaded and in kidney failure and was offered admission, but refused and left AMA.)  Discuss the patient with other providers: yes ( (nephrology)   ([facility]))    Patient Progress  Patient progress: stable         DIAGNOSIS  Final diagnoses:   Anasarca associated with disorder of kidney   DESHAWN (acute kidney injury) (CMS/HCC)   Acute on chronic systolic congestive heart failure (CMS/HCC)       DISPOSITION  ADMISSION    Discussed treatment plan and reason for admission with pt/family and admitting physician.  Pt/family voiced understanding of the plan for admission for further testing/treatment as needed.         Latest Documented Vital Signs:  As of 17:09  BP-  (!) 162/109 HR- 106 Temp- 98.8 °F (37.1 °C) (Tympanic) O2 sat- 97%    --  Documentation assistance provided by alessandro Mitchell for . Information recorded by the alessandro was done at my direction and has been verified and validated by me.       Regina Mitchell  03/12/20 [id number]       Jian Sinha MD  03/12/20 6960

## 2020-03-12 NOTE — PROGRESS NOTES
"Pharmacokinetic Consult - Vancomycin Dosing (Initial Note)    Pharmacy has been consulted to dose vancomycin for Nico King for an indication of LLE cellulitis per Dr. Pal's request. Goal trough: 15-20 mcg/mL.    Duration of Therapy: 5 days    Other Antimicrobials: None    Relevant clinical data and objective history reviewed:  49 y.o. male 180.3 cm (71\") (!) 145 kg (319 lb 2 oz)    Vitals:    03/12/20 1607 03/12/20 1621 03/12/20 1650 03/12/20 1705   BP: (!) 177/114 (!) 166/120 (!) 162/109    Pulse: 116 108 106    Resp: 20  19    Temp:    98.8 °F (37.1 °C)   TempSrc:    Tympanic   SpO2: 93% 96% 97%      Creatinine   Date Value Ref Range Status   03/12/2020 6.95 (H) 0.76 - 1.27 mg/dL Final     BUN   Date Value Ref Range Status   03/12/2020 70 (H) 6 - 20 mg/dL Final     Estimated Creatinine Clearance: 18.9 mL/min (A) (by C-G formula based on SCr of 6.95 mg/dL (H)).-not on dialysis yet    Lab Results   Component Value Date    WBC 5.93 03/12/2020     Temp Readings from Last 3 Encounters:   03/12/20 98.8 °F (37.1 °C) (Tympanic)     Assessment/Plan    1. Will give a vancomycin loading dose of 2250 mg (~15 mg/kg) IV once now, then schedule a random 12-hr level tomorrow morning to determine further dosing (pulse dosing) due to DESHAWN.    2. Will monitor serum creatinine every 24 hours for the first 3 days then at least every 48 hours per dosing recommendations. SCr was 6.95 this afternoon, which is very elevated from normal baseline renal function.    3. Pharmacy will continue to follow daily while on vancomycin and adjust as needed.     Thank you for allowing me to participate in your patient's care,  Belinda Garcia, Pharm.D., [facility]  Clinical Staff Pharmacist  "

## 2020-03-12 NOTE — ED TRIAGE NOTES
"Pt c/o generalized swelling \"in my whole body\" for one month. Pt reports he has CHF and has been out of his diuretics for one month also.   "

## 2020-03-13 NOTE — H&P
Internal medicine history and physical  INTERNAL MEDICINE   [facility]       Patient Identification:  Name: Nico King  Age: 49 y.o.  Sex: male  :  1970  MRN: [id number]                   Primary Care Physician: Provider, No Known                                   Chief Complaint: Progressive swelling of the leg and increasing abdominal distention for the last 3 weeks.    History of Present Illness:   Patient is a nonchalant chronically ill male who has history of chronic kidney disease, diabetes, lower extremity edema high blood pressure was in his usual state of his health until 3 4 weeks ago when he started noticing that he was gaining weight and fluid was building up everywhere.  He has some issues about getting his medicine refilled or some other logistical problem that prohibited him from getting care.  He has associated nausea shortness of breath and dyspnea on exertion.  About couple weeks ago he started noticing some redness of his left foot and left leg but did not have any fever chills or decrease in appetite.  He states that his swelling started to go down but a week ago he started to get worse again.  Because of this worsening swelling and not improving he decided to drive himself to the emergency room where he was evaluated and is being admitted for further care.  Patient has been seen by nephrology service and has been given 80 mg of Lasix IV.  Patient is currently feeling better.    Past Medical History:  Past Medical History:   Diagnosis Date   • DESHAWN (acute kidney injury) (CMS/HCC)    • CHF (congestive heart failure) (CMS/HCC)    • Cigarette smoker    • Diabetes mellitus (CMS/HCC)    • Foot callus    • Hypertension    • Mitral regurgitation     severe eccentric per echo 2018   • Pleural effusion 2018    Bilateral, small per x-ray   • Stage III chronic kidney disease (CMS/HCC)      Past Surgical History:  Past Surgical History:   Procedure Laterality Date    • HIP SURGERY        Home Meds:  Medications Prior to Admission   Medication Sig Dispense Refill Last Dose   • aspirin 81 MG EC tablet Take 81 mg by mouth Daily.   3/12/2020 at 0900   • amLODIPine (NORVASC) 5 MG tablet Take 1 tablet by mouth Daily. 90 tablet 0 More than a month at Unknown time   • carvedilol (COREG) 25 MG tablet Take 1 tablet by mouth 2 (Two) Times a Day. 180 tablet 0 More than a month at Unknown time   • furosemide (LASIX) 40 MG tablet Take 1 tablet by mouth 2 (Two) Times a Day. 180 tablet 0 More than a month at Unknown time   • hydrALAZINE (APRESOLINE) 100 MG tablet Take 0.5 tablets by mouth 3 (Three) Times a Day. 90 tablet 0 More than a month at Unknown time   • isosorbide mononitrate (IMDUR) 60 MG 24 hr tablet Take 1 tablet by mouth Daily. 90 tablet 0 More than a month at Unknown time     Current Meds:     Current Facility-Administered Medications:   •  carvedilol (COREG) tablet 25 mg, 25 mg, Oral, BID With Meals, Laurita Mcneil MD, 25 mg at 03/12/20 2024  •  furosemide (LASIX) injection 80 mg, 80 mg, Intravenous, Q6H, Laurita Mcneil MD, 80 mg at 03/12/20 2119  •  [START ON 3/13/2020] influenza vac split quad (FLUZONE,FLUARIX,AFLURIA) injection 0.5 mL, 0.5 mL, Intramuscular, Once, Mark Pal MD  •  Pharmacy to dose vancomycin, , Does not apply, Continuous PRN, Mark Pal MD  •  sodium chloride 0.9 % flush 10 mL, 10 mL, Intravenous, PRN, Jian Sinha MD  •  vancomycin 2250 mg/500 mL 0.9% NS IVPB ([facility]), 15 mg/kg, Intravenous, Once, Mark Pal MD, 2,250 mg at 03/12/20 2119  •  [START ON 3/13/2020] Vancomycin Pharmacy Intermittent Dosing, , Does not apply, Daily, Mark Pal MD  Allergies:  No Known Allergies  Social History:   Social History     Tobacco Use   • Smoking status: Current Every Day Smoker     Packs/day: 0.50     Types: Cigarettes   • Smokeless tobacco: Current User     Types: Chew   • Tobacco comment: caffeine use   Substance Use Topics   • Alcohol use: No  "     Family History:  Family History   Problem Relation Age of Onset   • Hypertension Mother    • Atrial fibrillation Father    • Hypertension Father           Review of Systems  See history of present illness and past medical history.    Constitutional: Negative for activity change, appetite change, chills and fever.   HENT: Negative for congestion, ear pain and sore throat.    Eyes: Negative.    Respiratory: Positive for shortness of breath. Negative for cough.    Cardiovascular: Negative for chest pain and leg swelling.   Gastrointestinal: Positive for nausea and vomiting. Negative for abdominal pain and diarrhea.   Endocrine: Negative.    Genitourinary: Negative for decreased urine volume and dysuria.   Musculoskeletal: Negative for neck pain.        Diffuse swelling   Skin: Redness of the left leg and calluses on the bottom of the left foot..   Allergic/Immunologic: Negative.    Neurological: Negative for weakness, numbness and headaches.   Hematological: Negative.    Psychiatric/Behavioral: Negative.    All other systems reviewed and are negative.    Vitals:   BP (!) 179/124 (BP Location: Right arm, Patient Position: Lying)   Pulse 112   Temp 98.1 °F (36.7 °C) (Oral)   Resp 18   Ht 180.3 cm (71\")   Wt (!) 145 kg (319 lb 2 oz)   SpO2 94%   BMI 44.51 kg/m²   I/O:     Intake/Output Summary (Last 24 hours) at 3/12/2020 2157  Last data filed at 3/12/2020 2148  Gross per 24 hour   Intake 240 ml   Output --   Net 240 ml     Exam:  General Appearance:   Awake interactive chronically ill-appearing male who does not appear to be toxic or septic.   Head:    Normocephalic, without obvious abnormality, atraumatic   Eyes:    PERRL, conjunctiva/corneas clear, EOM's intact, both eyes   Ears:    Normal external ear canals, both ears   Nose:   Nares normal, septum midline, mucosa normal, no drainage    or sinus tenderness   Throat:   Lips, tongue, gums normal; oral mucosa pink and moist   Neck:   Supple, symmetrical, " trachea midline, no adenopathy;     thyroid:  no enlargement/tenderness/nodules; no carotid    bruit or JVD   Back:     Symmetric, no curvature, ROM normal, no CVA tenderness   Lungs:    Creased breath sounds at the bases   Chest Wall:    No tenderness or deformity    Heart:    Regular rate and rhythm, S1 and S2 normal, loud murmur noted.   Abdomen:    Abdominal distention with possible ascites and body wall edema   Extremities:  Bilateral lower extremity edema with left leg more swollen than the right erythema noted involving the toes feet and part of the leg with some scratches.  Mild warmth noted.   Pulses:   Pulses palpable in all extremities; symmetric all extremities   Skin:  Erythematous changes in the leg.  Specifically left leg.   Neurologic:  Grossly nonfocal       Data Review:      I reviewed the patient's new clinical results.  Results from last 7 days   Lab Units 03/12/20  1359   WBC 10*3/mm3 5.93   HEMOGLOBIN g/dL 13.1   PLATELETS 10*3/mm3 222     Results from last 7 days   Lab Units 03/12/20  1359   SODIUM mmol/L 136   POTASSIUM mmol/L 5.3*   CHLORIDE mmol/L 104   CO2 mmol/L 15.9*   BUN mg/dL 70*   CREATININE mg/dL 6.95*   CALCIUM mg/dL 8.3*   GLUCOSE mg/dL 115*   Xr Chest 2 View    Result Date: 3/12/2020  Small bilateral basilar atelectasis/infiltrate and pleural effusions, follow-up suggested. Borderline heart size.  This report was finalized on 3/12/2020 2:43 PM by Dr. Michael Platt M.D.          Assessment:  Active Hospital Problems    Diagnosis POA   • **Anasarca associated with disorder of kidney [N04.9] Yes   • Diabetes mellitus (CMS/HCC) [E11.9] Unknown   • Redness and swelling of lower leg [M79.89, R23.8] Unknown   • Essential hypertension [I10] Yes   • Mitral regurgitation [I34.0] Yes     severe eccentric per echo 7/2018     • Stage III chronic kidney disease (CMS/HCC) [N18.3] Yes   hyperkalemia  Metabolic acidosis    Medical decision making:  Generalized anasarca-multifactorial  including worsening renal disease as well as possible systolic/diastolic congestive heart failure contributing to it.  Plan is to diurese him under the direction of nephrology service get 2D echo with Doppler and monitor strict EMILY's.  Acute on chronic renal failure with hyperkalemia and metabolic acidosis-nephrology consultation and management as per their recommendations.  They have already seen the patient.  Patient may end up needing hemodialysis.  Hypertension-continue antihypertensive regimen avoid hypotensive episode.  Erythema of the left foot and leg with callus on the bottom of the left foot in the setting of diabetes and neuropathy this could very well be early cellulitis versus superimposed stasis changes and secondary stasis dermatitis.  Plan is to empirically treat him with IV vancomycin apply local anti-inflammatory/antifungal cream and elevate the leg.  Follow-up on the venous Doppler of the lower extremities.  Diabetes mellitus-check hemoglobin A1c monitor for hypoglycemia.  Severe mitral [id number] on echocardiogram ordered by nephrology service and continue diuresis.  Mark Pal MD   3/12/2020  21:57  Much of this encounter note is an electronic transcription/translation of spoken language to printed text. The electronic translation of spoken language may permit erroneous, or at times, nonsensical words or phrases to be inadvertently transcribed; Although I have reviewed the note for such errors, some may still exist

## 2020-03-13 NOTE — TELEPHONE ENCOUNTER
Pt is needing a hospital follow up, but I was unsure of what to do because he has never established care here, he has had several appointments scheduled but they were either no showed or canceled. Please advise.

## 2020-03-13 NOTE — PAYOR COMM NOTE
"Nico Washington (49 y.o. Male)     PLEASE SEE ATTACHED CLINICAL REVIEW.     REF#[id number]    PLEASE CALL   OR  [phone] WITH INPT AUTH AND DAYS APPROVED.     THANK YOU    AUSTIN WALKER LPN CCP    Date of Birth Social Security Number Address Home Phone MRN    1970  [facility] [address] [place] [address] [phone] [id number]    Restoration Marital Status          None Single       Admission Date Admission Type Admitting Provider Attending Provider Department, Room/Bed    3/12/20 Emergency Mark Pal MD McCracken, Robert Russell, MD [address], [address]/    Discharge Date Discharge Disposition Discharge Destination                       Attending Provider:  Pollo Diaz MD    Allergies:  No Known Allergies    Isolation:  None   Infection:  None   Code Status:  CPR    Ht:  180.3 cm (71\")   Wt:  145 kg (320 lb)    Admission Cmt:  None   Principal Problem:  Anasarca associated with disorder of kidney [N04.9]                 Active Insurance as of 3/12/2020     Primary Coverage     Payor Plan Insurance Group Employer/Plan Group    [employer]HEM BLUE CROSS ANTHEM peerTransfer CROSS BLUE SHIELD PPO [id number]     Payor Plan Address Payor Plan Phone Number Payor Plan Fax Number Effective Dates    [address] [phone]  2018 - None Entered    [place] [address]       Subscriber Name Subscriber Birth Date Member ID       NICO WASHINGTON 1970 [id number]                 Emergency Contacts      (Rel.) Home Phone Work Phone Mobile Phone    Eleazar Washington (Father) [phone] -- --               History & Physical      Mark Pal MD at 20 1974          Internal medicine history and physical  INTERNAL MEDICINE   [facility]       Patient Identification:  Name: Nico Washington  Age: 49 y.o.  Sex: male  :  1970  MRN: [id number]                   Primary Care Physician: Provider, No " Known                                   Chief Complaint: Progressive swelling of the leg and increasing abdominal distention for the last 3 weeks.    History of Present Illness:   Patient is a nonchalant chronically ill male who has history of chronic kidney disease, diabetes, lower extremity edema high blood pressure was in his usual state of his health until 3 4 weeks ago when he started noticing that he was gaining weight and fluid was building up everywhere.  He has some issues about getting his medicine refilled or some other logistical problem that prohibited him from getting care.  He has associated nausea shortness of breath and dyspnea on exertion.  About couple weeks ago he started noticing some redness of his left foot and left leg but did not have any fever chills or decrease in appetite.  He states that his swelling started to go down but a week ago he started to get worse again.  Because of this worsening swelling and not improving he decided to drive himself to the emergency room where he was evaluated and is being admitted for further care.  Patient has been seen by nephrology service and has been given 80 mg of Lasix IV.  Patient is currently feeling better.    Past Medical History:  Past Medical History:   Diagnosis Date   • DESHAWN (acute kidney injury) (CMS/[facility])    • CHF (congestive heart failure) (CMS/[facility])    • Cigarette smoker    • Diabetes mellitus (CMS/[facility])    • Foot callus    • Hypertension    • Mitral regurgitation     severe eccentric per echo 7/2018   • Pleural effusion 06/30/2018    Bilateral, small per x-ray   • Stage III chronic kidney disease (CMS/HCC)      Past Surgical History:  Past Surgical History:   Procedure Laterality Date   • HIP SURGERY        Home Meds:  Medications Prior to Admission   Medication Sig Dispense Refill Last Dose   • aspirin 81 MG EC tablet Take 81 mg by mouth Daily.   3/12/2020 at 0900   • amLODIPine (NORVASC) 5 MG tablet Take 1 tablet by mouth Daily. 90 tablet 0  More than a month at Unknown time   • carvedilol (COREG) 25 MG tablet Take 1 tablet by mouth 2 (Two) Times a Day. 180 tablet 0 More than a month at Unknown time   • furosemide (LASIX) 40 MG tablet Take 1 tablet by mouth 2 (Two) Times a Day. 180 tablet 0 More than a month at Unknown time   • hydrALAZINE (APRESOLINE) 100 MG tablet Take 0.5 tablets by mouth 3 (Three) Times a Day. 90 tablet 0 More than a month at Unknown time   • isosorbide mononitrate (IMDUR) 60 MG 24 hr tablet Take 1 tablet by mouth Daily. 90 tablet 0 More than a month at Unknown time     Current Meds:     Current Facility-Administered Medications:   •  carvedilol (COREG) tablet 25 mg, 25 mg, Oral, BID With Meals, Laurita Mcneil MD, 25 mg at 03/12/20 2024  •  furosemide (LASIX) injection 80 mg, 80 mg, Intravenous, Q6H, Laurita Mcneil MD, 80 mg at 03/12/20 2119  •  [START ON 3/13/2020] influenza vac split quad (FLUZONE,FLUARIX,AFLURIA) injection 0.5 mL, 0.5 mL, Intramuscular, Once, Mark Pal MD  •  Pharmacy to dose vancomycin, , Does not apply, Continuous PRN, Mark Pal MD  •  sodium chloride 0.9 % flush 10 mL, 10 mL, Intravenous, PRN, Jian Sinha MD  •  vancomycin 2250 mg/500 mL 0.9% NS IVPB ([facility]), 15 mg/kg, Intravenous, Once, Mark Pal MD, 2,250 mg at 03/12/20 2119  •  [START ON 3/13/2020] Vancomycin Pharmacy Intermittent Dosing, , Does not apply, Daily, Mark Pal MD  Allergies:  No Known Allergies  Social History:   Social History     Tobacco Use   • Smoking status: Current Every Day Smoker     Packs/day: 0.50     Types: Cigarettes   • Smokeless tobacco: Current User     Types: Chew   • Tobacco comment: caffeine use   Substance Use Topics   • Alcohol use: No      Family History:  Family History   Problem Relation Age of Onset   • Hypertension Mother    • Atrial fibrillation Father    • Hypertension Father           Review of Systems  See history of present illness and past medical history.    Constitutional:  "Negative for activity change, appetite change, chills and fever.   HENT: Negative for congestion, ear pain and sore throat.    Eyes: Negative.    Respiratory: Positive for shortness of breath. Negative for cough.    Cardiovascular: Negative for chest pain and leg swelling.   Gastrointestinal: Positive for nausea and vomiting. Negative for abdominal pain and diarrhea.   Endocrine: Negative.    Genitourinary: Negative for decreased urine volume and dysuria.   Musculoskeletal: Negative for neck pain.        Diffuse swelling   Skin: Redness of the left leg and calluses on the bottom of the left foot..   Allergic/Immunologic: Negative.    Neurological: Negative for weakness, numbness and headaches.   Hematological: Negative.    Psychiatric/Behavioral: Negative.    All other systems reviewed and are negative.    Vitals:   BP (!) 179/124 (BP Location: Right arm, Patient Position: Lying)   Pulse 112   Temp 98.1 °F (36.7 °C) (Oral)   Resp 18   Ht 180.3 cm (71\")   Wt (!) 145 kg (319 lb 2 oz)   SpO2 94%   BMI 44.51 kg/m²    I/O:     Intake/Output Summary (Last 24 hours) at 3/12/2020 2157  Last data filed at 3/12/2020 2148  Gross per 24 hour   Intake 240 ml   Output --   Net 240 ml     Exam:  General Appearance:   Awake interactive chronically ill-appearing male who does not appear to be toxic or septic.   Head:    Normocephalic, without obvious abnormality, atraumatic   Eyes:    PERRL, conjunctiva/corneas clear, EOM's intact, both eyes   Ears:    Normal external ear canals, both ears   Nose:   Nares normal, septum midline, mucosa normal, no drainage    or sinus tenderness   Throat:   Lips, tongue, gums normal; oral mucosa pink and moist   Neck:   Supple, symmetrical, trachea midline, no adenopathy;     thyroid:  no enlargement/tenderness/nodules; no carotid    bruit or JVD   Back:     Symmetric, no curvature, ROM normal, no CVA tenderness   Lungs:    Creased breath sounds at the bases   Chest Wall:    No tenderness or " deformity    Heart:    Regular rate and rhythm, S1 and S2 normal, loud murmur noted.   Abdomen:    Abdominal distention with possible ascites and body wall edema   Extremities:  Bilateral lower extremity edema with left leg more swollen than the right erythema noted involving the toes feet and part of the leg with some scratches.  Mild warmth noted.   Pulses:   Pulses palpable in all extremities; symmetric all extremities   Skin:  Erythematous changes in the leg.  Specifically left leg.   Assessment:  Active Hospital Problems    Diagnosis POA   • **Anasarca associated with disorder of kidney [N04.9] Yes   • Diabetes mellitus (CMS/HCC) [E11.9] Unknown   • Redness and swelling of lower leg [M79.89, R23.8] Unknown   • Essential hypertension [I10] Yes   • Mitral regurgitation [I34.0] Yes     severe eccentric per echo 7/2018     • Stage III chronic kidney disease (CMS/HCC) [N18.3] Yes   hyperkalemia  Metabolic acidosis    Medical decision making:  Generalized anasarca-multifactorial including worsening renal disease as well as possible systolic/diastolic congestive heart failure contributing to it.  Plan is to diurese him under the direction of nephrology service get 2D echo with Doppler and monitor strict EMILY's.  Acute on chronic renal failure with hyperkalemia and metabolic acidosis-nephrology consultation and management as per their recommendations.  They have already seen the patient.  Patient may end up needing hemodialysis.  Hypertension-continue antihypertensive regimen avoid hypotensive episode.  Erythema of the left foot and leg with callus on the bottom of the left foot in the setting of diabetes and neuropathy this could very well be early cellulitis versus superimposed stasis changes and secondary stasis dermatitis.  Plan is to empirically treat him with IV vancomycin apply local anti-inflammatory/antifungal cream and elevate the leg.  Follow-up on the venous Doppler of the lower extremities.  Diabetes  "mellitus-check hemoglobin A1c monitor for hypoglycemia.  Severe mitral [id number] on echocardiogram ordered by nephrology service and continue diuresis.  Mark Pal MD   3/12/2020  21:57  Much of this encounter note is an electronic transcription/translation of spoken language to printed text. The electronic translation of spoken language may permit erroneous, or at times, nonsensical words or phrases to be inadvertently transcribed; Although I have reviewed the note for such errors, some may still exist      Electronically signed by Mark Pal MD at 03/12/20 2213          Emergency Department Notes      Naheed Duffy, RN at 03/12/20 1347        Pt c/o generalized swelling \"in my whole body\" for one month. Pt reports he has CHF and has been out of his diuretics for one month also.     Electronically signed by Naheed Duffy RN at 03/12/20 1348     Jian Sinha MD at 03/12/20 1514           EMERGENCY DEPARTMENT ENCOUNTER    CHIEF COMPLAINT  Chief Complaint: Anasarca   History given by: Patient   History limited by: Nothing   Room Number: 10/10  PMD: Provider, No Known      HPI:  Pt is a 49 y.o. male who presents complaining of worsening anasarca that began 3-4 weeks ago. Pt is also c/o nausea, vomiting and SOA on exertion. Pt denies fever, chills, rash, sore throat and ear pain. Pt reports swelling is worst in abdomen, BLE and genitals. He reports being followed by  (nephrology) for CKD but is not on dialysis. He reports making a normal amount of urine over the past few weeks and affirms it has been clear not cloudy. Pt reports a hx of HTN, DM, CHF and CKD. He reports running out of all of his medications one month ago. He does not have a PCP, but normally sees cardiology and nephrology who refill his medications. Pt reports smoking cessation 5 weeks ago and denies alcohol use.        Duration:  3-4 weeks  Onset: Gradual   Timing: Constant   Location: Diffuse   Radiation: " None  Quality: Swelling   Intensity/Severity: Moderate  Progression: Worsening   Associated Symptoms: Nausea, vomiting, SOA  Aggravating Factors: None  Alleviating Factors: None  Previous Episodes: Pt has a hx of CKD  Treatment before arrival: None    PAST MEDICAL HISTORY  Active Ambulatory Problems     Diagnosis Date Noted   • Mitral regurgitation    • Cigarette smoker    • Essential hypertension 07/17/2018   • Stage III chronic kidney disease (CMS/[facility])    • Foot callus 07/17/2018     Resolved Ambulatory Problems     Diagnosis Date Noted   • Acute congestive heart failure (CMS/HCC) 06/30/2018     Past Medical History:   Diagnosis Date   • DESHAWN (acute kidney injury) (CMS/[facility])    • CHF (congestive heart failure) (CMS/[facility])    • Diabetes mellitus (CMS/[facility])    • Hypertension    • Pleural effusion 06/30/2018       PAST SURGICAL HISTORY  Past Surgical History:   Procedure Laterality Date   • HIP SURGERY         FAMILY HISTORY  Family History   Problem Relation Age of Onset   • Hypertension Mother    • Atrial fibrillation Father    • Hypertension Father        SOCIAL HISTORY  Social History     Socioeconomic History   • Marital status: Single     Spouse name: Not on file   • Number of children: Not on file   • Years of education: Not on file   • Highest education level: Not on file   Tobacco Use   • Smoking status: Current Every Day Smoker     Packs/day: 0.50     Types: Cigarettes   • Smokeless tobacco: Current User     Types: Chew   • Tobacco comment: caffeine use   Substance and Sexual Activity   • Alcohol use: No   • Drug use: No   • Sexual activity: Defer       ALLERGIES  Patient has no known allergies.    REVIEW OF SYSTEMS  Review of Systems   Constitutional: Negative for activity change, appetite change, chills and fever.   HENT: Negative for congestion, ear pain and sore throat.    Eyes: Negative.    Respiratory: Positive for shortness of breath. Negative for cough.    Cardiovascular: Negative for chest pain and leg  swelling.   Gastrointestinal: Positive for nausea and vomiting. Negative for abdominal pain and diarrhea.   Endocrine: Negative.    Genitourinary: Negative for decreased urine volume and dysuria.   Musculoskeletal: Negative for neck pain.        Diffuse swelling   Skin: Negative for rash and wound.   Allergic/Immunologic: Negative.    Neurological: Negative for weakness, numbness and headaches.   Hematological: Negative.    Psychiatric/Behavioral: Negative.    All other systems reviewed and are negative.      PHYSICAL EXAM  ED Triage Vitals   Temp Heart Rate Resp BP SpO2   03/12/20 1348 03/12/20 1348 03/12/20 1348 03/12/20 1350 03/12/20 1348   97.4 °F (36.3 °C) (!) 136 18 (!) 204/108 91 %      Temp src Heart Rate Source Patient Position BP Location FiO2 (%)   03/12/20 1348 03/12/20 1412 -- -- --   Tympanic Monitor          Physical Exam   Constitutional: He is oriented to person, place, and time. No distress.   HENT:   Head: Normocephalic and atraumatic.   Mouth/Throat: Oropharynx is clear and moist.   Eyes: Pupils are equal, round, and reactive to light. EOM are normal.   Neck: Normal range of motion. Neck supple. JVD (3 cm) present.   Cardiovascular: Regular rhythm. Tachycardia present.   Murmur heard.   Systolic (L sternal border) murmur is present with a grade of 2/6.  Pulmonary/Chest: Tachypnea noted. He is in respiratory distress (mild). He has rales (bibasilar).   Abdominal: Soft. Bowel sounds are normal. He exhibits no distension. There is no tenderness. There is no rebound and no guarding.   Pitting edema in lower abdominal wall   Genitourinary:   Genitourinary Comments: Scrotal edema   Musculoskeletal: Normal range of motion.        Right lower leg: He exhibits edema.        Left lower leg: He exhibits edema.   3+ pedal edema to BLE   Neurological: He is alert and oriented to person, place, and time. He has normal sensation and normal strength.   Skin: Skin is warm and dry.   Psychiatric: Mood and affect  normal.   Nursing note and vitals reviewed.      I ordered the above noted radiological studies. Interpreted by radiologist. Reviewed by me in PACS.       PROCEDURES  Procedures     EKG          EKG time: 1405   Rhythm/Rate: Sinus tachycardia, rate 117   P waves and WV: Nml  QRS, axis: LAD, Q waves anteriorly   ST and T waves: Nml     Interpreted Contemporaneously by me, independently viewed  Unchanged compared to prior 10/17/19        PROGRESS AND CONSULTS       1545: Spoke with  (nephrology) who agreed to consult.     1600:  (nephrology) in ED to evaluate pt     1617: Spoke with  ([facility]) who agreed to admit     MEDICAL DECISION MAKING  Results were reviewed/discussed with the patient and they were also made aware of online access. Pt also made aware that some labs, such as cultures, will not be resulted during ER visit and follow up with PMD is necessary.     MDM  Number of Diagnoses or Management Options  Acute on chronic systolic congestive heart failure (CMS/HCC):   DESHAWN (acute kidney injury) (CMS/HCC):   Anasarca associated with disorder of kidney:      Amount and/or Complexity of Data Reviewed  Clinical lab tests: ordered and reviewed (proBNP >70,000, troponin 0.211, BUN 70, creatinine 6.95)  Tests in the radiology section of CPT®:  ordered and reviewed (XR chest showed Small bilateral basilar atelectasis/infiltrate and pleural effusions)  Tests in the medicine section of CPT®:  ordered and reviewed (EKG- see procedure note for interpretation)  Decide to obtain previous medical records or to obtain history from someone other than the patient: yes (Epic)  Review and summarize past medical records: yes (Pt was seen in the ED on 10/17/19 with c/o SOA. He was found to be volume overloaded and in kidney failure and was offered admission, but refused and left AMA.)  Discuss the patient with other providers: yes ( (nephrology)   ([facility]))    Patient Progress  Patient progress:  stable         DIAGNOSIS  Final diagnoses:   Anasarca associated with disorder of kidney   DESHAWN (acute kidney injury) (CMS/HCC)   Acute on chronic systolic congestive heart failure (CMS/HCC)       DISPOSITION  ADMISSION    Discussed treatment plan and reason for admission with pt/family and admitting physician.  Pt/family voiced understanding of the plan for admission for further testing/treatment as needed.         Latest Documented Vital Signs:  As of 17:09  BP- (!) 162/109 HR- 106 Temp- 98.8 °F (37.1 °C) (Tympanic) O2 sat- 97%    --  Documentation assistance provided by alessandro Mitchell for . Information recorded by the alessandro was done at my direction and has been verified and validated by me.       Regina Mitchell  03/12/20 1709       Jian Sinha MD  03/12/20 2336      Electronically signed by Jian Sinha MD at 03/12/20 2336       Oxygen Therapy (since admission)     Date/Time   SpO2   Device (Oxygen Therapy)   Flow (L/min)   Oxygen Concentration (%)   ETCO2 (mmHg)    03/13/20 0800   --   room air   --   --   --    03/13/20 0718   95   room air   --   --   --    03/13/20 0400   --   nasal cannula   2   --   --    03/13/20 0046   93   room air   --   --   --    03/13/20 0000   --   nasal cannula   2   --   --    03/12/20 2034   94   room air   --   --   --    03/12/20 1806   --   room air   --   --   --    03/12/20 1650   97   --   --   --   --    03/12/20 1621   96   --   --   --   --    03/12/20 16:07:57   93   room air   --   --   --    03/12/20 14:12:48   93   room air   --   --   --    03/12/20 1348   91   --   --   --   --            Intake & Output (last 3 days)       03/10 0701 - 03/11 0700 03/11 0701 - 03/12 0700 03/12 0701 - 03/13 0700 03/13 0701 - 03/14 0700    P.O.   615 210    I.V. (mL/kg)   500 (3.4)     Total Intake(mL/kg)   1115 (7.7) 210 (1.4)    Urine (mL/kg/hr)   1200 200 (0.2)    Stool   0     Total Output   1200 200    Net   -85 +10            Stool Unmeasured Occurrence    2 x          Lines, Drains & Airways    Active LDAs     Name:   Placement date:   Placement time:   Site:   Days:    Peripheral IV 03/13/20 1023 Anterior;Right Forearm   03/13/20    1023    Forearm   less than 1         Inactive LDAs     Name:   Placement date:   Placement time:   Removal date:   Removal time:   Site:   Days:    [REMOVED] Peripheral IV 10/17/19 1450 Right Forearm   10/17/19    1450    03/12/20 pt did not arrive to facility with IV    1714    Forearm   147    [REMOVED] Peripheral IV 03/12/20 1549 Left Antecubital   03/12/20    1549    03/12/20    2135    Antecubital   less than 1    [REMOVED] Peripheral IV 03/12/20 2206 Left Wrist   03/12/20    2206    03/13/20    1031    Wrist   less than 1    [REMOVED] Peripheral IV 03/13/20 1000   03/13/20    1000    03/13/20    1000    --   less than 1                Lab Results (all)     Procedure Component Value Units Date/Time    POC Glucose Once [[id number]]  (Normal) Collected:  03/13/20 1121    Specimen:  Blood Updated:  03/13/20 1126     Glucose 117 mg/dL     Vancomycin, Random [[id number]]  (Normal) Collected:  03/13/20 0855    Specimen:  Blood Updated:  03/13/20 1000     Vancomycin Random 17.00 mcg/mL     Comprehensive Metabolic Panel [[id number]]  (Abnormal) Collected:  03/13/20 0621    Specimen:  Blood Updated:  03/13/20 0719     Glucose 121 mg/dL      BUN 75 mg/dL      Creatinine 7.15 mg/dL      Sodium 136 mmol/L      Potassium 4.9 mmol/L      Chloride 108 mmol/L      CO2 14.4 mmol/L      Calcium 7.7 mg/dL      Total Protein 5.0 g/dL      Albumin 2.30 g/dL      ALT (SGPT) 51 U/L      AST (SGOT) 18 U/L      Alkaline Phosphatase 68 U/L      Total Bilirubin 0.4 mg/dL      eGFR Non African Amer 8 mL/min/1.73      Comment: <15 Indicative of kidney failure.        eGFR   Amer --     Comment: <15 Indicative of kidney failure.        Globulin 2.7 gm/dL      A/G Ratio 0.9 g/dL      BUN/Creatinine Ratio 10.5     Anion Gap 13.6 mmol/L     Narrative:        CBC (No Diff) [[id number]]  (Abnormal) Collected:  03/13/20 0621    Specimen:  Blood Updated:  03/13/20 0658     WBC 5.24 10*3/mm3      RBC 4.78 10*6/mm3      Hemoglobin 11.6 g/dL      Hematocrit 37.8 %      MCV 79.1 fL      MCH 24.3 pg      MCHC 30.7 g/dL      RDW 17.2 %      RDW-SD 49.5 fl      MPV 10.1 fL      Platelets 172 10*3/mm3     POC Glucose Once [[id number]]  (Normal) Collected:  03/13/20 0620    Specimen:  Blood Updated:  03/13/20 0622     Glucose 109 mg/dL      Hepatitis B Surface Ag Non-Reactive    Narrative:       Results may be falsely decreased if patient taking Biotin.       Comment: Auto resulted.       BNP [[id number]]  (Abnormal) Collected:  03/12/20 1359    Specimen:  Blood Updated:  03/12/20 1453     proBNP >70,000.0 pg/mL     Narrative:       Troponin [[id number]]  (Abnormal) Collected:  03/12/20 1359    Specimen:  Blood Updated:  03/12/20 1436     Troponin T 0.211 ng/mL     Narrative:       Comprehensive Metabolic Panel [[id number]]  (Abnormal) Collected:  03/12/20 1359    Specimen:  Blood Updated:  03/12/20 1431     Glucose 115 mg/dL      BUN 70 mg/dL      Creatinine 6.95 mg/dL      Sodium 136 mmol/L      Potassium 5.3 mmol/L      Chloride 104 mmol/L      CO2 15.9 mmol/L      Calcium 8.3 mg/dL      Total Protein 6.4 g/dL      Albumin 2.90 g/dL      ALT (SGPT) 74 U/L      AST (SGOT) 31 U/L      Alkaline Phosphatase 86 U/L      Total Bilirubin 0.4 mg/dL      eGFR Non African Amer 9 mL/min/1.73      Comment: <15 Indicative of kidney failure.        eGFR   Amer --     Comment: <15 Indicative of kidney failure.        Globulin 3.5 gm/dL      A/G Ratio 0.8 g/dL      BUN/Creatinine Ratio 10.1     Anion Gap 16.1 mmol/L     Narrative:       CBC & Differential [[id number]] Collected:  03/12/20 1359    Specimen:  Blood Updated:  03/12/20 1408    Narrative:       CBC Auto Differential [[id number]]  (Abnormal) Collected:  03/12/20 1359    Specimen:  Blood Updated:  03/12/20 1408     WBC 5.93 10*3/mm3       RBC 5.52 10*6/mm3      Hemoglobin 13.1 g/dL      Hematocrit 42.7 %      MCV 77.4 fL      MCH 23.7 pg      MCHC 30.7 g/dL      RDW 16.8 %      RDW-SD 46.3 fl      MPV 10.0 fL      Platelets 222 10*3/mm3      Neutrophil % 79.3 %      Lymphocyte % 10.3 %      Monocyte % 9.1 %      Eosinophil % 0.3 %      Basophil % 0.5 %      Immature Grans % 0.5 %      Neutrophils, Absolute 4.70 10*3/mm3      Lymphocytes, Absolute 0.61 10*3/mm3      Monocytes, Absolute 0.54 10*3/mm3      Eosinophils, Absolute 0.02 10*3/mm3      Basophils, Absolute 0.03 10*3/mm3      Immature Grans, Absolute 0.03 10*3/mm3      nRBC 0.0 /100 WBC           Imaging Results (All)     Procedure Component Value Units Date/Time    XR Chest 2 View [[id number]] Collected:  03/12/20 1441     Updated:  03/12/20 1446    Narrative:       XR CHEST 2 VW-     HISTORY: Male who is 49 years-old,  short of breath     TECHNIQUE: Frontal and lateral views of the chest     COMPARISON: 10/17/2019     FINDINGS: Heart size is borderline. Pulmonary vasculature is  unremarkable. Small bilateral basilar atelectasis/infiltrate and pleural  effusions. A similar appearance was present on the prior exam. No  pneumothorax. No acute osseous process.       Impression:       Small bilateral basilar atelectasis/infiltrate and pleural  effusions, follow-up suggested. Borderline heart size.     This report was finalized on 3/12/2020 2:43 PM by Dr. Michael Platt M.D.             ECG/EMG Results (all)     Procedure Component Value Units Date/Time    ECG 12 Lead [[id number]] Collected:  03/12/20 1405     Updated:  03/12/20 1946    Narrative:       HEART RATE= 113  bpm  RR Interval= 528  ms  RI Interval= 142  ms  P Horizontal Axis= -21  deg  P Front Axis= 43  deg  QRSD Interval= 102  ms  QT Interval= 331  ms  QRS Axis= -24  deg  T Wave Axis= 35  deg  - ABNORMAL ECG -  Sinus tachycardia  Probable left atrial enlargement  Borderline left axis deviation  Consider anterior infarct  NO  SIGNIFICANT CHANGE FROM PREVIOUS ECG  Electronically Signed By: [facility] 12-Mar-2020 19:46:13  Date and Time of Study: 2020-03-12 14:05:43    Adult Transthoracic Echo Complete W/ Cont if Necessary Per Protocol [[id number]] Collected:  03/13/20 1201     Updated:  03/13/20 1247     BSA 2.6 m^2      IVSd 1.3 cm      LVIDd 6.5 cm      LVIDs 5.4 cm      LVPWd 1.3 cm      IVS/LVPW 1.0     FS 16.9 %      EDV(Teich) 216.0 ml      ESV(Teich) 141.3 ml      EF(Teich) 34.6 %      EDV(cubed) 274.6 ml      ESV(cubed) 157.5 ml      EF(cubed) 42.7 %      LV mass(C)d 399.1 grams      LV mass(C)dI 155.0 grams/m^2      SV(Teich) 74.7 ml      SI(Teich) 29.0 ml/m^2      SV(cubed) 117.2 ml      SI(cubed) 45.5 ml/m^2      Ao root diam 3.7 cm      Ao root area 10.8 cm^2      ACS 2.2 cm      asc Aorta Diam 2.8 cm      LVOT diam 2.3 cm      LVOT area 4.2 cm^2      LVOT area(traced) 4.2 cm^2      RVOT diam 2.3 cm      RVOT area 4.2 cm^2      LVLd ap4 9.9 cm      EDV(MOD-sp4) 209.0 ml      LVLs ap4 8.5 cm      ESV(MOD-sp4) 134.0 ml      EF(MOD-sp4) 35.9 %      LVLd ap2 10.3 cm      EDV(MOD-sp2) 283.0 ml      LVLs ap2 9.4 cm      ESV(MOD-sp2) 188.0 ml      EF(MOD-sp2) 33.6 %      SV(MOD-sp4) 75.0 ml      SI(MOD-sp4) 29.1 ml/m^2      SV(MOD-sp2) 95.0 ml      SI(MOD-sp2) 36.9 ml/m^2      Ao root area (BSA corrected) 1.4     LV Shields Vol (BSA corrected) 81.2 ml/m^2      LV Sys Vol (BSA corrected) 52.0 ml/m^2      TAPSE (>1.6) 1.5 cm      MV A dur 0.13 sec      MV E max kiya 82.9 cm/sec      MV A max kiya 47.5 cm/sec      MV E/A 1.7     MV V2 max 98.1 cm/sec      MV max PG 3.8 mmHg      MV V2 mean 61.2 cm/sec      MV mean PG 2.0 mmHg      MV V2 VTI 26.6 cm      MVA(VTI) 2.0 cm^2      MV P1/2t max kiya 106.0 cm/sec      MV P1/2t 78.8 msec      MVA(P1/2t) 2.8 cm^2      MV dec slope 394.0 cm/sec^2      MV dec time 172 sec      Ao pk kiya 121.0 cm/sec      Ao max PG 5.9 mmHg      Ao max PG (full) 3.5 mmHg      Ao V2 mean 80.7 cm/sec      Ao mean  PG 3.0 mmHg      Ao mean PG (full) 2.0 mmHg      Ao V2 VTI 22.4 cm      MICHAEL(I,A) 2.4 cm^2      MICHAEL(I,D) 2.4 cm^2      MICHAEL(V,A) 2.6 cm^2      MICHAEL(V,D) 2.6 cm^2      LV V1 max PG 2.4 mmHg      LV V1 mean PG 1.0 mmHg      LV V1 max 76.9 cm/sec      LV V1 mean 52.1 cm/sec      LV V1 VTI 13.0 cm      MR max ernie 541.0 cm/sec      MR max .1 mmHg      MR mean ernie 392.0 cm/sec      MR mean PG 71.0 mmHg      MR .0 cm      SV(Ao) 240.8 ml      SI(Ao) 93.5 ml/m^2      SV(LVOT) 54.0 ml      SV(RVOT) 33.7 ml      SI(LVOT) 21.0 ml/m^2      PA V2 max 79.1 cm/sec      PA max PG 2.5 mmHg      PA max PG (full) 1.7 mmHg       CV ECHO PANCHO - PVA(V,A) 2.3 cm^2       CV ECHO PANCHO - PVA(V,D) 2.3 cm^2      PA acc time 0.1 sec      RV V1 max PG 0.76 mmHg      RV V1 mean PG 0 mmHg      RV V1 max 43.6 cm/sec      RV V1 mean 28.7 cm/sec      RV V1 VTI 8.1 cm      TR max ernie 254.0 cm/sec      RVSP(TR) 28.8 mmHg      RAP systole 3.0 mmHg      PA pr(Accel) 34.0 mmHg      Pulm Sys Ernie 42.0 cm/sec      Pulm Shields Ernie 38.8 cm/sec      Pulm S/D 1.1     Qp/Qs 0.62     Pulm A Revs Dur 0.09 sec      Pulm A Revs Ernie 20.8 cm/sec      MVA P1/2T LCG 2.1 cm^2       CV ECHO PANCHO - BZI_BMI 44.6 kilograms/m^2       CV ECHO PANCHO - BSA([facility]) 2.8 m^2       CV ECHO PANCHO - BZI_METRIC_WEIGHT 145.2 kg       CV ECHO PANCHO - BZI_METRIC_HEIGHT 180.3 cm      Target HR (85%) 146 bpm      Max. Pred. HR (100%) 172 bpm      BH CV VAS BP LEFT /78 mmHg      TDI S' 10.00 cm/sec      RV Base 3.90 cm      RV Length 8.00 cm      RV Mid 3.60 cm      Dimensionless Index 0.6 (DI)      LA Volume Index 38.0 mL/m2      Avg E/e' ratio 11.05     EF(MOD-bp) 33.0 %      Lat Peak E' Ernie 10.0 cm/sec      Med Peak E' Ernie 5.00 cm/sec           Orders (all)      Start     Ordered    03/13/20 1234  Diet Regular; Thin; Cardiac, Consistent Carbohydrate, Renal, Low Sodium; 2,000 mg Na  Diet Effective Now      03/13/20 1235    03/13/20 0000  Vital Signs  Every 4 Hours       03/12/20 2200 03/12/20 2200  POC Glucose Finger 4x Daily AC & at Bedtime  4 Times Daily Before Meals & at Bedtime      03/12/20 1933    03/12/20 2200  POC Glucose 4x Daily AC & at Bedtime  4 Times Daily Before Meals & at Bedtime      03/12/20 2200 03/12/20 2200  PT Consult: Eval & Treat Discharge Placement Assessment  Once     Comments:  Reason Why PT Needed: discharge    03/12/20 2200 03/12/20 2159  Code Status and Medical Interventions:  Continuous      03/12/20 2200 03/12/20 2159  Intake & Output  Every Shift      03/12/20 2200 03/12/20 2159  Weigh Patient  Once      03/12/20 2200 03/12/20 2159  Do NOT Hold Basal or Correction Scale Insulin When Patient is NPO, Hold Scheduled Mealtime (Bolus) Insulin if NPO  Continuous      03/12/20 2200 03/12/20 2159  Follow [facility] Hypoglycemia Standing Orders For Blood Glucose Less Than 70 mg/dL  Until Discontinued     Comments:  ALERT PATIENT - NOT NPO & CAN SAFELY SWALLOW  Administer 4 oz Fruit Juice OR 4 oz Regular Soda OR 8 oz Milk OR 15-30 grams (1 tube) of Glucose Gel.  Recheck Blood Glucose Approximately 15 Minutes After Ingestion, Repeat Treatment & Continue to Recheck Blood Sugar Approximately Every 15 Minutes Until Blood Glucose is 70 or Higher.  Once Blood Glucose is 70 or Higher & if It Will Be More Than 60 Minutes Until Next Meal, Provide Appropriate Snack (Including Carbohydrate Food) Based on Meal Plan Order. Give Meal Tray As Soon As Possible.    PATIENT HAS IV ACCESS - UNRESPONSIVE, NPO OR UNABLE TO SAFELY SWALLOW  Administer 25g (50ml) D50W IV Push.  Recheck Blood Glucose Approximately 15 Minutes After Administration, if Blood Glucose Remains Less Than 70, Repeat Treatment   Recheck Blood Glucose Approximately 15 Minutes After 2nd Administration, if Blood Glucose Remains Less Than 70 After 2nd Dose of D50W, Contact Provider for Further Treatment Orders & Consider Adding IVF With D5W for Maintenance    PATIENT WITHOUT IV ACCESS -  UNRESPONSIVE, NPO OR UNABLE TO SAFELY SWALLOW  Administer 1mg Glucagon SQ & Establish IV Access.  Turn Patient on Side - Nausea / Vomiting May Occur.  Recheck Blood Glucose Approximately 15 Minutes After Administration.  If Blood Glucose Remains Less Than 70, Administer 25g D50W IV Push (50ml).  Recheck Blood Glucose Approximately 15 Minutes After Administration of D50W, if Blood Glucose Remains Less Than 70, Contact Provider for Further Treatment Orders & Consider Adding IVF With D5 for Maintenance    Document Event & Patient Response to Interventions in EMR, Document Medications on MAR  Notify Provider if Hypoglycemia Treatment Needed    03/12/20 2200 03/12/20 2159  Oxygen Therapy- Nasal Cannula; Titrate for SPO2: 90% - 95%  Continuous      03/12/20 2200 03/12/20 2159  Insert Peripheral IV  Once      03/12/20 2200 03/12/20 2159  Saline Lock & Maintain IV Access  Continuous,   Status:  Canceled      03/12/20 2200 03/12/20 2159  Place Sequential Compression Device  Once      03/12/20 2200 03/12/20 2159  Maintain Sequential Compression Device  Continuous      03/12/20 2200 03/12/20 2159  Telemetry - Maintain IV Access  Continuous      03/12/20 2200 03/12/20 2159  Continuous Cardiac Monitoring  Continuous      03/12/20 2200 03/12/20 2159  May Be Off Telemetry for Tests  Continuous      03/12/20 2200 03/12/20 2159  ACLS Protocol For Life Threatening Dysrhythmias (Unless Code Status Indicates Otherwise)  Continuous      03/12/20 2200 03/12/20 1900  Strict Intake & Output  Every Hour      03/12/20 1852    03/12/20 1851  Diet Regular; Thin; Cardiac, Consistent Carbohydrate, Renal  Diet Effective Now,   Status:  Canceled      03/12/20 1852 03/12/20 1710  Bladder Scan  Once     Comments:  Call if over 300 cc.    03/12/20 1710    03/12/20 1710  Elevate Extremity  Continuous     Comments:  Elevate scrotum on towel roll when in bed.    03/12/20 1710    03/12/20 1619  Inpatient Admission  Once       03/12/20 1618    03/12/20 1600  Strict Intake & Output  Every 8 Hours      03/12/20 1546    03/12/20 1547  Daily Weights  Daily     Comments:  standing    03/12/20 1546    03/12/20 1525  LHA (on-call MD unless specified) Details  Once     Specialty:  Hospitalist  Provider:  (Not yet assigned)    03/12/20 1524    03/12/20 1525  Nephrology (on -call MD unless specified)  Once     Specialty:  Nephrology  Provider:  (Not yet assigned)    03/12/20 1524    03/12/20 1350  NPO Diet  Diet Effective Now,   Status:  Canceled      03/12/20 1349    03/12/20 1350  Undress and Gown  Once      03/12/20 1349    03/12/20 1350  Cardiac Monitoring  Per Hospital Policy,   Status:  Canceled      03/12/20 1349    03/12/20 1350  Continuous Pulse Oximetry  Continuous      03/12/20 1349    03/12/20 1350  Vital Signs  Per Hospital Policy      03/12/20 1349    03/12/20 1350  ECG 12 Lead  Once      03/12/20 1349    03/12/20 1349  Oxygen Therapy- Nasal Cannula; 2 LPM; Titrate for SPO2: equal to or greater than, 92%  Continuous PRN,   Status:  Canceled      03/12/20 1349    03/12/20 1349  sodium chloride 0.9 % flush 10 mL  As Needed      03/12/20 1349    Unscheduled  Telemetry - Pulse Oximetry  Continuous PRN     Comments:  If Patient Develops Unresponsiveness, Acute Dyspnea, Cyanosis or Suspected Hypoxemia Start Continuous Pulse Ox Monitoring, Apply Oxygen & Notify Provider    03/12/20 2200    Unscheduled  Oxygen Therapy- Nasal Cannula; Titrate for SPO2: 90% - 95%  Continuous PRN     Comments:  If Patient Develops Unresponsiveness, Acute Dyspnea, Cyanosis or Suspected Hypoxemia Start Continuous Pulse Ox Monitoring, Apply Oxygen & Notify Provider    03/12/20 2200    Unscheduled  ECG 12 Lead  As Needed     Comments:  Nurse to Release if Patient Expericences Acute Chest Pain or Dysrhythmias    03/12/20 2200                     Physician Progress Notes (all)      Camron Graves MD at 03/13/20 1224             LOS: 1 day    Patient Care  "Team:  Provider, No Known as PCP - General    Chief Complaint:    Chief Complaint   Patient presents with   • Edema     Follow-up acute kidney injury on chronic kidney disease  Subjective     Interval History:   Patient is feeling the same since yesterday, no chest pain or shortness of air, no nausea or vomiting, no metallic taste, no significant sleep disturbance but he noted that his cognitive function is slower, he has lower extremity edema denies lightheadedness.      Review of Systems:   As noted above    Objective     Vital Signs  Temp:  [97.4 °F (36.3 °C)-98.8 °F (37.1 °C)] 97.8 °F (36.6 °C)  Heart Rate:  [] 81  Resp:  [18-20] 18  BP: (140-204)/() 140/86    Flowsheet Rows      First Filed Value   Admission Height  180.3 cm (71\") Documented at 03/12/2020 1350   Admission Weight  136 kg (300 lb) Documented at 03/12/2020 1350          I/O this shift:  In: 210 [P.O.:210]  Out: 200 [Urine:200]  I/O last 3 completed shifts:  In: 1115 [P.O.:615; I.V.:500]  Out: 1200 [Urine:1200]    Intake/Output Summary (Last 24 hours) at 3/13/2020 1224  Last data filed at 3/13/2020 0900  Gross per 24 hour   Intake 1325 ml   Output 1400 ml   Net -75 ml       Physical Exam:  General Appearance: alert, oriented x 3, no acute distress, disheveled, chronically ill  Skin: warm and dry  HEENT: pupils round and reactive to light, oral mucosa normal, nonicteric sclerae  Neck: supple, no JVD, trachea midline  Lungs: Bibasilar crackles and rhonchi, unlabored breathing effort  Heart: RRR, normal S1 and S2, no S3, no rub, grade 3/6 systolic murmur  Abdomen: soft, non-tender,  present bowel sounds to auscultation  : no palpable bladder, has scrotal and penile edema  Extremities: No cyanosis or clubbing, he has 3+ lower extremity edema with some chronic erythema bilaterally  Joints: No significant deformities noted, no crepitation over the knees or the ankles.  Lymphatics: No cervical or supraclavicular lymphadenopathy.  Neuro: " normal speech and mental status                    Results from last 7 days   Lab Units 20  0621 20  1359   WBC 10*3/mm3 5.24 5.93   HEMOGLOBIN g/dL 11.6* 13.1   PLATELETS 10*3/mm3 172 222             Assessment/Plan   1.  Acute on chronic kidney disease stage IV associate with hypertensive nephropathy, noncompliance with medical care.  Suspect that he is having progression toward ESRD he had significant fluid excess.  His creatinine up to 7.15 today.  Electrolytes within acceptable range other than total CO2 14.4.  Also with right-sided heart failure leads to increase renal vein pressure and that also can lower the GFR.  There is no lauren uremic symptoms other than slow cognition.  2.  Cellulitis of left lower extremity treated currently with vancomycin  3.  Severe mitral regurgitation and he has loud murmur  4.  Anasarca and fluid excess probably related to some degree of right-sided heart failure.  5.  Hypertension, better controlled  6.  Medical noncompliance.    Plan:  1.  Will try to diurese more with IV Bumex, 4 mg every 8 hours x 3 doses and readjust  2.  If he continues to have worsening creatinine and not adequate response to diuretics we may need to consider initiating dialysis  3.  We will get vein mapping hopefully this admission in preparation for an AV fistula  4.  Strict sodium intake to 2 g daily  5.  Surveillance labs        Camron Graves MD  20  12:24      Electronically signed by Camron Graves MD at 20 1234     Pollo Diaz MD at 20 1220           LOS: 1 day     Name: Nico King  Age: 49 y.o.  Sex: male  :  1970  MRN: [id number]         Primary Care Physician: Provider, No Known    Subjective   Subjective  Denies much in the way of shortness of breath at present.  No chest pain.  Does not feel as if his left leg looks much different than yesterday.  Really not interested in conversing with me and keeps his eyes closed  "throughout the majority of the interview.    Objective   Vital Signs  Temp:  [97.4 °F (36.3 °C)-98.8 °F (37.1 °C)] 97.8 °F (36.6 °C)  Heart Rate:  [] 81  Resp:  [18-20] 18  BP: (140-204)/() 140/86  Body mass index is 44.71 kg/m².    Objective:  General Appearance:  Comfortable, in no acute distress and ill-appearing.    Vital signs: (most recent): Blood pressure 140/86, pulse 81, temperature 97.8 °F (36.6 °C), temperature source Oral, resp. rate 18, height 180.3 cm (71\"), weight (!) 145 kg (320 lb 9.6 oz), SpO2 95 %.    Lungs:  Normal effort and normal respiratory rate.  There are decreased breath sounds.    Heart: Normal rate.  Regular rhythm.    Abdomen: Abdomen is soft.  Bowel sounds are normal.   There is no abdominal tenderness.     Extremities: There is dependent edema.  There is no local swelling.  (Redness and swelling to the left lower leg with associated skin warmth)  Neurological: Patient is alert and oriented to person, place and time.    Skin:  Warm and dry.            Assessment/Plan   Active Hospital Problems    Diagnosis  POA   • **Anasarca associated with disorder of kidney [N04.9]  Yes   • Diabetes mellitus (CMS/HCC) [E11.9]  Unknown   • Redness and swelling of lower leg [M79.89, R23.8]  Unknown   • Essential hypertension [I10]  Yes   • Mitral regurgitation [I34.0]  Yes   • Stage III chronic kidney disease (CMS/HCC) [N18.3]  Yes      Resolved Hospital Problems   No resolved problems to display.       Assessment & Plan    -Aggressive diuresis has been initiated by nephrology.  Creatinine and BUN slightly worse today.  May ultimately require dialysis.  Appreciate nephrology's assistance.  -2D echocardiogram pending  -Continue vancomycin for possible cellulitis of the left leg  -Blood sugars well controlled at this time      Pollo Diaz MD  [facility]  03/13/20  12:20 PM      Electronically signed by Pollo Diaz MD at 03/13/20 1223        " "  Consult Notes (all)      Laurita Mcneil MD at 03/12/20 1642            Referring Provider: Ernestine  Reason for Consultation: DESHAWN on CKD IV    Subjective     Chief complaint   Chief Complaint   Patient presents with   • Edema       History of present illness:  50 yo wm with history of HTN, CKD IV with creatinine 3.0 10/19,  Severe MR, noncompliance with follow up and meds.  Presented with generalized edema and SOA.  Last seen in our office by Dr. Tao 11/18.  Creatinine 2-2.2 at that time .  Reports increase in peripheral edema over past month, SOA , abdominal girth all gradual, but much worse this week.  Also noted Left leg swelling more than right and getting very red and painful . No fever, but chills.  Scrotum swelling.   Making much less urine. No hematuria.  SOA with exertion.  Weight gain.  Not eating well.  Taking no meds for at least a month .   Bowels moving.   No chest pain.  Occasional cough last week .  (Not in a hospital admission)  Allergies:  Patient has no known allergies.    Review of Systems  See HPI    Objective     Vital Signs  Temp:  [97.4 °F (36.3 °C)] 97.4 °F (36.3 °C)  Heart Rate:  [108-136] 108  Resp:  [18-20] 20  BP: (166-204)/(108-120) 166/120    Flowsheet Rows      First Filed Value   Admission Height  180.3 cm (71\") Documented at 03/12/2020 1350   Admission Weight  136 kg (300 lb) Documented at 03/12/2020 1350           No intake/output data recorded.  No intake/output data recorded.  No intake or output data in the 24 hours ending 03/12/20 1642    Physical Exam:  Very dishevelled.  Looks older than age.  Lying on stretcher.  Oral mucosa dry  Neck JVD at 45 degrees.  No thyromegaly  Heart RRR, tachy, no s3 .  4/6 systolic murmur.  Lungs bibasilar crackles. Wheezing audible when he stood up at bedside  Abd +bs, distended. Body wall edema. Nontender  Ext 2+ lower ext Right, LLE 3+. Erythema to just below knee, warmth, mild tenderness of shin.    scrotal and penis " edema.  Skin LLE as described above. Venous stasis both lower ext.  Hyperkeratotic skin right plantar foot at 1st tarsal.  Multiple areas of scratches on torso.    Neuro awake, alert. Speech fluent.  Moves all 4 ext.     Assessment/Plan   1. Salma on CKD IV.  Hypertensive nephropathy.  Noncompliant with medical care. Suspect that he has progression to ESRD.  Significant volume excess, anasarca on exam.  Will try to diurese, but I have explained the severity of his renal disease to him.   I think that he will likely need dialysis this admission.  IV lasix,  Check hepatitis studies,  Vein mapping.  2. Cellulitis LLE.  DW Dr. Pal admitting him for LHA.  Starting Vanc.  Venous doppler ordered .  3. Severe MR, last echo 7/2018. Has loud murmur.  Repeat echo. Suspect cardiomyopathy.  Looks predominately like right heart failure.  Dr. Foss had referred him for sleep study for likely CONCEPCION, but he did not follow up .  4. HTN. Not controlled.  Diurese, start coreg 25 mg bid.  For now, avoid arterial dilators with his severe edema.  5. Medical noncompliance .    Plan:  1. IV lasix 80 mg every 6 hours  2. Echo  3. Doppler LLE  4. DW Dr. Pal regarding antibiotic and Vancomycin .  5. Vein mapping this admission.  6. Coreg 25 mg bid  7. Hepatitis B surface Ag in case dialysis needed.       Laurita Mcneil MD  03/12/20  16:42      Electronically signed by Laurita Mcneil MD at 03/12/20 2391         All medication doses during the admission are shown, including meds that are no longer on order.   Scheduled Meds Sorted by Name   for Fernando Tobiasmyrna as of 3/11/20 through 3/13/20     1 Day 3 Days 7 Days 10 Days < Today >    Legend:                          Discontinued    Completed    Future    MAR Hold     Other Encounter    Linked           Medications 03/11/20 03/12/20 03/13/20   aspirin EC tablet 81 mg   Dose: 81 mg  Freq: Daily Route: PO  Start: 03/13/20 0900    Admin Instructions:   Herbal/drug interaction:  Avoid use with ginkgo biloba. Do not crush or chew.  Do not exceed 4 grams of aspirin in a 24 hr period.    If given for pain, use the following pain scale:   Mild Pain = Pain Score of 1-3, CPOT 1-2  Moderate Pain = Pain Score of 4-6, CPOT 3-4  Severe Pain = Pain Score of 7-10, CPOT 5-8      0825            bumetanide (BUMEX) injection 4 mg   Dose: 4 mg  Freq: Every 8 Hours Route: IV  Start: 03/13/20 1400 End: 03/14/20 1359    Admin Instructions:   Give slow IV push over 1-2 minutes.      1400   2200          carvedilol (COREG) tablet 25 mg   Dose: 25 mg  Freq: 2 Times Daily With Meals Route: PO  Start: 03/12/20 1654    Admin Instructions:   Give with food.     2024 0825   1800          clotrimazole-betamethasone (LOTRISONE) 1-0.05 % cream   Freq: Every 12 Hours Scheduled Route: TOP  Start: 03/12/20 2300    Admin Instructions:   Apply to left foot and left leg .      0100 0826   2100        furosemide (LASIX) injection 40 mg   Dose: 40 mg  Freq: Once Route: IV  Start: 03/12/20 1526 End: 03/12/20 1546     1546-D/C'd  (1608)             furosemide (LASIX) injection 80 mg   Dose: 80 mg  Freq: Every 6 Hours Route: IV  Start: 03/12/20 1600 End: 03/13/20 1235     1608   2119        0450   0938   1235-D/C'd      hydrALAZINE (APRESOLINE) injection 10 mg   Dose: 10 mg  Freq: Once Route: IV  Start: 03/12/20 1526 End: 03/12/20 1639    Admin Instructions:   As needed for SBP greater than 180  Caution: Look alike/sound alike drug alert     1639             influenza vac split quad (FLUZONE,FLUARIX,AFLURIA) injection 0.5 mL   Dose: 0.5 mL  Freq: Once Route: IM  Start: 03/13/20 1200    Admin Instructions:   **Do Not Administer if Temperature Greater Than 102F & Notify Pharmacy** Pneumococcal & Influenza Vaccines May Be Given at the Same Time in SEPARATE Injections.  If unable to administer at this scheduled time, please notify Pharmacy and reschedule the dose.      1200            insulin lispro (humaLOG) injection 0-9  Units   Dose: 0-9 Units  Freq: 4 Times Daily With Meals & Nightly Route: SC  Start: 03/12/20 2300    Admin Instructions:   Correction - Moderate Dose.  40-60 units/day total insulin dose or average weight, on oral agents    Blood glucose 150-199 mg/dL - 2 units  Blood glucose 200-249 mg/dL - 4 units  Blood glucose 250-299 mg/dL - 6 units  Blood glucose 300-349 mg/dL - 7 units  Blood glucose 350-400 mg/dL - 8 units  Blood glucose greater than 400 mg/dL - 9 units and call provider   Caution: Look alike/sound alike drug alert     (1055) (8559) (0284) [id number]            isosorbide mononitrate (IMDUR) 24 hr tablet 60 mg   Dose: 60 mg  Freq: Every 24 Hours Scheduled Route: PO  Start: 03/13/20 0900    Admin Instructions:   Do not crush, or chew.      0825            perflutren (DEFINITY) 8.476 mg in sodium chloride 0.9 % 10 mL injection   Dose: 2.5 mL  Freq: Once Route: IV  Start: 03/13/20 1330 End: 03/13/20 1230    Admin Instructions:   Mix 1.3 mL of mechanically activated Definity with 8.7 mL of normal saline. A total of 2.5 mL of the resulting Definity solution was administered.      1230            sodium chloride 0.9 % flush 10 mL   Dose: 10 mL  Freq: Every 12 Hours Scheduled Route: IV  Start: 03/12/20 2300      0450   0826   2100        vancomycin 2250 mg/500 mL 0.9% NS IVPB ([facility])   Dose: 15 mg/kg  Weight Dosing Info: 145 kg  Freq: Once Route: IV  Indications of Use: SKIN AND SOFT TISSUE INFECTION  Start: 03/12/20 2100 End: 03/13/20 0019     2119             vancomycin 750 mg/250 mL 0.9% NS add-vantage   Dose: 5 mg/kg  Weight Dosing Info: 145 kg  Freq: Once Route: IV  Indications of Use: SKIN AND SOFT TISSUE INFECTION  Start: 03/13/20 1245      1245            Vancomycin Pharmacy Intermittent Dosing   Freq: Daily Route: XX  Indications of Use: SKIN AND SOFT TISSUE INFECTION  Start: 03/13/20 0900 End: 03/18/20 0859    Admin Instructions:   Patient is on intermittent Vancomycin dosing. This is an  "informational \"Pharmacy Dosing\" note only. Please [place] MAR as \"Not given\".      (1011)            Medications 03/11/20 03/12/20 03/13/20       Continuous Meds Sorted by Name   for Nico King as of 3/11/20 through 3/13/20    Legend:                          Discontinued    Completed    Future    MAR Hold     Other Encounter    Linked           Medications 03/11/20 03/12/20 03/13/20   Pharmacy to dose vancomycin   Freq: Continuous PRN Route: XX  PRN Reason: Consult  Indications of Use: SKIN AND SOFT TISSUE INFECTION  Start: 03/12/20 1851 End: 03/17/20 1850             PRN Meds Sorted by Name   for Nico King as of 3/11/20 through 3/13/20    Legend:                          Discontinued    Completed    Future    MAR Hold     Other Encounter    Linked           Medications 03/11/20 03/12/20 03/13/20   dextrose (D50W) 25 g/ 50mL Intravenous Solution 25 g   Dose: 25 g  Freq: Every 15 Minutes PRN Route: IV  PRN Reason: Low Blood Sugar  PRN Comment: Blood Sugar Less Than 70  Start: 03/12/20 2158    Admin Instructions:   Blood sugar less than 70; patient has IV access - Unresponsive, NPO or Unable To Safely Swallow         dextrose (GLUTOSE) oral gel 15 g   Dose: 15 g  Freq: Every 15 Minutes PRN Route: PO  PRN Reason: Low Blood Sugar  PRN Comment: Blood sugar less than 70  Start: 03/12/20 2158    Admin Instructions:   BS<70, Patient Alert, Is not NPO, Can safely swallow.         glucagon (human recombinant) (GLUCAGEN DIAGNOSTIC) injection 1 mg   Dose: 1 mg  Freq: Every 15 Minutes PRN Route: SC  PRN Reason: Low Blood Sugar  PRN Comment: Blood Glucose Less Than 70  Start: 03/12/20 2158    Admin Instructions:   Blood Glucose Less Than 70 - Patient Without IV Access - Unresponsive, NPO or Unable To Safely Swallow         nitroglycerin (NITROSTAT) SL tablet 0.4 mg   Dose: 0.4 mg  Freq: Every 5 Minutes PRN Route: SL  PRN Reason: Chest Pain  PRN Comment: Only if SBP Greater Than 100  Start: 03/12/20 2158 "    Admin Instructions:   If Pain Unrelieved After 3 Doses Notify MD         Pharmacy to dose vancomycin   Freq: Continuous PRN Route: XX  PRN Reason: Consult  Indications of Use: SKIN AND SOFT TISSUE INFECTION  Start: 03/12/20 1851 End: 03/17/20 1850         sodium chloride 0.9 % flush 10 mL   Dose: 10 mL  Freq: As Needed Route: IV  PRN Reason: Line Care  Start: 03/12/20 2158         sodium chloride 0.9 % flush 10 mL   Dose: 10 mL  Freq: As Needed Route: IV  PRN Reason: Line Care  Start: 03/12/20 1349         Medications 03/11/20 03/12/20 03/13/20

## 2020-03-13 NOTE — TELEPHONE ENCOUNTER
I tried to reach out to patient to relay the information about establishing care with someone else and his phone is not set up for voicemail and he did not answer, I then called his father esme and he stated he has not spoke to his son in 2 years.

## 2020-03-13 NOTE — THERAPY DISCHARGE NOTE
Inpatient Rehabilitation - Physical Therapy Initial Eval/Discharge  [facility]     Patient Name: Nico King  : 1970  MRN: [id number]  Today's Date: 3/13/2020                Admit Date: 3/12/2020    Visit Dx:    ICD-10-CM ICD-9-CM   1. Anasarca associated with disorder of kidney N04.9 581.9   2. DESHAWN (acute kidney injury) (CMS/HCC) N17.9 584.9   3. Acute on chronic systolic congestive heart failure (CMS/HCC) I50.23 428.23     428.0     Patient Active Problem List   Diagnosis   • Mitral regurgitation   • Cigarette smoker   • Essential hypertension   • Stage III chronic kidney disease (CMS/HCC)   • Foot callus   • Anasarca associated with disorder of kidney   • Diabetes mellitus (CMS/HCC)   • Redness and swelling of lower leg     Past Medical History:   Diagnosis Date   • DESHAWN (acute kidney injury) (CMS/HCC)    • CHF (congestive heart failure) (CMS/HCC)    • Cigarette smoker    • Diabetes mellitus (CMS/HCC)    • Foot callus    • Hypertension    • Mitral regurgitation     severe eccentric per echo 2018   • Pleural effusion 2018    Bilateral, small per x-ray   • Stage III chronic kidney disease (CMS/HCC)      Past Surgical History:   Procedure Laterality Date   • HIP SURGERY            PT ASSESSMENT (last 12 hours)      Physical Therapy Evaluation    No documentation.             PT Recommendation and Plan  Anticipated Discharge Disposition (PT): home  Outcome Summary/Treatment Plan (PT)  Anticipated Discharge Disposition (PT): home  Plan of Care Reviewed With: patient  Outcome Summary: Pt is a 50 yo male who presents to the hospital with anasarca associated with kidney disorder. He has multiple comorbidities and is obese. At baseline, he lives alone and is indep with all ADLs. Today he was able to ambulate 40 ft with no AD and SBA for safety. He had no LOB and was only limited by fatigue. He does not need skilled PT at this time and is safe to return home, but he should continue to walk  multiple times per day with nursing. Pt and RN aware.         Time Calculation:   PT Charges     Row Name 03/13/20 0952             Time Calculation    Start Time  0831  -      Stop Time  0842  -      Time Calculation (min)  11 min  -LC      PT Received On  03/13/20  -        User Key  (r) = Recorded By, (t) = Taken By, (c) = Cosigned By    Initials Name Provider Type    LC Madison Cotton, PT Physical Therapist        Therapy Charges for Today     Code Description Service Date Service Provider Modifiers Qty    [id number]  PT EVAL LOW COMPLEXITY 1 3/13/2020 Madison Cotton, PT GP 1          PT G-Codes  Outcome Measure Options: AM-PAC 6 Clicks Basic Mobility (PT)  AM-PAC 6 Clicks Score (PT): 22    PT Discharge Summary  Anticipated Discharge Disposition (PT): home  Reason for Discharge: At baseline function  Discharge Destination: Home    Madison Cotton PT  3/13/2020

## 2020-03-13 NOTE — PROGRESS NOTES
"Pharmacokinetic Consult - Vancomycin Dosing (Follow-Up Note)    Nico King is a 49 y.o. male who is on day 1 pharmacy to dose vancomycin for LLE cellulitis.  Pharmacy dosing per Dr. Pal's request.   Other antimicrobials: none  Goal vancomycin trough: 10-20 mg/L    Current vancomycin dose: intermittent dosing    Relevant clinical data and objective history reviewed:  180.3 cm (71\")  (!) 145 kg (320 lb 9.6 oz)  Body mass index is 44.71 kg/m².     He has a past medical history of DESHAWN (acute kidney injury) (CMS/[facility]), CHF (congestive heart failure) (CMS/[facility]), Cigarette smoker, Diabetes mellitus (CMS/[facility]), Foot callus, Hypertension, Mitral regurgitation, Pleural effusion (06/30/2018), and Stage III chronic kidney disease (CMS/[facility]).    Allergies as of 03/12/2020   • (No Known Allergies)     Vital Signs (last 24 hours)       03/12 0700  -  03/13 0659 03/13 0700  -  03/13 0847   Most Recent    Temp (°F) 97.4 -  98.8      97.8     97.8 (36.6)    Heart Rate 86 -  136      81     81    Resp 18 -  20      18     18    /99 -  204/108      140/86     140/86    SpO2 (%) 91 -  97      95     95        Estimated Creatinine Clearance: 18.4 mL/min (A) (by C-G formula based on SCr of 7.15 mg/dL (H)).  Results from last 7 days   Lab Units 03/13/20  0621 03/12/20  1359   CREATININE mg/dL 7.15* 6.95*     Results from last 7 days   Lab Units 03/13/20  0621 03/12/20  1359   WBC 10*3/mm3 5.24 5.93     Vancomycin Dosing History  2250 mg (~15.5 mg/kg) IV x1 dose: 03/12/20 2119   Random 03/13 0855: 17 mg/L (~11.5 hours after last dose)     Assessment/Plan  SCr significantly elevated and trending up. No plans for dialysis at this time. Will schedule small dose due to serum level within acceptable range for re-dosing.     1) Vancomycin 750 mg (~5 mg/kg) IV x1 dose now.  2) Random level and SCr ordered for tomorrow with AM labs.    Pharmacy will continue to follow daily while on vancomycin and adjust as needed.     Thank you " for this consult,    Isaak Proctor, PharmD, MARYELLEN, BCPS

## 2020-03-13 NOTE — PROGRESS NOTES
"   LOS: 1 day    Patient Care Team:  Provider, No Known as PCP - General    Chief Complaint:    Chief Complaint   Patient presents with   • Edema     Follow-up acute kidney injury on chronic kidney disease  Subjective     Interval History:   Patient is feeling the same since yesterday, no chest pain or shortness of air, no nausea or vomiting, no metallic taste, no significant sleep disturbance but he noted that his cognitive function is slower, he has lower extremity edema denies lightheadedness.      Review of Systems:   As noted above    Objective     Vital Signs  Temp:  [97.4 °F (36.3 °C)-98.8 °F (37.1 °C)] 97.8 °F (36.6 °C)  Heart Rate:  [] 81  Resp:  [18-20] 18  BP: (140-204)/() 140/86    Flowsheet Rows      First Filed Value   Admission Height  180.3 cm (71\") Documented at 03/12/2020 1350   Admission Weight  136 kg (300 lb) Documented at 03/12/2020 1350          I/O this shift:  In: 210 [P.O.:210]  Out: 200 [Urine:200]  I/O last 3 completed shifts:  In: 1115 [P.O.:615; I.V.:500]  Out: 1200 [Urine:1200]    Intake/Output Summary (Last 24 hours) at 3/13/2020 1224  Last data filed at 3/13/2020 0900  Gross per 24 hour   Intake 1325 ml   Output 1400 ml   Net -75 ml       Physical Exam:  General Appearance: alert, oriented x 3, no acute distress, disheveled, chronically ill  Skin: warm and dry  HEENT: pupils round and reactive to light, oral mucosa normal, nonicteric sclerae  Neck: supple, no JVD, trachea midline  Lungs: Bibasilar crackles and rhonchi, unlabored breathing effort  Heart: RRR, normal S1 and S2, no S3, no rub, grade 3/6 systolic murmur  Abdomen: soft, non-tender,  present bowel sounds to auscultation  : no palpable bladder, has scrotal and penile edema  Extremities: No cyanosis or clubbing, he has 3+ lower extremity edema with some chronic erythema bilaterally  Joints: No significant deformities noted, no crepitation over the knees or the ankles.  Lymphatics: No cervical or supraclavicular " lymphadenopathy.  Neuro: normal speech and mental status        Results Review:    Results from last 7 days   Lab Units 03/13/20  0621 03/12/20  1359   SODIUM mmol/L 136 136   POTASSIUM mmol/L 4.9 5.3*   CHLORIDE mmol/L 108* 104   CO2 mmol/L 14.4* 15.9*   BUN mg/dL 75* 70*   CREATININE mg/dL 7.15* 6.95*   CALCIUM mg/dL 7.7* 8.3*   BILIRUBIN mg/dL 0.4 0.4   ALK PHOS U/L 68 86   ALT (SGPT) U/L 51* 74*   AST (SGOT) U/L 18 31   GLUCOSE mg/dL 121* 115*       Estimated Creatinine Clearance: 18.4 mL/min (A) (by C-G formula based on SCr of 7.15 mg/dL (H)).                Results from last 7 days   Lab Units 03/13/20  0621 03/12/20  1359   WBC 10*3/mm3 5.24 5.93   HEMOGLOBIN g/dL 11.6* 13.1   PLATELETS 10*3/mm3 172 222               Imaging Results (Last 24 Hours)     Procedure Component Value Units Date/Time    XR Chest 2 View [[id number]] Collected:  03/12/20 1441     Updated:  03/12/20 1446    Narrative:       XR CHEST 2 VW-     HISTORY: Male who is 49 years-old,  short of breath     TECHNIQUE: Frontal and lateral views of the chest     COMPARISON: 10/17/2019     FINDINGS: Heart size is borderline. Pulmonary vasculature is  unremarkable. Small bilateral basilar atelectasis/infiltrate and pleural  effusions. A similar appearance was present on the prior exam. No  pneumothorax. No acute osseous process.       Impression:       Small bilateral basilar atelectasis/infiltrate and pleural  effusions, follow-up suggested. Borderline heart size.     This report was finalized on 3/12/2020 2:43 PM by Dr. Michael Platt M.D.             aspirin 81 mg Oral Daily   carvedilol 25 mg Oral BID With Meals   clotrimazole-betamethasone  Topical Q12H   furosemide 80 mg Intravenous Q6H   influenza vaccine 0.5 mL Intramuscular Once   insulin lispro 0-9 Units Subcutaneous 4x Daily With Meals & Nightly   isosorbide mononitrate 60 mg Oral Q24H   sodium chloride 10 mL Intravenous Q12H   vancomycin 5 mg/kg Intravenous Once   Vancomycin Pharmacy  Intermittent Dosing  Does not apply Daily       Pharmacy to dose vancomycin        Medication Review:   Current Facility-Administered Medications   Medication Dose Route Frequency Provider Last Rate Last Dose   • aspirin EC tablet 81 mg  81 mg Oral Daily Mark Pal MD   81 mg at 03/13/20 0825   • carvedilol (COREG) tablet 25 mg  25 mg Oral BID With Meals Mark Pal MD   25 mg at 03/13/20 0825   • clotrimazole-betamethasone (LOTRISONE) 1-0.05 % cream   Topical Q12H Mark Pal MD   1 application at 03/13/20 0826   • dextrose (D50W) 25 g/ 50mL Intravenous Solution 25 g  25 g Intravenous Q15 Min PRN Mark Pal MD       • dextrose (GLUTOSE) oral gel 15 g  15 g Oral Q15 Min PRN Mark Pal MD       • furosemide (LASIX) injection 80 mg  80 mg Intravenous Q6H Mark Pal MD   80 mg at 03/13/20 0938   • glucagon (human recombinant) (GLUCAGEN DIAGNOSTIC) injection 1 mg  1 mg Subcutaneous Q15 Min PRN Mark Pal MD       • influenza vac split quad (FLUZONE,FLUARIX,AFLURIA) injection 0.5 mL  0.5 mL Intramuscular Once Mark Pal MD       • insulin lispro (humaLOG) injection 0-9 Units  0-9 Units Subcutaneous 4x Daily With Meals & Nightly Mark Pal MD       • isosorbide mononitrate (IMDUR) 24 hr tablet 60 mg  60 mg Oral Q24H Mark Pal MD   60 mg at 03/13/20 0825   • nitroglycerin (NITROSTAT) SL tablet 0.4 mg  0.4 mg Sublingual Q5 Min PRN Mark Pal MD       • Pharmacy to dose vancomycin   Does not apply Continuous PRN Mark Pal MD       • sodium chloride 0.9 % flush 10 mL  10 mL Intravenous PRN Mark Pal MD       • sodium chloride 0.9 % flush 10 mL  10 mL Intravenous Q12H Mark Pal MD   10 mL at 03/13/20 0826   • sodium chloride 0.9 % flush 10 mL  10 mL Intravenous PRN Mark Pal MD       • vancomycin 750 mg/250 mL 0.9% NS add-vantage  5 mg/kg Intravenous Once Mark Pal MD       • Vancomycin Pharmacy Intermittent Dosing   Does not apply Daily Mark Pal MD            Assessment/Plan   1.  Acute on chronic kidney disease stage IV associate with hypertensive nephropathy, noncompliance with medical care.  Suspect that he is having progression toward ESRD he had significant fluid excess.  His creatinine up to 7.15 today.  Electrolytes within acceptable range other than total CO2 14.4.  Also with right-sided heart failure leads to increase renal vein pressure and that also can lower the GFR.  There is no lauren uremic symptoms other than slow cognition.  2.  Cellulitis of left lower extremity treated currently with vancomycin  3.  Severe mitral regurgitation and he has loud murmur  4.  Anasarca and fluid excess probably related to some degree of right-sided heart failure.  5.  Hypertension, better controlled  6.  Medical noncompliance.    Plan:  1.  Will try to diurese more with IV Bumex, 4 mg every 8 hours x 3 doses and readjust  2.  If he continues to have worsening creatinine and not adequate response to diuretics we may need to consider initiating dialysis  3.  We will get vein mapping hopefully this admission in preparation for an AV fistula  4.  Strict sodium intake to 2 g daily  5.  Surveillance labs        Camron Graves MD  03/13/20  12:24

## 2020-03-13 NOTE — PROGRESS NOTES
Discharge Planning Assessment  [facility]     Patient Name: Nico King  MRN: [id number]  Today's Date: 3/13/2020    Admit Date: 3/12/2020    Discharge Needs Assessment     Row Name 03/13/20 1031       Living Environment    Lives With  alone    Current Living Arrangements  home/apartment/condo    Primary Care Provided by  self    Provides Primary Care For  no one    Family Caregiver if Needed  none    Family Caregiver Names  Emergency contact is his father Eleazar [phone]    Quality of Family Relationships  helpful    Able to Return to Prior Arrangements  yes       Resource/Environmental Concerns    Resource/Environmental Concerns  none       Transition Planning    Patient/Family Anticipates Transition to  home       Discharge Needs Assessment    Readmission Within the Last 30 Days  no previous admission in last 30 days    Equipment Currently Used at Home  none    Anticipated Changes Related to Illness  none    Equipment Needed After Discharge  none        Discharge Plan     Row Name 03/13/20 1032       Plan    Plan  Return home    Patient/Family in Agreement with Plan  yes    Plan Comments  Spoke with patient at bedside.  Patient lives alone, is IADL, uses no DME, has never used HH or been to SNF.  Emergency contact is father Eleazar [phone].  Patient does not have a PCP - he is willing to have CCP make an appointment - call placed to [facility] liason.  Liason states he had appointment and did not show up, so will have to send message and see if he will be accepted.  Patient was given list of [facility] physicians and contact informaion and is aware that liason should call him with appointment.  CCP will F/U the first of the week.  Patient may need initiation of HD.  CCP will follow.  BHumeniuk RN               Demographic Summary     Row Name 03/13/20 1030       General Information    Admission Type  inpatient    Arrived From  home    Referral Source  admission list    Reason for Consult  discharge  planning    Preferred Language  English     Used During This Interaction  no        Functional Status     Row Name 03/13/20 1030       Functional Status    Usual Activity Tolerance  moderate    Current Activity Tolerance  fair       Functional Status, IADL    Medications  independent    Meal Preparation  independent    Housekeeping  independent    Laundry  independent    Shopping  independent       Mental Status    General Appearance WDL  WDL       Mental Status Summary    Recent Changes in Mental Status/Cognitive Functioning  no changes                Becky S. Humeniuk, RN

## 2020-03-13 NOTE — PLAN OF CARE
Problem: Patient Care Overview  Goal: Plan of Care Review  Outcome: Ongoing (interventions implemented as appropriate)   Lab values slightly worse this morning.  Continues to diurese, switched to Bumex from Lasix.  Echo complete.  Remains very edematous with redness and more swelling on left leg versus right.  Applying antifungal cream to left leg.  IV Vanc continues with dosing by pharmacy.  VSS.  May need to start dialysis if lab values dont improve.  Will continue to monitor.

## 2020-03-13 NOTE — TELEPHONE ENCOUNTER
Pt has No Showed Dr. Mackay in past as est care pt.   Dr. Mackay ususlllly doesn't see new pt if they do not keep est care appt.  Seeing pt's hx of NO SHOW and Cancelled how would you want us to proceed? MM

## 2020-03-13 NOTE — PROGRESS NOTES
" LOS: 1 day     Name: Nico King  Age: 49 y.o.  Sex: male  :  1970  MRN: [id number]         Primary Care Physician: Provider, No Known    Subjective   Subjective  Denies much in the way of shortness of breath at present.  No chest pain.  Does not feel as if his left leg looks much different than yesterday.  Really not interested in conversing with me and keeps his eyes closed throughout the majority of the interview.    Objective   Vital Signs  Temp:  [97.4 °F (36.3 °C)-98.8 °F (37.1 °C)] 97.8 °F (36.6 °C)  Heart Rate:  [] 81  Resp:  [18-20] 18  BP: (140-204)/() 140/86  Body mass index is 44.71 kg/m².    Objective:  General Appearance:  Comfortable, in no acute distress and ill-appearing.    Vital signs: (most recent): Blood pressure 140/86, pulse 81, temperature 97.8 °F (36.6 °C), temperature source Oral, resp. rate 18, height 180.3 cm (71\"), weight (!) 145 kg (320 lb 9.6 oz), SpO2 95 %.    Lungs:  Normal effort and normal respiratory rate.  There are decreased breath sounds.    Heart: Normal rate.  Regular rhythm.    Abdomen: Abdomen is soft.  Bowel sounds are normal.   There is no abdominal tenderness.     Extremities: There is dependent edema.  There is no local swelling.  (Redness and swelling to the left lower leg with associated skin warmth)  Neurological: Patient is alert and oriented to person, place and time.    Skin:  Warm and dry.              Results Review:       I reviewed the patient's new clinical results.    Results from last 7 days   Lab Units 20  0621 20  1359   WBC 10*3/mm3 5.24 5.93   HEMOGLOBIN g/dL 11.6* 13.1   PLATELETS 10*3/mm3 172 222     Results from last 7 days   Lab Units 20  0621 20  1359   SODIUM mmol/L 136 136   POTASSIUM mmol/L 4.9 5.3*   CHLORIDE mmol/L 108* 104   CO2 mmol/L 14.4* 15.9*   BUN mg/dL 75* 70*   CREATININE mg/dL 7.15* 6.95*   CALCIUM mg/dL 7.7* 8.3*   GLUCOSE mg/dL 121* 115*                 Scheduled Meds: "     aspirin 81 mg Oral Daily   carvedilol 25 mg Oral BID With Meals   clotrimazole-betamethasone  Topical Q12H   furosemide 80 mg Intravenous Q6H   influenza vaccine 0.5 mL Intramuscular Once   insulin lispro 0-9 Units Subcutaneous 4x Daily With Meals & Nightly   isosorbide mononitrate 60 mg Oral Q24H   sodium chloride 10 mL Intravenous Q12H   vancomycin 5 mg/kg Intravenous Once   Vancomycin Pharmacy Intermittent Dosing  Does not apply Daily     PRN Meds:   dextrose  •  dextrose  •  glucagon (human recombinant)  •  nitroglycerin  •  Pharmacy to dose vancomycin  •  sodium chloride  •  sodium chloride  Continuous Infusions:    Pharmacy to dose vancomycin        Assessment/Plan   Active Hospital Problems    Diagnosis  POA   • **Anasarca associated with disorder of kidney [N04.9]  Yes   • Diabetes mellitus (CMS/[facility]) [E11.9]  Unknown   • Redness and swelling of lower leg [M79.89, R23.8]  Unknown   • Essential hypertension [I10]  Yes   • Mitral regurgitation [I34.0]  Yes   • Stage III chronic kidney disease (CMS/[facility]) [N18.3]  Yes      Resolved Hospital Problems   No resolved problems to display.       Assessment & Plan    -Aggressive diuresis has been initiated by nephrology.  Creatinine and BUN slightly worse today.  May ultimately require dialysis.  Appreciate nephrology's assistance.  -2D echocardiogram pending  -Continue vancomycin for possible cellulitis of the left leg  -Blood sugars well controlled at this time      Pollo Diaz MD  [facility]  03/13/20  12:20 PM

## 2020-03-13 NOTE — THERAPY EVALUATION
Patient Name: Nico King  : 1970    MRN: [id number]                              Today's Date: 3/13/2020       Admit Date: 3/12/2020    Visit Dx:     ICD-10-CM ICD-9-CM   1. Anasarca associated with disorder of kidney N04.9 581.9   2. DESHAWN (acute kidney injury) (CMS/HCC) N17.9 584.9   3. Acute on chronic systolic congestive heart failure (CMS/HCC) I50.23 428.23     428.0     Patient Active Problem List   Diagnosis   • Mitral regurgitation   • Cigarette smoker   • Essential hypertension   • Stage III chronic kidney disease (CMS/HCC)   • Foot callus   • Anasarca associated with disorder of kidney   • Diabetes mellitus (CMS/HCC)   • Redness and swelling of lower leg     Past Medical History:   Diagnosis Date   • DESHAWN (acute kidney injury) (CMS/HCC)    • CHF (congestive heart failure) (CMS/[facility])    • Cigarette smoker    • Diabetes mellitus (CMS/[facility])    • Foot callus    • Hypertension    • Mitral regurgitation     severe eccentric per echo 2018   • Pleural effusion 2018    Bilateral, small per x-ray   • Stage III chronic kidney disease (CMS/HCC)      Past Surgical History:   Procedure Laterality Date   • HIP SURGERY       General Information     Row Name 20 0941          PT Evaluation Time/Intention    Document Type  evaluation;discharge evaluation/summary  -     Mode of Treatment  physical therapy  -LC     Row Name 20 0941          General Information    Patient Profile Reviewed?  yes  -LC     Prior Level of Function  independent:  -LC     Existing Precautions/Restrictions  no known precautions/restrictions  -LC     Barriers to Rehab  medically complex  -LC     Row Name 20 0941          Relationship/Environment    Lives With  alone  -LC     Row Name 20 0941          Resource/Environmental Concerns    Current Living Arrangements  home/apartment/condo  -LC     Row Name 20 0941          Home Main Entrance    Number of Stairs, Main Entrance  none  -LC     Row Name  03/13/20 0941          Cognitive Assessment/Intervention- PT/OT    Orientation Status (Cognition)  oriented x 3  -     Cognitive Assessment/Intervention Comment  Pt agreeable to PT.   -     Row Name 03/13/20 0941          Safety Issues, Functional Mobility    Impairments Affecting Function (Mobility)  endurance/activity tolerance  -       User Key  (r) = Recorded By, (t) = Taken By, (c) = Cosigned By    Initials Name Provider Type     Madison Cotton, PT Physical Therapist        Mobility     Row Name 03/13/20 0942          Bed Mobility Assessment/Treatment    Bed Mobility Assessment/Treatment  bed mobility (all) activities  -     [place] Level (Bed Mobility)  conditional independence  -     Assistive Device (Bed Mobility)  bed rails;head of bed elevated  -     Row Name 03/13/20 0942          Sit-Stand Transfer    Sit-Stand [place] (Transfers)  conditional independence;contact guard  -     Row Name 03/13/20 0942          Gait/Stairs Assessment/Training    Gait/Stairs Assessment/Training  gait/ambulation independence  -     [place] Level (Gait)  stand by assist  -     Distance in Feet (Gait)  40'  -     Pattern (Gait)  step-through  -     Deviations/Abnormal Patterns (Gait)  base of support, wide;stride length decreased  -     Comment (Gait/Stairs)  Pt ambulated safely but was limited by fatigue/not feeling well  -       User Key  (r) = Recorded By, (t) = Taken By, (c) = Cosigned By    Initials Name Provider Type     Madison Cotton, PT Physical Therapist        Obj/Interventions     Row Name 03/13/20 0944          General ROM    GENERAL ROM COMMENTS  WFL  -     Row Name 03/13/20 0944          MMT (Manual Muscle Testing)    General MMT Comments  WFL  -     Row Name 03/13/20 0944          Static Sitting Balance    Level of [place] (Unsupported Sitting, Static Balance)  independent  -     Row Name 03/13/20 0944          Dynamic Sitting Balance    Level of [place],  Reaches Outside Midline (Sitting, Dynamic Balance)  independent  -     Row Name 03/13/20 0944          Static Standing Balance    Level of [place] (Supported Standing, Static Balance)  independent  -[facility] Name 03/13/20 0944          Dynamic Standing Balance    Level of [place], Reaches Outside Midline (Standing, Dynamic Balance)  supervision  -[facility] Name 03/13/20 0944          Sensory Assessment/Intervention    Sensory General Assessment  -- numbness in both feet at baseline  -       User Key  (r) = Recorded By, (t) = Taken By, (c) = Cosigned By    Initials Name Provider Type    Madison Yusuf, PT Physical Therapist        Goals/Plan    No documentation.       Clinical Impression     Row Name 03/13/20 0945          Pain Assessment    Additional Documentation  Pain Scale: Numbers Pre/Post-Treatment (Group)  -LC     Row Name 03/13/20 0945          Pain Scale: Numbers Pre/Post-Treatment    Pain Scale: Numbers, Pretreatment  0/10 - no pain  -     Pain Scale: Numbers, Post-Treatment  0/10 - no pain  -LC     Row Name 03/13/20 0945          Plan of Care Review    Plan of Care Reviewed With  patient  -     Outcome Summary  Pt is a 50 yo male who presents to the hospital with anasarca associated with kidney disorder. He has multiple comorbidities and is obese. At baseline, he lives alone and is indep with all ADLs. Today he was able to ambulate 40 ft with no AD and SBA for safety. He had no LOB and was only limited by fatigue. He does not need skilled PT at this time and is safe to return home, but he should continue to walk multiple times per day with nursing. Pt and RN aware.  -[facility] Name 03/13/20 0945          Physical Therapy Clinical Impression    Patient/Family Goals Statement (PT Clinical Impression)  go home  -     Criteria for Skilled Interventions Met (PT Clinical Impression)  no;current level of function same as previous level of function  -[facility] Name 03/13/20 0945           Vital Signs    O2 Delivery Intra Treatment  room air  -LC     Row Name 03/13/20 0945          Positioning and Restraints    Pre-Treatment Position  in bed  -LC     Post Treatment Position  chair  -LC     In Chair  reclined;call light within reach;encouraged to call for assist;notified nsg  -LC       User Key  (r) = Recorded By, (t) = Taken By, (c) = Cosigned By    Initials Name Provider Type    Madison Yusuf PT Physical Therapist        Outcome Measures     Row Name 03/13/20 0948          How much help from another person do you currently need...    Turning from your back to your side while in flat bed without using bedrails?  4  -LC     Moving from lying on back to sitting on the side of a flat bed without bedrails?  3  -LC     Moving to and from a bed to a chair (including a wheelchair)?  4  -LC     Standing up from a chair using your arms (e.g., wheelchair, bedside chair)?  4  -LC     Climbing 3-5 steps with a railing?  3  -LC     To walk in hospital room?  4  -LC     AM-PAC 6 Clicks Score (PT)  22  -LC     Row Name 03/13/20 0948          Functional Assessment    Outcome Measure Options  AM-PAC 6 Clicks Basic Mobility (PT)  -LC       User Key  (r) = Recorded By, (t) = Taken By, (c) = Cosigned By    Initials Name Provider Type    Madison Yusuf PT Physical Therapist          PT Recommendation and Plan     Outcome Summary/Treatment Plan (PT)  Anticipated Discharge Disposition (PT): home  Plan of Care Reviewed With: patient  Outcome Summary: Pt is a 48 yo male who presents to the hospital with anasarca associated with kidney disorder. He has multiple comorbidities and is obese. At baseline, he lives alone and is indep with all ADLs. Today he was able to ambulate 40 ft with no AD and SBA for safety. He had no LOB and was only limited by fatigue. He does not need skilled PT at this time and is safe to return home, but he should continue to walk multiple times per day with nursing. Pt and RN aware.     Time  Calculation:   PT Charges     Row Name 03/13/20 0952             Time Calculation    Start Time  0831  -      Stop Time  0842  -      Time Calculation (min)  11 min  -      PT Received On  03/13/20  -        User Key  (r) = Recorded By, (t) = Taken By, (c) = Cosigned By    Initials Name Provider Type    Madison Yusuf, PT Physical Therapist        Therapy Charges for Today     Code Description Service Date Service Provider Modifiers Qty    22301274485 HC PT EVAL LOW COMPLEXITY 1 3/13/2020 Madison Cotton, PT GP 1          PT G-Codes  Outcome Measure Options: AM-PAC 6 Clicks Basic Mobility (PT)  AM-PAC 6 Clicks Score (PT): 22    Madison Cotton PT  3/13/2020

## 2020-03-14 PROBLEM — I50.21 SYSTOLIC CHF, ACUTE (HCC): Status: ACTIVE | Noted: 2020-01-01

## 2020-03-14 PROBLEM — L03.116 CELLULITIS OF LEFT LEG: Status: ACTIVE | Noted: 2020-01-01

## 2020-03-14 NOTE — PROGRESS NOTES
" LOS: 2 days     Name: Nico King  Age: 49 y.o.  Sex: male  :  1970  MRN: [id number]         Primary Care Physician: Provider, No Known    Subjective   Subjective  No new complaints.  Denies shortness of breath.  Feels as if his legs are about the same with regards to swelling but the redness on the left is somewhat improved.    Objective   Vital Signs  Temp:  [97.2 °F (36.2 °C)-97.8 °F (36.6 °C)] 97.8 °F (36.6 °C)  Heart Rate:  [73-77] 76  Resp:  [18-26] 18  BP: (122-137)/() 137/98  Body mass index is 45.01 kg/m².    Objective:  General Appearance:  Comfortable, in no acute distress and ill-appearing.    Vital signs: (most recent): Blood pressure 137/98, pulse 76, temperature 97.8 °F (36.6 °C), temperature source Oral, resp. rate 18, height 180.3 cm (71\"), weight (!) 146 kg (322 lb 11.2 oz), SpO2 95 %.    Lungs:  Normal effort and normal respiratory rate.  He is not in respiratory distress.  There are decreased breath sounds.    Heart: Normal rate.  Regular rhythm.    Abdomen: Abdomen is soft.  Bowel sounds are normal.   There is no abdominal tenderness.     Extremities: There is dependent edema.  There is no local swelling.    Neurological: Patient is alert and oriented to person, place and time.    Skin:  Warm and dry.  (Improvement of erythema and warmth over the left lower leg)            Results Review:       I reviewed the patient's new clinical results.    Results from last 7 days   Lab Units 20  0433 20  1359   WBC 10*3/mm3 4.33 5.24 5.93   HEMOGLOBIN g/dL 10.9* 11.6* 13.1   PLATELETS 10*3/mm3 167 172 222     Results from last 7 days   Lab Units 20  0433 20  0620  1359   SODIUM mmol/L 134* 136 136   POTASSIUM mmol/L 4.6 4.9 5.3*   CHLORIDE mmol/L 105 108* 104   CO2 mmol/L 14.9* 14.4* 15.9*   BUN mg/dL 78* 75* 70*   CREATININE mg/dL 7.28* 7.15* 6.95*   CALCIUM mg/dL 7.5* 7.7* 8.3*   GLUCOSE mg/dL 122* 121* 115*       "           Scheduled Meds:     aspirin 81 mg Oral Daily   carvedilol 25 mg Oral BID With Meals   clotrimazole-betamethasone  Topical Q12H   influenza vaccine 0.5 mL Intramuscular Once   insulin lispro 0-9 Units Subcutaneous 4x Daily With Meals & Nightly   isosorbide mononitrate 60 mg Oral Q24H   sodium chloride 10 mL Intravenous Q12H   Vancomycin Pharmacy Intermittent Dosing  Does not apply Daily     PRN Meds:   dextrose  •  dextrose  •  glucagon (human recombinant)  •  nitroglycerin  •  Pharmacy to dose vancomycin  •  sodium chloride  •  sodium chloride  Continuous Infusions:    Pharmacy to dose vancomycin        Assessment/Plan   Active Hospital Problems    Diagnosis  POA   • **Anasarca associated with disorder of kidney [N04.9]  Yes   • Diabetes mellitus (CMS/[facility]) [E11.9]  Unknown   • Redness and swelling of lower leg [M79.89, R23.8]  Unknown   • Essential hypertension [I10]  Yes   • Mitral regurgitation [I34.0]  Yes   • Stage III chronic kidney disease (CMS/[facility]) [N18.3]  Yes      Resolved Hospital Problems   No resolved problems to display.       Assessment & Plan    -Aggressive diuresis has been initiated by nephrology.  Creatinine and BUN slightly worse today.  May ultimately require dialysis.  Appreciate nephrology's assistance.  -2D echocardiogram noted.  EF now down to 33%..  Known severe mitral regurgitation was demonstrated.  He also has a moderate sized PFO.  Will ask his cardiologist to evaluate.  Appears that he may have been lost to follow-up.  -Cellulitis of the left leg versus stasis dermatitis.  This appears improved today.  We will stop the vancomycin and start oral doxycycline for a 5-day course.  -Blood sugars well controlled at this time    Pollo Diaz MD  [facility]  03/14/20  11:16 AM

## 2020-03-14 NOTE — CONSULTS
Date of Hospital Visit: 20  Encounter Provider: Aguilar Morataya MD  Place of Service: [facility] CARDIOLOGY  Patient Name: Nico King  :1970  [id number]  Referral Provider: Mark Pal MD    Chief complaint: anasarca    Reason for Consult: systolic CHF, severe MR    History of Present Illness: Mr King is a 49 year old male patient of Dr. Hinton's with history of HTN, severe MR, CKD3, Dm and tobacco abuse. He was first seen in the ED by  in the summer of  after recently moving from [place] and running out of his Lasix/Hydralazine. An echocardiogram completed at that time noted normal LVSF with EF 50% as well as severe MR. He was also noted to have elevated blood pressure at that time, in the 190's systolic. He was admitted, diresed and sent home on a new medication regimen of Norvasc, Coreg, Imdur, Hydralazine and Lasix. He was most recently seen in office in 2018, but has not followed up since.     He presented to [facility] ED 3/12/20 with reports of worsening anasarca, ongoing for the past 3-4 weeks, and associated shortness of breath, nausea and vomiting. He also reported worsening abdominal, BLE and genital swelling. CXR noted small bilateral pleural effusions and basilar atelectasis. He was diuresed with IV Lasix and admitted. An echocardiogram obtained noted mildly reduced LVSF with EF 36-40%, as well as global hypokinesis, severe thickening of the noncoronary cusp of the aortic valve, mild MAC, moderate to severe MR with a posteriorly-directed jet, mild TR and a moderate PFO.     This is a karen of Dr. Zambrano to has had longstanding severe mitral insufficiency he has been lost to follow-up has not been seen in a couple of years now shows up anasarca heart failure EF is dropped it used to be low normal now it is low still with severe MR.  Now advanced renal failure.  Ongoing tobacco abuse, also with  diabetes      Echocardiogram 3/13/20  · Calculated EF = 33.0%. Estimated EF was in disagreement with the calculated EF. Estimated EF appears to be in the range of 36 - 40%. There is left ventricular global hypokinesis noted. The left ventricular cavity is moderately dilated. Left ventricular wall thickness is consistent with mild concentric hypertrophy. Left ventricular diastolic function was indeterminate.  · Mildly reduced right ventricular systolic function noted.  · Left atrial volume is moderately increased.  · Right atrial cavity size is moderately dilated  · There is severe thickening of the noncoronary cusp of the aortic valve.  · Mild MAC is present. Moderate-to-severe mitral valve regurgitation is present with a posteriorly-directed jet noted. The mitral valve regurgitation is likely underestimated. No significant mitral valve stenosis is present.  · Mild tricuspid valve regurgitation is present. Estimated right ventricular systolic pressure from tricuspid regurgitation is normal (<35 mmHg). Calculated right ventricular systolic pressure from tricuspid regurgitation is 29 mmHg.  · Addendum: the interatrial septum appears redundant. Moderate patent foramen ovale present. Saline test results are positive.    CXR 3/12/20  IMPRESSION:  Small bilateral basilar atelectasis/infiltrate and pleural  effusions, follow-up suggested. Borderline heart size.    Previous Cardiac Testing:    Echocardiogram 7/1/18  · Left ventricular wall thickness is consistent with mild concentric hypertrophy.  · There is calcification of the aortic valve.  · Left atrial cavity size is moderate-to-severely dilated.  · Severe mitral valve regurgitation is present  · Left ventricular systolic function is normal. Estimated EF = 50%.    Past Medical History:   Diagnosis Date   • DESHAWN (acute kidney injury) (CMS/[facility])    • CHF (congestive heart failure) (CMS/[facility])    • Cigarette smoker    • Diabetes mellitus (CMS/[facility])    • Foot callus    •  Hypertension    • Mitral regurgitation     severe eccentric per echo 7/2018   • Pleural effusion 06/30/2018    Bilateral, small per x-ray   • Stage III chronic kidney disease (CMS/HCC)        Past Surgical History:   Procedure Laterality Date   • HIP SURGERY         Medications Prior to Admission   Medication Sig Dispense Refill Last Dose   • aspirin 81 MG EC tablet Take 81 mg by mouth Daily.   3/12/2020 at 0900   • amLODIPine (NORVASC) 5 MG tablet Take 1 tablet by mouth Daily. 90 tablet 0 More than a month at Unknown time   • carvedilol (COREG) 25 MG tablet Take 1 tablet by mouth 2 (Two) Times a Day. 180 tablet 0 More than a month at Unknown time   • furosemide (LASIX) 40 MG tablet Take 1 tablet by mouth 2 (Two) Times a Day. 180 tablet 0 More than a month at Unknown time   • hydrALAZINE (APRESOLINE) 100 MG tablet Take 0.5 tablets by mouth 3 (Three) Times a Day. 90 tablet 0 More than a month at Unknown time   • isosorbide mononitrate (IMDUR) 60 MG 24 hr tablet Take 1 tablet by mouth Daily. 90 tablet 0 More than a month at Unknown time       Current Meds  Scheduled Meds:    aspirin 81 mg Oral Daily   carvedilol 25 mg Oral BID With Meals   clotrimazole-betamethasone  Topical Q12H   doxycycline 100 mg Oral Q12H   influenza vaccine 0.5 mL Intramuscular Once   insulin lispro 0-9 Units Subcutaneous 4x Daily With Meals & Nightly   isosorbide mononitrate 60 mg Oral Q24H   sodium chloride 10 mL Intravenous Q12H     Continuous Infusions:    bumetanide 1 mg/hr     PRN Meds:.dextrose  •  dextrose  •  glucagon (human recombinant)  •  nitroglycerin  •  sodium chloride  •  sodium chloride    Allergies as of 03/12/2020   • (No Known Allergies)       Social History     Socioeconomic History   • Marital status: Single     Spouse name: Not on file   • Number of children: Not on file   • Years of education: Not on file   • Highest education level: Not on file   Tobacco Use   • Smoking status: Current Every Day Smoker     Packs/day:  "0.50     Types: Cigarettes   • Smokeless tobacco: Current User     Types: Chew   • Tobacco comment: caffeine use   Substance and Sexual Activity   • Alcohol use: No   • Drug use: No   • Sexual activity: Defer       Family History   Problem Relation Age of Onset   • Hypertension Mother    • Atrial fibrillation Father    • Hypertension Father        REVIEW OF SYSTEMS:   ROS was performed and is negative except as outlined in HPI     REVIEW OF SYSTEMS:   CONSTITUTIONAL: No weight loss, fever, chills, weakness or fatigue.   HEENT: Eyes: No visual loss, blurred vision, double vision or yellow sclerae. Ears, Nose, Throat: No hearing loss, sneezing, congestion, runny nose or sore throat.   SKIN: No rash or itching.     RESPIRATORY: No shortness of breath, hemoptysis, cough or sputum.   GASTROINTESTINAL: No anorexia, nausea, vomiting or diarrhea. No abdominal pain, bright red blood per rectum or melena.  GENITOURINARY: No burning on urination, hematuria or increased frequency.  NEUROLOGICAL: No headache, dizziness, syncope, paralysis, ataxia, numbness or tingling in the extremities. No change in bowel or bladder control.   MUSCULOSKELETAL: No muscle, back pain, joint pain or stiffness.   HEMATOLOGIC: No anemia, bleeding or bruising.   LYMPHATICS: No enlarged nodes. No history of splenectomy.   PSYCHIATRIC: No history of depression, anxiety, hallucinations.   ENDOCRINOLOGIC: No reports of sweating, cold or heat intolerance. No polyuria or polydipsia.        Objective:   Temp:  [97.2 °F (36.2 °C)-97.8 °F (36.6 °C)] 97.8 °F (36.6 °C)  Heart Rate:  [73-77] 76  Resp:  [18-26] 18  BP: (122-137)/() 137/98  Body mass index is 45.01 kg/m².  Flowsheet Rows      First Filed Value   Admission Height  180.3 cm (71\") Documented at 03/12/2020 1350   Admission Weight  136 kg (300 lb) Documented at 03/12/2020 1350        Vitals:    03/14/20 0740   BP: 137/98   Pulse: 76   Resp:    Temp:    SpO2:        Head:    Normocephalic, without " obvious abnormality, atraumatic   Eyes:            Lids and lashes normal, conjunctivae and sclerae normal, no   icterus, no pallor   Ears:    Ears appear intact with no abnormalities noted   Throat:   No oral lesions, dentition good   Neck:   No adenopathy, supple, trachea midline, no thyromegaly, no   carotid bruit, no JVD   Lungs:     Breath sounds are equal and clear to auscultation    Heart:    Normal S1 and S2, RRR, 3 out of 6 holosystolic murmur at the apex M/G/R   Abdomen:     Normal bowel sounds, no masses, no organomegaly, soft        non-tender, non-distended, no guarding   Extremities:   Moves all extremities well, +2 edema, no cyanosis, no redness   Pulses:   Pulses palpable and equal bilaterally.    Skin:  Psychiatric:   No bleeding, bruising or rash    Awake, alert and oriented x 3, normal mood and affect           EKG 3/12/20    Previous EKG 10/17/19          I personally viewed and interpreted the patient's EKG/Telemetry data    Assessment:  Active Hospital Problems    Diagnosis  POA   • **Anasarca associated with disorder of kidney [N04.9]  Yes   • Systolic CHF, acute (CMS/HCC) [I50.21]  Unknown   • Diabetes mellitus (CMS/HCC) [E11.9]  Unknown   • Cellulitis of left leg [L03.116]  Unknown   • Essential hypertension [I10]  Yes   • Nonrheumatic mitral valve regurgitation, severe [I34.0]  Yes   • Stage III chronic kidney disease (CMS/HCC) [N18.3]  Yes      Resolved Hospital Problems   No resolved problems to display.       Plan: Well I am afraid receiving the natural history of severe mitral insufficiency that is not treated.  Now he has pretty severe cardiomyopathy as well as severe renal insufficiency I think we probably need to start with a MEGHA on Monday and possibly a left and right heart cath.  And we need to be looking forward toward mitral valve surgery.    At this time it is very likely he will end up going on to dialysis either with the contrast or at the time of his mitral surgery so we need  to be moving forward with dialysis readiness  .  Aguilar Morataya MD  03/14/20  13:21.

## 2020-03-14 NOTE — PROGRESS NOTES
"   LOS: 2 days    Patient Care Team:  Provider, No Known as PCP - General    Chief Complaint:    Chief Complaint   Patient presents with   • Edema     Follow-up acute kidney injury on chronic kidney disease  Subjective     Interval History:   Patient is feeling the same since yesterday, no chest pain or shortness of air, no nausea or vomiting, no metallic taste, no significant sleep disturbance but he noted that his cognitive function is slower, he has lower extremity edema denies lightheadedness.  He was given Bumex 4 mg every 8 hours x 3 doses urine output was not very brisk      Review of Systems:   As noted above    Objective     Vital Signs  Temp:  [97.2 °F (36.2 °C)-97.8 °F (36.6 °C)] 97.8 °F (36.6 °C)  Heart Rate:  [73-77] 76  Resp:  [18-26] 18  BP: (122-137)/() 137/98    Flowsheet Rows      First Filed Value   Admission Height  180.3 cm (71\") Documented at 03/12/2020 1350   Admission Weight  136 kg (300 lb) Documented at 03/12/2020 1350          I/O this shift:  In: -   Out: 400 [Urine:400]  I/O last 3 completed shifts:  In: 1895 [P.O.:1145; I.V.:500; IV Piggyback:250]  Out: 2475 [Urine:2475]    Intake/Output Summary (Last 24 hours) at 3/14/2020 1247  Last data filed at 3/14/2020 1036  Gross per 24 hour   Intake 570 ml   Output 1275 ml   Net -705 ml       Physical Exam:  General Appearance: alert, oriented x 3, no acute distress, disheveled, chronically ill  Skin: warm and dry  HEENT: pupils round and reactive to light, oral mucosa normal, nonicteric sclerae  Neck: supple, no JVD, trachea midline  Lungs: Bibasilar crackles and rhonchi, unlabored breathing effort  Heart: RRR, normal S1 and S2, no S3, no rub, grade 3/6 systolic murmur  Abdomen: soft, non-tender,  present bowel sounds to auscultation  : no palpable bladder, has scrotal and penile edema  Extremities: No cyanosis or clubbing, he has 3+ lower extremity edema with some chronic erythema bilaterally  Neuro: normal speech and mental " status        Results Review:    Results from last 7 days   Lab Units 03/14/20  0433 03/13/20  0621 03/12/20  1359   SODIUM mmol/L 134* 136 136   POTASSIUM mmol/L 4.6 4.9 5.3*   CHLORIDE mmol/L 105 108* 104   CO2 mmol/L 14.9* 14.4* 15.9*   BUN mg/dL 78* 75* 70*   CREATININE mg/dL 7.28* 7.15* 6.95*   CALCIUM mg/dL 7.5* 7.7* 8.3*   BILIRUBIN mg/dL  --  0.4 0.4   ALK PHOS U/L  --  68 86   ALT (SGPT) U/L  --  51* 74*   AST (SGOT) U/L  --  18 31   GLUCOSE mg/dL 122* 121* 115*       Estimated Creatinine Clearance: 18.3 mL/min (A) (by C-G formula based on SCr of 7.28 mg/dL (H)).    Results from last 7 days   Lab Units 03/14/20  0433   MAGNESIUM mg/dL 2.2   PHOSPHORUS mg/dL 6.2*       Results from last 7 days   Lab Units 03/14/20  0433   URIC ACID mg/dL 9.4*       Results from last 7 days   Lab Units 03/14/20  0433 03/13/20  0621 03/12/20  1359   WBC 10*3/mm3 4.33 5.24 5.93   HEMOGLOBIN g/dL 10.9* 11.6* 13.1   PLATELETS 10*3/mm3 167 172 222               Imaging Results (Last 24 Hours)     ** No results found for the last 24 hours. **          aspirin 81 mg Oral Daily   carvedilol 25 mg Oral BID With Meals   clotrimazole-betamethasone  Topical Q12H   doxycycline 100 mg Oral Q12H   influenza vaccine 0.5 mL Intramuscular Once   insulin lispro 0-9 Units Subcutaneous 4x Daily With Meals & Nightly   isosorbide mononitrate 60 mg Oral Q24H   sodium chloride 10 mL Intravenous Q12H          Medication Review:   Current Facility-Administered Medications   Medication Dose Route Frequency Provider Last Rate Last Dose   • aspirin EC tablet 81 mg  81 mg Oral Daily Mark Pal MD   81 mg at 03/14/20 1035   • carvedilol (COREG) tablet 25 mg  25 mg Oral BID With Meals Mark Pal MD   25 mg at 03/14/20 1030   • clotrimazole-betamethasone (LOTRISONE) 1-0.05 % cream   Topical Q12H Mark Pal MD       • dextrose (D50W) 25 g/ 50mL Intravenous Solution 25 g  25 g Intravenous Q15 Min PRN Mark Pal MD       • dextrose (GLUTOSE) oral gel  15 g  15 g Oral Q15 Min PRN Mark Pal MD       • doxycycline (MONODOX) capsule 100 mg  100 mg Oral Q12H Pollo Diaz MD       • glucagon (human recombinant) (GLUCAGEN DIAGNOSTIC) injection 1 mg  1 mg Subcutaneous Q15 Min PRN Mark Pal MD       • influenza vac split quad (FLUZONE,FLUARIX,AFLURIA) injection 0.5 mL  0.5 mL Intramuscular Once Mark Pal MD       • insulin lispro (humaLOG) injection 0-9 Units  0-9 Units Subcutaneous 4x Daily With Meals & Nightly Mark Pal MD       • isosorbide mononitrate (IMDUR) 24 hr tablet 60 mg  60 mg Oral Q24H Mark Pal MD   60 mg at 03/14/20 1030   • nitroglycerin (NITROSTAT) SL tablet 0.4 mg  0.4 mg Sublingual Q5 Min PRN Mark Pal MD       • sodium chloride 0.9 % flush 10 mL  10 mL Intravenous PRN Mark Pal MD       • sodium chloride 0.9 % flush 10 mL  10 mL Intravenous Q12H Mark Pal MD   10 mL at 03/14/20 1031   • sodium chloride 0.9 % flush 10 mL  10 mL Intravenous PRN Mark Pal MD           Assessment/Plan   1.  Acute on chronic kidney disease stage IV associate with hypertensive nephropathy, noncompliance with medical care.  Suspect that he is having progression toward ESRD he had significant fluid excess.  His creatinine up to 7.28 today.  Electrolytes within acceptable range other than total CO2 14.9.  Also with right-sided heart failure leads to increase renal vein pressure and that also can lower the GFR.  There is no lauren uremic symptoms other than slow cognition.  2.  Cellulitis of left lower extremity treated currently with vancomycin  3.  Severe mitral regurgitation and he has loud murmur  4.  Anasarca and fluid excess probably related to some degree of right-sided heart failure.  5.  Hypertension, better controlled  6.  Medical noncompliance.    Plan:  1.  Will try Bumex drip  2.  If he continues to have worsening creatinine and not adequate response to diuretics we may need to consider initiating dialysis  3.  Will  get vein mapping hopefully this admission in preparation for an AV fistula  4.  Surveillance labs        Camron Graves MD  03/14/20  12:47

## 2020-03-15 NOTE — PROGRESS NOTES
"   LOS: 3 days    Patient Care Team:  Provider, No Known as PCP - General    Chief Complaint:    Chief Complaint   Patient presents with   • Edema     Follow-up acute kidney injury on chronic kidney disease  Subjective     Interval History:   Patient is feeling the same since yesterday, no chest pain or shortness of air, no nausea or vomiting, no metallic taste, no significant sleep disturbance but he noted that his cognitive function is slower, he has lower extremity edema denies lightheadedness.  He was started yesterday on Bumex drip with some improvement of the urine output and the edema    Review of Systems:   As noted above    Objective     Vital Signs  Temp:  [97.8 °F (36.6 °C)-98.7 °F (37.1 °C)] 98 °F (36.7 °C)  Heart Rate:  [77-83] 83  Resp:  [16-18] 18  BP: (127-139)/(89-97) 130/96    Flowsheet Rows      First Filed Value   Admission Height  180.3 cm (71\") Documented at 03/12/2020 1350   Admission Weight  136 kg (300 lb) Documented at 03/12/2020 1350          I/O this shift:  In: 240 [P.O.:240]  Out: 700 [Urine:700]  I/O last 3 completed shifts:  In: 490 [P.O.:370; I.V.:120]  Out: 2700 [Urine:2700]    Intake/Output Summary (Last 24 hours) at 3/15/2020 1330  Last data filed at 3/15/2020 1030  Gross per 24 hour   Intake 610 ml   Output 2500 ml   Net -1890 ml       Physical Exam:  General Appearance: alert, oriented x 3, no acute distress, disheveled, chronically ill  Skin: warm and dry  HEENT: pupils round and reactive to light, oral mucosa normal, nonicteric sclerae  Neck: supple, no JVD, trachea midline  Lungs: Bibasilar crackles and rhonchi, unlabored breathing effort  Heart: RRR, normal S1 and S2, no S3, no rub, grade 3/6 systolic murmur  Abdomen: soft, non-tender,  present bowel sounds to auscultation  : no palpable bladder, has scrotal and penile edema  Extremities: No cyanosis or clubbing, he has 3+ lower extremity edema with some chronic erythema bilaterally  Neuro: normal speech and mental " status        Results Review:    Results from last 7 days   Lab Units 03/15/20  0530 03/14/20  0433 03/13/20 0621 03/12/20  1359   SODIUM mmol/L 134* 134* 136 136   POTASSIUM mmol/L 4.8 4.6 4.9 5.3*   CHLORIDE mmol/L 101 105 108* 104   CO2 mmol/L 17.1* 14.9* 14.4* 15.9*   BUN mg/dL 79* 78* 75* 70*   CREATININE mg/dL 7.07* 7.28* 7.15* 6.95*   CALCIUM mg/dL 7.5* 7.5* 7.7* 8.3*   BILIRUBIN mg/dL  --   --  0.4 0.4   ALK PHOS U/L  --   --  68 86   ALT (SGPT) U/L  --   --  51* 74*   AST (SGOT) U/L  --   --  18 31   GLUCOSE mg/dL 124* 122* 121* 115*       Estimated Creatinine Clearance: 18.8 mL/min (A) (by C-G formula based on SCr of 7.07 mg/dL (H)).    Results from last 7 days   Lab Units 03/15/20  0530 03/14/20  0433   MAGNESIUM mg/dL 2.2 2.2   PHOSPHORUS mg/dL 6.3* 6.2*       Results from last 7 days   Lab Units 03/15/20  0530 03/14/20  0433   URIC ACID mg/dL 9.6* 9.4*       Results from last 7 days   Lab Units 03/15/20  0530 03/14/20  0433 03/13/20 0621 03/12/20  1359   WBC 10*3/mm3 4.91 4.33 5.24 5.93   HEMOGLOBIN g/dL 10.9* 10.9* 11.6* 13.1   PLATELETS 10*3/mm3 193 167 172 222               Imaging Results (Last 24 Hours)     ** No results found for the last 24 hours. **          aspirin 81 mg Oral Daily   carvedilol 25 mg Oral BID With Meals   clotrimazole-betamethasone  Topical Q12H   doxycycline 100 mg Oral Q12H   influenza vaccine 0.5 mL Intramuscular Once   insulin lispro 0-9 Units Subcutaneous 4x Daily With Meals & Nightly   isosorbide mononitrate 60 mg Oral Q24H   sodium chloride 10 mL Intravenous Q12H       bumetanide 1 mg/hr Last Rate: 1 mg/hr (03/15/20 5740)       Medication Review:   Current Facility-Administered Medications   Medication Dose Route Frequency Provider Last Rate Last Dose   • aspirin EC tablet 81 mg  81 mg Oral Daily Mark Pal MD   81 mg at 03/15/20 1034   • bumetanide (BUMEX) 12.5 mg in sodium chloride 0.9 % 125 mL (0.1 mg/mL) infusion  1 mg/hr Intravenous Continuous Camron Graves  MD Yair 10 mL/hr at 03/15/20 0540 1 mg/hr at 03/15/20 0540   • carvedilol (COREG) tablet 25 mg  25 mg Oral BID With Meals Mark Pal MD   25 mg at 03/15/20 1034   • clotrimazole-betamethasone (LOTRISONE) 1-0.05 % cream   Topical Q12H Mark Pal MD       • dextrose (D50W) 25 g/ 50mL Intravenous Solution 25 g  25 g Intravenous Q15 Min PRN Mark Pal MD       • dextrose (GLUTOSE) oral gel 15 g  15 g Oral Q15 Min PRN Mark Pal MD       • doxycycline (MONODOX) capsule 100 mg  100 mg Oral Q12H Pollo Diaz MD   100 mg at 03/15/20 1033   • glucagon (human recombinant) (GLUCAGEN DIAGNOSTIC) injection 1 mg  1 mg Subcutaneous Q15 Min PRN Mark Pal MD       • influenza vac split quad (FLUZONE,FLUARIX,AFLURIA) injection 0.5 mL  0.5 mL Intramuscular Once Mark Pal MD       • insulin lispro (humaLOG) injection 0-9 Units  0-9 Units Subcutaneous 4x Daily With Meals & Nightly Mark Pal MD       • isosorbide mononitrate (IMDUR) 24 hr tablet 60 mg  60 mg Oral Q24H Mark Pal MD   60 mg at 03/15/20 1034   • nitroglycerin (NITROSTAT) SL tablet 0.4 mg  0.4 mg Sublingual Q5 Min PRN Mark Pal MD       • sodium chloride 0.9 % flush 10 mL  10 mL Intravenous PRN Mark Pal MD       • sodium chloride 0.9 % flush 10 mL  10 mL Intravenous Q12H Mark Pal MD   10 mL at 03/15/20 1034   • sodium chloride 0.9 % flush 10 mL  10 mL Intravenous PRN Mark Pal MD           Assessment/Plan   1.  Acute on chronic kidney disease stage IV associate with hypertensive nephropathy, noncompliance with medical care.  Suspect that he is having progression toward ESRD he had significant fluid excess.  His creatinine today is down to 7.07, electrolytes within acceptable range other than total CO2 17.1.  Albumin 2.7 phosphorus 6.3, uric acid 9.6 and magnesium 2.2.   2.  Cellulitis of left lower extremity treated currently with vancomycin  3.  Severe mitral regurgitation and he has loud murmur  4.  Anasarca and  fluid excess probably related to some degree of right-sided heart failure.  5.  Hypertension, better controlled  6.  Medical noncompliance.    Plan:  1.  Continue the Bumex  2.  If he continues to have worsening creatinine and not adequate response to diuretics we may need to consider initiating dialysis  3.  If the creatinine continue to improve there will be no reason for pain mapping or AV fistula at this time  4.  Surveillance labs        Camron Graves MD  03/15/20  13:30

## 2020-03-15 NOTE — PROGRESS NOTES
" LOS: 3 days     Name: Nico King  Age: 49 y.o.  Sex: male  :  1970  MRN: [id number]         Primary Care Physician: Provider, No Known    Subjective   Subjective  No new complaints or overnight events.  Denies shortness of breath or chest pain.  Feels as if his left leg swelling and redness is improving.  Denies fevers or chills.    Objective   Vital Signs  Temp:  [97.8 °F (36.6 °C)-98 °F (36.7 °C)] 98 °F (36.7 °C)  Heart Rate:  [79-83] 83  Resp:  [16-18] 18  BP: (127-132)/(89-97) 130/96  Body mass index is 45.08 kg/m².    Objective:  General Appearance:  Comfortable and in no acute distress.    Vital signs: (most recent): Blood pressure 130/96, pulse 83, temperature 98 °F (36.7 °C), temperature source Oral, resp. rate 18, height 180.3 cm (71\"), weight (!) 147 kg (323 lb 3.2 oz), SpO2 95 %.    Lungs:  Normal effort and normal respiratory rate.    Heart: Normal rate.  Regular rhythm.    Abdomen: Abdomen is soft.  Bowel sounds are normal.   There is no abdominal tenderness.     Extremities: There is dependent edema.  There is no local swelling.  (Anasarca)  Neurological: Patient is alert and oriented to person, place and time.    Skin:  Warm and dry.  (Improving erythema and warmth to the left leg)            Results Review:       I reviewed the patient's new clinical results.    Results from last 7 days   Lab Units 03/15/20  0520  1359   WBC 10*3/mm3 4.91 4.33 5.24 5.93   HEMOGLOBIN g/dL 10.9* 10.9* 11.6* 13.1   PLATELETS 10*3/mm3 193 167 172 222     Results from last 7 days   Lab Units 03/15/20  0530 20  0433 20  1359   SODIUM mmol/L 134* 134* 136 136   POTASSIUM mmol/L 4.8 4.6 4.9 5.3*   CHLORIDE mmol/L 101 105 108* 104   CO2 mmol/L 17.1* 14.9* 14.4* 15.9*   BUN mg/dL 79* 78* 75* 70*   CREATININE mg/dL 7.07* 7.28* 7.15* 6.95*   CALCIUM mg/dL 7.5* 7.5* 7.7* 8.3*   GLUCOSE mg/dL 124* 122* 121* 115*         Scheduled Meds: "     aspirin 81 mg Oral Daily   carvedilol 25 mg Oral BID With Meals   clotrimazole-betamethasone  Topical Q12H   doxycycline 100 mg Oral Q12H   influenza vaccine 0.5 mL Intramuscular Once   insulin lispro 0-9 Units Subcutaneous 4x Daily With Meals & Nightly   isosorbide mononitrate 60 mg Oral Q24H   sodium chloride 10 mL Intravenous Q12H     PRN Meds:   dextrose  •  dextrose  •  glucagon (human recombinant)  •  nitroglycerin  •  sodium chloride  •  sodium chloride  Continuous Infusions:    bumetanide 1 mg/hr Last Rate: 1 mg/hr (03/15/20 0540)       Assessment/Plan   Active Hospital Problems    Diagnosis  POA   • **Anasarca associated with disorder of kidney [N04.9]  Yes   • Systolic CHF, acute (CMS/HCC) [I50.21]  Unknown   • Diabetes mellitus (CMS/HCC) [E11.9]  Unknown   • Cellulitis of left leg [L03.116]  Unknown   • Essential hypertension [I10]  Yes   • Nonrheumatic mitral valve regurgitation, severe [I34.0]  Yes   • Stage III chronic kidney disease (CMS/HCC) [N18.3]  Yes      Resolved Hospital Problems   No resolved problems to display.       Assessment & Plan    -Transitioned to a Bumex drip today. May ultimately require dialysis.  Appreciate nephrology's assistance.  -2D echocardiogram noted.  EF now down to 33%..  Known severe mitral regurgitation was demonstrated.  Cardiology has evaluated and preparing for additional work-up with consideration for valve replacement  -Cellulitis of the left leg versus stasis dermatitis.  This appears improved today.  Will complete a course of doxycycline  -Blood sugars well controlled at this time    Pollo Diaz MD  [facility]  03/15/20  2:10 PM

## 2020-03-15 NOTE — PLAN OF CARE
Rested well through night, Bumex gtt infusing. VSS, denies chest pain, soa with activity. Cont to monitor.

## 2020-03-15 NOTE — PROGRESS NOTES
"Nico King  1970 49 y.o.  [id number]      Patient Care Team:  Provider, No Known as PCP - General    CC: Severe mitral insufficiency, severe cardiomyopathy, end-stage renal failure    Interval History: No significant change      Objective   Vital Signs  Temp:  [97.8 °F (36.6 °C)-98.5 °F (36.9 °C)] 98.5 °F (36.9 °C)  Heart Rate:  [72-83] 72  Resp:  [16-18] 18  BP: (127-133)/(89-97) 133/97    Intake/Output Summary (Last 24 hours) at 3/15/2020 1616  Last data filed at 3/15/2020 1030  Gross per 24 hour   Intake 410 ml   Output 2300 ml   Net -1890 ml     Flowsheet Rows      First Filed Value   Admission Height  180.3 cm (71\") Documented at 03/12/2020 1350   Admission Weight  136 kg (300 lb) Documented at 03/12/2020 1350          Physical Exam:   General Appearance:    Alert,oriented, in no acute distress   Lungs:     Clear to auscultation,BS are equal    Heart:    Normal S1 and S2, RRR with 2 out of 6 holosystolic t murmur, gallop or rub   HEENT:    Sclerae are clear, no JVD or adenopathy   Abdomen:     Normal bowel sounds, soft non-tender, non-distended, no HSM   Extremities:   Moves all extremities well, no edema, no cyanosis, no             Redness, no rash     Medication Review:        aspirin 81 mg Oral Daily   carvedilol 25 mg Oral BID With Meals   clotrimazole-betamethasone  Topical Q12H   doxycycline 100 mg Oral Q12H   hydrALAZINE 10 mg Oral Q8H   influenza vaccine 0.5 mL Intramuscular Once   insulin lispro 0-9 Units Subcutaneous 4x Daily With Meals & Nightly   isosorbide mononitrate 60 mg Oral Q24H   sodium chloride 10 mL Intravenous Q12H       bumetanide 1 mg/hr Last Rate: 1 mg/hr (03/15/20 0540)         I reviewed the patient's new clinical results.  I personally viewed and interpreted the patient's EKG/Telemetry data    Assessment/Plan  Active Hospital Problems    Diagnosis  POA   • **Anasarca associated with disorder of kidney [N04.9]  Yes   • Systolic CHF, acute (CMS/HCC) [I50.21]  Unknown "   • Diabetes mellitus (CMS/HCC) [E11.9]  Unknown   • Cellulitis of left leg [L03.116]  Unknown   • Essential hypertension [I10]  Yes   • Nonrheumatic mitral valve regurgitation, severe [I34.0]  Yes   • Stage III chronic kidney disease (CMS/HCC) [N18.3]  Yes      Resolved Hospital Problems   No resolved problems to display.       This karen is in deep trouble is got a severe cardiomyopathy severe MR I think we got a move forward to look and see if there is something we can do about his valve working to get a MEGHA and a left and right heart cath tomorrow likely that will push him over onto dialysis    Aguilar Morataya MD  03/15/20  16:16

## 2020-03-16 NOTE — PROGRESS NOTES
Progress Note    Name: Nico King ADMIT: 3/12/2020   : 1970  PCP: Provider, No Known    MRN: [id number] LOS: 4 days   AGE/SEX: 49 y.o. male  ROOM: S611/1   Date of Encounter Visit: 20    Subjective:     Interval History: plan for MEGHA and left and heart heart cath today.     REVIEW OF SYSTEMS:   no fever or chills. Dizziness when ambulating.  No chest pain, palpitations. Stable edema.   SOA worse when lying down. Mild cough with white phelgm  No nausea, vomiting or diarrhea. No abdominal pain or blood.     Objective:   Temp:  [97.3 °F (36.3 °C)-98.5 °F (36.9 °C)] 97.3 °F (36.3 °C)  Heart Rate:  [65-75] 65  Resp:  [16-24] 18  BP: (109-160)/() 151/97   SpO2:  [90 %-99 %] 99 %  on  Flow (L/min):  [2-4] 4 Device (Oxygen Therapy): nasal cannula    Intake/Output Summary (Last 24 hours) at 3/16/2020 1402  Last data filed at 3/16/2020 1100  Gross per 24 hour   Intake 120 ml   Output 2475 ml   Net -2355 ml     Body mass index is 44.91 kg/m².      03/15/20  0518 20  0500 20  1155   Weight: (!) 147 kg (323 lb 3.2 oz) (!) 146 kg (321 lb 12.8 oz) (!) 146 kg (322 lb)     Weight change: -0.635 kg (-1 lb 6.4 oz)    Physical Exam   Constitutional: No distress.   HENT:   Head: Atraumatic.   Nose: Nose normal.   Eyes: Conjunctivae are normal.   Cardiovascular: Normal rate and regular rhythm.   Murmur heard.  Pulmonary/Chest: Effort normal. He has no wheezes. He has no rales.   Diminished bases   Abdominal: Soft. Bowel sounds are normal. He exhibits distension (mild). There is no tenderness.   Musculoskeletal: He exhibits edema (3-4+ BLE).   Neurological: He is alert.   Skin: Skin is warm and dry. He is not diaphoretic. There is erythema (mild left shin).       Results Review:      Results from last 7 days   Lab Units 20  0528 03/15/20  0530 20  0433 20  0621 20  1359   SODIUM mmol/L 135* 134* 134* 136 136   POTASSIUM mmol/L 4.2 4.8 4.6 4.9 5.3*   CHLORIDE mmol/L 106  101 105 108* 104   CO2 mmol/L 15.7* 17.1* 14.9* 14.4* 15.9*   BUN mg/dL 74* 79* 78* 75* 70*   CREATININE mg/dL 7.20* 7.07* 7.28* 7.15* 6.95*   GLUCOSE mg/dL 103* 124* 122* 121* 115*   CALCIUM mg/dL 7.0* 7.5* 7.5* 7.7* 8.3*   AST (SGOT) U/L  --   --   --  18 31   ALT (SGPT) U/L  --   --   --  51* 74*     Estimated Creatinine Clearance: 18.5 mL/min (A) (by C-G formula based on SCr of 7.2 mg/dL (H)).      Results from last 7 days   Lab Units 03/16/20  0628 03/15/20  2053 03/15/20  1711 03/15/20  1100 03/15/20  0641 03/14/20  2101 03/14/20  1624 03/14/20  1125   GLUCOSE mg/dL 117 138* 108 141* 126 148* 154* 120     Results from last 7 days   Lab Units 03/12/20  1359   TROPONIN T ng/mL 0.211*     Results from last 7 days   Lab Units 03/12/20  1359   PROBNP pg/mL >70,000.0*         Results from last 7 days   Lab Units 03/16/20  0528 03/15/20  0530 03/14/20  0433   MAGNESIUM mg/dL 1.9 2.2 2.2           Invalid input(s): LDLCALC  Results from last 7 days   Lab Units 03/16/20  0528 03/15/20  0530 03/14/20  0433 03/13/20  0621 03/12/20  1359   WBC 10*3/mm3 4.83 4.91 4.33 5.24 5.93   HEMOGLOBIN g/dL 9.9* 10.9* 10.9* 11.6* 13.1   HEMATOCRIT % 32.9* 36.6* 35.4* 37.8 42.7   PLATELETS 10*3/mm3 168 193 167 172 222   MCV fL 76.9* 78.0* 77.5* 79.1 77.4*   MCH pg 23.1* 23.2* 23.9* 24.3* 23.7*   MCHC g/dL 30.1* 29.8* 30.8* 30.7* 30.7*   RDW % 16.7* 17.1* 16.9* 17.2* 16.8*   RDW-SD fl 45.9 47.9 48.0 49.5 46.3   MPV fL 10.7 10.6 10.6 10.1 10.0   NEUTROPHIL % % 68.4 66.6 71.1  --  79.3*   LYMPHOCYTE % % 15.5* 17.1* 12.7*  --  10.3*   MONOCYTES % % 11.8 12.0 11.5  --  9.1   EOSINOPHIL % % 3.3 3.3 3.5  --  0.3   BASOPHIL % % 0.6 0.6 0.7  --  0.5   IMM GRAN % % 0.4 0.4 0.5  --  0.5   NEUTROS ABS 10*3/mm3 3.30 3.27 3.08  --  4.70   LYMPHS ABS 10*3/mm3 0.75 0.84 0.55*  --  0.61*   MONOS ABS 10*3/mm3 0.57 0.59 0.50  --  0.54   EOS ABS 10*3/mm3 0.16 0.16 0.15  --  0.02   BASOS ABS 10*3/mm3 0.03 0.03 0.03  --  0.03   IMMATURE GRANS (ABS) 10*3/mm3  0.02 0.02 0.02  --  0.03   NRBC /100 WBC 0.0 0.2 0.0  --  0.0                                     Results from last 7 days   Lab Units 03/16/20  0528   URIC ACID mg/dL 10.2*       Imaging:  Imaging Results (Last 24 Hours)     ** No results found for the last 24 hours. **          I reviewed the patient's new clinical results and medications.         Medication Review:   Scheduled Meds:  aspirin 81 mg Oral Daily   carvedilol 25 mg Oral BID With Meals   clotrimazole-betamethasone  Topical Q12H   doxycycline 100 mg Oral Q12H   hydrALAZINE 10 mg Oral Q8H   influenza vaccine 0.5 mL Intramuscular Once   insulin lispro 0-9 Units Subcutaneous 4x Daily With Meals & Nightly   isosorbide mononitrate 60 mg Oral Q24H   sodium chloride 10 mL Intravenous Q12H     Continuous Infusions:  bumetanide 1 mg/hr Last Rate: 1 mg/hr (03/16/20 0621)     PRN Meds:.dextrose  •  dextrose  •  glucagon (human recombinant)  •  nitroglycerin  •  sodium chloride  •  sodium chloride    Problem List:     Active Hospital Problems    Diagnosis  POA   • **Anasarca associated with disorder of kidney [N04.9]  Yes   • Systolic CHF, acute (CMS/HCC) [I50.21]  Unknown   • Diabetes mellitus (CMS/HCC) [E11.9]  Unknown   • Cellulitis of left leg [L03.116]  Unknown   • Essential hypertension [I10]  Yes   • Nonrheumatic mitral valve regurgitation, severe [I34.0]  Yes   • Stage III chronic kidney disease (CMS/HCC) [N18.3]  Yes      Resolved Hospital Problems   No resolved problems to display.       Assessment and Plan:     Anasarca/ acute systolic CHF/ severe MR  -Echo showed EF reduced to 33% and severe MR  - on bumex drip  -plan for MEGHA and right and left heart cath today. May need to consider valve replacement surgery    DESHAWN/CKD  -nearing ESRD and will likely need to start HD after heart cath.   -vein mapping completed, will need vascular surgery to place temporary cath   -nephrology following. Electrolytes overall stable with some decrease in Co2    Cellulitis,  left leg  -cellulitis vs dermatitis. Overall improved.   -continue planned course of doxycycline    DM  -blood sugars well controlled.     Anemia  -Hgb trending down with no reported blood loss. Microcytic  -check iron studies and ferritin in am     VTE prophylaxis: SCDs  CODE status: full code  Disposition: TBD    I discussed the patients findings and my recommendations with patient and nursing staff.    Emily Yan, APRN  03/16/20

## 2020-03-16 NOTE — PROGRESS NOTES
"   LOS: 4 days    Patient Care Team:  Provider, No Known as PCP - General    Chief Complaint:    Chief Complaint   Patient presents with   • Edema     Follow-up acute kidney injury on chronic kidney disease  Subjective     Interval History:   Still feels very swollen; had cardiac catheterization a short while ago; although coronary arteries look okay, has significant valvular disease requiring surgical intervention, planned for later in the week    Review of Systems:   As noted above    Objective     Vital Signs  Temp:  [97.3 °F (36.3 °C)-97.6 °F (36.4 °C)] 97.3 °F (36.3 °C)  Heart Rate:  [65-75] 66  Resp:  [16-24] 16  BP: (109-160)/() 148/92    Flowsheet Rows      First Filed Value   Admission Height  180.3 cm (71\") Documented at 03/12/2020 1350   Admission Weight  136 kg (300 lb) Documented at 03/12/2020 1350          I/O this shift:  In: 100 [I.V.:100]  Out: 350 [Urine:350]  I/O last 3 completed shifts:  In: 530 [P.O.:410; I.V.:120]  Out: 4800 [Urine:4800]    Intake/Output Summary (Last 24 hours) at 3/16/2020 1826  Last data filed at 3/16/2020 1641  Gross per 24 hour   Intake 220 ml   Output 2150 ml   Net -1930 ml       Physical Exam:  General Appearance: alert, oriented x 3, no acute distress, disheveled, chronically ill  Skin: warm and dry  HEENT: pupils round and reactive to light, oral mucosa normal, nonicteric sclerae  Neck: supple, no JVD, trachea midline  Lungs: Bibasilar crackles and rhonchi, unlabored breathing effort  Heart: RRR, normal S1 and S2, no S3, no rub, grade 3/6 systolic murmur  Abdomen: soft, non-tender, present bowel sounds to auscultation  : no palpable bladder, has scrotal and penile edema  Extremities: No cyanosis or clubbing, 3-4+ LE edema bilaterally; chr venous stasis changes  Neuro: normal speech and mental status        Results Review:    Results from last 7 days   Lab Units 03/16/20  0528 03/15/20  0530 03/14/20  0433 03/13/20  0621 03/12/20  1359   SODIUM mmol/L 135* 134* " 134* 136 136   POTASSIUM mmol/L 4.2 4.8 4.6 4.9 5.3*   CHLORIDE mmol/L 106 101 105 108* 104   CO2 mmol/L 15.7* 17.1* 14.9* 14.4* 15.9*   BUN mg/dL 74* 79* 78* 75* 70*   CREATININE mg/dL 7.20* 7.07* 7.28* 7.15* 6.95*   CALCIUM mg/dL 7.0* 7.5* 7.5* 7.7* 8.3*   BILIRUBIN mg/dL  --   --   --  0.4 0.4   ALK PHOS U/L  --   --   --  68 86   ALT (SGPT) U/L  --   --   --  51* 74*   AST (SGOT) U/L  --   --   --  18 31   GLUCOSE mg/dL 103* 124* 122* 121* 115*       Estimated Creatinine Clearance: 18.5 mL/min (A) (by C-G formula based on SCr of 7.2 mg/dL (H)).    Results from last 7 days   Lab Units 03/16/20  0528 03/15/20  0530 03/14/20  0433   MAGNESIUM mg/dL 1.9 2.2 2.2   PHOSPHORUS mg/dL 5.9* 6.3* 6.2*       Results from last 7 days   Lab Units 03/16/20 0528 03/15/20  0530 03/14/20  0433   URIC ACID mg/dL 10.2* 9.6* 9.4*       Results from last 7 days   Lab Units 03/16/20  0528 03/15/20  0530 03/14/20  0433 03/13/20  0621 03/12/20  1359   WBC 10*3/mm3 4.83 4.91 4.33 5.24 5.93   HEMOGLOBIN g/dL 9.9* 10.9* 10.9* 11.6* 13.1   PLATELETS 10*3/mm3 168 193 167 172 222               Imaging Results (Last 24 Hours)     ** No results found for the last 24 hours. **          aspirin 81 mg Oral Daily   carvedilol 25 mg Oral BID With Meals   clotrimazole-betamethasone  Topical Q12H   doxycycline 100 mg Oral Q12H   hydrALAZINE 10 mg Oral Q8H   influenza vaccine 0.5 mL Intramuscular Once   insulin lispro 0-9 Units Subcutaneous 4x Daily With Meals & Nightly   isosorbide mononitrate 60 mg Oral Q24H   sodium chloride 10 mL Intravenous Q12H       bumetanide 1 mg/hr Last Rate: 1 mg/hr (03/16/20 0621)       Medication Review:   Current Facility-Administered Medications   Medication Dose Route Frequency Provider Last Rate Last Dose   • aspirin EC tablet 81 mg  81 mg Oral Daily Aguilar Morataya MD   81 mg at 03/16/20 0916   • bumetanide (BUMEX) 12.5 mg in sodium chloride 0.9 % 125 mL (0.1 mg/mL) infusion  1 mg/hr Intravenous Continuous Hood,  MD Aguilar 10 mL/hr at 03/16/20 0621 1 mg/hr at 03/16/20 0621   • carvedilol (COREG) tablet 25 mg  25 mg Oral BID With Meals Aguilar Morataya MD   25 mg at 03/16/20 0916   • clotrimazole-betamethasone (LOTRISONE) 1-0.05 % cream   Topical Q12H Aguilar Morataya MD       • dextrose (D50W) 25 g/ 50mL Intravenous Solution 25 g  25 g Intravenous Q15 Min PRN Aguilar Morataya MD       • dextrose (GLUTOSE) oral gel 15 g  15 g Oral Q15 Min PRN Aguilar Morataya MD       • doxycycline (MONODOX) capsule 100 mg  100 mg Oral Q12H Aguilar Morataya MD   100 mg at 03/16/20 0916   • glucagon (human recombinant) (GLUCAGEN DIAGNOSTIC) injection 1 mg  1 mg Subcutaneous Q15 Min PRN Aguilar Morataya MD       • hydrALAZINE (APRESOLINE) tablet 10 mg  10 mg Oral Q8H Aguilar Morataya MD   10 mg at 03/16/20 1401   • influenza vac split quad (FLUZONE,FLUARIX,AFLURIA) injection 0.5 mL  0.5 mL Intramuscular Once Aguilar Morataya MD       • insulin lispro (humaLOG) injection 0-9 Units  0-9 Units Subcutaneous 4x Daily With Meals & Nightly Aguilar Morataya MD       • isosorbide mononitrate (IMDUR) 24 hr tablet 60 mg  60 mg Oral Q24H Aguilar Morataya MD   60 mg at 03/16/20 0916   • nitroglycerin (NITROSTAT) SL tablet 0.4 mg  0.4 mg Sublingual Q5 Min PRN Aguilar Morataya MD       • sodium chloride 0.9 % flush 10 mL  10 mL Intravenous PRN Aguilar Morataya MD       • sodium chloride 0.9 % flush 10 mL  10 mL Intravenous Q12H Aguilar Morataya MD   10 mL at 03/16/20 0916   • sodium chloride 0.9 % flush 10 mL  10 mL Intravenous PRN Aguilar Morataya MD           Assessment/Plan   1.  Acute on chronic kidney disease stage IV with progression to ESRD: refractory fluid overload; NAGMA; stable potassium  2.  Cellulitis of left lower extremity treated currently with vancomycin  3.  Severe MR and moderate TR  4.  Anasarca and fluid excess due to right-sided heart failure and advanced CKD  5.  Hypertension, better controlled  6.  Medical noncompliance.    Plan:  1.   Initiate hemodialysis (patient is agreeable); will ask vascular for TDC, hopefully tomorrow  2.  Anticipate daily dialysis for waste and volume removal until surgery occurs  3.  Check hepatitis B surface antigen  4.  Taper Bumex drip off  5.  Request outpt HD spot  6.  Surveillance labs  7.  D/w Dr. Melva Eng MD  03/16/20  18:26

## 2020-03-16 NOTE — CONSULTS
This is a 49-year-old man with a dilated cardiomyopathy with ejection fraction of 30% and severe mitral incompetence and at least moderate tricuspid incompetence.  He has mild coronary disease that does not need fixing.  I think we need to dialyze him tomorrow and then maybe surgery on Thursday.  I discussed this with Dr. Morataya and the patient.

## 2020-03-16 NOTE — DISCHARGE INSTRUCTIONS
[facility]  [address] [place] [address]    Coronary Angiogram (Radial/Ulnar Approach) After Care    Refer to this sheet in the next few weeks. These instructions provide you with information on caring for yourself after your procedure. Your caregiver may also give you more specific instructions. Your treatment has been planned according to current medical practices, but problems sometimes occur. Call your caregiver if you have any problems or questions after your procedure.    Home Care Instructions:  · You may shower the day after the procedure. Remove the bandage (dressing) and gently wash the site with plain soap and water. Gently pat the site dry. You may apply a band aid daily for 2 days if desired.    · Do not apply powder or lotion to the site.  · Do not submerge the affected site in water for 3 to 5 days or until the site is completely healed.   · Do not lift, push or pull anything over 10 pounds for 2 days after your procedure.  · Inspect the site at least twice daily. You may notice some bruising at the site and it may be tender for 1 to 2 weeks.     · Increase your fluid intake for the next 2 days.    · Keep arm elevated for 24 hours. For the remainder of the day, keep your arm in “Pledge of Allegiance” position when up and about.     · You may drive 24 hours after the procedure unless otherwise instructed by your caregiver.  · Do not operate machinery or power tools for 24 hours.  · A responsible adult should be with you for the first 24 hours after you arrive home. Do not make any important legal decisions or sign legal papers for 24 hours.  Do not drink alcohol for 24 hours.    · Metformin or any medications containing Metformin should not be taken for 48 hours after your procedure.      Call Your Doctor if:   · You have unusual pain at the radial/ulnar (wrist) site.  · You have redness, warmth, swelling, or pain at the radial/ulnar (wrist) site.  · You have drainage (other  than a small amount of blood on the dressing).  · You have chills or a fever > 101.  · Your arm becomes pale or dark, cool, tingly, or numb.  · You have heavy bleeding from the site, hold pressure on the site for 20 minutes.  If the bleeding stops, apply a fresh bandage and call your cardiologist.  However, if you continue to have bleeding, call 911.

## 2020-03-16 NOTE — PLAN OF CARE
Pt went down for MEGHA today and right and left sided heart cath. Pt was only able to get Left Heart cath. Right brachial was tried but unable to get through. Pt found to have mitral valve problems and Dr Colin consulted and seen patient plan for surgery on thur or Friday will cont to monitor.

## 2020-03-16 NOTE — PAYOR COMM NOTE
"Nico Washington (49 y.o. Male)     PLEASE SEE ATTACHED CLINICAL REVIEW.    REF#[id number]    PLEASE CALL   OR  [phone] WITH INPT CONTINUED STAY AUTH AND DAYS.     THANK YOU    AUSTIN WALKER LPN CCP    Date of Birth Social Security Number Address Home Phone MRN    1970  [address] [place] [address] [phone] [id number]    Protestant Marital Status          None Single       Admission Date Admission Type Admitting Provider Attending Provider Department, Room/Bed    3/12/20 Emergency Mark Pal MD McCracken, Robert Russell, MD [address], S611/1    Discharge Date Discharge Disposition Discharge Destination                       Attending Provider:  Pollo Diaz MD    Allergies:  No Known Allergies    Isolation:  None   Infection:  None   Code Status:  CPR    Ht:  180.3 cm (71\")   Wt:  146 kg (321 lb 12.8 oz)    Admission Cmt:  None   Principal Problem:  Anasarca associated with disorder of kidney [N04.9]                 Active Insurance as of 3/12/2020     Primary Coverage     Payor Plan Insurance Group Employer/Plan Group    ANTHEM BLUE CROSS ANTHEM BLUE CROSS BLUE SHIELD PPO [id number]     Payor Plan Address Payor Plan Phone Number Payor Plan Fax Number Effective Dates    [address] [phone]  8/1/2018 - None Entered    [place] [address]       Subscriber Name Subscriber Birth Date Member ID       NICO WASHINGTON 1970 [id number]                 Emergency Contacts      (Rel.) Home Phone Work Phone Mobile Phone    Eleazar Washington (Father) [phone] -- --              Oxygen Therapy (last 2 days)     Date/Time   SpO2   Device (Oxygen Therapy)   Flow (L/min)   Oxygen Concentration (%)   ETCO2 (mmHg)    03/16/20 0918   --   room air   --   --   --    03/16/20 0800   --   room air   2   --   --    03/16/20 0010   91   room air   --   --   --    03/15/20 2045   --   room air   --   --   " --    03/15/20 1438   --   room air   --   --   --    03/15/20 1030   --   room air   --   --   --    03/15/20 0741   95   room air   --   --   --    03/15/20 0019   96   room air   --   --   --    03/14/20 2000   --   room air   --   --   --    03/14/20 1944   --   room air   --   --   --    03/14/20 1419   --   room air   --   --   --    03/14/20 1350   96   nasal cannula   --   --   --    03/14/20 1045   --   room air   --   --   --    03/14/20 0704   95   room air   --   --   --    03/14/20 0000   --   room air   --   --   --            Intake & Output (last 2 days)       03/14 0701 - 03/15 0700 03/15 0701 - 03/16 0700 03/16 0701 - 03/17 0700    P.O. 370 360     I.V. (mL/kg) 120 (0.8)      IV Piggyback       Total Intake(mL/kg) 490 (3.3) 360 (2.5)     Urine (mL/kg/hr) 2200 (0.6) 3300 (0.9)     Stool 0 0     Total Output 2200 3300     Net -1710 -2940            Stool Unmeasured Occurrence 1 x 1 x         Lines, Drains & Airways    Active LDAs     Name:   Placement date:   Placement time:   Site:   Days:    Peripheral IV 03/14/20 2244 Left Hand   03/14/20    2244    Hand   1                Orders (last 48 hrs)      Start     Ordered    03/16/20 0631  POC Glucose Once  Once      03/16/20 0628    03/16/20 0630  Cardiac Catheterization/Vascular Study  Once      03/16/20 0629    03/16/20 0430  Renal Function Panel  Morning Draw      03/15/20 1333    03/16/20 0430  Uric Acid  Morning Draw      03/15/20 1333    03/16/20 0430  Magnesium  Morning Draw      03/15/20 1333    03/16/20 0430  CBC & Differential  Morning Draw      03/15/20 1333    03/16/20 0430  CBC Auto Differential  PROCEDURE ONCE      03/15/20 2230    03/16/20 0001  NPO Diet  Diet Effective Midnight      03/14/20 1327    03/16/20 0000  Adult Transesophageal Echo (MEGHA) W/ Cont if Necessary Per Protocol  Once      03/14/20 1327    03/15/20 2055  POC Glucose Once  Once      03/15/20 2053    03/15/20 1713  POC Glucose Once  Once      03/15/20 1711    03/15/20  1708  Obtain Informed Consent  Once      03/15/20 1707    03/15/20 1600  hydrALAZINE (APRESOLINE) tablet 10 mg  Every 8 Hours Scheduled      03/15/20 1458    03/15/20 1103  POC Glucose Once  Once      03/15/20 1100    03/15/20 0644  POC Glucose Once  Once      03/15/20 0641    03/15/20 0600  CBC (No Diff)  Morning Draw,   Status:  Canceled      03/14/20 1125    03/15/20 0600  Renal Function Panel  Morning Draw,   Status:  Canceled      03/14/20 1125    03/15/20 0430  Uric Acid  Morning Draw      03/14/20 1259    03/15/20 0430  Renal Function Panel  Morning Draw      03/14/20 1259    03/15/20 0430  Magnesium  Morning Draw      03/14/20 1259    03/15/20 0430  CBC & Differential  Morning Draw      03/14/20 1259    03/15/20 0430  CBC Auto Differential  PROCEDURE ONCE      03/14/20 2230    03/14/20 2127  POC Glucose Once  Once      03/14/20 2101    03/14/20 1626  POC Glucose Once  Once      03/14/20 1624    03/14/20 1345  bumetanide (BUMEX) 12.5 mg in sodium chloride 0.9 % 125 mL (0.1 mg/mL) infusion  Continuous      03/14/20 1257    03/14/20 1328  Case Request Cath Lab: Right and Left Heart Cath  Once      03/14/20 1327    03/14/20 1300  doxycycline (MONODOX) capsule 100 mg  Every 12 Hours Scheduled      03/14/20 1124    03/14/20 1128  POC Glucose Once  Once      03/14/20 1125    03/14/20 1125  Inpatient Cardiology Consult  Once     Specialty:  Cardiology  Provider:  Meggan Hinton MD    03/14/20 1124    03/14/20 0600  Creatinine, Serum  Daily      03/13/20 1147    03/13/20 1400  bumetanide (BUMEX) injection 4 mg  Every 8 Hours      03/13/20 1235    03/13/20 1200  influenza vac split quad (FLUZONE,FLUARIX,AFLURIA) injection 0.5 mL  Once      03/12/20 1820    03/13/20 0900  Vancomycin Pharmacy Intermittent Dosing  Daily,   Status:  Discontinued      03/12/20 1855    03/13/20 0900  aspirin EC tablet 81 mg  Daily      03/12/20 2200    03/13/20 0900  isosorbide mononitrate (IMDUR) 24 hr tablet 60 mg  Every 24 Hours  Scheduled      03/12/20 2200 03/12/20 2300  sodium chloride 0.9 % flush 10 mL  Every 12 Hours Scheduled      03/12/20 2200 03/12/20 2300  insulin lispro (humaLOG) injection 0-9 Units  4 Times Daily With Meals & Nightly      03/12/20 2200 03/12/20 2300  clotrimazole-betamethasone (LOTRISONE) 1-0.05 % cream  Every 12 Hours Scheduled      03/12/20 2200 03/12/20 2200  POC Glucose Finger 4x Daily AC & at Bedtime  4 Times Daily Before Meals & at Bedtime      03/12/20 1933    03/12/20 2200  POC Glucose 4x Daily AC & at Bedtime  4 Times Daily Before Meals & at Bedtime      03/12/20 2200 03/12/20 2158  nitroglycerin (NITROSTAT) SL tablet 0.4 mg  Every 5 Minutes PRN      03/12/20 2200 03/12/20 2158  dextrose (GLUTOSE) oral gel 15 g  Every 15 Minutes PRN      03/12/20 2200 03/12/20 2158  dextrose (D50W) 25 g/ 50mL Intravenous Solution 25 g  Every 15 Minutes PRN      03/12/20 2200 03/12/20 2158  glucagon (human recombinant) (GLUCAGEN DIAGNOSTIC) injection 1 mg  Every 15 Minutes PRN      03/12/20 2200 03/12/20 2158  sodium chloride 0.9 % flush 10 mL  As Needed      03/12/20 2200 03/12/20 1851  Pharmacy to dose vancomycin  Continuous PRN,   Status:  Discontinued      03/12/20 1852 03/12/20 1654  carvedilol (COREG) tablet 25 mg  2 Times Daily With Meals      03/12/20 1652 03/12/20 1349  sodium chloride 0.9 % flush 10 mL  As Needed      03/12/20 1349    Unscheduled  Telemetry - Pulse Oximetry  Continuous PRN     Comments:  If Patient Develops Unresponsiveness, Acute Dyspnea, Cyanosis or Suspected Hypoxemia Start Continuous Pulse Ox Monitoring, Apply Oxygen & Notify Provider    03/12/20 2200    Unscheduled  Oxygen Therapy- Nasal Cannula; Titrate for SPO2: 90% - 95%  Continuous PRN     Comments:  If Patient Develops Unresponsiveness, Acute Dyspnea, Cyanosis or Suspected Hypoxemia Start Continuous Pulse Ox Monitoring, Apply Oxygen & Notify Provider    03/12/20 2200    Unscheduled  ECG 12 Lead  As  Needed     Comments:  Nurse to Release if Patient Expericences Acute Chest Pain or Dysrhythmias    03/12/20 2200    Unscheduled  Potassium  As Needed     Comments:  For Ventricular Arrhythmias      03/12/20 2200    Unscheduled  Magnesium  As Needed     Comments:  For Ventricular Arrhythmias      03/12/20 2200    Unscheduled  Troponin  As Needed     Comments:  For Chest Pain      03/12/20 2200    Unscheduled  Digoxin Level  As Needed     Comments:  For Atrial Arrhythmias      03/12/20 2200    Unscheduled  Blood Gas, Arterial  As Needed     Comments:  Per O2 PolicyNotify Physician      03/12/20 2200    --  aspirin 81 MG EC tablet  Daily      03/12/20 1412              Operative/Procedure Notes (last 48 hours) (Notes from 03/14/20 1012 through 03/16/20 1012)    No notes of this type exist for this encounter.          Physician Progress Notes (last 48 hours) (Notes from 03/14/20 1012 through 03/16/20 1012)      Analilia Snell APRN at 03/16/20 0921     Attestation signed by Aguilar Morataya MD at 03/16/20 0932    I have reviewed the documentation above and agree.                     LOS: 4 days   Patient Care Team:  Provider, No Known as PCP - General      Chief Complaint: Follow-up severe mitral insufficiency, severe cardiomyopathy       Interval History: He is lying in bed with no complaints.      Objective   Vital Signs  Temp:  [97.3 °F (36.3 °C)-98.5 °F (36.9 °C)] 97.6 °F (36.4 °C)  Heart Rate:  [69-75] 75  Resp:  [17-18] 17  BP: (127-150)/(82-98) 150/98    Intake/Output Summary (Last 24 hours) at 3/16/2020 0922  Last data filed at 3/16/2020 0622  Gross per 24 hour   Intake 360 ml   Output 3100 ml   Net -2740 ml           Physical Exam   Constitutional: He is oriented to person, place, and time. He appears well-developed and well-nourished. No distress.   HENT:   Head: Normocephalic and atraumatic.   Eyes: Pupils are equal, round, and reactive to light.   Neck: No JVD present. No thyromegaly present.    Cardiovascular: Normal rate, regular rhythm and intact distal pulses.   Murmur heard.  Pulmonary/Chest: Effort normal and breath sounds normal. No respiratory distress.   Abdominal: Soft. Bowel sounds are normal. He exhibits no distension. There is no splenomegaly or hepatomegaly. There is no tenderness.   Musculoskeletal: Normal range of motion. He exhibits edema.   Bilateral LE pitting +3 edema   Neurological: He is alert and oriented to person, place, and time.   Skin: Skin is warm and dry. He is not diaphoretic. There is erythema.   Psychiatric: He has a normal mood and affect. His behavior is normal. Judgment normal.       Results Review:      Results from last 7 days   Lab Units 03/16/20  0528 03/15/20  0530 03/14/20  0433   SODIUM mmol/L 135* 134* 134*   POTASSIUM mmol/L 4.2 4.8 4.6   CHLORIDE mmol/L 106 101 105   CO2 mmol/L 15.7* 17.1* 14.9*   BUN mg/dL 74* 79* 78*   CREATININE mg/dL 7.20* 7.07* 7.28*   GLUCOSE mg/dL 103* 124* 122*   CALCIUM mg/dL 7.0* 7.5* 7.5*     Results from last 7 days   Lab Units 03/12/20  1359   TROPONIN T ng/mL 0.211*     Results from last 7 days   Lab Units 03/16/20 0528 03/15/20  0530 03/14/20  0433   WBC 10*3/mm3 4.83 4.91 4.33   HEMOGLOBIN g/dL 9.9* 10.9* 10.9*   HEMATOCRIT % 32.9* 36.6* 35.4*   PLATELETS 10*3/mm3 168 193 167             Results from last 7 days   Lab Units 03/16/20  0528   MAGNESIUM mg/dL 1.9           I reviewed the patient's new clinical results.  I personally viewed and interpreted the patient's EKG/Telemetry data        Medication Review:     aspirin 81 mg Oral Daily   carvedilol 25 mg Oral BID With Meals   clotrimazole-betamethasone  Topical Q12H   doxycycline 100 mg Oral Q12H   hydrALAZINE 10 mg Oral Q8H   influenza vaccine 0.5 mL Intramuscular Once   insulin lispro 0-9 Units Subcutaneous 4x Daily With Meals & Nightly   isosorbide mononitrate 60 mg Oral Q24H   sodium chloride 10 mL Intravenous Q12H         bumetanide 1 mg/hr Last Rate: 1 mg/hr (03/16/20  "0670)       Assessment/Plan       Anasarca associated with disorder of kidney    Nonrheumatic mitral valve regurgitation, severe    Essential hypertension    Stage III chronic kidney disease (CMS/HCC)    Diabetes mellitus (CMS/HCC)    Cellulitis of left leg    Systolic CHF, acute (CMS/HCC)      1.  Acute systolic CHF/severe cardiomyopathy.  EF 36 to 40%.  Remains on a Bumex drip.  May ultimately require dialysis.  Right and left heart catheterization today.  2.  Severe mitral regurgitation.  MEGHA and right and left heart catheterization today.  3.  Hypertension.  4.  Left leg cellulitis.      Analilia Mei, APRN  03/16/20  09:22      Electronically signed by Aguilar Morataya MD at 03/16/20 0932     Aguilar Morataya MD at 03/15/20 1616          Tobiasmyrna Fernando  1970 49 y.o.  [id number]      Patient Care Team:  Provider, No Known as PCP - General    CC: Severe mitral insufficiency, severe cardiomyopathy, end-stage renal failure    Interval History: No significant change      Objective   Vital Signs  Temp:  [97.8 °F (36.6 °C)-98.5 °F (36.9 °C)] 98.5 °F (36.9 °C)  Heart Rate:  [72-83] 72  Resp:  [16-18] 18  BP: (127-133)/(89-97) 133/97    Intake/Output Summary (Last 24 hours) at 3/15/2020 1616  Last data filed at 3/15/2020 1030  Gross per 24 hour   Intake 410 ml   Output 2300 ml   Net -1890 ml     Flowsheet Rows      First Filed Value   Admission Height  180.3 cm (71\") Documented at 03/12/2020 1350   Admission Weight  136 kg (300 lb) Documented at 03/12/2020 1350          Physical Exam:   General Appearance:    Alert,oriented, in no acute distress   Lungs:     Clear to auscultation,BS are equal    Heart:    Normal S1 and S2, RRR with 2 out of 6 holosystolic t murmur, gallop or rub   HEENT:    Sclerae are clear, no JVD or adenopathy   Abdomen:     Normal bowel sounds, soft non-tender, non-distended, no HSM   Extremities:   Moves all extremities well, no edema, no cyanosis, no             Redness, no rash "     Medication Review:        aspirin 81 mg Oral Daily   carvedilol 25 mg Oral BID With Meals   clotrimazole-betamethasone  Topical Q12H   doxycycline 100 mg Oral Q12H   hydrALAZINE 10 mg Oral Q8H   influenza vaccine 0.5 mL Intramuscular Once   insulin lispro 0-9 Units Subcutaneous 4x Daily With Meals & Nightly   isosorbide mononitrate 60 mg Oral Q24H   sodium chloride 10 mL Intravenous Q12H       bumetanide 1 mg/hr Last Rate: 1 mg/hr (03/15/20 0540)         I reviewed the patient's new clinical results.  I personally viewed and interpreted the patient's EKG/Telemetry data    Assessment/Plan  Active Hospital Problems    Diagnosis  POA   • **Anasarca associated with disorder of kidney [N04.9]  Yes   • Systolic CHF, acute (CMS/HCC) [I50.21]  Unknown   • Diabetes mellitus (CMS/HCC) [E11.9]  Unknown   • Cellulitis of left leg [L03.116]  Unknown   • Essential hypertension [I10]  Yes   • Nonrheumatic mitral valve regurgitation, severe [I34.0]  Yes   • Stage III chronic kidney disease (CMS/HCC) [N18.3]  Yes      Resolved Hospital Problems   No resolved problems to display.       This karen is in deep trouble is got a severe cardiomyopathy severe MR I think we got a move forward to look and see if there is something we can do about his valve working to get a MEGHA and a left and right heart cath tomorrow likely that will push him over onto dialysis    Aguilar Morataya MD  03/15/20  16:16              Electronically signed by Aguilar Morataya MD at 03/15/20 1617     Pollo Diaz MD at 03/15/20 1410           LOS: 3 days     Name: Nico King  Age: 49 y.o.  Sex: male  :  1970  MRN: [id number]         Primary Care Physician: Provider, No Known    Subjective   Subjective  No new complaints or overnight events.  Denies shortness of breath or chest pain.  Feels as if his left leg swelling and redness is improving.  Denies fevers or chills.    Objective   Vital Signs  Temp:  [97.8 °F (36.6 °C)-98 °F  "(36.7 °C)] 98 °F (36.7 °C)  Heart Rate:  [79-83] 83  Resp:  [16-18] 18  BP: (127-132)/(89-97) 130/96  Body mass index is 45.08 kg/m².    Objective:  General Appearance:  Comfortable and in no acute distress.    Vital signs: (most recent): Blood pressure 130/96, pulse 83, temperature 98 °F (36.7 °C), temperature source Oral, resp. rate 18, height 180.3 cm (71\"), weight (!) 147 kg (323 lb 3.2 oz), SpO2 95 %.    Lungs:  Normal effort and normal respiratory rate.    Heart: Normal rate.  Regular rhythm.    Abdomen: Abdomen is soft.  Bowel sounds are normal.   There is no abdominal tenderness.     Extremities: There is dependent edema.  There is no local swelling.  (Anasarca)  Neurological: Patient is alert and oriented to person, place and time.    Skin:  Warm and dry.  (Improving erythema and warmth to the left leg)            Results Review:       I reviewed the patient's new clinical results.    Results from last 7 days   Lab Units 03/15/20  0530 03/14/20  0433 03/13/20  0621 03/12/20  1359   WBC 10*3/mm3 4.91 4.33 5.24 5.93   HEMOGLOBIN g/dL 10.9* 10.9* 11.6* 13.1   PLATELETS 10*3/mm3 193 167 172 222     Results from last 7 days   Lab Units 03/15/20  0530 03/14/20  0433 03/13/20  0621 03/12/20  1359   SODIUM mmol/L 134* 134* 136 136   POTASSIUM mmol/L 4.8 4.6 4.9 5.3*   CHLORIDE mmol/L 101 105 108* 104   CO2 mmol/L 17.1* 14.9* 14.4* 15.9*   BUN mg/dL 79* 78* 75* 70*   CREATININE mg/dL 7.07* 7.28* 7.15* 6.95*   CALCIUM mg/dL 7.5* 7.5* 7.7* 8.3*   GLUCOSE mg/dL 124* 122* 121* 115*         Scheduled Meds:     aspirin 81 mg Oral Daily   carvedilol 25 mg Oral BID With Meals   clotrimazole-betamethasone  Topical Q12H   doxycycline 100 mg Oral Q12H   influenza vaccine 0.5 mL Intramuscular Once   insulin lispro 0-9 Units Subcutaneous 4x Daily With Meals & Nightly   isosorbide mononitrate 60 mg Oral Q24H   sodium chloride 10 mL Intravenous Q12H     PRN Meds:   dextrose  •  dextrose  •  glucagon (human recombinant)  •  " nitroglycerin  •  sodium chloride  •  sodium chloride  Continuous Infusions:    bumetanide 1 mg/hr Last Rate: 1 mg/hr (03/15/20 0540)       Assessment/Plan   Active Hospital Problems    Diagnosis  POA   • **Anasarca associated with disorder of kidney [N04.9]  Yes   • Systolic CHF, acute (CMS/[facility]) [I50.21]  Unknown   • Diabetes mellitus (CMS/[facility]) [E11.9]  Unknown   • Cellulitis of left leg [L03.116]  Unknown   • Essential hypertension [I10]  Yes   • Nonrheumatic mitral valve regurgitation, severe [I34.0]  Yes   • Stage III chronic kidney disease (CMS/[facility]) [N18.3]  Yes      Resolved Hospital Problems   No resolved problems to display.       Assessment & Plan    -Transitioned to a Bumex drip today. May ultimately require dialysis.  Appreciate nephrology's assistance.  -2D echocardiogram noted.  EF now down to 33%..  Known severe mitral regurgitation was demonstrated.  Cardiology has evaluated and preparing for additional work-up with consideration for valve replacement  -Cellulitis of the left leg versus stasis dermatitis.  This appears improved today.  Will complete a course of doxycycline  -Blood sugars well controlled at this time    Pollo Diaz MD  [facility]  03/15/20  2:10 PM      Electronically signed by Pollo Diaz MD at 03/15/20 1413     Camron Graves MD at 03/15/20 1330             LOS: 3 days    Patient Care Team:  Provider, No Known as PCP - General    Chief Complaint:    Chief Complaint   Patient presents with   • Edema     Follow-up acute kidney injury on chronic kidney disease  Subjective     Interval History:   Patient is feeling the same since yesterday, no chest pain or shortness of air, no nausea or vomiting, no metallic taste, no significant sleep disturbance but he noted that his cognitive function is slower, he has lower extremity edema denies lightheadedness.  He was started yesterday on Bumex drip with some improvement of the urine  "output and the edema    Review of Systems:   As noted above    Objective     Vital Signs  Temp:  [97.8 °F (36.6 °C)-98.7 °F (37.1 °C)] 98 °F (36.7 °C)  Heart Rate:  [77-83] 83  Resp:  [16-18] 18  BP: (127-139)/(89-97) 130/96    Flowsheet Rows      First Filed Value   Admission Height  180.3 cm (71\") Documented at 03/12/2020 1350   Admission Weight  136 kg (300 lb) Documented at 03/12/2020 1350          I/O this shift:  In: 240 [P.O.:240]  Out: 700 [Urine:700]  I/O last 3 completed shifts:  In: 490 [P.O.:370; I.V.:120]  Out: 2700 [Urine:2700]    Intake/Output Summary (Last 24 hours) at 3/15/2020 1330  Last data filed at 3/15/2020 1030  Gross per 24 hour   Intake 610 ml   Output 2500 ml   Net -1890 ml       Physical Exam:  General Appearance: alert, oriented x 3, no acute distress, disheveled, chronically ill  Skin: warm and dry  HEENT: pupils round and reactive to light, oral mucosa normal, nonicteric sclerae  Neck: supple, no JVD, trachea midline  Lungs: Bibasilar crackles and rhonchi, unlabored breathing effort  Heart: RRR, normal S1 and S2, no S3, no rub, grade 3/6 systolic murmur  Abdomen: soft, non-tender,  present bowel sounds to auscultation  : no palpable bladder, has scrotal and penile edema  Extremities: No cyanosis or clubbing, he has 3+ lower extremity edema with some chronic erythema bilaterally  Neuro: normal speech and mental status        Results Review:    Results from last 7 days   Lab Units 03/15/20  0530 03/14/20  0433 03/13/20  0621 03/12/20  1359   SODIUM mmol/L 134* 134* 136 136   POTASSIUM mmol/L 4.8 4.6 4.9 5.3*   CHLORIDE mmol/L 101 105 108* 104   CO2 mmol/L 17.1* 14.9* 14.4* 15.9*   BUN mg/dL 79* 78* 75* 70*   CREATININE mg/dL 7.07* 7.28* 7.15* 6.95*   CALCIUM mg/dL 7.5* 7.5* 7.7* 8.3*   BILIRUBIN mg/dL  --   --  0.4 0.4   ALK PHOS U/L  --   --  68 86   ALT (SGPT) U/L  --   --  51* 74*   AST (SGOT) U/L  --   --  18 31   GLUCOSE mg/dL 124* 122* 121* 115*       Estimated Creatinine " Clearance: 18.8 mL/min (A) (by C-G formula based on SCr of 7.07 mg/dL (H)).    Results from last 7 days   Lab Units 03/15/20  0530 03/14/20  0433   MAGNESIUM mg/dL 2.2 2.2   PHOSPHORUS mg/dL 6.3* 6.2*       Results from last 7 days   Lab Units 03/15/20  0530 03/14/20  0433   URIC ACID mg/dL 9.6* 9.4*       Results from last 7 days   Lab Units 03/15/20  0530 03/14/20  0433 03/13/20  0621 03/12/20  1359   WBC 10*3/mm3 4.91 4.33 5.24 5.93   HEMOGLOBIN g/dL 10.9* 10.9* 11.6* 13.1   PLATELETS 10*3/mm3 193 167 172 222               Imaging Results (Last 24 Hours)     ** No results found for the last 24 hours. **          aspirin 81 mg Oral Daily   carvedilol 25 mg Oral BID With Meals   clotrimazole-betamethasone  Topical Q12H   doxycycline 100 mg Oral Q12H   influenza vaccine 0.5 mL Intramuscular Once   insulin lispro 0-9 Units Subcutaneous 4x Daily With Meals & Nightly   isosorbide mononitrate 60 mg Oral Q24H   sodium chloride 10 mL Intravenous Q12H       bumetanide 1 mg/hr Last Rate: 1 mg/hr (03/15/20 0540)       Medication Review:   Current Facility-Administered Medications   Medication Dose Route Frequency Provider Last Rate Last Dose   • aspirin EC tablet 81 mg  81 mg Oral Daily Mark Pal MD   81 mg at 03/15/20 1034   • bumetanide (BUMEX) 12.5 mg in sodium chloride 0.9 % 125 mL (0.1 mg/mL) infusion  1 mg/hr Intravenous Continuous StarCamron MD 10 mL/hr at 03/15/20 0540 1 mg/hr at 03/15/20 0540   • carvedilol (COREG) tablet 25 mg  25 mg Oral BID With Meals Mark aPl MD   25 mg at 03/15/20 1034   • clotrimazole-betamethasone (LOTRISONE) 1-0.05 % cream   Topical Q12H Mark Pal MD       • dextrose (D50W) 25 g/ 50mL Intravenous Solution 25 g  25 g Intravenous Q15 Min PRN Mark Pal MD       • dextrose (GLUTOSE) oral gel 15 g  15 g Oral Q15 Min PRN Mark Pal MD       • doxycycline (MONODOX) capsule 100 mg  100 mg Oral Q12H Pollo Diaz MD   100 mg at 03/15/20 1033   • glucagon  (human recombinant) (GLUCAGEN DIAGNOSTIC) injection 1 mg  1 mg Subcutaneous Q15 Min PRN Mark Pal MD       • influenza vac split quad (FLUZONE,FLUARIX,AFLURIA) injection 0.5 mL  0.5 mL Intramuscular Once Mark Pal MD       • insulin lispro (humaLOG) injection 0-9 Units  0-9 Units Subcutaneous 4x Daily With Meals & Nightly Mark Pal MD       • isosorbide mononitrate (IMDUR) 24 hr tablet 60 mg  60 mg Oral Q24H Mark Pal MD   60 mg at 03/15/20 1034   • nitroglycerin (NITROSTAT) SL tablet 0.4 mg  0.4 mg Sublingual Q5 Min PRN Mark Pal MD       • sodium chloride 0.9 % flush 10 mL  10 mL Intravenous PRN Mark Pal MD       • sodium chloride 0.9 % flush 10 mL  10 mL Intravenous Q12H Mark Pal MD   10 mL at 03/15/20 1034   • sodium chloride 0.9 % flush 10 mL  10 mL Intravenous PRN Mark Pal MD           Assessment/Plan   1.  Acute on chronic kidney disease stage IV associate with hypertensive nephropathy, noncompliance with medical care.  Suspect that he is having progression toward ESRD he had significant fluid excess.  His creatinine today is down to 7.07, electrolytes within acceptable range other than total CO2 17.1.  Albumin 2.7 phosphorus 6.3, uric acid 9.6 and magnesium 2.2.   2.  Cellulitis of left lower extremity treated currently with vancomycin  3.  Severe mitral regurgitation and he has loud murmur  4.  Anasarca and fluid excess probably related to some degree of right-sided heart failure.  5.  Hypertension, better controlled  6.  Medical noncompliance.    Plan:  1.  Continue the Bumex  2.  If he continues to have worsening creatinine and not adequate response to diuretics we may need to consider initiating dialysis  3.  If the creatinine continue to improve there will be no reason for pain mapping or AV fistula at this time  4.  Surveillance labs        Camron Graves MD  03/15/20  13:30      Electronically signed by Camron Graves MD at 03/15/20 2280

## 2020-03-16 NOTE — PROGRESS NOTES
LOS: 4 days   Patient Care Team:  Provider, No Known as PCP - General      Chief Complaint: Follow-up severe mitral insufficiency, severe cardiomyopathy       Interval History: He is lying in bed with no complaints.      Objective   Vital Signs  Temp:  [97.3 °F (36.3 °C)-98.5 °F (36.9 °C)] 97.6 °F (36.4 °C)  Heart Rate:  [69-75] 75  Resp:  [17-18] 17  BP: (127-150)/(82-98) 150/98    Intake/Output Summary (Last 24 hours) at 3/16/2020 0922  Last data filed at 3/16/2020 0622  Gross per 24 hour   Intake 360 ml   Output 3100 ml   Net -2740 ml           Physical Exam   Constitutional: He is oriented to person, place, and time. He appears well-developed and well-nourished. No distress.   HENT:   Head: Normocephalic and atraumatic.   Eyes: Pupils are equal, round, and reactive to light.   Neck: No JVD present. No thyromegaly present.   Cardiovascular: Normal rate, regular rhythm and intact distal pulses.   Murmur heard.  Pulmonary/Chest: Effort normal and breath sounds normal. No respiratory distress.   Abdominal: Soft. Bowel sounds are normal. He exhibits no distension. There is no splenomegaly or hepatomegaly. There is no tenderness.   Musculoskeletal: Normal range of motion. He exhibits edema.   Bilateral LE pitting +3 edema   Neurological: He is alert and oriented to person, place, and time.   Skin: Skin is warm and dry. He is not diaphoretic. There is erythema.   Psychiatric: He has a normal mood and affect. His behavior is normal. Judgment normal.       Results Review:      Results from last 7 days   Lab Units 03/16/20  0528 03/15/20  0530 03/14/20  0433   SODIUM mmol/L 135* 134* 134*   POTASSIUM mmol/L 4.2 4.8 4.6   CHLORIDE mmol/L 106 101 105   CO2 mmol/L 15.7* 17.1* 14.9*   BUN mg/dL 74* 79* 78*   CREATININE mg/dL 7.20* 7.07* 7.28*   GLUCOSE mg/dL 103* 124* 122*   CALCIUM mg/dL 7.0* 7.5* 7.5*     Results from last 7 days   Lab Units 03/12/20  1359   TROPONIN T ng/mL 0.211*     Results from last 7 days   Lab Units  03/16/20  0528 03/15/20  0530 03/14/20  0433   WBC 10*3/mm3 4.83 4.91 4.33   HEMOGLOBIN g/dL 9.9* 10.9* 10.9*   HEMATOCRIT % 32.9* 36.6* 35.4*   PLATELETS 10*3/mm3 168 193 167             Results from last 7 days   Lab Units 03/16/20  0528   MAGNESIUM mg/dL 1.9           I reviewed the patient's new clinical results.  I personally viewed and interpreted the patient's EKG/Telemetry data        Medication Review:     aspirin 81 mg Oral Daily   carvedilol 25 mg Oral BID With Meals   clotrimazole-betamethasone  Topical Q12H   doxycycline 100 mg Oral Q12H   hydrALAZINE 10 mg Oral Q8H   influenza vaccine 0.5 mL Intramuscular Once   insulin lispro 0-9 Units Subcutaneous 4x Daily With Meals & Nightly   isosorbide mononitrate 60 mg Oral Q24H   sodium chloride 10 mL Intravenous Q12H         bumetanide 1 mg/hr Last Rate: 1 mg/hr (03/16/20 0621)       Assessment/Plan       Anasarca associated with disorder of kidney    Nonrheumatic mitral valve regurgitation, severe    Essential hypertension    Stage III chronic kidney disease (CMS/HCC)    Diabetes mellitus (CMS/HCC)    Cellulitis of left leg    Systolic CHF, acute (CMS/HCC)      1.  Acute systolic CHF/severe cardiomyopathy.  EF 36 to 40%.  Remains on a Bumex drip.  May ultimately require dialysis.  Right and left heart catheterization today.  2.  Severe mitral regurgitation.  MEGHA and right and left heart catheterization today.  3.  Hypertension.  4.  Left leg cellulitis.      Analilia Mei, APRN  03/16/20  09:22

## 2020-03-16 NOTE — PLAN OF CARE
Problem: Patient Care Overview  Goal: Plan of Care Review  Outcome: Ongoing (interventions implemented as appropriate)  Flowsheets  Taken 3/16/2020 0401  Progress: no change  Outcome Summary: Patient had no complaints of pain through the night. Ambulating ad sandoval to bathroom. Strict I & O's. Daily weights. IV Bumex drip continued. ACHS no SSI coverage needed. NPO since midnight for MEGHA and cardiac cath today. VSS. Will continue to monitor closely.  Taken 3/16/2020 0008  Plan of Care Reviewed With: patient

## 2020-03-17 NOTE — PROGRESS NOTES
LOS: 5 days   Patient Care Team:  Provider, No Known as PCP - General    Chief Complaint:     F/u MR    Interval History:       Denies no dyspnea or chest pain, no tachycardia or nausea, s/p access for HD.     Objective   Vital Signs  Temp:  [97.3 °F (36.3 °C)-98.6 °F (37 °C)] 98.6 °F (37 °C)  Heart Rate:  [65-75] 70  Resp:  [16-24] 18  BP: (109-160)/() 137/94    Intake/Output Summary (Last 24 hours) at 3/17/2020 0829  Last data filed at 3/17/2020 0559  Gross per 24 hour   Intake 670 ml   Output 1050 ml   Net -380 ml       Comfortable NAD  PERRL, conjunctivae clear  Neck supple, no JVD or thyromegaly appreciated  S1/S2 RRR, no /r/g, 3/6 hsm  Lungs CTA B, normal effort  Abdomen S/NT/ND (+) BS, no HSM appreciated  Extremities warm, no clubbing, cyanosis, LLE 2+ edema  No visible or palpable skin lesions  A/Ox4, mood and affect appropriate    Results Review:      Results from last 7 days   Lab Units 03/17/20  0654 03/16/20  0528 03/15/20  0530   SODIUM mmol/L 138 135* 134*   POTASSIUM mmol/L 4.9 4.2 4.8   CHLORIDE mmol/L 107 106 101   CO2 mmol/L 16.9* 15.7* 17.1*   BUN mg/dL 82* 74* 79*   CREATININE mg/dL 7.44* 7.20* 7.07*   GLUCOSE mg/dL 100* 103* 124*   CALCIUM mg/dL 7.3* 7.0* 7.5*     Results from last 7 days   Lab Units 03/12/20  1359   TROPONIN T ng/mL 0.211*     Results from last 7 days   Lab Units 03/17/20  0654 03/16/20  0528 03/15/20  0530   WBC 10*3/mm3 4.68 4.83 4.91   HEMOGLOBIN g/dL 10.2* 9.9* 10.9*   HEMATOCRIT % 33.9* 32.9* 36.6*   PLATELETS 10*3/mm3 183 168 193             Results from last 7 days   Lab Units 03/16/20  0528   MAGNESIUM mg/dL 1.9           I reviewed the patient's new clinical results.  I personally viewed and interpreted the patient's EKG/Telemetry data        Medication Review:     aspirin 81 mg Oral Daily   carvedilol 25 mg Oral BID With Meals   clotrimazole-betamethasone  Topical Q12H   doxycycline 100 mg Oral Q12H   hydrALAZINE 10 mg Oral Q8H   influenza vaccine 0.5 mL  Intramuscular Once   insulin lispro 0-9 Units Subcutaneous 4x Daily With Meals & Nightly   isosorbide mononitrate 60 mg Oral Q24H   sodium chloride 10 mL Intravenous Q12H            Assessment/Plan       Anasarca associated with disorder of kidney    Nonrheumatic mitral valve regurgitation, severe    Essential hypertension    Stage III chronic kidney disease (CMS/HCC)    Diabetes mellitus (CMS/HCC)    Cellulitis of left leg    Systolic CHF, acute (CMS/HCC)    1.  Acute systolic CHF/severe cardiomyopathy.  EF 36 to 40%.  start dialysis, has right palindrome.  minimal CAD on cath 3/16/20.   2.  Severe mitral regurgitation. CV surgery seeing for MVR  3.  Hypertension.  4.  Left leg cellulitis.  5. DESHAWN, CKD - started HD     Overall stable but ill.       Meggan Hinton MD  03/17/20  08:29

## 2020-03-17 NOTE — PROGRESS NOTES
Progress Note    Name: Nico King ADMIT: 3/12/2020   : 1970  PCP: Provider, No Known    MRN: [id number] LOS: 5 days   AGE/SEX: 49 y.o. male  ROOM: S611/1   Date of Encounter Visit: 20    Subjective:     Interval History: s/p MEGHA and left and heart heart cath 3/16.  Tunnel catheter placed today in OR    REVIEW OF SYSTEMS:   no fever or chills. Dizziness when ambulating.  No chest pain, palpitations. Stable edema.    mild dyspnea. Mild cough with white phelgm  No nausea, vomiting or diarrhea. No abdominal pain or blood.     Objective:   Temp:  [97.3 °F (36.3 °C)-98.6 °F (37 °C)] 97.5 °F (36.4 °C)  Heart Rate:  [63-74] 65  Resp:  [16-18] 16  BP: (105-159)/() 139/106   SpO2:  [91 %-100 %] 95 %  on    Device (Oxygen Therapy): room air    Intake/Output Summary (Last 24 hours) at 3/17/2020 1724  Last data filed at 3/17/2020 1350  Gross per 24 hour   Intake 670 ml   Output 1005 ml   Net -335 ml     Body mass index is 44.45 kg/m².      20  0500 20  1155 20  0514   Weight: (!) 146 kg (321 lb 12.8 oz) (!) 146 kg (322 lb) (!) 145 kg (318 lb 11.2 oz)     Weight change: 0.091 kg (3.2 oz)    Physical Exam   Constitutional: No distress.   HENT:   Head: Atraumatic.   Nose: Nose normal.   Eyes: Conjunctivae are normal.   Cardiovascular: Normal rate and regular rhythm.   Murmur heard.  Pulmonary/Chest: Effort normal. He has no wheezes. He has no rales.   Diminished bases   Abdominal: Soft. Bowel sounds are normal. He exhibits distension (mild). There is no tenderness.   Musculoskeletal: He exhibits edema (2+).   Neurological: He is alert.   Skin: Skin is warm and dry. He is not diaphoretic. There is erythema (mild left shin).       Results Review:      Results from last 7 days   Lab Units 20  0654 20  0528 03/15/20  0530 20  0433 20  0621 20  1359   SODIUM mmol/L 138 135* 134* 134* 136 136   POTASSIUM mmol/L 4.9 4.2 4.8 4.6 4.9 5.3*   CHLORIDE mmol/L 107  106 101 105 108* 104   CO2 mmol/L 16.9* 15.7* 17.1* 14.9* 14.4* 15.9*   BUN mg/dL 82* 74* 79* 78* 75* 70*   CREATININE mg/dL 7.44* 7.20* 7.07* 7.28* 7.15* 6.95*   GLUCOSE mg/dL 100* 103* 124* 122* 121* 115*   CALCIUM mg/dL 7.3* 7.0* 7.5* 7.5* 7.7* 8.3*   AST (SGOT) U/L  --   --   --   --  18 31   ALT (SGPT) U/L  --   --   --   --  51* 74*     Estimated Creatinine Clearance: 17.7 mL/min (A) (by C-G formula based on SCr of 7.44 mg/dL (H)).  Results from last 7 days   Lab Units 03/17/20  0654   HEMOGLOBIN A1C % 6.50*     Results from last 7 days   Lab Units 03/17/20  1146 03/17/20  0621 03/16/20  2031 03/16/20  1836 03/16/20  1653 03/16/20  0628 03/15/20  2053 03/15/20  1711   GLUCOSE mg/dL 101 97 179* 115 96 117 138* 108     Results from last 7 days   Lab Units 03/12/20  1359   TROPONIN T ng/mL 0.211*     Results from last 7 days   Lab Units 03/12/20  1359   PROBNP pg/mL >70,000.0*         Results from last 7 days   Lab Units 03/16/20  0528 03/15/20  0530 03/14/20  0433   MAGNESIUM mg/dL 1.9 2.2 2.2           Invalid input(s): LDLCALC  Results from last 7 days   Lab Units 03/17/20  0654 03/16/20  0528 03/15/20  0530 03/14/20  0433 03/13/20  0621 03/12/20  1359   WBC 10*3/mm3 4.68 4.83 4.91 4.33 5.24 5.93   HEMOGLOBIN g/dL 10.2* 9.9* 10.9* 10.9* 11.6* 13.1   HEMATOCRIT % 33.9* 32.9* 36.6* 35.4* 37.8 42.7   PLATELETS 10*3/mm3 183 168 193 167 172 222   MCV fL 76.5* 76.9* 78.0* 77.5* 79.1 77.4*   MCH pg 23.0* 23.1* 23.2* 23.9* 24.3* 23.7*   MCHC g/dL 30.1* 30.1* 29.8* 30.8* 30.7* 30.7*   RDW % 16.7* 16.7* 17.1* 16.9* 17.2* 16.8*   RDW-SD fl 45.3 45.9 47.9 48.0 49.5 46.3   MPV fL 10.6 10.7 10.6 10.6 10.1 10.0   NEUTROPHIL % %  --  68.4 66.6 71.1  --  79.3*   LYMPHOCYTE % %  --  15.5* 17.1* 12.7*  --  10.3*   MONOCYTES % %  --  11.8 12.0 11.5  --  9.1   EOSINOPHIL % %  --  3.3 3.3 3.5  --  0.3   BASOPHIL % %  --  0.6 0.6 0.7  --  0.5   IMM GRAN % %  --  0.4 0.4 0.5  --  0.5   NEUTROS ABS 10*3/mm3  --  3.30 3.27 3.08  --   4.70   LYMPHS ABS 10*3/mm3  --  0.75 0.84 0.55*  --  0.61*   MONOS ABS 10*3/mm3  --  0.57 0.59 0.50  --  0.54   EOS ABS 10*3/mm3  --  0.16 0.16 0.15  --  0.02   BASOS ABS 10*3/mm3  --  0.03 0.03 0.03  --  0.03   IMMATURE GRANS (ABS) 10*3/mm3  --  0.02 0.02 0.02  --  0.03   NRBC /100 WBC  --  0.0 0.2 0.0  --  0.0     Glucose   Date/Time Value Ref Range Status   03/17/2020 1146 101 70 - 130 mg/dL Final   03/17/2020 0621 97 70 - 130 mg/dL Final   03/16/2020 2031 179 (H) 70 - 130 mg/dL Final   03/16/2020 1836 115 70 - 130 mg/dL Final   03/16/2020 1653 96 70 - 130 mg/dL Final   03/16/2020 0628 117 70 - 130 mg/dL Final   03/15/2020 2053 138 (H) 70 - 130 mg/dL Final   03/15/2020 1711 108 70 - 130 mg/dL Final                                   Results from last 7 days   Lab Units 03/17/20  1355   NITRITE UA  Negative   WBC UA /HPF 3-5*   BACTERIA UA /HPF None Seen   SQUAM EPITHEL UA /HPF None Seen     Results from last 7 days   Lab Units 03/16/20  0528   URIC ACID mg/dL 10.2*       Imaging:  Imaging Results (Last 24 Hours)     Procedure Component Value Units Date/Time    XR Chest Post CVA Port [[id number]] Collected:  03/17/20 1203     Updated:  03/17/20 1207    Narrative:       ONE VIEW PORTABLE CHEST AT 11:29 AM     HISTORY: Vascular catheter placement     FINDINGS: There is been recent insertion of a right-sided catheter which  ends in the SVC. There is no pneumothorax. Is moderate cardiomegaly and  vascular congestion showing slight worsening since a study of 5 days  ago. There are small pleural effusions layering posteriorly which are  unchanged.     This report was finalized on 3/17/2020 12:04 PM by Dr. Ovi Maier M.D.       IR PICC W Fluoro Surgery Only [[id number]] Resulted:  03/17/20 1105     Updated:  03/17/20 1105    Narrative:       This procedure was auto-finalized with no dictation required.          I reviewed the patient's new clinical results and medications.         Medication Review:   Scheduled  Meds:    aspirin 81 mg Oral Daily   carvedilol 25 mg Oral BID With Meals   [START ON 3/18/2020] ceFAZolin 3 g Intravenous On Call to OR   [START ON 3/18/2020] chlorhexidine 15 mL Mouth/Throat Q12H   [START ON 3/18/2020] Chlorhexidine Gluconate Cloth 1 application Topical Q12H   clotrimazole-betamethasone  Topical Q12H   doxycycline 100 mg Oral Q12H   heparin (porcine) 4,000 Units Intracatheter Once   hydrALAZINE 25 mg Oral Q8H   influenza vaccine 0.5 mL Intramuscular Once   insulin lispro 0-9 Units Subcutaneous 4x Daily With Meals & Nightly   isosorbide mononitrate 60 mg Oral Q24H   [START ON 3/18/2020] metoprolol tartrate 12.5 mg Oral On Call to OR   [START ON 3/18/2020] mupirocin 1 application Each Nare Q12H   sodium chloride 10 mL Intravenous Q12H     Continuous Infusions:   PRN Meds:.•  albumin human  •  dextrose  •  dextrose  •  glucagon (human recombinant)  •  nitroglycerin  •  sodium chloride  •  sodium chloride    Problem List:     Active Hospital Problems    Diagnosis  POA   • **ESRD on hemodialysis (CMS/[facility]) [N18.6, Z99.2]  Not Applicable   • Systolic CHF, acute (CMS/[facility]) [I50.21]  Unknown   • Anasarca associated with disorder of kidney [N04.9]  Yes   • Diabetes mellitus (CMS/[facility]) [E11.9]  Unknown   • Cellulitis of left leg [L03.116]  Unknown   • Essential hypertension [I10]  Yes   • Nonrheumatic mitral valve regurgitation, severe [I34.0]  Yes      Resolved Hospital Problems   No resolved problems to display.       Assessment and Plan:     Anasarca/ acute systolic CHF/ severe MR  -Echo showed EF reduced to 33% and severe MR  -Cardiothoracic surgery following for MVR/ TVR.  tentative surgery plans Thursday  -Dialysis to start tomorrow.     DESHAWN/CKD  - ESRD with plans to start HD tomorrow.  -Tunnel catheter placed today  -nephrology following.     Cellulitis, left leg  -cellulitis vs dermatitis. Overall improved.   -continue planned course of doxycycline    DM-blood sugars well controlled.     Anemia  -Hgb  trending down with no reported blood loss. Microcytic  -Iron stores low    VTE prophylaxis: SCDs  CODE status: full code  Disposition: TBD    I discussed the patients findings and my recommendations with patient and nursing staff.    Mercedes Armenta, MINGO  03/17/20

## 2020-03-17 NOTE — ANESTHESIA PREPROCEDURE EVALUATION
Anesthesia Evaluation     Patient summary reviewed and Nursing notes reviewed                Airway   Mallampati: III  Dental      Pulmonary    (+) pleural effusion,   Cardiovascular     ECG reviewed  Rhythm: regular  Rate: normal    (+) hypertension, valvular problems/murmurs, CHF ,       Neuro/Psych- negative ROS  GI/Hepatic/Renal/Endo    (+)   renal disease ESRD, diabetes mellitus,     Musculoskeletal (-) negative ROS    Abdominal    Substance History - negative use     OB/GYN negative ob/gyn ROS         Other                      Anesthesia Plan    ASA 4     MAC   (Severe mitral regurgitation with CHR and RF awaiting cardiac assessment for valve replacement and requiring venous dialysis access )  intravenous induction     Anesthetic plan, all risks, benefits, and alternatives have been provided, discussed and informed consent has been obtained with: patient.

## 2020-03-17 NOTE — PLAN OF CARE
Problem: Patient Care Overview  Goal: Plan of Care Review  Outcome: Ongoing (interventions implemented as appropriate)  Flowsheets (Taken 3/17/2020 6252)  Progress: no change  Plan of Care Reviewed With: patient  Outcome Summary: No c.o discomfort overnight. rested well. Up ad sandoval. Edema in lower ext continues to be moderate-severe. Encouraged to keep them elevated. NPO since midnight for tunnel cath placement for dialysis. consent signed and in chart. VSS/ on RA. BG monitored and covered per orders. Will cont to monitor.

## 2020-03-17 NOTE — CONSULTS
Name: Nico King ADMIT: 3/12/2020   : 1970  PCP: Provider, No Known    MRN: [id number] LOS: 4 days   AGE/SEX: 49 y.o. male  ROOM: [address]         CHIEF COMPLAINT: Chronic kidney disease stage V  HPI: Nico King is a 49 y.o. male whose previous medical records I have reviewed and summarize below in addition to the findings from today's exam.  He has a history of dilated cardiomyopathy secondary to severe mitral valve incompetence and has progressed from chronic kidney disease to end-stage renal disease and is now in need of initiating hemodialysis.  We have been asked to evaluate him for dialysis access.  He has no history of dialysis access, pacemakers, other vascular procedures.  No other thrombotic history or history of anticoagulation.    He denies current chest pain, shortness of breath, has improved significantly with Bumex.    PAST MEDICAL HISTORY:   Past Medical History:   Diagnosis Date   • DESHAWN (acute kidney injury) (CMS/HCC)    • CHF (congestive heart failure) (CMS/[facility])    • Cigarette smoker    • Diabetes mellitus (CMS/HCC)    • Foot callus    • Hypertension    • Mitral regurgitation     severe eccentric per echo 2018   • Pleural effusion 2018    Bilateral, small per x-ray   • Stage III chronic kidney disease (CMS/HCC)       PAST SURGICAL HISTORY:   Past Surgical History:   Procedure Laterality Date   • HIP SURGERY        FAMILY HISTORY:   Family History   Problem Relation Age of Onset   • Hypertension Mother    • Atrial fibrillation Father    • Hypertension Father       SOCIAL HISTORY:   Social History     Tobacco Use   • Smoking status: Current Every Day Smoker     Packs/day: 0.50     Types: Cigarettes   • Smokeless tobacco: Current User     Types: Chew   • Tobacco comment: caffeine use   Substance Use Topics   • Alcohol use: No   • Drug use: No      MEDICATIONS:   No current facility-administered medications on file prior to encounter.      Current  Outpatient Medications on File Prior to Encounter   Medication Sig Dispense Refill   • aspirin 81 MG EC tablet Take 81 mg by mouth Daily.     • amLODIPine (NORVASC) 5 MG tablet Take 1 tablet by mouth Daily. 90 tablet 0   • carvedilol (COREG) 25 MG tablet Take 1 tablet by mouth 2 (Two) Times a Day. 180 tablet 0   • furosemide (LASIX) 40 MG tablet Take 1 tablet by mouth 2 (Two) Times a Day. 180 tablet 0   • hydrALAZINE (APRESOLINE) 100 MG tablet Take 0.5 tablets by mouth 3 (Three) Times a Day. 90 tablet 0   • isosorbide mononitrate (IMDUR) 60 MG 24 hr tablet Take 1 tablet by mouth Daily. 90 tablet 0             ALLERGIES: Patient has no known allergies.     COMPLETE REVIEW OF SYSTEMS:     Review of Systems   Constitutional: Positive for activity change and fatigue. Negative for fever.   Respiratory: Positive for shortness of breath. Negative for cough.    Cardiovascular: Positive for chest pain and leg swelling.   Gastrointestinal: Negative for abdominal pain, nausea and vomiting.   Musculoskeletal: Negative for back pain and gait problem.   Skin: Negative for color change and wound.   Neurological: Negative for speech difficulty and headaches.         PHYSICAL EXAM:   Patient Vitals for the past 24 hrs:   BP Temp Temp src Pulse Resp SpO2 Height Weight   03/16/20 2230 134/89 -- -- 71 -- -- -- --   03/16/20 2032 140/97 97.3 °F (36.3 °C) Oral 71 16 100 % -- --   03/16/20 1831 144/95 -- -- 74 16 -- -- --   03/16/20 1755 148/92 -- -- 66 16 92 % -- --   03/16/20 1740 159/95 -- -- 68 16 92 % -- --   03/16/20 1725 157/98 -- -- 68 16 92 % -- --   03/16/20 1710 146/96 -- -- 69 16 92 % -- --   03/16/20 1655 153/94 -- -- 69 16 92 % -- --   03/16/20 1651 156/93 -- -- 69 16 91 % -- --   03/16/20 1636 -- -- -- -- 16 -- -- --   03/16/20 1630 -- -- -- -- 17 -- -- --   03/16/20 1625 -- -- -- -- 17 -- -- --   03/16/20 1621 -- -- -- -- 18 -- -- --   03/16/20 1616 -- -- -- -- 17 -- -- --   03/16/20 1611 -- -- -- -- 18 -- -- --   03/16/20  "1610 -- -- -- -- -- (!) 79 % -- --   03/16/20 1607 -- -- -- -- 17 -- -- --   03/16/20 1602 -- -- -- -- 16 -- -- --   03/16/20 1557 -- -- -- -- 17 -- -- --   03/16/20 1552 -- -- -- -- 18 -- -- --   03/16/20 1547 -- -- -- -- 18 -- -- --   03/16/20 1350 151/97 97.3 °F (36.3 °C) Oral 65 18 99 % -- --   03/16/20 1250 139/78 -- -- 67 16 93 % -- --   03/16/20 1245 132/77 -- -- 68 20 96 % -- --   03/16/20 1240 130/73 -- -- 67 16 95 % -- --   03/16/20 1235 138/69 -- -- 72 20 92 % -- --   03/16/20 1230 119/70 -- -- 72 18 90 % -- --   03/16/20 1225 109/66 -- -- 72 20 90 % -- --   03/16/20 1220 134/80 -- -- 73 24 95 % -- --   03/16/20 1215 149/85 -- -- 74 24 96 % -- --   03/16/20 1211 (!) 160/106 -- -- 71 20 97 % -- --   03/16/20 1155 (!) 156/107 -- -- 73 -- -- 180.3 cm (71\") (!) 146 kg (322 lb)   03/16/20 0918 150/98 97.6 °F (36.4 °C) Oral 75 -- -- -- --   03/16/20 0500 -- -- -- -- -- -- -- (!) 146 kg (321 lb 12.8 oz)   03/16/20 0010 136/95 97.6 °F (36.4 °C) Oral 74 17 91 % -- --        Physical Exam   Constitutional: He is oriented to person, place, and time. He appears well-developed and well-nourished.   I have reviewed the vital signs listed in this exam.    Morbidly obese   HENT:   Mouth/Throat: Oropharynx is clear and moist.   No jugular venous distension   Eyes: Conjunctivae are normal.   Cardiovascular: Normal rate, regular rhythm and normal heart sounds.   Pulses:       Carotid pulses are 2+ on the right side, and 2+ on the left side.       Radial pulses are 2+ on the right side, and 2+ on the left side.        Femoral pulses are 2+ on the right side, and 2+ on the left side.       Popliteal pulses are 2+ on the right side, and 2+ on the left side.        Dorsalis pedis pulses are 2+ on the right side, and 2+ on the left side.        Posterior tibial pulses are 2+ on the right side, and 2+ on the left side.   PMI normal  No Abdominal aortic aneurysm is palpable.    Pulmonary/Chest: Effort normal and breath sounds " normal. No respiratory distress.   Abdominal: Soft. He exhibits no distension. There is no tenderness. There is no guarding.   No hepatosplenomegaly is palpable.      Musculoskeletal: He exhibits no deformity.   Normal muscular tone of the four extremities without flaccidity, atrophy, or abnormal movements.     Inspection of the digits and nails is negative for clubbing, cyanosis, infection, or other pathology.    Neurological: He is alert and oriented to person, place, and time.   Skin: Skin is warm and dry. No rash noted.   Psychiatric: He has a normal mood and affect.             LABS:      Recent Results (from the past 24 hour(s))   Renal Function Panel    Collection Time: 03/16/20  5:28 AM   Result Value Ref Range    Glucose 103 (H) 65 - 99 mg/dL    BUN 74 (H) 6 - 20 mg/dL    Creatinine 7.20 (H) 0.76 - 1.27 mg/dL    Sodium 135 (L) 136 - 145 mmol/L    Potassium 4.2 3.5 - 5.2 mmol/L    Chloride 106 98 - 107 mmol/L    CO2 15.7 (L) 22.0 - 29.0 mmol/L    Calcium 7.0 (L) 8.6 - 10.5 mg/dL    Albumin 2.10 (L) 3.50 - 5.20 g/dL    Phosphorus 5.9 (H) 2.5 - 4.5 mg/dL    Anion Gap 13.3 5.0 - 15.0 mmol/L    BUN/Creatinine Ratio 10.3 7.0 - 25.0    eGFR Non African Amer 8 (L) >60 mL/min/1.73    eGFR  African Amer     Uric Acid    Collection Time: 03/16/20  5:28 AM   Result Value Ref Range    Uric Acid 10.2 (H) 3.4 - 7.0 mg/dL   Magnesium    Collection Time: 03/16/20  5:28 AM   Result Value Ref Range    Magnesium 1.9 1.6 - 2.6 mg/dL   CBC Auto Differential    Collection Time: 03/16/20  5:28 AM   Result Value Ref Range    WBC 4.83 3.40 - 10.80 10*3/mm3    RBC 4.28 4.14 - 5.80 10*6/mm3    Hemoglobin 9.9 (L) 13.0 - 17.7 g/dL    Hematocrit 32.9 (L) 37.5 - 51.0 %    MCV 76.9 (L) 79.0 - 97.0 fL    MCH 23.1 (L) 26.6 - 33.0 pg    MCHC 30.1 (L) 31.5 - 35.7 g/dL    RDW 16.7 (H) 12.3 - 15.4 %    RDW-SD 45.9 37.0 - 54.0 fl    MPV 10.7 6.0 - 12.0 fL    Platelets 168 140 - 450 10*3/mm3    Neutrophil % 68.4 42.7 - 76.0 %    Lymphocyte % 15.5  (L) 19.6 - 45.3 %    Monocyte % 11.8 5.0 - 12.0 %    Eosinophil % 3.3 0.3 - 6.2 %    Basophil % 0.6 0.0 - 1.5 %    Immature Grans % 0.4 0.0 - 0.5 %    Neutrophils, Absolute 3.30 1.70 - 7.00 10*3/mm3    Lymphocytes, Absolute 0.75 0.70 - 3.10 10*3/mm3    Monocytes, Absolute 0.57 0.10 - 0.90 10*3/mm3    Eosinophils, Absolute 0.16 0.00 - 0.40 10*3/mm3    Basophils, Absolute 0.03 0.00 - 0.20 10*3/mm3    Immature Grans, Absolute 0.02 0.00 - 0.05 10*3/mm3    nRBC 0.0 0.0 - 0.2 /100 WBC   POC Glucose Once    Collection Time: 03/16/20  6:28 AM   Result Value Ref Range    Glucose 117 70 - 130 mg/dL   Adult Transesophageal Echo (MEGHA) W/ Cont if Necessary Per Protocol    Collection Time: 03/16/20 12:57 PM   Result Value Ref Range    BSA 0.87 m^2     CV ECHO PANCHO - BZI_BMI 6.0 kilograms/m^2     CV ECHO PANCHO - BSA(HAYCOCK) 0.76 m^2     CV ECHO PANCHO - BZI_METRIC_WEIGHT 14.5 kg     CV ECHO PANCHO - BZI_METRIC_HEIGHT 154.9 cm   POC Glucose Once    Collection Time: 03/16/20  4:53 PM   Result Value Ref Range    Glucose 96 70 - 130 mg/dL   POC Glucose Once    Collection Time: 03/16/20  6:36 PM   Result Value Ref Range    Glucose 115 70 - 130 mg/dL   POC Glucose Once    Collection Time: 03/16/20  8:31 PM   Result Value Ref Range    Glucose 179 (H) 70 - 130 mg/dL   ]    The following radiologic or non-invasive studies including the images have been independently reviewed by me and my impressions are as follows: Vein mapping shows no useful basilic or cephalic vein of either arm       ASSESSMENT/PLAN: 49 y.o. male with new end-stage renal disease in need of initiating hemodialysis.  Plan for tunneled dialysis access placement tomorrow.  He will need cardiac surgery for mitral valve repair during this admission.  Plan for creation of upper extremity access in the future.  By my review of his vein mapping, he does not have autogenous access options and may need an AV graft.    I have discussed with the patient the following five  points:  1-  I have been asked to see Nico King for the treatment of the diagnosis of: End-stage renal disease  2- The planned treatment of this diagnosis is: Tunneled dialysis catheter insertion  3- The risks of a procedure include but are not limited to the following: bleeding, thrombosis, damage to adjacent anatomical structures including nerves or blood vessels, infections, wound healing problems, the need for further procedures, whether planned or emergent. The benefits of a procedure include but are not limited to the following: Ability to have hemodialysis  4- Alternatives to the planned procedure include: Medical management of renal failure  5- The natural history of the diagnosis if no treatment is given is: Increased likelihood of volume disturbances or electrolyte deficiencies    Problem Points:  4:  Patient has a new problem, with additional work-up planned  Total problem points:4 or more    Data Points:  1:  I have reviewed or order clinical lab test  1:  I have reviewed or order radiology test (except heart catheterization or echo)  2:  I have personally and independently review of image, tracing, or specimen  2:  I have reviewed and summation of old records and/or discussed the patients care with another health care provider  Total data points:4 or more    Risk Points:  High:  Any illness that poses threat to life or body funciton    MDM requires 2/3 (Problem points, Data points and Risk)  MDM Prob point Data point Risk   SF 1 1 Minimal   Low 2 2 Low   Mod 3 3 Moderate   High 4 4 High     Code requires 3/3 (MDM, History and Exam)  Code MDM History Exam Time   29453 SF/Low Detailed Detailed 30   87887 Mod Comprehensive Comprehensive 50   48917 High Comprehensive Comprehensive 70     Detailed history:  4 elements HPI or status of 3 chronic problems; 2-9 system ROS  Comprehensive:  4 elements HPI or status of 3 chronic problems;  10 system ROS    Detailed Exam:  12 findings from any organ  system  Comprehensive Exam:  2 findings from each of 9 systems.     Billin    Assessment/Plan       Anasarca associated with disorder of kidney    Nonrheumatic mitral valve regurgitation, severe    Essential hypertension    Stage III chronic kidney disease (CMS/HCC)    Diabetes mellitus (CMS/HCC)    Cellulitis of left leg    Systolic CHF, acute (CMS/HCC)      Jake Bains MD   20

## 2020-03-17 NOTE — OP NOTE
Nico King  Admission date: 3/12/2020  Date of operation: 3/17/2020    Location: [facility]    Pre-op Diagnosis:      * ESRD on hemodialysis (CMS/HCC) [N18.6, Z99.2]    Post-Op Diagnosis Codes:     * ESRD on hemodialysis (CMS/HCC) [N18.6, Z99.2]    Procedure performed: 23 cm tunneled palindrome dialysis catheter placement    Surgeon: Dr. Renard Ro    Assistants:  Makayla CARTY , Provided critical assistance in exposure, retraction, and suction that overall decrease blood loss and operative time.    Anesthesia: Monitored Anesthesia Care    Staff:   Circulator: Es Maurice, HUE; Bettina Horan RN  Radiology Technologist: Niya Hairston, RRT  Scrub Person: Damir Hendrix  Assistant: Makayla May CSA    Indications:  Pleasant gentleman with acute renal failure for tunneled catheter placement for hemodialysis.       Procedure Details Right Neck and chest prepped and draped in usual fashion.  1% Xylocaine with Marcaine used for local anesthesia.  Ultrasound access jugular vein performed without problems.  Wire advanced without difficulty.  23 cm palindrome dialysis Catheter tunneled from chest wall to the neck incision.  Dilator then peel-away sheath placed over wire centrally under fluoroscopic guidance.  Catheter placed to the superior vena cava without problems.  Peel-away sheath removed in its entirety.  Ports of the catheter flushed with heparin saline.  They were both flushed with high concentration heparin and capped.  Catheter secured to skin with nylon suture.  Neck incision closed with subcuticular Vicryl suture.Dermabond applied to neck incision.  Biopatch and dressing applied to the chest incision.  Patient tolerated procedure well without problems.      Radiographic interpretation: normal contour catheter at neck with tip at right atrium and superior vena cava    Findings: see above    Estimated Blood Loss: none    Specimens: * No orders  in the log *      Drains: * No LDAs found *    Complications: none    Condition: stable    Disposition: to recovery room    Renard Ro MD     Date: 3/17/2020  Time: 11:00    Active Hospital Problems    Diagnosis  POA   • **Anasarca associated with disorder of kidney [N04.9]  Yes   • Systolic CHF, acute (CMS/HCC) [I50.21]  Unknown   • Diabetes mellitus (CMS/HCC) [E11.9]  Unknown   • Cellulitis of left leg [L03.116]  Unknown   • Essential hypertension [I10]  Yes   • Nonrheumatic mitral valve regurgitation, severe [I34.0]  Yes   • Stage III chronic kidney disease (CMS/HCC) [N18.3]  Yes      Resolved Hospital Problems   No resolved problems to display.

## 2020-03-17 NOTE — PROGRESS NOTES
Continued Stay Note  [facility]     Patient Name: Nico King  MRN: [id number]  Today's Date: 3/17/2020    Admit Date: 3/12/2020    Discharge Plan     Row Name 03/17/20 0911       Plan    Plan  Retutn home with OP HD at [facility]    Patient/Family in Agreement with Plan  yes    Plan Comments  Spoke with patient at bedside.  Plan remains for him to return home at [place].  He is aware he will need OP HD.  Patient was given list of HD facilities in [address] [place] [address].  He states [facility] would be closest facility for him.  He prefers mornings.  Inpatient referral entered in [facility] website and paperwork faxed to [phone].  CCP will follow.  BH[place]eniuk RN Becky S. Humeniuk, RN

## 2020-03-17 NOTE — ANESTHESIA POSTPROCEDURE EVALUATION
"Patient: Nico King    Procedure Summary     Date:  03/17/20 Room / Location:  [facility] OR  / [facility] MAIN OR    Anesthesia Start:  1028 Anesthesia Stop:  1114    Procedure:  HEMODIALYSIS CATHETER INSERTION (N/A ) Diagnosis:  ESRD on hemodialysis (CMS/[facility])    Surgeon:  Renard Ro MD Provider:  Renard Sosa MD    Anesthesia Type:  MAC ASA Status:  4          Anesthesia Type: MAC    Vitals  Vitals Value Taken Time   /90 3/17/2020 11:40 AM   Temp 36.3 °C (97.4 °F) 3/17/2020 11:12 AM   Pulse 65 3/17/2020 11:44 AM   Resp 16 3/17/2020 11:30 AM   SpO2 92 % 3/17/2020 11:44 AM   Vitals shown include unvalidated device data.        Post Anesthesia Care and Evaluation    Patient location during evaluation: bedside  Level of consciousness: awake  Pain management: adequate  Airway patency: patent  Anesthetic complications: No anesthetic complications    Cardiovascular status: acceptable  Respiratory status: acceptable  Hydration status: acceptable    Comments: /89   Pulse 65   Temp 36.3 °C (97.4 °F) (Oral)   Resp 16   Ht 180.3 cm (71\")   Wt (!) 145 kg (318 lb 11.2 oz)   SpO2 92%   BMI 44.45 kg/m²         "

## 2020-03-17 NOTE — PROGRESS NOTES
"   LOS: 5 days    Patient Care Team:  Provider, No Known as PCP - General    Chief Complaint:    Chief Complaint   Patient presents with   • Edema     Follow-up acute kidney injury on chronic kidney disease  Subjective     Interval History:   SP TDC today.  Dialysis this afternoon.  Not soa.  Edema unchanged.  Still with poor appetite.   BM 2 days ago .    Review of Systems:   As noted above    Objective     Vital Signs  Temp:  [97.3 °F (36.3 °C)-98.6 °F (37 °C)] 97.5 °F (36.4 °C)  Heart Rate:  [63-74] 65  Resp:  [16-18] 16  BP: (105-144)/() 139/106    Flowsheet Rows      First Filed Value   Admission Height  180.3 cm (71\") Documented at 03/12/2020 1350   Admission Weight  136 kg (300 lb) Documented at 03/12/2020 1350          I/O this shift:  In: 100 [I.V.:100]  Out: 305 [Urine:300; Blood:5]  I/O last 3 completed shifts:  In: 790 [P.O.:690; I.V.:100]  Out: 2850 [Urine:2850]    Intake/Output Summary (Last 24 hours) at 3/17/2020 1816  Last data filed at 3/17/2020 1350  Gross per 24 hour   Intake 670 ml   Output 1005 ml   Net -335 ml       Physical Exam:  General Appearance: alert, oriented x 3, no acute distress,  chronically ill  Skin: warm and dry  HEENT: pupils round and reactive to light, oral mucosa normal, nonicteric sclerae  Neck: supple, no JVD, trachea midline. RIJ TDC.  Lungs: Bibasilar crackles and rhonchi, unlabored breathing effort  Heart: RRR, no S3, no rub,  3/6 systolic murmur  Abdomen: soft, non-tender, +bs. body wall edema .  :  scrotal and penile edema  Extremities:  3-4+ LE edema bilaterally; chronic  venous stasis changes  Neuro: normal speech and mental status.  Flat affect .        Results Review:    Results from last 7 days   Lab Units 03/17/20  0654 03/16/20  0528 03/15/20  0530  03/13/20  0621 03/12/20  1359   SODIUM mmol/L 138 135* 134*   < > 136 136   POTASSIUM mmol/L 4.9 4.2 4.8   < > 4.9 5.3*   CHLORIDE mmol/L 107 106 101   < > 108* 104   CO2 mmol/L 16.9* 15.7* 17.1*   < > 14.4* " 15.9*   BUN mg/dL 82* 74* 79*   < > 75* 70*   CREATININE mg/dL 7.44* 7.20* 7.07*   < > 7.15* 6.95*   CALCIUM mg/dL 7.3* 7.0* 7.5*   < > 7.7* 8.3*   BILIRUBIN mg/dL  --   --   --   --  0.4 0.4   ALK PHOS U/L  --   --   --   --  68 86   ALT (SGPT) U/L  --   --   --   --  51* 74*   AST (SGOT) U/L  --   --   --   --  18 31   GLUCOSE mg/dL 100* 103* 124*   < > 121* 115*    < > = values in this interval not displayed.       Estimated Creatinine Clearance: 17.7 mL/min (A) (by C-G formula based on SCr of 7.44 mg/dL (H)).    Results from last 7 days   Lab Units 03/17/20  0654 03/16/20  0528 03/15/20  0530 03/14/20  0433   MAGNESIUM mg/dL  --  1.9 2.2 2.2   PHOSPHORUS mg/dL 6.4* 5.9* 6.3* 6.2*       Results from last 7 days   Lab Units 03/16/20  0528 03/15/20  0530 03/14/20  0433   URIC ACID mg/dL 10.2* 9.6* 9.4*       Results from last 7 days   Lab Units 03/17/20  0654 03/16/20  0528 03/15/20  0530 03/14/20  0433 03/13/20  0621   WBC 10*3/mm3 4.68 4.83 4.91 4.33 5.24   HEMOGLOBIN g/dL 10.2* 9.9* 10.9* 10.9* 11.6*   PLATELETS 10*3/mm3 183 168 193 167 172               Imaging Results (Last 24 Hours)     Procedure Component Value Units Date/Time    XR Chest Post CVA Port [[id number]] Collected:  03/17/20 1203     Updated:  03/17/20 1207    Narrative:       ONE VIEW PORTABLE CHEST AT 11:29 AM     HISTORY: Vascular catheter placement     FINDINGS: There is been recent insertion of a right-sided catheter which  ends in the SVC. There is no pneumothorax. Is moderate cardiomegaly and  vascular congestion showing slight worsening since a study of 5 days  ago. There are small pleural effusions layering posteriorly which are  unchanged.     This report was finalized on 3/17/2020 12:04 PM by Dr. Ovi Maier M.D.       [facility] Surgery Only [[id number]] Resulted:  03/17/20 1105     Updated:  03/17/20 1105    Narrative:       This procedure was auto-finalized with no dictation required.          aspirin 81 mg Oral Daily    carvedilol 25 mg Oral BID With Meals   [START ON 3/18/2020] ceFAZolin 3 g Intravenous On Call to OR   [START ON 3/18/2020] chlorhexidine 15 mL Mouth/Throat Q12H   [START ON 3/18/2020] Chlorhexidine Gluconate Cloth 1 application Topical Q12H   clotrimazole-betamethasone  Topical Q12H   doxycycline 100 mg Oral Q12H   [START ON 3/18/2020] ferrous sulfate 325 mg Oral Daily With Breakfast   heparin (porcine) 4,000 Units Intracatheter Once   hydrALAZINE 25 mg Oral Q8H   influenza vaccine 0.5 mL Intramuscular Once   insulin lispro 0-9 Units Subcutaneous 4x Daily With Meals & Nightly   isosorbide mononitrate 60 mg Oral Q24H   [START ON 3/18/2020] metoprolol tartrate 12.5 mg Oral On Call to OR   [START ON 3/18/2020] mupirocin 1 application Each Nare Q12H   sodium chloride 10 mL Intravenous Q12H          Medication Review:   Current Facility-Administered Medications   Medication Dose Route Frequency Provider Last Rate Last Dose   • albumin human 25 % IV SOLN 12.5 g  12.5 g Intravenous PRN Stephen Eng MD       • aspirin EC tablet 81 mg  81 mg Oral Daily Renard Ro MD   81 mg at 03/17/20 1246   • carvedilol (COREG) tablet 25 mg  25 mg Oral BID With Meals Renard Ro MD   25 mg at 03/17/20 0930   • [START ON 3/18/2020] ceFAZolin in Sodium Chloride (ANCEF) IVPB solution 3 g  3 g Intravenous On Call to OR Renard Ro MD       • [START ON 3/18/2020] chlorhexidine (PERIDEX) 0.12 % solution 15 mL  15 mL Mouth/Throat Q12H Renard Ro MD       • [START ON 3/18/2020] Chlorhexidine Gluconate Cloth 2 % pads 1 application  1 application Topical Q12H Renard Ro MD       • clotrimazole-betamethasone (LOTRISONE) 1-0.05 % cream   Topical Q12H Renard Ro MD       • dextrose (D50W) 25 g/ 50mL Intravenous Solution 25 g  25 g Intravenous Q15 Min PRN Renard Ro MD       • dextrose (GLUTOSE) oral gel 15 g  15 g Oral Q15 Min PRN Renard Ro MD       •  doxycycline (MONODOX) capsule 100 mg  100 mg Oral Q12H Renard Ro MD   100 mg at 03/17/20 1246   • [START ON 3/18/2020] ferrous sulfate tablet 325 mg  325 mg Oral Daily With Breakfast Mercedes Armenta APRN       • glucagon (human recombinant) (GLUCAGEN DIAGNOSTIC) injection 1 mg  1 mg Subcutaneous Q15 Min PRN Renard Ro MD       • heparin (porcine) injection 4,000 Units  4,000 Units Intracatheter Once Padmaja Lee MD       • hydrALAZINE (APRESOLINE) tablet 25 mg  25 mg Oral Q8H Meggan Hinton MD       • influenza vac split quad (FLUZONE,FLUARIX,AFLURIA) injection 0.5 mL  0.5 mL Intramuscular Once Renard Ro MD       • insulin lispro (humaLOG) injection 0-9 Units  0-9 Units Subcutaneous 4x Daily With Meals & Nightly Renard Ro MD   2 Units at 03/16/20 2040   • isosorbide mononitrate (IMDUR) 24 hr tablet 60 mg  60 mg Oral Q24H Renard Ro MD   60 mg at 03/17/20 1246   • [START ON 3/18/2020] metoprolol tartrate (LOPRESSOR) tablet 12.5 mg  12.5 mg Oral On Call to OR Renard Ro MD       • [START ON 3/18/2020] mupirocin (BACTROBAN) 2 % nasal ointment 1 application  1 application Each Nare Q12H Renard Ro MD       • nitroglycerin (NITROSTAT) SL tablet 0.4 mg  0.4 mg Sublingual Q5 Min PRN Renard Ro MD       • sodium chloride 0.9 % flush 10 mL  10 mL Intravenous PRN Renard Ro MD       • sodium chloride 0.9 % flush 10 mL  10 mL Intravenous Q12H Renard Ro MD   10 mL at 03/17/20 1247   • sodium chloride 0.9 % flush 10 mL  10 mL Intravenous PRN Renard Ro MD           Assessment/Plan   1.  Acute on chronic kidney disease stage IV with progression to ESRD: refractory fluid overload; NAGMA; stable potassium.  Initiate dialysis today.  HD again tomorrow .  2.  Cellulitis of left lower extremity . PO doxycycline .  3.  Severe MR and moderate TR.  Systolic non-ischemic cardiomyopathy EF 33%. Plan for  Mitral and possible Tricuspid valve replacement 3/19 .  4.  Anasarca and fluid excess due to right-sided heart failure and ESRD.    5.  Hypertension,  Remove volume .  6.  Medical noncompliance.  7. DM2. Controlled .  8. Anemia CKD.  May see hg rise as volume removed .  Iron deficient.  IV iron with dialysis tomorrow .    Plan:  1. Dialysis today and tomorrow.        Laurita Mcneil MD  03/17/20  18:16

## 2020-03-17 NOTE — PLAN OF CARE
Pt had tunnel cath placed today and dialysis. Pt is to move to CVU for complete valve Pt was able to eat after his tunnel cath. Will cont to monitor.

## 2020-03-17 NOTE — NURSING NOTE
Obtained informed consent for tunneled dialysis catheter placement planned for tomorrow. Pt states no questions at this time. Consent signed and in chart. Pt NPO per orders.

## 2020-03-17 NOTE — PROGRESS NOTES
" LOS: 5 days   Patient Care Team:  Provider, No Known as PCP - General    Chief Complaint:   Mitral regurg    Subjective:  Symptoms:  He reports shortness of breath.      Having tunnel catheter placement today.     Review of Systems   Constitutional: Negative for chills and fever.   Respiratory: Positive for shortness of breath.    Cardiovascular: Positive for leg swelling.   Skin:        Lower extrem cellulitis        Objective:  General Appearance:  Comfortable.    Vital signs: (most recent): Blood pressure 125/84, pulse 72, temperature 98 °F (36.7 °C), temperature source Oral, resp. rate 18, height 180.3 cm (71\"), weight (!) 145 kg (318 lb 11.2 oz), SpO2 94 %.  No fever.    Lungs:  Normal effort and normal respiratory rate.  He is not in respiratory distress.    Heart: Normal rate.  Regular rhythm.  Positive for murmur.    Abdomen: Abdomen is soft and non-distended.  Bowel sounds are normal.   There is no abdominal tenderness.     Extremities: Normal range of motion.  (++edema, left lower extrem cellulitis)  Neurological: Patient is alert and oriented to person, place and time.    Skin:  Warm and dry.  There is a rash.            Vital Signs  Temp:  [97.3 °F (36.3 °C)-98.6 °F (37 °C)] 98.2 °F (36.8 °C)  Heart Rate:  [65-74] 72  Resp:  [16-24] 18  BP: (109-160)/() 125/84  Body mass index is 44.45 kg/m².    Intake/Output Summary (Last 24 hours) at 3/17/2020 0924  Last data filed at 3/17/2020 0559  Gross per 24 hour   Intake 670 ml   Output 1050 ml   Net -380 ml     No intake/output data recorded.      03/16/20  0500 03/16/20  1155 03/17/20  0514   Weight: (!) 146 kg (321 lb 12.8 oz) (!) 146 kg (322 lb) (!) 145 kg (318 lb 11.2 oz)        Physical Exam       Results Review:        WBC WBC   Date Value Ref Range Status   03/17/2020 4.68 3.40 - 10.80 10*3/mm3 Final   03/16/2020 4.83 3.40 - 10.80 10*3/mm3 Final   03/15/2020 4.91 3.40 - 10.80 10*3/mm3 Final      HGB Hemoglobin   Date Value Ref Range Status "   03/17/2020 10.2 (L) 13.0 - 17.7 g/dL Final   03/16/2020 9.9 (L) 13.0 - 17.7 g/dL Final   03/15/2020 10.9 (L) 13.0 - 17.7 g/dL Final      HCT Hematocrit   Date Value Ref Range Status   03/17/2020 33.9 (L) 37.5 - 51.0 % Final   03/16/2020 32.9 (L) 37.5 - 51.0 % Final   03/15/2020 36.6 (L) 37.5 - 51.0 % Final      Platelets Platelets   Date Value Ref Range Status   03/17/2020 183 140 - 450 10*3/mm3 Final   03/16/2020 168 140 - 450 10*3/mm3 Final   03/15/2020 193 140 - 450 10*3/mm3 Final        PT/INR:  No results found for: PROTIME/No results found for: INR    Sodium Sodium   Date Value Ref Range Status   03/17/2020 138 136 - 145 mmol/L Final   03/16/2020 135 (L) 136 - 145 mmol/L Final   03/15/2020 134 (L) 136 - 145 mmol/L Final      Potassium Potassium   Date Value Ref Range Status   03/17/2020 4.9 3.5 - 5.2 mmol/L Final   03/16/2020 4.2 3.5 - 5.2 mmol/L Final   03/15/2020 4.8 3.5 - 5.2 mmol/L Final      Chloride Chloride   Date Value Ref Range Status   03/17/2020 107 98 - 107 mmol/L Final   03/16/2020 106 98 - 107 mmol/L Final   03/15/2020 101 98 - 107 mmol/L Final      Bicarbonate CO2   Date Value Ref Range Status   03/17/2020 16.9 (L) 22.0 - 29.0 mmol/L Final   03/16/2020 15.7 (L) 22.0 - 29.0 mmol/L Final   03/15/2020 17.1 (L) 22.0 - 29.0 mmol/L Final      BUN BUN   Date Value Ref Range Status   03/17/2020 82 (H) 6 - 20 mg/dL Final   03/16/2020 74 (H) 6 - 20 mg/dL Final   03/15/2020 79 (H) 6 - 20 mg/dL Final      Creatinine Creatinine   Date Value Ref Range Status   03/17/2020 7.44 (H) 0.76 - 1.27 mg/dL Final   03/16/2020 7.20 (H) 0.76 - 1.27 mg/dL Final   03/15/2020 7.07 (H) 0.76 - 1.27 mg/dL Final      Calcium Calcium   Date Value Ref Range Status   03/17/2020 7.3 (L) 8.6 - 10.5 mg/dL Final   03/16/2020 7.0 (L) 8.6 - 10.5 mg/dL Final   03/15/2020 7.5 (L) 8.6 - 10.5 mg/dL Final      Magnesium Magnesium   Date Value Ref Range Status   03/16/2020 1.9 1.6 - 2.6 mg/dL Final   03/15/2020 2.2 1.6 - 2.6 mg/dL Final             aspirin 81 mg Oral Daily   carvedilol 25 mg Oral BID With Meals   clotrimazole-betamethasone  Topical Q12H   doxycycline 100 mg Oral Q12H   hydrALAZINE 10 mg Oral Q8H   influenza vaccine 0.5 mL Intramuscular Once   insulin lispro 0-9 Units Subcutaneous 4x Daily With Meals & Nightly   isosorbide mononitrate 60 mg Oral Q24H   sodium chloride 10 mL Intravenous Q12H              Patient Active Problem List   Diagnosis Code   • Nonrheumatic mitral valve regurgitation, severe I34.0   • Cigarette smoker F17.210   • Essential hypertension I10   • Stage III chronic kidney disease (CMS/HCC) N18.3   • Foot callus L84   • Anasarca associated with disorder of kidney N04.9   • Diabetes mellitus (CMS/HCC) E11.9   • Cellulitis of left leg L03.116   • Systolic CHF, acute (CMS/HCC) I50.21       Assessment & Plan       -severe mitral regurg  -moderate tricuspid regurg  -PFO with right to left shunt  -possible left atrial appendage thrombus on MEGHA   -dilated cardiomyopathy, EF 30-40%  -DESHAWN on CKD-- nephrology following, HD today  -HTN  -left leg cellulitis-- on doxy    Having tunneled catheter placed today by vascular. Then hopefully can have HD today or tomorrow. Tentative plans for MVR/TVR Thursday with Dr. Frye. Pre-op orders entered.    Sly Reddy PA-C  03/17/20  09:24

## 2020-03-18 PROBLEM — D63.8 ANEMIA OF CHRONIC DISEASE: Status: ACTIVE | Noted: 2020-01-01

## 2020-03-18 NOTE — PLAN OF CARE
Problem: Patient Care Overview  Goal: Plan of Care Review  3/18/2020 0536 by Emma Castillo, RN  Flowsheets (Taken 3/18/2020 1002)  Outcome Summary: First round of chemo completed yesterday.Denies pain. VSS Lower ext edema. Dsg is C/D/I. HD is scheduled again for today. Will continue to monitor.

## 2020-03-18 NOTE — PROGRESS NOTES
"   LOS: 6 days    Patient Care Team:  Provider, No Known as PCP - General    Chief Complaint:    Chief Complaint   Patient presents with   • Edema     Follow-up acute kidney injury on chronic kidney disease  Subjective     Interval History:    On dialysis. qb 300.  Feels a little better. Not making much urine.    Not soa.  Edema unchanged.  Still with poor appetite.   2 bm yesterday .Plan for MV and TV rings tomorrow with closure of PFO.      Review of Systems:   As noted above    Objective     Vital Signs  Temp:  [97.9 °F (36.6 °C)-98.4 °F (36.9 °C)] 97.9 °F (36.6 °C)  Heart Rate:  [65-75] 70  Resp:  [16-18] 18  BP: (124-144)/(84-97) 139/97    Flowsheet Rows      First Filed Value   Admission Height  180.3 cm (71\") Documented at 03/12/2020 1350   Admission Weight  136 kg (300 lb) Documented at 03/12/2020 1350          I/O this shift:  In: 240 [P.O.:240]  Out: -   I/O last 3 completed shifts:  In: 820 [P.O.:720; I.V.:100]  Out: 1305 [Urine:1300; Blood:5]    Intake/Output Summary (Last 24 hours) at 3/18/2020 1634  Last data filed at 3/18/2020 0900  Gross per 24 hour   Intake 640 ml   Output 300 ml   Net 340 ml       Physical Exam:  General Appearance: alert, oriented x 3, no acute distress,  chronically ill. On dialysis.   Goal 4.6 kg .  Skin: warm and dry  HEENT: pupils round and reactive to light, oral mucosa normal, nonicteric sclerae  Neck: supple, no JVD, trachea midline. RIJ TDC.  Lungs: Bibasilar crackles and rhonchi, unlabored breathing effort  Heart: RRR, no S3, no rub,  3/6 systolic murmur  Abdomen: soft, non-tender, +bs. body wall edema .  :  scrotal and penile edema  Extremities:  3-4+ LE edema bilaterally; chronic  venous stasis changes  Neuro: normal speech and mental status.  Flat affect .        Results Review:    Results from last 7 days   Lab Units 03/18/20  0551 03/17/20  0654 03/16/20  0528  03/13/20  0621 03/12/20  1359   SODIUM mmol/L 135* 138 135*   < > 136 136   POTASSIUM mmol/L 4.3 4.9 4.2  "  < > 4.9 5.3*   CHLORIDE mmol/L 103 107 106   < > 108* 104   CO2 mmol/L 20.6* 16.9* 15.7*   < > 14.4* 15.9*   BUN mg/dL 57* 82* 74*   < > 75* 70*   CREATININE mg/dL 5.68* 7.44* 7.20*   < > 7.15* 6.95*   CALCIUM mg/dL 7.4* 7.3* 7.0*   < > 7.7* 8.3*   BILIRUBIN mg/dL 0.3  --   --   --  0.4 0.4   ALK PHOS U/L 64  --   --   --  68 86   ALT (SGPT) U/L 18  --   --   --  51* 74*   AST (SGOT) U/L 9  --   --   --  18 31   GLUCOSE mg/dL 113* 100* 103*   < > 121* 115*    < > = values in this interval not displayed.       Estimated Creatinine Clearance: 23.2 mL/min (A) (by C-G formula based on SCr of 5.68 mg/dL (H)).    Results from last 7 days   Lab Units 03/17/20  0654 03/16/20  0528 03/15/20  0530 03/14/20  0433   MAGNESIUM mg/dL  --  1.9 2.2 2.2   PHOSPHORUS mg/dL 6.4* 5.9* 6.3* 6.2*       Results from last 7 days   Lab Units 03/16/20  0528 03/15/20  0530 03/14/20  0433   URIC ACID mg/dL 10.2* 9.6* 9.4*       Results from last 7 days   Lab Units 03/18/20  0550 03/17/20  0654 03/16/20  0528 03/15/20  0530 03/14/20  0433   WBC 10*3/mm3 5.23 4.68 4.83 4.91 4.33   HEMOGLOBIN g/dL 9.9* 10.2* 9.9* 10.9* 10.9*   PLATELETS 10*3/mm3 180 183 168 193 167       Results from last 7 days   Lab Units 03/18/20  0550   INR  1.28*         Imaging Results (Last 24 Hours)     ** No results found for the last 24 hours. **          aspirin 81 mg Oral Daily   bumetanide 4 mg Oral Daily   carvedilol 25 mg Oral BID With Meals   [START ON 3/19/2020] ceFAZolin 3 g Intravenous On Call to OR   chlorhexidine 15 mL Mouth/Throat Q12H   Chlorhexidine Gluconate Cloth 1 application Topical Q12H   clotrimazole-betamethasone  Topical Q12H   doxycycline 100 mg Oral Q12H   ferrous sulfate 325 mg Oral Daily With Breakfast   hydrALAZINE 25 mg Oral Q8H   influenza vaccine 0.5 mL Intramuscular Once   insulin lispro 0-9 Units Subcutaneous 4x Daily With Meals & Nightly   iron sucrose 100 mg Intravenous Once   isosorbide mononitrate 60 mg Oral Q24H   [START ON  3/19/2020] metoprolol tartrate 12.5 mg Oral On Call to OR   mupirocin 1 application Each Nare Q12H   sodium chloride 10 mL Intravenous Q12H          Medication Review:   Current Facility-Administered Medications   Medication Dose Route Frequency Provider Last Rate Last Dose   • aspirin EC tablet 81 mg  81 mg Oral Daily Renard Ro MD   81 mg at 03/18/20 0906   • bumetanide (BUMEX) tablet 4 mg  4 mg Oral Daily Laurita Mcneil MD   4 mg at 03/18/20 0906   • carvedilol (COREG) tablet 25 mg  25 mg Oral BID With Meals Renard Ro MD   25 mg at 03/18/20 0906   • [START ON 3/19/2020] ceFAZolin in Sodium Chloride (ANCEF) IVPB solution 3 g  3 g Intravenous On Call to OR Jr Ze Frye MD       • chlorhexidine (PERIDEX) 0.12 % solution 15 mL  15 mL Mouth/Throat Q12H Jr Ze Frye MD       • Chlorhexidine Gluconate Cloth 2 % pads 1 application  1 application Topical Q12H Jr Ze Frye MD       • clotrimazole-betamethasone (LOTRISONE) 1-0.05 % cream   Topical Q12H Renard Ro MD       • dextrose (D50W) 25 g/ 50mL Intravenous Solution 25 g  25 g Intravenous Q15 Min PRN Renard Ro MD       • dextrose (GLUTOSE) oral gel 15 g  15 g Oral Q15 Min PRN Renard Ro MD       • doxycycline (MONODOX) capsule 100 mg  100 mg Oral Q12H Renard Ro MD   100 mg at 03/18/20 0906   • ferrous sulfate tablet 325 mg  325 mg Oral Daily With Breakfast Mercedes Armenta APRN   325 mg at 03/18/20 0906   • glucagon (human recombinant) (GLUCAGEN DIAGNOSTIC) injection 1 mg  1 mg Subcutaneous Q15 Min PRN Renard Ro MD       • hydrALAZINE (APRESOLINE) tablet 25 mg  25 mg Oral Q8H Meggan Hinton MD   25 mg at 03/18/20 1340   • influenza vac split quad (FLUZONE,FLUARIX,AFLURIA) injection 0.5 mL  0.5 mL Intramuscular Once Renard Ro MD       • insulin lispro (humaLOG) injection 0-9 Units  0-9 Units Subcutaneous 4x Daily With Meals & Nightly  Renard Ro MD   2 Units at 03/16/20 2040   • iron sucrose (VENOFER) 100 mg in sodium chloride 0.9 % 100 mL IVPB  100 mg Intravenous Once Laurita Mcneil MD       • isosorbide mononitrate (IMDUR) 24 hr tablet 60 mg  60 mg Oral Q24H Renard Ro MD   60 mg at 03/18/20 0911   • [START ON 3/19/2020] metoprolol tartrate (LOPRESSOR) tablet 12.5 mg  12.5 mg Oral On Call to OR Jr Ze Frye MD       • mupirocin (BACTROBAN) 2 % nasal ointment 1 application  1 application Each Nare Q12H Jr Ze Frye MD       • nitroglycerin (NITROSTAT) SL tablet 0.4 mg  0.4 mg Sublingual Q5 Min PRN Renard Ro MD       • sodium chloride 0.9 % flush 10 mL  10 mL Intravenous PRN Renard Ro MD       • sodium chloride 0.9 % flush 10 mL  10 mL Intravenous Q12H Renard Ro MD   10 mL at 03/18/20 0908   • sodium chloride 0.9 % flush 10 mL  10 mL Intravenous PRN Renard Ro MD           Assessment/Plan   1.  Acute on chronic kidney disease stage IV with progression to ESRD: refractory fluid overload; NAGMA; stable potassium.  Initiate dialysis today.  HD again tomorrow .  2.  Cellulitis of left lower extremity . PO doxycycline .  3.  Severe MR and moderate TR.  Systolic non-ischemic cardiomyopathy EF 33%. Plan for Mitral and possible Tricuspid valve replacement 3/19 .  4.  Anasarca and fluid excess due to right-sided heart failure and ESRD.    5.  Hypertension,  Remove volume .  6.  Medical noncompliance.  7. DM2. Controlled .  8. Anemia CKD.  Hg stable .  Iron deficient.  IV iron with dialysis today.     Plan:  1. Dialysis today .  2. Valve surgery tomorrow.         Laurita Mcneil MD  03/18/20  16:34

## 2020-03-18 NOTE — PROGRESS NOTES
We will plan surgery for the morning.  He will have hemodialysis this afternoon.  If we cannot repair the mitral valve we should use a mechanical valve because of the accelerated calcium metabolism in a young patient on dialysis.  He is aware of this and knows he has to take Coumadin.  I really think we can repair it with a ring.  The tricuspid will need a ring and he needs a closure of his PFO.  All questions were answered.  He understands and wishes to proceed.

## 2020-03-18 NOTE — NURSING NOTE
Received patient post test, transportation brought to [facility] instead of 6th floor, report obtanied, patent educational packet provided, incentive spirometer used, patient npo, iron to be transported with patient to hemodialysis, HD to call back for report, no acute distress noted, bs 131 per accucheck, nsr 95% room ir 139/97,bruising noted at right wrist and hemodialysis site, dressing intact, patient is in preparation for surgery tomorrow with dr. Frye, will continue to monitor.

## 2020-03-18 NOTE — PROGRESS NOTES
LOS: 6 days   Patient Care Team:  Provider, No Known as PCP - General    Chief Complaint:   Mitral regurg  Tricuspid regurg  PFO    Subjective  Just getting back from carotid duplex. HD went ok yesterday. Breathing feels some better.     Vital Signs  Temp:  [97.4 °F (36.3 °C)-98.4 °F (36.9 °C)] 98.4 °F (36.9 °C)  Heart Rate:  [63-75] 70  Resp:  [16-18] 18  BP: (105-144)/() 124/89      03/16/20  1155 03/17/20  0514 03/18/20  0500   Weight: (!) 146 kg (322 lb) (!) 145 kg (318 lb 11.2 oz) (!) 144 kg (317 lb 0.3 oz)     Body mass index is 44.22 kg/m².    Intake/Output Summary (Last 24 hours) at 3/18/2020 0954  Last data filed at 3/18/2020 0400  Gross per 24 hour   Intake 500 ml   Output 455 ml   Net 45 ml     No intake/output data recorded.      Objective    Physical Exam:   General Appearance: awake and alert, no acute distress   Lungs: respirations regular, respirations unlabored and diminished   Heart: regular rhythm & normal rate, normal S1, S2 and +murmur   Abdomen: soft or nontender, + bowel sounds    Skin: warm and dry, left lower extrem cellulitis    Neuro: alert and oriented, no focal deficits.     Results Review:        WBC WBC   Date Value Ref Range Status   03/18/2020 5.23 3.40 - 10.80 10*3/mm3 Final   03/17/2020 4.68 3.40 - 10.80 10*3/mm3 Final   03/16/2020 4.83 3.40 - 10.80 10*3/mm3 Final      HGB Hemoglobin   Date Value Ref Range Status   03/18/2020 9.9 (L) 13.0 - 17.7 g/dL Final   03/17/2020 10.2 (L) 13.0 - 17.7 g/dL Final   03/16/2020 9.9 (L) 13.0 - 17.7 g/dL Final      HCT Hematocrit   Date Value Ref Range Status   03/18/2020 32.0 (L) 37.5 - 51.0 % Final   03/17/2020 33.9 (L) 37.5 - 51.0 % Final   03/16/2020 32.9 (L) 37.5 - 51.0 % Final      Platelets Platelets   Date Value Ref Range Status   03/18/2020 180 140 - 450 10*3/mm3 Final   03/17/2020 183 140 - 450 10*3/mm3 Final   03/16/2020 168 140 - 450 10*3/mm3 Final        PT/INR:    Protime   Date Value Ref Range Status   03/18/2020 15.7 (H) 11.7  - 14.2 Seconds Final   /  INR   Date Value Ref Range Status   03/18/2020 1.28 (H) 0.90 - 1.10 Final       Sodium Sodium   Date Value Ref Range Status   03/18/2020 135 (L) 136 - 145 mmol/L Final   03/17/2020 138 136 - 145 mmol/L Final   03/16/2020 135 (L) 136 - 145 mmol/L Final      Potassium Potassium   Date Value Ref Range Status   03/18/2020 4.3 3.5 - 5.2 mmol/L Final   03/17/2020 4.9 3.5 - 5.2 mmol/L Final   03/16/2020 4.2 3.5 - 5.2 mmol/L Final      Chloride Chloride   Date Value Ref Range Status   03/18/2020 103 98 - 107 mmol/L Final   03/17/2020 107 98 - 107 mmol/L Final   03/16/2020 106 98 - 107 mmol/L Final      Bicarbonate CO2   Date Value Ref Range Status   03/18/2020 20.6 (L) 22.0 - 29.0 mmol/L Final   03/17/2020 16.9 (L) 22.0 - 29.0 mmol/L Final   03/16/2020 15.7 (L) 22.0 - 29.0 mmol/L Final      BUN BUN   Date Value Ref Range Status   03/18/2020 57 (H) 6 - 20 mg/dL Final   03/17/2020 82 (H) 6 - 20 mg/dL Final   03/16/2020 74 (H) 6 - 20 mg/dL Final      Creatinine Creatinine   Date Value Ref Range Status   03/18/2020 5.68 (H) 0.76 - 1.27 mg/dL Final   03/17/2020 7.44 (H) 0.76 - 1.27 mg/dL Final   03/16/2020 7.20 (H) 0.76 - 1.27 mg/dL Final      Calcium Calcium   Date Value Ref Range Status   03/18/2020 7.4 (L) 8.6 - 10.5 mg/dL Final   03/17/2020 7.3 (L) 8.6 - 10.5 mg/dL Final   03/16/2020 7.0 (L) 8.6 - 10.5 mg/dL Final      Magnesium Magnesium   Date Value Ref Range Status   03/16/2020 1.9 1.6 - 2.6 mg/dL Final            aspirin 81 mg Oral Daily   bumetanide 4 mg Oral Daily   carvedilol 25 mg Oral BID With Meals   [START ON 3/19/2020] ceFAZolin 3 g Intravenous On Call to OR   chlorhexidine 15 mL Mouth/Throat Q12H   Chlorhexidine Gluconate Cloth 1 application Topical Q12H   clotrimazole-betamethasone  Topical Q12H   doxycycline 100 mg Oral Q12H   ferrous sulfate 325 mg Oral Daily With Breakfast   hydrALAZINE 25 mg Oral Q8H   influenza vaccine 0.5 mL Intramuscular Once   insulin lispro 0-9 Units  Subcutaneous 4x Daily With Meals & Nightly   iron sucrose 100 mg Intravenous Once   isosorbide mononitrate 60 mg Oral Q24H   [START ON 3/19/2020] metoprolol tartrate 12.5 mg Oral On Call to OR   mupirocin 1 application Each Nare Q12H   sodium chloride 10 mL Intravenous Q12H              Patient Active Problem List   Diagnosis Code   • Nonrheumatic mitral valve regurgitation, severe I34.0   • Cigarette smoker F17.210   • Essential hypertension I10   • ESRD on hemodialysis (CMS/[facility]) N18.6, Z99.2   • Foot callus L84   • Anasarca associated with disorder of kidney N04.9   • Diabetes mellitus (CMS/HCC) E11.9   • Cellulitis of left leg L03.116   • Systolic CHF, acute (CMS/[facility]) I50.21       Assessment & Plan    -severe mitral regurg  -moderate tricuspid regurg  -PFO with right to left shunt  -possible left atrial appendage thrombus on MEGHA   -dilated cardiomyopathy, EF 30-40%  -DESHAWN on CKD-- nephrology following, HD today  -HTN  -left leg cellulitis-- on doxy  -DM, A1c 6.5    PFTs: pending  Carotid duplex: pending     Tunneled dialysis catheter placed yesterday.  HD yesterday and today.   Plans for MVR/TVR/PFO closure tomorrow with Dr. Frye. Pre-op orders entered.    Sly Reddy PA-C  03/18/20  09:54

## 2020-03-18 NOTE — NURSING NOTE
Patient was received in stable condition, information packets given to patient regarding procedure, and ate lunch prior to dialysis, report taken from hd nurse, report given to on coming nurse.

## 2020-03-18 NOTE — PROGRESS NOTES
Progress Note    Name: Nico King ADMIT: 3/12/2020   : 1970  PCP: Provider, No Known    MRN: [id number] LOS: 6 days   AGE/SEX: 49 y.o. male  ROOM: S611/1   Date of Encounter Visit: 20    Subjective:     Interval History: plan valve surgery tomorrow. PFT and carotid doppler planned today. Tolerated HD.     REVIEW OF SYSTEMS:   no fever or chills.   No chest pain, palpitations. Stable edema.   SOA worse when lying down. Mild cough with white phlegm.  No nausea, vomiting or diarrhea. BM yesterday. Reports urinating less than previously    Objective:   Temp:  [97.5 °F (36.4 °C)-98.4 °F (36.9 °C)] 98.4 °F (36.9 °C)  Heart Rate:  [64-75] 70  Resp:  [16-18] 18  BP: (124-144)/() 124/89   SpO2:  [91 %-97 %] 95 %  on    Device (Oxygen Therapy): room air    Intake/Output Summary (Last 24 hours) at 3/18/2020 1147  Last data filed at 3/18/2020 0900  Gross per 24 hour   Intake 640 ml   Output 450 ml   Net 190 ml     Body mass index is 44.22 kg/m².      20  1155 20  0514 20  0500   Weight: (!) 146 kg (322 lb) (!) 145 kg (318 lb 11.2 oz) (!) 144 kg (317 lb 0.3 oz)     Weight change: -2.258 kg (-4 lb 15.7 oz)    Physical Exam   Constitutional: No distress.   HENT:   Head: Atraumatic.   Nose: Nose normal.   Eyes: Conjunctivae are normal.   Cardiovascular: Normal rate and regular rhythm.   Murmur heard.  Pulmonary/Chest: Effort normal. He has no wheezes. He has no rales.   Diminished bases   Abdominal: Soft. Bowel sounds are normal. He exhibits distension (mild). There is no tenderness.   Musculoskeletal: He exhibits edema (3-4+ BLE).   Neurological: He is alert.   Skin: Skin is warm and dry. He is not diaphoretic. There is erythema (mild left shin).       Results Review:      Results from last 7 days   Lab Units 20  0551 20  0654 20  0528 03/15/20  0530 20  0433 20  0621 20  1359   SODIUM mmol/L 135* 138 135* 134* 134* 136 136   POTASSIUM mmol/L  4.3 4.9 4.2 4.8 4.6 4.9 5.3*   CHLORIDE mmol/L 103 107 106 101 105 108* 104   CO2 mmol/L 20.6* 16.9* 15.7* 17.1* 14.9* 14.4* 15.9*   BUN mg/dL 57* 82* 74* 79* 78* 75* 70*   CREATININE mg/dL 5.68* 7.44* 7.20* 7.07* 7.28* 7.15* 6.95*   GLUCOSE mg/dL 113* 100* 103* 124* 122* 121* 115*   CALCIUM mg/dL 7.4* 7.3* 7.0* 7.5* 7.5* 7.7* 8.3*   AST (SGOT) U/L 9  --   --   --   --  18 31   ALT (SGPT) U/L 18  --   --   --   --  51* 74*     Estimated Creatinine Clearance: 23.2 mL/min (A) (by C-G formula based on SCr of 5.68 mg/dL (H)).  Results from last 7 days   Lab Units 03/17/20  0654   HEMOGLOBIN A1C % 6.50*     Results from last 7 days   Lab Units 03/18/20  0625 03/17/20  1921 03/17/20  1146 03/17/20  0621 03/16/20  2031 03/16/20  1836 03/16/20  1653 03/16/20  0628   GLUCOSE mg/dL 102 130 101 97 179* 115 96 117     Results from last 7 days   Lab Units 03/12/20  1359   TROPONIN T ng/mL 0.211*     Results from last 7 days   Lab Units 03/18/20  0551 03/12/20  1359   PROBNP pg/mL 57,642.0* >70,000.0*         Results from last 7 days   Lab Units 03/16/20  0528 03/15/20  0530 03/14/20  0433   MAGNESIUM mg/dL 1.9 2.2 2.2     Results from last 7 days   Lab Units 03/18/20  0551   CHOLESTEROL mg/dL 120   TRIGLYCERIDES mg/dL 110   HDL CHOL mg/dL 30*     Results from last 7 days   Lab Units 03/18/20  0550 03/17/20  0654 03/16/20  0528 03/15/20  0530 03/14/20  0433 03/13/20  0621 03/12/20  1359   WBC 10*3/mm3 5.23 4.68 4.83 4.91 4.33 5.24 5.93   HEMOGLOBIN g/dL 9.9* 10.2* 9.9* 10.9* 10.9* 11.6* 13.1   HEMATOCRIT % 32.0* 33.9* 32.9* 36.6* 35.4* 37.8 42.7   PLATELETS 10*3/mm3 180 183 168 193 167 172 222   MCV fL 75.7* 76.5* 76.9* 78.0* 77.5* 79.1 77.4*   MCH pg 23.4* 23.0* 23.1* 23.2* 23.9* 24.3* 23.7*   MCHC g/dL 30.9* 30.1* 30.1* 29.8* 30.8* 30.7* 30.7*   RDW % 17.0* 16.7* 16.7* 17.1* 16.9* 17.2* 16.8*   RDW-SD fl 45.3 45.3 45.9 47.9 48.0 49.5 46.3   MPV fL 9.9 10.6 10.7 10.6 10.6 10.1 10.0   NEUTROPHIL % %  --   --  68.4 66.6 71.1  --   79.3*   LYMPHOCYTE % %  --   --  15.5* 17.1* 12.7*  --  10.3*   MONOCYTES % %  --   --  11.8 12.0 11.5  --  9.1   EOSINOPHIL % %  --   --  3.3 3.3 3.5  --  0.3   BASOPHIL % %  --   --  0.6 0.6 0.7  --  0.5   IMM GRAN % %  --   --  0.4 0.4 0.5  --  0.5   NEUTROS ABS 10*3/mm3  --   --  3.30 3.27 3.08  --  4.70   LYMPHS ABS 10*3/mm3  --   --  0.75 0.84 0.55*  --  0.61*   MONOS ABS 10*3/mm3  --   --  0.57 0.59 0.50  --  0.54   EOS ABS 10*3/mm3  --   --  0.16 0.16 0.15  --  0.02   BASOS ABS 10*3/mm3  --   --  0.03 0.03 0.03  --  0.03   IMMATURE GRANS (ABS) 10*3/mm3  --   --  0.02 0.02 0.02  --  0.03   NRBC /100 WBC  --   --  0.0 0.2 0.0  --  0.0     Results from last 7 days   Lab Units 03/18/20  0550   INR  1.28*   APTT seconds 39.3*     Results from last 7 days   Lab Units 03/17/20  2300   PH, ARTERIAL pH units 7.418   PO2 ART mm Hg 67.9*   PCO2, ARTERIAL mm Hg 33.4*   HCO3 ART mmol/L 21.6*                         Results from last 7 days   Lab Units 03/17/20  1355   NITRITE UA  Negative   WBC UA /HPF 3-5*   BACTERIA UA /HPF None Seen   SQUAM EPITHEL UA /HPF None Seen     Results from last 7 days   Lab Units 03/16/20  0528   URIC ACID mg/dL 10.2*       Imaging:  Imaging Results (Last 24 Hours)     Procedure Component Value Units Date/Time    XR Chest Post CVA Port [[id number]] Collected:  03/17/20 1203     Updated:  03/17/20 1207    Narrative:       ONE VIEW PORTABLE CHEST AT 11:29 AM     HISTORY: Vascular catheter placement     FINDINGS: There is been recent insertion of a right-sided catheter which  ends in the SVC. There is no pneumothorax. Is moderate cardiomegaly and  vascular congestion showing slight worsening since a study of 5 days  ago. There are small pleural effusions layering posteriorly which are  unchanged.     This report was finalized on 3/17/2020 12:04 PM by Dr. Ovi Maier M.D.             I reviewed the patient's new clinical results and medications.         Medication Review:   Scheduled  Meds:    aspirin 81 mg Oral Daily   bumetanide 4 mg Oral Daily   carvedilol 25 mg Oral BID With Meals   [START ON 3/19/2020] ceFAZolin 3 g Intravenous On Call to OR   chlorhexidine 15 mL Mouth/Throat Q12H   Chlorhexidine Gluconate Cloth 1 application Topical Q12H   clotrimazole-betamethasone  Topical Q12H   doxycycline 100 mg Oral Q12H   ferrous sulfate 325 mg Oral Daily With Breakfast   hydrALAZINE 25 mg Oral Q8H   influenza vaccine 0.5 mL Intramuscular Once   insulin lispro 0-9 Units Subcutaneous 4x Daily With Meals & Nightly   iron sucrose 100 mg Intravenous Once   isosorbide mononitrate 60 mg Oral Q24H   [START ON 3/19/2020] metoprolol tartrate 12.5 mg Oral On Call to OR   mupirocin 1 application Each Nare Q12H   sodium chloride 10 mL Intravenous Q12H     Continuous Infusions:   PRN Meds:.dextrose  •  dextrose  •  glucagon (human recombinant)  •  nitroglycerin  •  sodium chloride  •  sodium chloride    Problem List:     Active Hospital Problems    Diagnosis  POA   • **ESRD on hemodialysis (CMS/[facility]) [N18.6, Z99.2]  Not Applicable   • Systolic CHF, acute (CMS/[facility]) [I50.21]  Unknown   • Anasarca associated with disorder of kidney [N04.9]  Yes   • Diabetes mellitus (CMS/[facility]) [E11.9]  Unknown   • Cellulitis of left leg [L03.116]  Unknown   • Essential hypertension [I10]  Yes   • Nonrheumatic mitral valve regurgitation, severe [I34.0]  Yes      Resolved Hospital Problems   No resolved problems to display.       Assessment and Plan:     Anasarca/ acute systolic CHF/ severe MR/ CAD  -Echo showed EF reduced to 33% and severe MR and moderate TR  -minimal CAD on cath  - on bumex drip  -plan for PFT and carotid doppler  - cardiothoracic surgery following and tentative plan for surgery tomorrow    DESHAWN/CKD4 now ESRD  -tunneled cath placed and HD started on 3/17/20   -vein mapping completed, will eventually need AV fistula  -nephrology following. Electrolytes overall stable and bicarb improved today.     Cellulitis, left  leg  -cellulitis vs dermatitis. Overall improved.   -continue planned course of doxycycline    DM  -blood sugars well controlled.     Anemia  -Hgb trending down with no reported blood loss. Microcytic  -iron low and plan for IV iron with HD today    VTE prophylaxis: SCDs  CODE status: full code  Disposition: TBD    I discussed the patients findings and my recommendations with patient.    Emily Yan, APRN  03/18/20

## 2020-03-18 NOTE — PROGRESS NOTES
Continued Stay Note  [facility]     Patient Name: Nico King  MRN: [id number]  Today's Date: 3/18/2020    Admit Date: 3/12/2020    Discharge Plan     Row Name 03/18/20 1144       Plan    Plan  SNF vs. home with OP HD    Patient/Family in Agreement with Plan  yes    Plan Comments  Spoke with Cleo Garrett/Qing [phone], she states patient could come to [facility] HD training for first ~30 days if he is interested - spoke with patient at bedside and he is agreeable.  Cleo is requesting update on Friday 3/20.  Then see that plan is for MVR and TVR tomorrow.  Spoke with patient again and he will consider SNF at [place].  He is aware that [facility] and [facility] are facilities that have in house HD.  Patient is leaving unit for HD at present.  CCP will follow.  BHumeniuk RN Becky S. Humeniuk, RN

## 2020-03-18 NOTE — PROGRESS NOTES
Name: Nico King ADMIT: 3/12/2020   : 1970  PCP: Provider, No Known    MRN: [id number] LOS: 6 days   AGE/SEX: 49 y.o. male  ROOM: [address]    Active Hospital Problems    Diagnosis  POA   • **ESRD on hemodialysis (CMS/HCC) [N18.6, Z99.2]  Not Applicable   • Systolic CHF, acute (CMS/HCC) [I50.21]  Unknown   • Anasarca associated with disorder of kidney [N04.9]  Yes   • Diabetes mellitus (CMS/HCC) [E11.9]  Unknown   • Cellulitis of left leg [L03.116]  Unknown   • Essential hypertension [I10]  Yes   • Nonrheumatic mitral valve regurgitation, severe [I34.0]  Yes      Resolved Hospital Problems   No resolved problems to display.     Subjective     49 y.o. male status post tunneled catheter insertion for dialysis.  No events overnight.  Minimal pain.  The catheter worked for dialysis.    Review of Systems  Denies chest pain, shortness of breath.  Objective     Vital Signs  Temp:  [97.4 °F (36.3 °C)-98.4 °F (36.9 °C)] 98.4 °F (36.9 °C)  Heart Rate:  [63-75] 70  Resp:  [16-18] 18  BP: (105-144)/() 124/89    Physical Exam  General: Alert and oriented  CV: Regular rate and rhythm  Respiratory: Unlabored breathing on room oxygen  GI: Abdomen is soft, nontender  Extremities: Continues to have severe leg swelling.  Right-sided tunneled dialysis catheter without hematoma.      Results Review:       I reviewed the patient's new clinical results.  Results from last 7 days   Lab Units 20  0550 20  0654 20  0528 03/15/20  0530 20  0433 20  0621 20  1359   WBC 10*3/mm3 5.23 4.68 4.83 4.91 4.33 5.24 5.93   HEMOGLOBIN g/dL 9.9* 10.2* 9.9* 10.9* 10.9* 11.6* 13.1   PLATELETS 10*3/mm3 180 183 168 193 167 172 222     Results from last 7 days   Lab Units 20  0551 20  0654 20  0528 03/15/20  0530 20  0433 20  0621 20  1359   SODIUM mmol/L 135* 138 135* 134* 134* 136 136   POTASSIUM mmol/L 4.3 4.9 4.2 4.8 4.6 4.9 5.3*   CHLORIDE  mmol/L 103 107 106 101 105 108* 104   CO2 mmol/L 20.6* 16.9* 15.7* 17.1* 14.9* 14.4* 15.9*   BUN mg/dL 57* 82* 74* 79* 78* 75* 70*   CREATININE mg/dL 5.68* 7.44* 7.20* 7.07* 7.28* 7.15* 6.95*   GLUCOSE mg/dL 113* 100* 103* 124* 122* 121* 115*   Estimated Creatinine Clearance: 23.2 mL/min (A) (by C-G formula based on SCr of 5.68 mg/dL (H)).  Results from last 7 days   Lab Units 03/18/20  0551 03/17/20  0654 03/16/20  0528 03/15/20  0530 03/14/20  0433 03/13/20  0621 03/12/20  1359   CALCIUM mg/dL 7.4* 7.3* 7.0* 7.5* 7.5* 7.7* 8.3*   ALBUMIN g/dL 2.20* 2.20* 2.10* 2.70* 2.50* 2.30* 2.90*   MAGNESIUM mg/dL  --   --  1.9 2.2 2.2  --   --    PHOSPHORUS mg/dL  --  6.4* 5.9* 6.3* 6.2*  --   --        Assessment/Plan           Active Hospital Problems    Diagnosis  POA   • **ESRD on hemodialysis (CMS/[facility]) [N18.6, Z99.2]  Not Applicable   • Systolic CHF, acute (CMS/[facility]) [I50.21]  Unknown   • Anasarca associated with disorder of kidney [N04.9]  Yes   • Diabetes mellitus (CMS/[facility]) [E11.9]  Unknown   • Cellulitis of left leg [L03.116]  Unknown   • Essential hypertension [I10]  Yes   • Nonrheumatic mitral valve regurgitation, severe [I34.0]  Yes      Resolved Hospital Problems   No resolved problems to display.        Assessment & Plan  49 y.o. male postoperative day #1 status post tunneled dialysis catheter placement.  Plan for dialysis as needed.  I will follow more peripherally with plans for permanent AV access once cardiac issues resolved.        Jake Bains MD  03/18/20   09:53  Office Number [phone]

## 2020-03-18 NOTE — ANESTHESIA PREPROCEDURE EVALUATION
Anesthesia Evaluation     Patient summary reviewed and Nursing notes reviewed   no history of anesthetic complications:  NPO Solid Status: > 8 hours  NPO Liquid Status: > 8 hours           Airway   Mallampati: II  Dental    (+) poor dentition    Pulmonary - normal exam   (+) pleural effusion, a smoker Current,   Cardiovascular - normal exam    (+) hypertension 2 medications or greater, valvular problems/murmurs MR, CHF Systolic <55%,       Neuro/Psych- negative ROS  GI/Hepatic/Renal/Endo    (+) obesity, morbid obesity,  renal disease ESRD, diabetes mellitus type 2,     Musculoskeletal     Abdominal    Substance History      OB/GYN          Other                        Anesthesia Plan    ASA 4     general     intravenous induction     Anesthetic plan, all risks, benefits, and alternatives have been provided, discussed and informed consent has been obtained with: patient.

## 2020-03-18 NOTE — PROGRESS NOTES
LOS: 6 days   Patient Care Team:  Provider, No Known as PCP - General    Chief Complaint:     F/u chf, valve disease.     Interval History:     He denies chest pain, pressure, tachycardia, dizziness, syncope.  He has no orthopnea or PND.  He did has pulmonary function testing done today. His breathing is stable.     Objective   Vital Signs  Temp:  [97.4 °F (36.3 °C)-98.4 °F (36.9 °C)] 98.4 °F (36.9 °C)  Heart Rate:  [63-75] 70  Resp:  [16-18] 18  BP: (105-144)/() 124/89    Intake/Output Summary (Last 24 hours) at 3/18/2020 0750  Last data filed at 3/18/2020 0400  Gross per 24 hour   Intake 500 ml   Output 605 ml   Net -105 ml       Comfortable NAD  PERRL, conjunctivae clear  Neck supple, no JVD or thyromegaly appreciated  S1/S2 RRR, no m/r/g  Lungs CTA B, normal effort  Abdomen S/NT/ND (+) BS, no HSM appreciated  Extremities warm, no clubbing, cyanosis, 3+ LE edema, L>R with erythema on left  No visible or palpable skin lesions  A/Ox4, mood and affect appropriate    Results Review:      Results from last 7 days   Lab Units 03/18/20  0551 03/17/20  0654 03/16/20  0528   SODIUM mmol/L 135* 138 135*   POTASSIUM mmol/L 4.3 4.9 4.2   CHLORIDE mmol/L 103 107 106   CO2 mmol/L 20.6* 16.9* 15.7*   BUN mg/dL 57* 82* 74*   CREATININE mg/dL 5.68* 7.44* 7.20*   GLUCOSE mg/dL 113* 100* 103*   CALCIUM mg/dL 7.4* 7.3* 7.0*     Results from last 7 days   Lab Units 03/12/20  1359   TROPONIN T ng/mL 0.211*     Results from last 7 days   Lab Units 03/18/20  0550 03/17/20  0654 03/16/20  0528   WBC 10*3/mm3 5.23 4.68 4.83   HEMOGLOBIN g/dL 9.9* 10.2* 9.9*   HEMATOCRIT % 32.0* 33.9* 32.9*   PLATELETS 10*3/mm3 180 183 168     Results from last 7 days   Lab Units 03/18/20  0550   INR  1.28*   APTT seconds 39.3*     Results from last 7 days   Lab Units 03/18/20  0551   CHOLESTEROL mg/dL 120     Results from last 7 days   Lab Units 03/16/20  0528   MAGNESIUM mg/dL 1.9     Results from last 7 days   Lab Units 03/18/20  0551    CHOLESTEROL mg/dL 120   TRIGLYCERIDES mg/dL 110   HDL CHOL mg/dL 30*   LDL CHOL mg/dL 68       I reviewed the patient's new clinical results.  I personally viewed and interpreted the patient's EKG/Telemetry data        Medication Review:     aspirin 81 mg Oral Daily   bumetanide 4 mg Oral Daily   carvedilol 25 mg Oral BID With Meals   ceFAZolin 3 g Intravenous On Call to OR   chlorhexidine 15 mL Mouth/Throat Q12H   Chlorhexidine Gluconate Cloth 1 application Topical Q12H   clotrimazole-betamethasone  Topical Q12H   doxycycline 100 mg Oral Q12H   ferrous sulfate 325 mg Oral Daily With Breakfast   hydrALAZINE 25 mg Oral Q8H   influenza vaccine 0.5 mL Intramuscular Once   insulin lispro 0-9 Units Subcutaneous 4x Daily With Meals & Nightly   iron sucrose 100 mg Intravenous Once   isosorbide mononitrate 60 mg Oral Q24H   metoprolol tartrate 12.5 mg Oral On Call to OR   mupirocin 1 application Each Nare Q12H   sodium chloride 10 mL Intravenous Q12H            Assessment/Plan       ESRD on hemodialysis (CMS/[facility])    Nonrheumatic mitral valve regurgitation, severe    Essential hypertension    Anasarca associated with disorder of kidney    Diabetes mellitus (CMS/[facility])    Cellulitis of left leg    Systolic CHF, acute (CMS/[facility])    1.  Acute systolic CHF/severe cardiomyopathy.  EF 36 to 40%.  start dialysis, has right palindrome.  minimal CAD on cath 3/16/20.   2.  Severe mitral regurgitation and moderate TR. CV surgery seeing for MVR and TVR  3.  Hypertension.  4.  Left leg cellulitis.  5. CKD - started HD with right palindrome.     Valve surgery planned for tomorrow.     Meggan Hinton MD  03/18/20  07:50

## 2020-03-19 PROBLEM — E83.51 HYPOCALCEMIA: Status: ACTIVE | Noted: 2020-01-01

## 2020-03-19 NOTE — ANESTHESIA PROCEDURE NOTES
Arterial Line      Patient reassessed immediately prior to procedure    Patient location during procedure: OR  Start time: 3/19/2020 6:50 AM  Stop Time:3/19/2020 6:55 AM       Line placed for hemodynamic monitoring.  Performed By   Anesthesiologist: Eleazar Boswell MD  Preanesthetic Checklist  Completed: patient identified, surgical consent, pre-op evaluation, timeout performed, IV checked, risks and benefits discussed and monitors and equipment checked  Arterial Line Prep   Sterile Tech: gloves and cap  Prep: ChloraPrep  Patient monitoring: blood pressure monitoring, continuous pulse oximetry and EKG  Arterial Line Procedure   Laterality:left  Location:  radial artery  Catheter size: 20 G   Guidance: landmark technique  Number of attempts: 1  Successful placement: yes  Post Assessment   Dressing Type: occlusive dressing applied and wrist guard applied.   Complications no  Circ/Move/Sens Assessment: normal and unchanged.   Patient Tolerance: patient tolerated the procedure well with no apparent complications

## 2020-03-19 NOTE — ANESTHESIA PROCEDURE NOTES
Central Line      Patient reassessed immediately prior to procedure    Patient location during procedure: OR  Start time: 3/19/2020 7:08 AM  Stop Time:3/19/2020 7:18 AM  Indications: vascular access, MD/Surgeon request and central pressure monitoring  Staff  Anesthesiologist: Eleazar Boswell MD  Preanesthetic Checklist  Completed: patient identified, surgical consent, pre-op evaluation, timeout performed, IV checked, risks and benefits discussed and monitors and equipment checked  Central Line Prep  Sterile Tech:cap, gloves, gown, mask and sterile barriers  Prep: chloraprep  Patient monitoring: blood pressure monitoring, continuous pulse oximetry and EKG  Central Line Procedure  Laterality:left  Location:subclavian  Catheter Type:Cordis  Catheter Size:9 Fr  Guidance:landmark technique  Assessment  Post procedure:biopatch applied, line sutured and occlusive dressing applied  Assessement:blood return through all ports, free fluid flow and Dave Test  Complications:no  Patient Tolerance:patient tolerated the procedure well with no apparent complications

## 2020-03-19 NOTE — ANESTHESIA PROCEDURE NOTES
Airway  Urgency: elective    Date/Time: 3/19/2020 7:04 AM    General Information and Staff    Patient location during procedure: OR  Anesthesiologist: Eleazar Boswell MD    Indications and Patient Condition    Preoxygenated: yes      Final Airway Details  Final airway type: endotracheal airway      Successful airway: ETT  Cuffed: yes   Successful intubation technique: direct laryngoscopy  Endotracheal tube insertion site: oral  Blade: Velma  Blade size: 4  ETT size (mm): 8.0  Cormack-Lehane Classification: grade I - full view of glottis  Placement verified by: chest auscultation   Number of attempts at approach: 1

## 2020-03-19 NOTE — PAYOR COMM NOTE
"Nico Washington (49 y.o. Male)                                   ATTENTION: CONTINUED CLINICAL REVIEW AUTH #[id number]                                [facility] DEPT [phone], CALL [phone]                                                Date of Birth Social Security Number Address Home Phone MRN    1970  [address] [place] [id number] [phone] [id number]    Yarsanism Marital Status          None Single       Admission Date Admission Type Admitting Provider Attending Provider Department, Room/Bed    3/12/20 Emergency Mark Pal MD McCracken, Robert Russell, MD [facility] CARDIO RECOVERY, 2001/1    Discharge Date Discharge Disposition Discharge Destination                       Attending Provider:  Pollo Diaz MD    Allergies:  No Known Allergies    Isolation:  None   Infection:  None   Code Status:  CPR    Ht:  180.3 cm (71\")   Wt:  136 kg (300 lb)    Admission Cmt:  None   Principal Problem:  ESRD on hemodialysis (CMS/[facility]) [N18.6,Z99.2]                 Active Insurance as of 3/12/2020     Primary Coverage     Payor Plan Insurance Group Employer/Plan Group    [employer] PPO [id number]     Payor Plan Address Payor Plan Phone Number Payor Plan Fax Number Effective Dates    [address] [phone]  8/1/2018 - None Entered    [place] [address]       Subscriber Name Subscriber Birth Date Member ID       NICO WASHINGTON 1970 [id number]                 Emergency Contacts      (Rel.) Home Phone Work Phone Mobile Phone    Eleazar Washington (Father) [phone] -- --            Vital Signs (last day)     Date/Time   Temp   Temp src   Pulse   Resp   BP   Patient Position   SpO2    03/19/20 1300   --   --   80   --   95/63   --   100    03/19/20 1230   --   --   74   --   107/73   --   100    03/19/20 1200   --   --   75   --   102/67   --   100    03/19/20 1130   --   --   84   --   " 121/85   --   100    03/19/20 1120   --   --   84   --   --   --   100    03/19/20 1115   --   --   89   --   101/73   --   100    03/19/20 1110   --   --   89   14   101/73   --   100    03/19/20 0554   --   --   76   --   (!) 153/103   --   --    03/19/20 0332   98.5 (36.9)   Oral   79   18   136/81   Lying   94    03/18/20 2334   98.3 (36.8)   Oral   76   18   116/78   Lying   95    03/18/20 1939   98.3 (36.8)   Oral   79   18   137/89   Sitting   96    03/18/20 1915   98.4 (36.9)   --   76   18   146/68   --   --    03/18/20 1254   97.9 (36.6)   Oral   --   18   139/97   Lying   --    03/18/20 0700   98.4 (36.9)   Oral   70   18   124/89   Sitting   95    03/18/20 0458   98.2 (36.8)   Oral   75   16   --   Lying   94              Current Facility-Administered Medications   Medication Dose Route Frequency Provider Last Rate Last Dose   • acetaminophen (TYLENOL) tablet 650 mg  650 mg Oral Q4H Jr Ze Frye MD        Or   • acetaminophen (TYLENOL) 160 MG/5ML solution 650 mg  650 mg Oral Q4H Jr Ze Frye MD        Or   • acetaminophen (TYLENOL) suppository 650 mg  650 mg Rectal Q4H Jr Ze Frye MD       • [START ON 3/20/2020] acetaminophen (TYLENOL) tablet 650 mg  650 mg Oral Q4H PRN Jr Ze Frye MD        Or   • [START ON 3/20/2020] acetaminophen (TYLENOL) 160 MG/5ML solution 650 mg  650 mg Oral Q4H PRN Jr Ze Frye MD        Or   • [START ON 3/20/2020] acetaminophen (TYLENOL) suppository 650 mg  650 mg Rectal Q4H PRN Jr Ze Frye MD       • albumin human 5 % solution 1,500 mL  1,500 mL Intravenous PRN Jr Ze Frye MD   250 mL at 03/19/20 1317   • ALPRAZolam (XANAX) tablet 0.25 mg  0.25 mg Oral Q8H PRN Jr Ze Frye MD       • [START ON 3/20/2020] aspirin EC tablet 81 mg  81 mg Oral Daily Jr Ze Frye MD       • atorvastatin (LIPITOR) tablet 40 mg  40 mg Oral Nightly Jr Ze Frye MD       • [START ON 3/20/2020] bisacodyl  (DULCOLAX) suppository 10 mg  10 mg Rectal Daily PRN Jr Ze Frye MD       • ceFAZolin in Sodium Chloride (ANCEF) IVPB solution 3 g  3 g Intravenous Q24H Jackeline Cyr APRN       • chlorhexidine (PERIDEX) 0.12 % solution 15 mL  15 mL Mouth/Throat Q12H Jr Ze Frye MD       • clevidipine (CLEVIPREX) infusion 0.5 mg/mL  2-32 mg/hr Intravenous Continuous PRN Jr Ze Frye MD       • [START ON 3/20/2020] cyclobenzaprine (FLEXERIL) tablet 10 mg  10 mg Oral Q8H PRN Jr Ze Frye MD       • dexmedetomidine (PRECEDEX) 400 mcg/100mL (4 mcg/mL) NS infusion kit  0.2-1.5 mcg/kg/hr Intravenous Titrated Jr Ze Frye MD 13.6 mL/hr at 03/19/20 1210 0.4 mcg/kg/hr at 03/19/20 1210   • docusate sodium (COLACE) capsule 100 mg  100 mg Oral BID PRN Jr Ze Frye MD       • DOPamine 400 mg in 250 mL D5W (1.6 mg/mL) infusion  2-20 mcg/kg/min Intravenous Continuous PRN Jr Ze Frye MD       • [START ON 3/20/2020] enoxaparin (LOVENOX) syringe 30 mg  30 mg Subcutaneous Q24H Jr Ze rFye MD       • EPINEPHrine (ADRENALIN) 5 mg in sodium chloride 0.9 % 250 mL (0.02 mg/mL) infusion  0.02-0.3 mcg/kg/min Intravenous Continuous PRN Jr Ze Frye MD       • furosemide (LASIX) injection 40 mg  40 mg Intravenous Q6H PRN Jr Ze Frye MD       • HYDROcodone-acetaminophen (NORCO) 5-325 MG per tablet 2 tablet  2 tablet Oral Q4H PRN Jr Ze Frye MD       • insulin regular (HumuLIN R,NovoLIN R) 100 Units in sodium chloride 0.9 % 100 mL (1 Units/mL) infusion  0-50 Units/hr Intravenous Continuous PRN Jr Ze Frye MD       • meperidine (DEMEROL) injection 25 mg  25 mg Intravenous Q4H PRN Jr Ze Frye MD       • metoclopramide (REGLAN) injection 10 mg  10 mg Intravenous Q6H Jr Ze Frye MD       • [START ON 3/20/2020] metoprolol tartrate (LOPRESSOR) tablet 12.5 mg  12.5 mg Oral Q12H Jr Ze Frye MD       • midazolam (VERSED)  injection 2 mg  2 mg Intravenous Q1H PRN Jr Ze Frye MD       • milrinone 20 mg/100 mL (0.2 mg/mL) in 5 % dextrose infusion  0.25-0.75 mcg/kg/min Intravenous Continuous PRN Jr Ze Frye MD       • morphine injection 1 mg  1 mg Intravenous Q4H PRN Jr Ze Frye MD        And   • naloxone (NARCAN) injection 0.4 mg  0.4 mg Intravenous Q5 Min PRN Jr Ze Frye MD       • morphine injection 4 mg  4 mg Intravenous Q30 Min PRN Jr Ze Frye MD       • mupirocin (BACTROBAN) 2 % nasal ointment   Each Nare BID Jr Ze Frye MD       • niCARdipine (CARDENE) 25 mg in 250 mL NS (0.1 mg/mL) infusion kit  5-15 mg/hr Intravenous Continuous PRN Jr Ze Frye MD       • nitroglycerin 50 mg/250 mL (0.2 mg/mL) infusion  5-200 mcg/min Intravenous Titrated Jr Ze Frye MD       • norepinephrine (LEVOPHED) 8 mg/250 mL (32 mcg/mL) in sodium chloride 0.9% infusion (premix)  0.02-0.3 mcg/kg/min Intravenous Continuous PRN Jr Ze Frye MD       • ondansetron (ZOFRAN) injection 4 mg  4 mg Intravenous Q6H PRN Jr Ze Frye MD       • oxyCODONE (ROXICODONE) immediate release tablet 10 mg  10 mg Oral Q4H PRN Jr Ze Frye MD       • [START ON 3/20/2020] pantoprazole (PROTONIX) EC tablet 40 mg  40 mg Oral Q AM Jr Ze Frye MD       • phenylephrine (MARGUERITE-SYNEPHRINE) 50 mg in sodium chloride 0.9 % 250 mL (0.2 mg/mL) infusion  0.2-3 mcg/kg/min Intravenous Continuous PRN Jr Ze Frye MD       • polyethylene glycol (MIRALAX) powder 17 g  17 g Oral Daily PRN Jr Ze Frye MD       • potassium chloride (MICRO-K) CR capsule 40 mEq  40 mEq Oral PRN Jr Ze Frye MD        Or   • potassium chloride (KLOR-CON) packet 40 mEq  40 mEq Oral PRN Jr Ze Frye MD       • potassium chloride 10 mEq in 100 mL IVPB  10 mEq Intravenous Q1H PRN Jr Ze Frye MD        Or   • potassium chloride 10 mEq in 100 mL IVPB  10 mEq  Intravenous Q1H PRN Jr Ze Frye MD       • potassium chloride 20 mEq in 50 mL IVPB  20 mEq Intravenous Q1H PRN Jr Ze Frye MD       • potassium chloride 20 mEq in 50 mL IVPB  20 mEq Intravenous Q1H PRN Jr Ze Frye MD       • potassium chloride 20 mEq in 50 mL IVPB  20 mEq Intravenous Q1H PRN Jr Ze Frye MD       • promethazine (PHENERGAN) tablet 12.5 mg  12.5 mg Oral Q6H PRN Jr Ze Frye MD        Or   • promethazine (PHENERGAN) injection 12.5 mg  12.5 mg Intravenous Q6H PRN Jr Ze Frye MD       • propofol (DIPRIVAN) infusion 10 mg/mL 100 mL  5-50 mcg/kg/min Intravenous Continuous PRN Jr Ze Frye MD 24.5 mL/hr at 03/19/20 1219 30 mcg/kg/min at 03/19/20 1219   • [START ON 3/20/2020] sennosides-docusate (PERICOLACE) 8.6-50 MG per tablet 2 tablet  2 tablet Oral Nightly Jr Ze Frye MD       • sodium chloride 0.9 % flush 30 mL  30 mL Intravenous Once PRN Jr Ze Frye MD       • sodium chloride 0.9 % infusion  30 mL/hr Intravenous Continuous Jr Ze Frye MD 30 mL/hr at 03/19/20 1214 30 mL/hr at 03/19/20 1214   • sodium chloride 0.9 % infusion  30 mL/hr Intravenous Continuous PRN Jr Ze Frye MD       • Vasopressin (PITRESSIN) 20 Units in sodium chloride 0.9 % 100 mL (0.2 Units/mL) infusion  0.02-0.1 Units/min Intravenous Continuous PRN Jr Ze Frye MD         Orders (last 24 hrs)      Start     Ordered    03/22/20 0600  Clean Midsternal Incision & Chest Tube Sites With Hibiclens Beginning on POD 3  Daily      03/19/20 1109    03/22/20 0600  XR Chest PA & Lateral  1 Time Imaging      03/19/20 1109    03/21/20 1200  Remove Midsternal Dressing on POD 3. Patient May Shower With Dressing On. If Dressing Becomes Loose or Wet Remove on POD 2  Once      03/19/20 1109    03/21/20 1200  Leave Incisions Open to Air Unless Draining or Edges Are Not Approximated  Continuous      03/19/20 1109    03/21/20 0600  Change  Chest Dressing Daily While Intubated  Daily      03/19/20 1109    03/21/20 0600  Incision Care - Cleanse With Normal Saline & 4x4 Gauze Using Sterile Technique  Daily      03/19/20 1109    03/21/20 0600  Chest Tube & Pacing Wire Sites - Cleanse With Normal Saline & 4x4 Gauze Using Sterile Technique  Daily      03/19/20 1109    03/21/20 0600  CBC (No Diff)  Daily      03/19/20 1109    03/21/20 0600  Basic Metabolic Panel  Daily      03/19/20 1109    03/20/20 2100  sennosides-docusate (PERICOLACE) 8.6-50 MG per tablet 2 tablet  Nightly      03/19/20 1109    03/20/20 1800  enoxaparin (LOVENOX) syringe 30 mg  Every 24 Hours      03/19/20 1109    03/20/20 1400  Intake & Output  Every Shift      03/19/20 1109    03/20/20 1200  Vital Signs  Every 4 Hours     Comments:  Perform Vitals Less Frequently Only if Patient Stable      03/19/20 1109    03/20/20 0900  aspirin EC tablet 81 mg  Daily      03/19/20 1109    03/20/20 0900  metoprolol tartrate (LOPRESSOR) tablet 12.5 mg  Every 12 Hours Scheduled      03/19/20 1109    03/20/20 0800  Wean & Extubate Per Rapid Wean and Extubation Protocol  Every Morning      03/19/20 1109    03/20/20 0600  XR Chest 1 View  Daily      03/19/20 1109    03/20/20 0600  ECG 12 Lead  Daily      03/19/20 1109    03/20/20 0600  RN to Place Order For STAT Chest X-Ray When Chest Tubes Are Removed  Once     Comments:  No chest X-ray needed for chest tubes with bulb drain    03/19/20 1109    03/20/20 0600  pantoprazole (PROTONIX) EC tablet 40 mg  Every Early Morning      03/19/20 1109    03/20/20 0353  acetaminophen (TYLENOL) tablet 650 mg  Every 4 Hours PRN      03/19/20 1109    03/20/20 0353  acetaminophen (TYLENOL) 160 MG/5ML solution 650 mg  Every 4 Hours PRN      03/19/20 1109    03/20/20 0353  acetaminophen (TYLENOL) suppository 650 mg  Every 4 Hours PRN      03/19/20 1109    03/20/20 0300  CBC & Differential  Once      03/19/20 1109    03/20/20 0300  Renal Function Panel  Once      03/19/20 1109     03/20/20 0300  Magnesium  Once      03/19/20 1109    03/20/20 0300  Protime-INR  Once      03/19/20 1109    03/20/20 0300  Calcium, Ionized  Once      03/19/20 1109    03/20/20 0000  cyclobenzaprine (FLEXERIL) tablet 10 mg  Every 8 Hours PRN      03/19/20 1109    03/20/20 0000  bisacodyl (DULCOLAX) suppository 10 mg  Daily PRN      03/19/20 1109    03/20/20 0000  Hemodialysis Inpatient  Once     Comments:  No heparin except to lock catheter    03/19/20 1301    03/19/20 2330  Patient May Use Home CPAP / BIPAP For Sleep  At Bedtime - RT      03/19/20 1109    03/19/20 2200  ceFAZolin in Sodium Chloride (ANCEF) IVPB solution 3 g  Every 24 Hours      03/19/20 1215    03/19/20 2100  mupirocin (BACTROBAN) 2 % nasal ointment  2 Times Daily      03/19/20 1109    03/19/20 2100  atorvastatin (LIPITOR) tablet 40 mg  Nightly      03/19/20 1109    03/19/20 1800  chlorhexidine (PERIDEX) 0.12 % solution 15 mL  Every 12 Hours      03/19/20 1109    03/19/20 1800  acetaminophen (TYLENOL) tablet 650 mg  Every 4 Hours      03/19/20 1109    03/19/20 1800  acetaminophen (TYLENOL) 160 MG/5ML solution 650 mg  Every 4 Hours      03/19/20 1109    03/19/20 1800  acetaminophen (TYLENOL) suppository 650 mg  Every 4 Hours      03/19/20 1109    03/19/20 1300  Ambulate Patient  3 Times Daily      03/19/20 1109    03/19/20 1230  acetaminophen (OFIRMEV) injection 1,000 mg  Once      03/19/20 1130    03/19/20 1200  Vital Signs Every Hour Until 24 Hours Post Op  Every Hour     Comments:  Perform Vitals Less Frequently Only if Patient Stable      03/19/20 1109    03/19/20 1200  Check Peripheral Pulses Every 1 Hour x4  Every Hour      03/19/20 1109    03/19/20 1200  Check Peripheral Pulses Every 4 Hours  Every 4 Hours      03/19/20 1109    03/19/20 1200  Intake & Output Every Hour Until 24 Hours Post Op  Every Hour      03/19/20 1109    03/19/20 1200  Cardiac Output Parameters On Admission, Then Every 1 Hour x4  Every Hour      03/19/20 1109     03/19/20 1200  Cardiac Output Parameters Every 2 Hours Until 24 Hours Post Op  Every 2 Hours      03/19/20 1109    03/19/20 1200  CBC (No Diff)  Every 4 Hours      03/19/20 1109    03/19/20 1200  sodium chloride 0.9 % infusion  Continuous      03/19/20 1109    03/19/20 1200  nitroglycerin 50 mg/250 mL (0.2 mg/mL) infusion  Titrated      03/19/20 1109    03/19/20 1200  metoclopramide (REGLAN) injection 10 mg  Every 6 Hours      03/19/20 1109    03/19/20 1200  dexmedetomidine (PRECEDEX) 400 mcg/100mL (4 mcg/mL) NS infusion kit  Titrated      03/19/20 1109    03/19/20 1200  ceFAZolin in Sodium Chloride (ANCEF) IVPB solution 3 g  Every 8 Hours,   Status:  Discontinued      03/19/20 1109    03/19/20 1200  NON FORMULARY  Once,   Status:  Discontinued      03/19/20 1109    03/19/20 1152  Blood Gas, Arterial  Once      03/19/20 1145    03/19/20 1135  POC Glucose Once  Once      03/19/20 1131    03/19/20 1110  Vital Signs & Post-Op Checks Every 15 Minutes x2, Every 30 Minutes x4  Per Hospital Policy     Comments:  Perform Vitals Less Frequently Only if Patient Stable    03/19/20 1109    03/19/20 1110  Daily Weights  Daily      03/19/20 1109    03/19/20 1110  Intake & Output Every 15 Minutes x2, Every 30 Minutes x4  Every Shift      03/19/20 1109    03/19/20 1110  Cardiac Monitoring  Continuous      03/19/20 1109    03/19/20 1110  Continuous Pulse Oximetry  Continuous      03/19/20 1109    03/19/20 1110  Turn Patient Every 2 Hours or Begin Bed Rotation Once Hemodynamically Stable  Now Then Every 2 Hours      03/19/20 1109    03/19/20 1110  Advance PROM to AROM  Now Then Every 4 Hours      03/19/20 1109    03/19/20 1110  Up in Chair for Meals  Until Discontinued      03/19/20 1109    03/19/20 1110  Discontinue NG After Extubation  Once      03/19/20 1109    03/19/20 1110  Continue Indwelling Urinary Catheter  Once      03/19/20 1109    03/19/20 1110  Assess Need for Indwelling Urinary Catheter - Follow Removal Protocol   Continuous     Comments:  Indwelling Urinary Catheter Removal Criteria  Discontinue Indwelling Urinary Catheter Unless One of the Following is Present  Urinary Retention or Obstruction  Chronic Mckay Catheter Use  End of Life  Critical Illness with Strict I/O   Tract or Abdominal Surgery  Stage 3/4 Sacral / Perineal Wound  Required Activity Restriction: Trauma  Required Activity Restriction: Spine Surgery  If Patient is Being Followed by Urology Contact Them PRIOR to Removal  Do Not Remove Indwelling Urinary Catheter Order is Present with a CLINICAL REASON to Maintain the Catheter. Provider is Required to Include a Clinical Reason to Maintain a Urinary Catheter    Chronic Mckay Catheter Use (Present on Admission)  Assess for Continued Need & Document Medical Necessity  If Infection is Suspected, Contact the Provider        03/19/20 1109    03/19/20 1110  Urinary Catheter Care  Every Shift      03/19/20 1109    03/19/20 1110  Chest & Mediastinal Tubes to 20cm Continuous Suction  Once      03/19/20 1109    03/19/20 1110  Begin Rewarming with Thermal Unit As Needed For Temp Less Than 96F  Once      03/19/20 1109    03/19/20 1110  ACE Wraps to Vein [place] Donor Site for 24 Hours, Then Apply ADELA Hose  Continuous      03/19/20 1109    03/19/20 1110  Heart Hugger Vest  Continuous      03/19/20 1109    03/19/20 1110  Keep Chest Incisions Covered Until Extubated  Continuous      03/19/20 1109    03/19/20 1110  If Chest Dressing Removed During Chest Tube Removal, Clean Incision With Normal Saline & Redress Until 48 Hours Post Op  Continuous      03/19/20 1109    03/19/20 1110  Notify Surgeon if Drainage From Surgical Incision Site  Until Discontinued      03/19/20 1109    03/19/20 1110  ISOLATE PACER WIRES  Continuous      03/19/20 1109    03/19/20 1110  Notify Provider - Urine Output  Until Discontinued      03/19/20 1109    03/19/20 1110  Notify Provider - Chest Tube Output  Until Discontinued      03/19/20 1109     03/19/20 1110  Initial Ventilator Settings Per Pulmonologist / Anesthesiologist  Once      03/19/20 1109    03/19/20 1110  Incentive Spirometry  Every Hour While Awake      03/19/20 1109    03/19/20 1110  EZ-Pap  Once      03/19/20 1109    03/19/20 1110  Wean & Extubate Per Rapid Wean & Extubate Protocol  Start Now      03/19/20 1109    03/19/20 1110  NPO Diet  Diet Effective Now      03/19/20 1109    03/19/20 1110  Advance Diet as Tolerated  Until Discontinued      03/19/20 1109    03/19/20 1110  Cardiac Rehab Evaluation and Enrollment  Once     Provider:  (Not yet assigned)    03/19/20 1109    03/19/20 1110  Consult to Case Management /   Once     Provider:  (Not yet assigned)    03/19/20 1109    03/19/20 1110  PT Consult: Eval & Treat  Once      03/19/20 1109    03/19/20 1110  XR Chest 1 View  1 Time Imaging      03/19/20 1109    03/19/20 1110  ECG 12 Lead  STAT      03/19/20 1109    03/19/20 1110  Protime-INR  STAT      03/19/20 1109    03/19/20 1110  aPTT  STAT      03/19/20 1109    03/19/20 1110  Calcium, Ionized  STAT      03/19/20 1109    03/19/20 1110  Blood Gas, Arterial  STAT      03/19/20 1109    03/19/20 1110  CBC & Differential  STAT      03/19/20 1109    03/19/20 1110  Fibrinogen  STAT      03/19/20 1109    03/19/20 1110  Renal Function Panel  Now Then Every 4 Hours      03/19/20 1109    03/19/20 1110  Magnesium  Now Then Every 4 Hours      03/19/20 1109    03/19/20 1110  Potassium  Now Then Every 4 Hours      03/19/20 1109    03/19/20 1110  CBC Auto Differential  PROCEDURE ONCE      03/19/20 1110    03/19/20 1109  morphine injection 1 mg  Every 4 Hours PRN      03/19/20 1109    03/19/20 1109  naloxone (NARCAN) injection 0.4 mg  Every 5 Minutes PRN      03/19/20 1109    03/19/20 1109  potassium chloride (MICRO-K) CR capsule 40 mEq  As Needed      03/19/20 1109    03/19/20 1109  potassium chloride (KLOR-CON) packet 40 mEq  As Needed      03/19/20 1109    03/19/20 1109  potassium chloride  10 mEq in 100 mL IVPB  Every 1 Hour PRN      03/19/20 1109    03/19/20 1109  potassium chloride 10 mEq in 100 mL IVPB  Every 1 Hour PRN      03/19/20 1109    03/19/20 1109  promethazine (PHENERGAN) tablet 12.5 mg  Every 6 Hours PRN      03/19/20 1109    03/19/20 1109  promethazine (PHENERGAN) injection 12.5 mg  Every 6 Hours PRN      03/19/20 1109    03/19/20 1109  Vasopressin (PITRESSIN) 20 Units in sodium chloride 0.9 % 100 mL (0.2 Units/mL) infusion  Continuous PRN      03/19/20 1109    03/19/20 1109  sodium chloride 0.9 % flush 30 mL  Once As Needed      03/19/20 1109    03/19/20 1109  sodium chloride 0.9 % infusion  Continuous PRN      03/19/20 1109    03/19/20 1109  potassium chloride 20 mEq in 50 mL IVPB  Every 1 Hour PRN      03/19/20 1109    03/19/20 1109  potassium chloride 20 mEq in 50 mL IVPB  Every 1 Hour PRN      03/19/20 1109    03/19/20 1109  potassium chloride 20 mEq in 50 mL IVPB  Every 1 Hour PRN      03/19/20 1109    03/19/20 1109  propofol (DIPRIVAN) infusion 10 mg/mL 100 mL  Continuous PRN      03/19/20 1109    03/19/20 1109  phenylephrine (MARGUERITE-SYNEPHRINE) 50 mg in sodium chloride 0.9 % 250 mL (0.2 mg/mL) infusion  Continuous PRN      03/19/20 1109    03/19/20 1109  niCARdipine (CARDENE) 25 mg in 250 mL NS (0.1 mg/mL) infusion kit  Continuous PRN      03/19/20 1109    03/19/20 1109  norepinephrine (LEVOPHED) 8 mg/250 mL (32 mcg/mL) in sodium chloride 0.9% infusion (premix)  Continuous PRN      03/19/20 1109    03/19/20 1109  milrinone 20 mg/100 mL (0.2 mg/mL) in 5 % dextrose infusion  Continuous PRN      03/19/20 1109    03/19/20 1109  oxyCODONE (ROXICODONE) immediate release tablet 10 mg  Every 4 Hours PRN      03/19/20 1109    03/19/20 1109  morphine injection 4 mg  Every 30 Minutes PRN      03/19/20 1109    03/19/20 1109  ondansetron (ZOFRAN) injection 4 mg  Every 6 Hours PRN      03/19/20 1109    03/19/20 1109  midazolam (VERSED) injection 2 mg  Every 1 Hour PRN      03/19/20 1109     03/19/20 1109  meperidine (DEMEROL) injection 25 mg  Every 4 Hours PRN      03/19/20 1109    03/19/20 1109  polyethylene glycol (MIRALAX) powder 17 g  Daily PRN      03/19/20 1109    03/19/20 1109  HYDROcodone-acetaminophen (NORCO) 5-325 MG per tablet 2 tablet  Every 4 Hours PRN      03/19/20 1109    03/19/20 1109  insulin regular (HumuLIN R,NovoLIN R) 100 Units in sodium chloride 0.9 % 100 mL (1 Units/mL) infusion  Continuous PRN      03/19/20 1109    03/19/20 1109  furosemide (LASIX) injection 40 mg  Every 6 Hours PRN      03/19/20 1109    03/19/20 1109  DOPamine 400 mg in 250 mL D5W (1.6 mg/mL) infusion  Continuous PRN      03/19/20 1109    03/19/20 1109  clevidipine (CLEVIPREX) infusion 0.5 mg/mL  Continuous PRN      03/19/20 1109    03/19/20 1109  EPINEPHrine (ADRENALIN) 5 mg in sodium chloride 0.9 % 250 mL (0.02 mg/mL) infusion  Continuous PRN      03/19/20 1109    03/19/20 1109  docusate sodium (COLACE) capsule 100 mg  2 Times Daily PRN      03/19/20 1109    03/19/20 1109  ALPRAZolam (XANAX) tablet 0.25 mg  Every 8 Hours PRN      03/19/20 1109    03/19/20 1109  albumin human 5 % solution 1,500 mL  As Needed      03/19/20 1109    03/19/20 1042  POC Activated Clotting Time  Once      03/19/20 0740    03/19/20 1042  POC Activated Clotting Time  Once      03/19/20 0813    03/19/20 1042  POC Activated Clotting Time  Once      03/19/20 0841    03/19/20 1042  POC Activated Clotting Time  Once      03/19/20 0916    03/19/20 1042  POC Activated Clotting Time  Once      03/19/20 1008    03/19/20 1041  POC OR Panel, ISTAT  Once      03/19/20 0847    03/19/20 1041  POC OR Panel, ISTAT  Once      03/19/20 0917    03/19/20 1041  POC OR Panel, ISTAT  Once      03/19/20 1009    03/19/20 1040  POC OR Panel, ISTAT  Once      03/19/20 0741    03/19/20 1040  POC OR Panel, ISTAT  Once      03/19/20 0842    03/19/20 0923  Tissue Pathology Exam      03/19/20 0923    03/19/20 0920  Prepare RBC, 2 Units  Blood - Once      03/19/20  0919    03/19/20 0823  Insert Indwelling Urinary Catheter  Once      03/19/20 0822    03/19/20 0818  sodium chloride (NS) irrigation solution  As Needed,   Status:  Discontinued      03/19/20 0818    03/19/20 0817  gelatin absorbable (GELFOAM) sponge  As Needed,   Status:  Discontinued      03/19/20 0818    03/19/20 0727  OR Blood Pickup  Once,   Status:  Canceled      03/19/20 0727    03/19/20 0600  [MAR Hold]  ceFAZolin in Sodium Chloride (ANCEF) IVPB solution 3 g  On Call to O.R.     (MAR Hold since Thu 3/19/2020 at 0640. Reason: Unreviewed Transfer Orders.)    03/18/20 0849    03/19/20 0600  metoprolol tartrate (LOPRESSOR) tablet 12.5 mg  On Call to O.R.      03/18/20 0849    03/19/20 0537  POC Glucose Once  Once      03/19/20 0535    03/19/20 0400  Basic Metabolic Panel  Morning Draw      03/18/20 1826 03/19/20 0001  NPO Diet NPO Except: Sips with meds  Diet Effective Midnight,   Status:  Canceled      03/17/20 1003    03/19/20 0001  NPO Diet  Diet Effective Now,   Status:  Canceled      03/18/20 1025    03/18/20 2053  POC Glucose Once  Once      03/18/20 2050 03/18/20 2015  famotidine (PEPCID) injection 20 mg  Once      03/18/20 1917 03/18/20 1918  May take Beta Blocker from home with sip of water.  Once,   Status:  Canceled     Comments:  Only applicable to patients on home Beta Blocker    03/18/20 1917 03/18/20 1917  Oxygen Therapy- Nasal Cannula; Titrate for SPO2: Per Policy  Continuous PRN,   Status:  Canceled      03/18/20 1917 03/18/20 1917  fentaNYL citrate (PF) (SUBLIMAZE) injection 50 mcg  Every 10 Minutes PRN,   Status:  Discontinued      03/18/20 1917 03/18/20 1915  heparin (porcine) injection 4,000 Units  Once      03/18/20 1827 03/18/20 1900  chlorhexidine (PERIDEX) 0.12 % solution 15 mL  Every 12 Hours Scheduled      03/18/20 0849    03/18/20 1900  Chlorhexidine Gluconate Cloth 2 % pads 1 application  Every 12 Hours,   Status:  Discontinued      03/18/20 0849    03/18/20 1900   mupirocin (BACTROBAN) 2 % nasal ointment 1 application  Every 12 Hours Scheduled      03/18/20 0849    03/18/20 1825  Basic Metabolic Panel  Once,   Status:  Canceled      03/18/20 1825 03/18/20 1821  ECG 12 Lead  Once      03/18/20 1825 03/18/20 1611  Pulse Oximetry, Continuous  Continuous,   Status:  Canceled      03/18/20 1611    03/18/20 0900  bumetanide (BUMEX) tablet 4 mg  Daily,   Status:  Discontinued      03/17/20 1823    03/18/20 0800  ferrous sulfate tablet 325 mg  Daily With Breakfast,   Status:  Discontinued      03/17/20 1730    03/18/20 0600  iron sucrose (VENOFER) 100 mg in sodium chloride 0.9 % 100 mL IVPB  Once      03/17/20 1826 03/17/20 2200  hydrALAZINE (APRESOLINE) tablet 25 mg  Every 8 Hours Scheduled,   Status:  Discontinued      03/17/20 1432    03/17/20 1102  Oxygen Therapy- Nasal Cannula; Titrate for SPO2: per policy  Continuous PRN,   Status:  Canceled      03/17/20 1102    03/16/20 1725  Vital Signs  As Needed,   Status:  Canceled      03/16/20 1725    03/16/20 1725  Change site dressing  As Needed,   Status:  Canceled      03/16/20 1725    03/14/20 1300  doxycycline (MONODOX) capsule 100 mg  Every 12 Hours Scheduled      03/14/20 1124    03/13/20 1200  influenza vac split quad (FLUZONE,FLUARIX,AFLURIA) injection 0.5 mL  Once,   Status:  Discontinued      03/12/20 1820 03/13/20 0900  aspirin EC tablet 81 mg  Daily,   Status:  Discontinued      03/12/20 2200 03/13/20 0900  isosorbide mononitrate (IMDUR) 24 hr tablet 60 mg  Every 24 Hours Scheduled,   Status:  Discontinued      03/12/20 2200 03/12/20 2300  sodium chloride 0.9 % flush 10 mL  Every 12 Hours Scheduled,   Status:  Discontinued      03/12/20 2200 03/12/20 2300  insulin lispro (humaLOG) injection 0-9 Units  4 Times Daily With Meals & Nightly,   Status:  Discontinued      03/12/20 2200 03/12/20 2300  clotrimazole-betamethasone (LOTRISONE) 1-0.05 % cream  Every 12 Hours Scheduled,   Status:  Discontinued       03/12/20 2200 03/12/20 2200  POC Glucose Finger 4x Daily AC & at Bedtime  4 Times Daily Before Meals & at Bedtime,   Status:  Canceled      03/12/20 1933    03/12/20 2200  POC Glucose 4x Daily AC & at Bedtime  4 Times Daily Before Meals & at Bedtime,   Status:  Canceled      03/12/20 2200 03/12/20 2158  nitroglycerin (NITROSTAT) SL tablet 0.4 mg  Every 5 Minutes PRN,   Status:  Discontinued      03/12/20 2200 03/12/20 2158  Potassium  As Needed,   Status:  Canceled     Comments:  For Ventricular Arrhythmias      03/12/20 2200 03/12/20 2158  Magnesium  As Needed,   Status:  Canceled     Comments:  For Ventricular Arrhythmias      03/12/20 2200 03/12/20 2158  Digoxin Level  As Needed,   Status:  Canceled     Comments:  For Atrial Arrhythmias      03/12/20 2200 03/12/20 2158  Blood Gas, Arterial  As Needed,   Status:  Canceled     Comments:  Per O2 PolicyNotify Physician      03/12/20 2200 03/12/20 2158  dextrose (GLUTOSE) oral gel 15 g  Every 15 Minutes PRN,   Status:  Discontinued      03/12/20 2200 03/12/20 2158  dextrose (D50W) 25 g/ 50mL Intravenous Solution 25 g  Every 15 Minutes PRN,   Status:  Discontinued      03/12/20 2200 03/12/20 2158  glucagon (human recombinant) (GLUCAGEN DIAGNOSTIC) injection 1 mg  Every 15 Minutes PRN,   Status:  Discontinued      03/12/20 2200 03/12/20 2158  sodium chloride 0.9 % flush 10 mL  As Needed,   Status:  Discontinued      03/12/20 2200 03/12/20 1654  carvedilol (COREG) tablet 25 mg  2 Times Daily With Meals,   Status:  Discontinued      03/12/20 1652    03/12/20 1349  sodium chloride 0.9 % flush 10 mL  As Needed,   Status:  Discontinued      03/12/20 1349    Unscheduled  ECG 12 Lead  As Needed     Comments:  Nurse to Release if Patient Expericences Acute Chest Pain or Dysrhythmias    03/12/20 2200    Unscheduled  Troponin  As Needed     Comments:  For Chest Pain      03/12/20 2200    Unscheduled  Check Peripheral Pulses As Needed  As Needed       03/19/20 1109    Unscheduled  Cardiac Output Parameters PRN Until 24 Hours Post-Op  As Needed      03/19/20 1109    Unscheduled  Pacemaker Settings - Initiate for Heart Rate Less Than 60 And / Or Hemodynamically Unstable  As Needed     Comments:  Mode: AAI  Atrial rate: 90  Atrial mA: 10  Atrial mV: 0.5    03/19/20 1109    Unscheduled  Dangle at Bedside After Extubation  As Needed      03/19/20 1109    Unscheduled  Up in Chair As Tolerated After Extubation  As Needed      03/19/20 1109    Unscheduled  Insert Nasogastric Tube If Indicated & Not Already in Place  As Needed     Comments:  Indications: Nausea, Vomiting, Prolonged Intubation or to Administer Medications  Attach to Low Wall Suction if Any Residual    03/19/20 1109    Unscheduled  Cleanse Incision With Normal Saline and Redress With Dry 4x4 Gauze if Incision is Draining  As Needed      03/19/20 1109    Unscheduled  Change Chest Tube Dressings PRN to Keep Dry - Do NOT Reinforce  As Needed      03/19/20 1109    Unscheduled  Oxygen Therapy- Nasal Cannula; 2 LPM; Titrate for SPO2: 92%  Continuous PRN      03/19/20 1109    Unscheduled  Patient May Use Home CPAP / BIPAP As Needed  As Needed      03/19/20 1109    Unscheduled  Blood Gas, Arterial  As Needed     Comments:  30 Minutes After Ventilator Changes, 30 Minutes After Extubation and PRN      03/19/20 1109    Unscheduled  CBC & Differential  As Needed     Comments:  Chest Tube Drainage Greater Than 200mL/hr      03/19/20 1109    Unscheduled  aPTT  As Needed     Comments:  Chest Tube Drainage Greater Than 200mL/hr      03/19/20 1109    Unscheduled  Protime-INR  As Needed     Comments:  Chest Tube Drainage Greater Than 200mL/hr      03/19/20 1109    Unscheduled  Fibrinogen  As Needed     Comments:  Chest Tube Drainage Greater Than 200mL/hr      03/19/20 1109    Unscheduled  Potassium  As Needed     Comments:  Four hours after dose given.      03/19/20 1109    Unscheduled  Magnesium  As Needed      Comments:  If potassium stays low after replacement      03/19/20 1109    --  aspirin 81 MG EC tablet  Daily      03/12/20 1412    Signed and Held  Assess Need for Indwelling Urinary Catheter - Follow Removal Protocol  Continuous     Comments:  Indwelling Urinary Catheter Removal Criteria  Discontinue Indwelling Urinary Catheter Unless One of the Following is Present:  Urinary Retention or Obstruction  Chronic Urinary Catheter Use  End of Life  Critical Illness with Strict I/O   Tract or Abdominal Surgery  Stage 3/4 Sacral / Perineal Wound  Required Activity Restriction: Trauma  Required Activity Restriction: Spine Surgery  If Patient is Being Followed by Urology Contact Them PRIOR to Removal  Do Not Remove Indwelling Urinary Catheter Order is Present with a CLINICAL REASON to Maintain the Catheter. Provider is Required to Include a Clinical Reason to Maintain a Urinary Catheter    Patient Admitted With Indwelling Urinary Catheter (Not Placed at [facility])  Assess for Continued Need & Document Medical Necessity  If Infection is Suspected, Contact the Provider        Signed and Held    Signed and Held  Urinary Catheter Care  Every Shift      Signed and Held    Signed and Held  albumin human 25 % IV SOLN 12.5 g  As Needed      Signed and Held                   Operative/Procedure Notes (last 24 hours) (Notes from 03/18/20 1332 through 03/19/20 1332)      Jr Ze Frye MD at 03/19/20 0756                                           [facility] CARDIAC SURGERY OP NOTE    Preop Diagnosis: Severe mitral incompetence.  Degenerative mitral valve disease with fibrosis retraction of subvalvular apparatus.  Moderate severe tricuspid incompetence.  Patent foramen ovale.  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  Dilated cardiomyopathy with ejection fraction of 30%.  Bilateral pleural effusions large.  End-stage renal disease on hemodialysis.    Postop Diagnosis: Same    Indications: This patient  was admitted with class IV congestive heart failure.  He had known severe mitral incompetence for several years.  His ejection fraction 2 years ago was 50% and is now 30%.  He started hemodialysis.  The STS Risk and all risks and alternatives were discussed with the patient and family. Operation was advisable to prolong life and relieve symptoms.They understand and wish to proceed.    Procedure: Mitral valve replacement with a 33 mm [place] mechanical valve with preservation of all subvalvular apparatus.  Closure of patent foramen ovale.  Tricuspid valve repair with a 32 mm Triad Sweeney band.  Temporary cardiopulmonary bypass.  Antegrade and retrograde cold blood cardioplegia.  Neurologic monitoring.  Transesophageal echo.  Bilateral intercostal nerve blocks with Exparel.    Surgeon: Ze Frye MD    Assistant: Lana Dominguez CSA    Anesthesia: GET    Findings : PA pressures were in the 70s.  The CVP was 32.  He had had hemodialysis for 2 days through a tunneled catheter.  The heart was enlarged grade 6.  The tricuspid annular dilatation and there was a 1 cm PFO in the superior portion of the septum.  The mitral valve had degenerative disease but there was retraction and scarring of the posterior valve are apparatus with leaflet tethering and this was not repairable.  The anterior leaflet likewise had thickening and calcification and was not repairable.  The postoperative pulmonary artery pressures were in the 40s and the CVP was 12 now.  The valve is well-seated with no paravalvular leaks.  The tricuspid had no tricuspid incompetence.  The septum was closed.  There was probably 3 L of pleural fluid on the right and 2 L on the left.    Operative Procedure: A primary median sternotomy was made.  Cardiopulmonary bypass was established for 87 minutes drifting 34 °C at appropriate flow rates.  The aorta was crossclamped for 54 minutes and we gave a liter of antegrade cold blood cardioplegia then 800 cc of  retrograde cold blood cardioplegia repeating doses every 10 to 15 minutes to good effect.  The right atrium was opened and we placed the coronary sinus catheter directly and used a pursestring.  CO2 gas was given in the pericardial space.  The left atrium was opened on the right side and the valve was examined.  After analysis it was not repairable so the anterior leaflet was incised and split and a portion removed.  A 33 mm mechanical valve was sewn into place in the anti-anatomic position with 2-0 Ethibond horizontal pledgeted mattress sutures with pledgets on the atrial side.  It was lowered into place seated nicely and the sutures were fixed with cor knot device.  The leaflet mobility was good and there was no sub-valve are obstruction.  The left atrium was closed with running 3-0 Prolene.  The PFO was closed with 2 layers of running 4-0 Prolene.  The 32 mm tricuspid annuloplasty band was sewn into place with 4-0 Mahaffey-Aime suture.  With pressure testing there was no leak.  The cross-clamp was released.  The right atrium was closed with 2 layers of running 3-0 Prolene.  Complete de-airing was performed and he regained spontaneous sinus rhythm.  Cardiopulmonary bypass was then discontinued with an improved hemodynamic state.  Decannulation was affected and the usual devices were placed.  Protamine was administered.  The sternum was closed with stainless steel wire.  We placed 5 chest tubes.  We applied vancomycin and wrist platelets plasma.  Because of the large size we use the zip fix and stainless steel wire.  The fascia, soft tissues and skin were closed usual.  The sponge, needle and instrument counts noted to be correct.  We injected with bilateral intercostal nerve blocks with 20 cc of Exparel.    Complications: None    Tubes: 5     Epicardial Wires: 3    Blood Loss: Minimal    Aortic cross-clamp time: 87 min    CPB time: 54 min     Specimen: Portion of the anterior leaflet of mitral valve    Condition:  "Improved.      Patient Care Team:  Provider, No Known as PCP - General        Ze Frye MD  3/19/2020  10:56              Electronically signed by Jr Ze Frye MD at 03/19/20 1103          Physician Progress Notes (last 24 hours) (Notes from 03/18/20 1332 through 03/19/20 1332)      Laurita Mcneil MD at 03/18/20 1634             LOS: 6 days    Patient Care Team:  Provider, No Known as PCP - General    Chief Complaint:    Chief Complaint   Patient presents with   • Edema     Follow-up acute kidney injury on chronic kidney disease  Subjective     Interval History:    On dialysis. qb 300.  Feels a little better. Not making much urine.    Not soa.  Edema unchanged.  Still with poor appetite.   2 bm yesterday .Plan for MV and TV rings tomorrow with closure of PFO.      Review of Systems:   As noted above    Objective     Vital Signs  Temp:  [97.9 °F (36.6 °C)-98.4 °F (36.9 °C)] 97.9 °F (36.6 °C)  Heart Rate:  [65-75] 70  Resp:  [16-18] 18  BP: (124-144)/(84-97) 139/97    Flowsheet Rows      First Filed Value   Admission Height  180.3 cm (71\") Documented at 03/12/2020 1350   Admission Weight  136 kg (300 lb) Documented at 03/12/2020 1350          I/O this shift:  In: 240 [P.O.:240]  Out: -   I/O last 3 completed shifts:  In: 820 [P.O.:720; I.V.:100]  Out: 1305 [Urine:1300; Blood:5]    Intake/Output Summary (Last 24 hours) at 3/18/2020 1634  Last data filed at 3/18/2020 0900  Gross per 24 hour   Intake 640 ml   Output 300 ml   Net 340 ml       Physical Exam:  General Appearance: alert, oriented x 3, no acute distress,  chronically ill. On dialysis.   Goal 4.6 kg .  Skin: warm and dry  HEENT: pupils round and reactive to light, oral mucosa normal, nonicteric sclerae  Neck: supple, no JVD, trachea midline. RIJ TDC.  Lungs: Bibasilar crackles and rhonchi, unlabored breathing effort  Heart: RRR, no S3, no rub,  3/6 systolic murmur  Abdomen: soft, non-tender, +bs. body wall edema .  :  scrotal and " penile edema  Extremities:  3-4+ LE edema bilaterally; chronic  venous stasis changes  Neuro: normal speech and mental status.  Flat affect .        Results Review:    Results from last 7 days   Lab Units 03/18/20  0551 03/17/20  0654 03/16/20  0528 03/13/20  0621 03/12/20  1359   SODIUM mmol/L 135* 138 135*   < > 136 136   POTASSIUM mmol/L 4.3 4.9 4.2   < > 4.9 5.3*   CHLORIDE mmol/L 103 107 106   < > 108* 104   CO2 mmol/L 20.6* 16.9* 15.7*   < > 14.4* 15.9*   BUN mg/dL 57* 82* 74*   < > 75* 70*   CREATININE mg/dL 5.68* 7.44* 7.20*   < > 7.15* 6.95*   CALCIUM mg/dL 7.4* 7.3* 7.0*   < > 7.7* 8.3*   BILIRUBIN mg/dL 0.3  --   --   --  0.4 0.4   ALK PHOS U/L 64  --   --   --  68 86   ALT (SGPT) U/L 18  --   --   --  51* 74*   AST (SGOT) U/L 9  --   --   --  18 31   GLUCOSE mg/dL 113* 100* 103*   < > 121* 115*    < > = values in this interval not displayed.       Estimated Creatinine Clearance: 23.2 mL/min (A) (by C-G formula based on SCr of 5.68 mg/dL (H)).    Results from last 7 days   Lab Units 03/17/20  0654 03/16/20  0528 03/15/20  0530 03/14/20  0433   MAGNESIUM mg/dL  --  1.9 2.2 2.2   PHOSPHORUS mg/dL 6.4* 5.9* 6.3* 6.2*       Results from last 7 days   Lab Units 03/16/20  0528 03/15/20  0530 03/14/20  0433   URIC ACID mg/dL 10.2* 9.6* 9.4*       Results from last 7 days   Lab Units 03/18/20  0550 03/17/20  0654 03/16/20  0528 03/15/20  0530 03/14/20  0433   WBC 10*3/mm3 5.23 4.68 4.83 4.91 4.33   HEMOGLOBIN g/dL 9.9* 10.2* 9.9* 10.9* 10.9*   PLATELETS 10*3/mm3 180 183 168 193 167       Results from last 7 days   Lab Units 03/18/20  0550   INR  1.28*         Imaging Results (Last 24 Hours)     ** No results found for the last 24 hours. **          aspirin 81 mg Oral Daily   bumetanide 4 mg Oral Daily   carvedilol 25 mg Oral BID With Meals   [START ON 3/19/2020] ceFAZolin 3 g Intravenous On Call to OR   chlorhexidine 15 mL Mouth/Throat Q12H   Chlorhexidine Gluconate Cloth 1 application Topical Q12H    clotrimazole-betamethasone  Topical Q12H   doxycycline 100 mg Oral Q12H   ferrous sulfate 325 mg Oral Daily With Breakfast   hydrALAZINE 25 mg Oral Q8H   influenza vaccine 0.5 mL Intramuscular Once   insulin lispro 0-9 Units Subcutaneous 4x Daily With Meals & Nightly   iron sucrose 100 mg Intravenous Once   isosorbide mononitrate 60 mg Oral Q24H   [START ON 3/19/2020] metoprolol tartrate 12.5 mg Oral On Call to OR   mupirocin 1 application Each Nare Q12H   sodium chloride 10 mL Intravenous Q12H          Medication Review:   Current Facility-Administered Medications   Medication Dose Route Frequency Provider Last Rate Last Dose   • aspirin EC tablet 81 mg  81 mg Oral Daily Renard Ro MD   81 mg at 03/18/20 0906   • bumetanide (BUMEX) tablet 4 mg  4 mg Oral Daily Laurita Mcneil MD   4 mg at 03/18/20 0906   • carvedilol (COREG) tablet 25 mg  25 mg Oral BID With Meals Renard Ro MD   25 mg at 03/18/20 0906   • [START ON 3/19/2020] ceFAZolin in Sodium Chloride (ANCEF) IVPB solution 3 g  3 g Intravenous On Call to OR Jr Ze Frye MD       • chlorhexidine (PERIDEX) 0.12 % solution 15 mL  15 mL Mouth/Throat Q12H Jr Ze Frye MD       • Chlorhexidine Gluconate Cloth 2 % pads 1 application  1 application Topical Q12H Jr Ze Frye MD       • clotrimazole-betamethasone (LOTRISONE) 1-0.05 % cream   Topical Q12H Renard Ro MD       • dextrose (D50W) 25 g/ 50mL Intravenous Solution 25 g  25 g Intravenous Q15 Min PRN Renard Ro MD       • dextrose (GLUTOSE) oral gel 15 g  15 g Oral Q15 Min PRN Renard Ro MD       • doxycycline (MONODOX) capsule 100 mg  100 mg Oral Q12H Renard Ro MD   100 mg at 03/18/20 0906   • ferrous sulfate tablet 325 mg  325 mg Oral Daily With Breakfast Mercedes Armenta APRN   325 mg at 03/18/20 0906   • glucagon (human recombinant) (GLUCAGEN DIAGNOSTIC) injection 1 mg  1 mg Subcutaneous Q15 Min PRN Jayjay,  Renard PACHECO MD       • hydrALAZINE (APRESOLINE) tablet 25 mg  25 mg Oral Q8H Meggan Hinton MD   25 mg at 03/18/20 1340   • influenza vac split quad (FLUZONE,FLUARIX,AFLURIA) injection 0.5 mL  0.5 mL Intramuscular Once Renard Ro MD       • insulin lispro (humaLOG) injection 0-9 Units  0-9 Units Subcutaneous 4x Daily With Meals & Nightly Renard Ro MD   2 Units at 03/16/20 2040   • iron sucrose (VENOFER) 100 mg in sodium chloride 0.9 % 100 mL IVPB  100 mg Intravenous Once Laurita Mcneil MD       • isosorbide mononitrate (IMDUR) 24 hr tablet 60 mg  60 mg Oral Q24H Renard Ro MD   60 mg at 03/18/20 0911   • [START ON 3/19/2020] metoprolol tartrate (LOPRESSOR) tablet 12.5 mg  12.5 mg Oral On Call to OR Jr Ze Frye MD       • mupirocin (BACTROBAN) 2 % nasal ointment 1 application  1 application Each Nare Q12H Jr Ze Frye MD       • nitroglycerin (NITROSTAT) SL tablet 0.4 mg  0.4 mg Sublingual Q5 Min PRN Renard Ro MD       • sodium chloride 0.9 % flush 10 mL  10 mL Intravenous PRN Renard Ro MD       • sodium chloride 0.9 % flush 10 mL  10 mL Intravenous Q12H Renard Ro MD   10 mL at 03/18/20 0908   • sodium chloride 0.9 % flush 10 mL  10 mL Intravenous PRN Renard Ro MD           Assessment/Plan   1.  Acute on chronic kidney disease stage IV with progression to ESRD: refractory fluid overload; NAGMA; stable potassium.  Initiate dialysis today.  HD again tomorrow .  2.  Cellulitis of left lower extremity . PO doxycycline .  3.  Severe MR and moderate TR.  Systolic non-ischemic cardiomyopathy EF 33%. Plan for Mitral and possible Tricuspid valve replacement 3/19 .  4.  Anasarca and fluid excess due to right-sided heart failure and ESRD.    5.  Hypertension,  Remove volume .  6.  Medical noncompliance.  7. DM2. Controlled .  8. Anemia CKD.  Hg stable .  Iron deficient.  IV iron with dialysis today.     Plan:  1. Dialysis  today .  2. Valve surgery tomorrow.         Laurita Mcneil MD  03/18/20  16:34      Electronically signed by Laurita Mcneil MD at 03/18/20 1637     Jr Ze Frye MD at 03/18/20 1400        We will plan surgery for the morning.  He will have hemodialysis this afternoon.  If we cannot repair the mitral valve we should use a mechanical valve because of the accelerated calcium metabolism in a young patient on dialysis.  He is aware of this and knows he has to take Coumadin.  I really think we can repair it with a ring.  The tricuspid will need a ring and he needs a closure of his PFO.  All questions were answered.  He understands and wishes to proceed.    Electronically signed by Jr Ze Frye MD at 03/18/20 1401

## 2020-03-19 NOTE — ANESTHESIA POSTPROCEDURE EVALUATION
"Patient: Nico King    Procedure Summary     Date:  03/19/20 Room / Location:  [facility] OR [address] [place] [address] MAIN OR    Anesthesia Start:  0645 Anesthesia Stop:  1119    Procedure:  MEGHA, STERNOTOMY, MITRAL VALVE REPLACEMENT, TRICUSPID VALVE REPAIR, PFO CLOSURE, PRP (N/A Chest) Diagnosis:       Nonrheumatic mitral valve regurgitation      (Nonrheumatic mitral valve regurgitation [I34.0])    Surgeon:  Jr Ze Frye MD Provider:  Eleazar Boswell MD    Anesthesia Type:  general ASA Status:  4          Anesthesia Type: general    Vitals  Vitals Value Taken Time   /73 3/19/2020 11:11 AM   Temp     Pulse 84 3/19/2020 11:19 AM   Resp 14 3/19/2020 11:10 AM   SpO2 100 % 3/19/2020 11:19 AM   Vitals shown include unvalidated device data.        Post Anesthesia Care and Evaluation    Patient location during evaluation: ICU  Patient participation: waiting for patient participation  Pain management: adequate  Airway patency: patent  Anesthetic complications: No anesthetic complications  PONV Status: none  Cardiovascular status: acceptable  Respiratory status: acceptable, ETT, intubated and ventilator  Hydration status: acceptable    Comments: /73   Pulse 89   Temp 36.9 °C (98.5 °F) (Oral)   Resp 14 Comment: Simultaneous filing. User may be unaware of other data.  Ht 180.3 cm (71\")   Wt 136 kg (300 lb)   SpO2 100%   BMI 41.84 kg/m²       "

## 2020-03-19 NOTE — PROGRESS NOTES
"   LOS: 7 days    Patient Care Team:  Provider, No Known as PCP - General    Chief Complaint:    Chief Complaint   Patient presents with   • Edema     Follow-up acute kidney injury on chronic kidney disease  Subjective     Interval History:   S/p mech MVR, PFO closure, and TV repair earlier today; 30% fio2; stable BP's on precedex     Review of Systems:   As noted above    Objective     Vital Signs  Temp:  [98.3 °F (36.8 °C)-98.5 °F (36.9 °C)] 98.5 °F (36.9 °C)  Heart Rate:  [75-89] 75  Resp:  [14-18] 14  BP: (101-153)/() 102/67  Arterial Line BP: (101-128)/(59-72) 106/60  FiO2 (%):  [100 %] 100 %    Flowsheet Rows      First Filed Value   Admission Height  180.3 cm (71\") Documented at 03/12/2020 1350   Admission Weight  136 kg (300 lb) Documented at 03/12/2020 1350          I/O this shift:  In: 2510 [I.V.:1400; Blood:1110]  Out: 4765 [Urine:145; Other:3600; Blood:200; Chest Tube:820]  I/O last 3 completed shifts:  In: 760 [P.O.:760]  Out: 4300 [Urine:300; Other:4000]    Intake/Output Summary (Last 24 hours) at 3/19/2020 1300  Last data filed at 3/19/2020 1200  Gross per 24 hour   Intake 2630 ml   Output 8765 ml   Net -6135 ml       Physical Exam:  General Appearance: sedated, intubated, chronically ill-appearing  Skin: warm and dry  HEENT: oral ETT, nonicteric sclerae  Neck: supple, no JVD, trachea midline. RIJ TDC  Lungs: Bibasilar rhonchi, unlabored on vent  Heart: RRR, no S3, +rub  Abdomen: soft, non-tender, +bs; body wall edema .  :  scrotal and penile edema  Extremities:  3-4+ LE edema bilaterally; chronic  venous stasis changes  Neuro: sedated        Results Review:    Results from last 7 days   Lab Units 03/19/20  1132 03/19/20  0516 03/18/20  0551  03/13/20  0621 03/12/20  1359   SODIUM mmol/L 139 139 135*   < > 136 136   POTASSIUM mmol/L 3.9 3.7 4.3   < > 4.9 5.3*   CHLORIDE mmol/L 102 102 103   < > 108* 104   CO2 mmol/L 22.3 22.3 20.6*   < > 14.4* 15.9*   BUN mg/dL 37* 39* 57*   < > 75* 70* "   CREATININE mg/dL 4.37* 4.35* 5.68*   < > 7.15* 6.95*   CALCIUM mg/dL 6.9* 7.5* 7.4*   < > 7.7* 8.3*   BILIRUBIN mg/dL  --   --  0.3  --  0.4 0.4   ALK PHOS U/L  --   --  64  --  68 86   ALT (SGPT) U/L  --   --  18  --  51* 74*   AST (SGOT) U/L  --   --  9  --  18 31   GLUCOSE mg/dL 120* 104* 113*   < > 121* 115*    < > = values in this interval not displayed.       Estimated Creatinine Clearance: 29.1 mL/min (A) (by C-G formula based on SCr of 4.37 mg/dL (H)).    Results from last 7 days   Lab Units 03/19/20  1132 03/17/20  0654 03/16/20  0528 03/15/20  0530   MAGNESIUM mg/dL 1.9  --  1.9 2.2   PHOSPHORUS mg/dL 3.8 6.4* 5.9* 6.3*       Results from last 7 days   Lab Units 03/16/20  0528 03/15/20  0530 03/14/20  0433   URIC ACID mg/dL 10.2* 9.6* 9.4*       Results from last 7 days   Lab Units 03/19/20  1132 03/19/20  1009 03/19/20  0917 03/19/20  0847 03/19/20  0842  03/18/20  0550 03/17/20  0654 03/16/20  0528 03/15/20  0530   WBC 10*3/mm3 8.97  --   --   --   --   --  5.23 4.68 4.83 4.91   HEMOGLOBIN g/dL 9.1*  --   --   --   --   --  9.9* 10.2* 9.9* 10.9*   HEMOGLOBIN, POC g/dL  --  8.8* 7.8* 8.8* 8.8*   < >  --   --   --   --    PLATELETS 10*3/mm3 106*  --   --   --   --   --  180 183 168 193    < > = values in this interval not displayed.       Results from last 7 days   Lab Units 03/19/20  1132 03/18/20  0550   INR  1.58* 1.28*         Imaging Results (Last 24 Hours)     Procedure Component Value Units Date/Time    XR Chest 1 View [[id number]] Resulted:  03/19/20 1203     Updated:  03/19/20 1207          acetaminophen 650 mg Oral Q4H   Or      acetaminophen 650 mg Oral Q4H   Or      acetaminophen 650 mg Rectal Q4H   [START ON 3/20/2020] aspirin 81 mg Oral Daily   atorvastatin 40 mg Oral Nightly   ceFAZolin 3 g Intravenous Q24H   chlorhexidine 15 mL Mouth/Throat Q12H   [START ON 3/20/2020] enoxaparin 30 mg Subcutaneous Q24H   metoclopramide 10 mg Intravenous Q6H   [START ON 3/20/2020] metoprolol tartrate 12.5  mg Oral Q12H   mupirocin  Each Nare BID   [START ON 3/20/2020] pantoprazole 40 mg Oral Q AM   [START ON 3/20/2020] sennosides-docusate 2 tablet Oral Nightly       clevidipine 2-32 mg/hr    dexmedetomidine 0.2-1.5 mcg/kg/hr Last Rate: 0.4 mcg/kg/hr (03/19/20 1210)   DOPamine 2-20 mcg/kg/min    EPINEPHrine 0.02-0.3 mcg/kg/min    insulin 0-50 Units/hr    milrinone 0.25-0.75 mcg/kg/min    niCARdipine 5-15 mg/hr    nitroglycerin 5-200 mcg/min    norepinephrine 0.02-0.3 mcg/kg/min    phenylephrine 0.2-3 mcg/kg/min    propofol 5-50 mcg/kg/min Last Rate: 30 mcg/kg/min (03/19/20 1219)   sodium chloride 30 mL/hr Last Rate: 30 mL/hr (03/19/20 1214)   sodium chloride 30 mL/hr    vasopressin 0.02-0.1 Units/min        Medication Review:   Current Facility-Administered Medications   Medication Dose Route Frequency Provider Last Rate Last Dose   • acetaminophen (TYLENOL) tablet 650 mg  650 mg Oral Q4H Jr Ze Frye MD        Or   • acetaminophen (TYLENOL) 160 MG/5ML solution 650 mg  650 mg Oral Q4H Jr Ze Frye MD        Or   • acetaminophen (TYLENOL) suppository 650 mg  650 mg Rectal Q4H Jr Ze Frye MD       • [START ON 3/20/2020] acetaminophen (TYLENOL) tablet 650 mg  650 mg Oral Q4H PRN Jr Ze Frye MD        Or   • [START ON 3/20/2020] acetaminophen (TYLENOL) 160 MG/5ML solution 650 mg  650 mg Oral Q4H PRN Jr Ze Frye MD        Or   • [START ON 3/20/2020] acetaminophen (TYLENOL) suppository 650 mg  650 mg Rectal Q4H PRN Jr Ze Frye MD       • albumin human 5 % solution 1,500 mL  1,500 mL Intravenous PRN Jr Ze Frye MD       • ALPRAZolam (XANAX) tablet 0.25 mg  0.25 mg Oral Q8H PRN Jr Ze Frye MD       • [START ON 3/20/2020] aspirin EC tablet 81 mg  81 mg Oral Daily Jr Ze Frye MD       • atorvastatin (LIPITOR) tablet 40 mg  40 mg Oral Nightly Jr Ze Frye MD       • [START ON 3/20/2020] bisacodyl (DULCOLAX) suppository 10 mg  10 mg  Rectal Daily PRN Jr Ze Frye MD       • ceFAZolin in Sodium Chloride (ANCEF) IVPB solution 3 g  3 g Intravenous Q24H Jackeline Cyr APRN       • chlorhexidine (PERIDEX) 0.12 % solution 15 mL  15 mL Mouth/Throat Q12H Jr Ze Frye MD       • clevidipine (CLEVIPREX) infusion 0.5 mg/mL  2-32 mg/hr Intravenous Continuous PRN Jr Ze Frye MD       • [START ON 3/20/2020] cyclobenzaprine (FLEXERIL) tablet 10 mg  10 mg Oral Q8H PRN Jr Ze Frye MD       • dexmedetomidine (PRECEDEX) 400 mcg/100mL (4 mcg/mL) NS infusion kit  0.2-1.5 mcg/kg/hr Intravenous Titrated Jr Ze Frye MD 13.6 mL/hr at 03/19/20 1210 0.4 mcg/kg/hr at 03/19/20 1210   • docusate sodium (COLACE) capsule 100 mg  100 mg Oral BID PRN Jr Ze Frye MD       • DOPamine 400 mg in 250 mL D5W (1.6 mg/mL) infusion  2-20 mcg/kg/min Intravenous Continuous PRN Jr Ze Frye MD       • [START ON 3/20/2020] enoxaparin (LOVENOX) syringe 30 mg  30 mg Subcutaneous Q24H Jr Ze Frye MD       • EPINEPHrine (ADRENALIN) 5 mg in sodium chloride 0.9 % 250 mL (0.02 mg/mL) infusion  0.02-0.3 mcg/kg/min Intravenous Continuous PRN Jr Ze Frye MD       • furosemide (LASIX) injection 40 mg  40 mg Intravenous Q6H PRN Jr Ze Frye MD       • HYDROcodone-acetaminophen (NORCO) 5-325 MG per tablet 2 tablet  2 tablet Oral Q4H PRN Jr Ze Frye MD       • insulin regular (HumuLIN R,NovoLIN R) 100 Units in sodium chloride 0.9 % 100 mL (1 Units/mL) infusion  0-50 Units/hr Intravenous Continuous PRN Jr Ze Frye MD       • meperidine (DEMEROL) injection 25 mg  25 mg Intravenous Q4H PRN Jr Ze Frye MD       • metoclopramide (REGLAN) injection 10 mg  10 mg Intravenous Q6H Jr Ze Frye MD       • [START ON 3/20/2020] metoprolol tartrate (LOPRESSOR) tablet 12.5 mg  12.5 mg Oral Q12H Jr Ze Frye MD       • midazolam (VERSED) injection 2 mg  2 mg Intravenous Q1H  PRN Jr Ze Frye MD       • milrinone 20 mg/100 mL (0.2 mg/mL) in 5 % dextrose infusion  0.25-0.75 mcg/kg/min Intravenous Continuous PRN Jr Ze Frye MD       • morphine injection 1 mg  1 mg Intravenous Q4H PRN Jr Ze Frye MD        And   • naloxone (NARCAN) injection 0.4 mg  0.4 mg Intravenous Q5 Min PRN Jr Ze Frye MD       • morphine injection 4 mg  4 mg Intravenous Q30 Min PRN Jr Ze Frye MD       • mupirocin (BACTROBAN) 2 % nasal ointment   Each Nare BID Jr Ze Frye MD       • niCARdipine (CARDENE) 25 mg in 250 mL NS (0.1 mg/mL) infusion kit  5-15 mg/hr Intravenous Continuous PRN Jr Ze Frye MD       • nitroglycerin 50 mg/250 mL (0.2 mg/mL) infusion  5-200 mcg/min Intravenous Titrated Jr Ze Frye MD       • norepinephrine (LEVOPHED) 8 mg/250 mL (32 mcg/mL) in sodium chloride 0.9% infusion (premix)  0.02-0.3 mcg/kg/min Intravenous Continuous PRN Jr Ze Frye MD       • ondansetron (ZOFRAN) injection 4 mg  4 mg Intravenous Q6H PRN Jr Ze Frye MD       • oxyCODONE (ROXICODONE) immediate release tablet 10 mg  10 mg Oral Q4H PRN Jr Ze Frye MD       • [START ON 3/20/2020] pantoprazole (PROTONIX) EC tablet 40 mg  40 mg Oral Q AM Jr Ze Frye MD       • phenylephrine (MARGUERITE-SYNEPHRINE) 50 mg in sodium chloride 0.9 % 250 mL (0.2 mg/mL) infusion  0.2-3 mcg/kg/min Intravenous Continuous PRN Jr Ze Frye MD       • polyethylene glycol (MIRALAX) powder 17 g  17 g Oral Daily PRN Jr Ze Frye MD       • potassium chloride (MICRO-K) CR capsule 40 mEq  40 mEq Oral PRN Jr Ze Frye MD        Or   • potassium chloride (KLOR-CON) packet 40 mEq  40 mEq Oral PRN Jr Ze Frye MD       • potassium chloride 10 mEq in 100 mL IVPB  10 mEq Intravenous Q1H PRN Jr Ze Frye MD        Or   • potassium chloride 10 mEq in 100 mL IVPB  10 mEq Intravenous Q1H PRN Jr Ze Frye MD        • potassium chloride 20 mEq in 50 mL IVPB  20 mEq Intravenous Q1H PRN Jr Ze Frye MD       • potassium chloride 20 mEq in 50 mL IVPB  20 mEq Intravenous Q1H PRN Jr Ze Frye MD       • potassium chloride 20 mEq in 50 mL IVPB  20 mEq Intravenous Q1H PRN Jr Ze Frye MD       • promethazine (PHENERGAN) tablet 12.5 mg  12.5 mg Oral Q6H PRN Jr Ze Frye MD        Or   • promethazine (PHENERGAN) injection 12.5 mg  12.5 mg Intravenous Q6H PRN Jr Ze Frye MD       • propofol (DIPRIVAN) infusion 10 mg/mL 100 mL  5-50 mcg/kg/min Intravenous Continuous PRN Jr Ze Frye MD 24.5 mL/hr at 03/19/20 1219 30 mcg/kg/min at 03/19/20 1219   • [START ON 3/20/2020] sennosides-docusate (PERICOLACE) 8.6-50 MG per tablet 2 tablet  2 tablet Oral Nightly Jr Ze Frye MD       • sodium chloride 0.9 % flush 30 mL  30 mL Intravenous Once PRN Jr Ze Frye MD       • sodium chloride 0.9 % infusion  30 mL/hr Intravenous Continuous Jr Ze Frye MD 30 mL/hr at 03/19/20 1214 30 mL/hr at 03/19/20 1214   • sodium chloride 0.9 % infusion  30 mL/hr Intravenous Continuous PRN Jr Ze Frye MD       • Vasopressin (PITRESSIN) 20 Units in sodium chloride 0.9 % 100 mL (0.2 Units/mL) infusion  0.02-0.1 Units/min Intravenous Continuous PRN Jr Ze Frye MD           Assessment/Plan   1.  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  Cellulitis of left lower extremity  3.  Severe MR and moderate TR.  Systolic non-ischemic cardiomyopathy EF 33%:  had [facility] MVR, TV repair, and closure of PFO on 3/19   4.  Anasarca and fluid excess due to right-sided heart failure and ESRD.    5.  Hypertension, stable  6.  Medical noncompliance.  7.  DM2. Controlled .  8.  Anemia CKD.  Hg stable .  Iron deficient    Plan:  1.  Dialysis again tomorrow, with further vol removal as BP's permit  2.   Surveillance labs      Stephen Eng MD  03/19/20  13:00

## 2020-03-19 NOTE — PLAN OF CARE
Problem: Patient Care Overview  Goal: Plan of Care Review  Outcome: Ongoing (interventions implemented as appropriate)  Flowsheets (Taken 3/19/2020 8589)  Plan of Care Reviewed With: patient  Outcome Summary: Pt has been NPO since midnight for first case with Dr. Frye. No c/o pain. SR on the monitor, RA. Cell phone given to security. VSS will continue to monitor.

## 2020-03-19 NOTE — ANESTHESIA PROCEDURE NOTES
Central Line      Patient reassessed immediately prior to procedure    Patient location during procedure: OR  Stop Time:3/19/2020 7:18 AM  Indications: vascular access, MD/Surgeon request and central pressure monitoring  Staff  Anesthesiologist: Eleazar Boswell MD  Preanesthetic Checklist  Completed: patient identified, surgical consent, pre-op evaluation, timeout performed, IV checked, risks and benefits discussed and monitors and equipment checked  Central Line Prep  Sterile Tech:cap, gloves, gown, mask and sterile barriers  Prep: chloraprep  Patient monitoring: blood pressure monitoring, continuous pulse oximetry and EKG  Central Line Procedure  Laterality:left  Location:subclavian  Catheter Type:Conklin-Massiel  Guidance:landmark technique  Assessment  Post procedure:biopatch applied, line sutured and occlusive dressing applied  Assessement:blood return through all ports, free fluid flow and Dave Test  Complications:no  Patient Tolerance:patient tolerated the procedure well with no apparent complications

## 2020-03-19 NOTE — PROGRESS NOTES
Progress Note    Name: Nico King ADMIT: 3/12/2020   : 1970  PCP: Provider, No Known    MRN: [id number] LOS: 7 days   AGE/SEX: 49 y.o. male  ROOM:    Date of Encounter Visit: 20    Subjective:     Interval History: s/p MVR, TV repair. On pressors and milrinone. Large output from chest tubes.     REVIEW OF SYSTEMS:   Unable to obtain given pt is intubated and sedated.     Objective:   Temp:  [98.3 °F (36.8 °C)-98.5 °F (36.9 °C)] 98.5 °F (36.9 °C)  Heart Rate:  [74-89] 80  Resp:  [14-18] 14  BP: ()/() 102/69  Arterial Line BP: ()/(53-72) 111/61  FiO2 (%):  [100 %] 100 %   SpO2:  [94 %-100 %] 100 %  on    Device (Oxygen Therapy): ventilator    Intake/Output Summary (Last 24 hours) at 3/19/2020 1455  Last data filed at 3/19/2020 1400  Gross per 24 hour   Intake 2630 ml   Output 9005 ml   Net -6375 ml     Body mass index is 41.84 kg/m².      20  0500 20  1915 20  0535   Weight: (!) 144 kg (317 lb 0.3 oz) (!) 139 kg (306 lb 7 oz) 136 kg (300 lb)     Weight change: -4.8 kg (-10 lb 9.3 oz)    Physical Exam   Constitutional: No distress. He is sedated and intubated.   HENT:   Head: Atraumatic.   Nose: Nose normal.   Eyes: Conjunctivae are normal.   Cardiovascular: Normal rate, regular rhythm and intact distal pulses.   Chest incision drsg- CDI   Pulmonary/Chest: Effort normal. He is intubated. He has no wheezes. He has no rales.   Diminished bases. Rhonchi.   Chest tubes in place with bloody drainage   Abdominal: Soft. Distention: mild.   Hypoactive bowel sounds   Genitourinary:   Genitourinary Comments: Mckay present   Musculoskeletal: He exhibits edema (2+ BLE).   Skin: Skin is warm and dry. He is not diaphoretic. There is erythema (mild left shin).   Foot calluses        Results Review:      Results from last 7 days   Lab Units 20  1132 20  0516 20  0551 20  0654 20  0528 03/15/20  0530 20  0433 20  0621      SODIUM mmol/L 139 139 135* 138 135* 134* 134* 136   POTASSIUM mmol/L 3.9 3.7 4.3 4.9 4.2 4.8 4.6 4.9   CHLORIDE mmol/L 102 102 103 107 106 101 105 108*   CO2 mmol/L 22.3 22.3 20.6* 16.9* 15.7* 17.1* 14.9* 14.4*   BUN mg/dL 37* 39* 57* 82* 74* 79* 78* 75*   CREATININE mg/dL 4.37* 4.35* 5.68* 7.44* 7.20* 7.07* 7.28* 7.15*   GLUCOSE mg/dL 120* 104* 113* 100* 103* 124* 122* 121*   CALCIUM mg/dL 6.9* 7.5* 7.4* 7.3* 7.0* 7.5* 7.5* 7.7*   AST (SGOT) U/L  --   --  9  --   --   --   --  18   ALT (SGPT) U/L  --   --  18  --   --   --   --  51*     Estimated Creatinine Clearance: 29.1 mL/min (A) (by C-G formula based on SCr of 4.37 mg/dL (H)).  Results from last 7 days   Lab Units 03/17/20  0654   HEMOGLOBIN A1C % 6.50*     Results from last 7 days   Lab Units 03/19/20  1405 03/19/20  1131 03/19/20  1009 03/19/20  0917 03/19/20  0847 03/19/20  0842 03/19/20  0741 03/19/20  0535   GLUCOSE mg/dL 117 125 119 109 122 124 100 96         Results from last 7 days   Lab Units 03/18/20  0551   PROBNP pg/mL 57,642.0*         Results from last 7 days   Lab Units 03/19/20  1132 03/16/20  0528 03/15/20  0530 03/14/20  0433   MAGNESIUM mg/dL 1.9 1.9 2.2 2.2     Results from last 7 days   Lab Units 03/18/20  0551   CHOLESTEROL mg/dL 120   TRIGLYCERIDES mg/dL 110   HDL CHOL mg/dL 30*     Results from last 7 days   Lab Units 03/19/20  1132 03/19/20  1009 03/19/20  0917 03/19/20  0847 03/19/20  0842 03/19/20  0741 03/18/20  0550 03/17/20  0654 03/16/20  0528 03/15/20  0530 03/14/20  0433  03/13/20  0621   WBC 10*3/mm3 8.97  --   --   --   --   --  5.23 4.68 4.83 4.91 4.33  --  5.24   HEMOGLOBIN g/dL 9.1*  --   --   --   --   --  9.9* 10.2* 9.9* 10.9* 10.9*  --  11.6*   HEMOGLOBIN, POC g/dL  --  8.8* 7.8* 8.8* 8.8* 10.5*  --   --   --   --   --   --   --    HEMATOCRIT % 30.0*  --   --   --   --   --  32.0* 33.9* 32.9* 36.6* 35.4*  --  37.8   HEMATOCRIT POC %  --  26* 23* 26* 26* 31*  --   --   --   --   --   --   --    PLATELETS 10*3/mm3 106*   --   --   --   --   --  180 183 168 193 167  --  172   MCV fL 77.7*  --   --   --   --   --  75.7* 76.5* 76.9* 78.0* 77.5*  --  79.1   MCH pg 23.6*  --   --   --   --   --  23.4* 23.0* 23.1* 23.2* 23.9*  --  24.3*   MCHC g/dL 30.3*  --   --   --   --   --  30.9* 30.1* 30.1* 29.8* 30.8*  --  30.7*   RDW % 16.4*  --   --   --   --   --  17.0* 16.7* 16.7* 17.1* 16.9*  --  17.2*   RDW-SD fl 46.0  --   --   --   --   --  45.3 45.3 45.9 47.9 48.0  --  49.5   MPV fL 10.0  --   --   --   --   --  9.9 10.6 10.7 10.6 10.6  --  10.1   NEUTROPHIL % % 79.4*  --   --   --   --   --   --   --  68.4 66.6 71.1   < >  --    LYMPHOCYTE % % 10.6*  --   --   --   --   --   --   --  15.5* 17.1* 12.7*   < >  --    MONOCYTES % % 6.7  --   --   --   --   --   --   --  11.8 12.0 11.5   < >  --    EOSINOPHIL % % 1.8  --   --   --   --   --   --   --  3.3 3.3 3.5   < >  --    BASOPHIL % % 0.6  --   --   --   --   --   --   --  0.6 0.6 0.7   < >  --    IMM GRAN % % 0.9*  --   --   --   --   --   --   --  0.4 0.4 0.5   < >  --    NEUTROS ABS 10*3/mm3 7.13*  --   --   --   --   --   --   --  3.30 3.27 3.08   < >  --    LYMPHS ABS 10*3/mm3 0.95  --   --   --   --   --   --   --  0.75 0.84 0.55*   < >  --    MONOS ABS 10*3/mm3 0.60  --   --   --   --   --   --   --  0.57 0.59 0.50   < >  --    EOS ABS 10*3/mm3 0.16  --   --   --   --   --   --   --  0.16 0.16 0.15   < >  --    BASOS ABS 10*3/mm3 0.05  --   --   --   --   --   --   --  0.03 0.03 0.03   < >  --    IMMATURE GRANS (ABS) 10*3/mm3 0.08*  --   --   --   --   --   --   --  0.02 0.02 0.02   < >  --    NRBC /100 WBC 0.0  --   --   --   --   --   --   --  0.0 0.2 0.0   < >  --     < > = values in this interval not displayed.     Results from last 7 days   Lab Units 03/19/20  1132 03/18/20  0550   INR  1.58* 1.28*   APTT seconds 38.0* 39.3*     Results from last 7 days   Lab Units 03/19/20  1145 03/19/20  1009 03/19/20  0917 03/19/20  0847 03/19/20  0842 03/19/20  0741 03/17/20  2300   PH,  ARTERIAL pH units 7.374 7.38 7.34* 7.33* 7.36 7.32* 7.418   PO2 ART mm Hg 608.6*  --   --   --   --   --  67.9*   PCO2, ARTERIAL mm Hg 45.2*  --   --   --   --   --  33.4*   HCO3 ART mmol/L 26.4  --   --   --   --   --  21.6*                         Results from last 7 days   Lab Units 03/17/20  1355   NITRITE UA  Negative   WBC UA /HPF 3-5*   BACTERIA UA /HPF None Seen   SQUAM EPITHEL UA /HPF None Seen     Results from last 7 days   Lab Units 03/16/20  0528   URIC ACID mg/dL 10.2*       Imaging:  Imaging Results (Last 24 Hours)     Procedure Component Value Units Date/Time    XR Chest 1 View [[id number]] Collected:  03/19/20 1341     Updated:  03/19/20 1348    Narrative:       XR CHEST 1 VW-     Clinical: Postop tube and line check     COMPARISON 03/17/2020 at 1131 hours, current examination 03/19/2020 at  1145 hours     FINDINGS: Double-lumen central venous catheter remains in satisfactory  position. Endotracheal tube tip located the level of the clavicular  heads, left subclavian Stevinson-Massiel catheter superimposes the projected  area of the right pulmonary artery. Bilateral chest tubes in position.  Mediastinal tube in place. No pneumothorax. No effusion, edema or  consolidation. There are now median sternotomy wires in place, interval  valvular repair. Cardiac size diminished in the interim, pulmonary edema  new completely resolved. The remainder is unremarkable.     This report was finalized on 3/19/2020 1:45 PM by Dr. Eulalio Arvizu M.D.             I reviewed the patient's new clinical results and medications.         Medication Review:   Scheduled Meds:    acetaminophen 650 mg Oral Q4H   Or      acetaminophen 650 mg Oral Q4H   Or      acetaminophen 650 mg Rectal Q4H   [START ON 3/20/2020] aspirin 81 mg Oral Daily   atorvastatin 40 mg Oral Nightly   ceFAZolin 3 g Intravenous Q24H   chlorhexidine 15 mL Mouth/Throat Q12H   [START ON 3/20/2020] enoxaparin 30 mg Subcutaneous Q24H   metoclopramide 10 mg Intravenous  Q6H   [START ON 3/20/2020] metoprolol tartrate 12.5 mg Oral Q12H   mupirocin  Each Nare BID   [START ON 3/20/2020] pantoprazole 40 mg Oral Q AM   [START ON 3/20/2020] sennosides-docusate 2 tablet Oral Nightly     Continuous Infusions:    clevidipine 2-32 mg/hr    dexmedetomidine 0.2-1.5 mcg/kg/hr Last Rate: 0.4 mcg/kg/hr (03/19/20 1210)   DOPamine 2-20 mcg/kg/min    EPINEPHrine 0.02-0.3 mcg/kg/min    insulin 0-50 Units/hr    milrinone 0.25-0.75 mcg/kg/min    niCARdipine 5-15 mg/hr    nitroglycerin 5-200 mcg/min    norepinephrine 0.02-0.3 mcg/kg/min    phenylephrine 0.2-3 mcg/kg/min Last Rate: 0.2 mcg/kg/min (03/19/20 1330)   propofol 5-50 mcg/kg/min Last Rate: Stopped (03/19/20 1421)   sodium chloride 30 mL/hr Last Rate: 30 mL/hr (03/19/20 1214)   sodium chloride 30 mL/hr    vasopressin 0.02-0.1 Units/min      PRN Meds:.[START ON 3/20/2020] acetaminophen **OR** [START ON 3/20/2020] acetaminophen **OR** [START ON 3/20/2020] acetaminophen  •  albumin human  •  ALPRAZolam  •  [START ON 3/20/2020] bisacodyl  •  clevidipine  •  [START ON 3/20/2020] cyclobenzaprine  •  docusate sodium  •  DOPamine  •  EPINEPHrine  •  furosemide  •  HYDROcodone-acetaminophen  •  insulin  •  meperidine  •  midazolam  •  milrinone  •  Morphine **AND** naloxone  •  Morphine  •  niCARdipine  •  norepinephrine  •  ondansetron  •  oxyCODONE  •  phenylephrine  •  polyethylene glycol  •  potassium chloride **OR** potassium chloride  •  potassium chloride **OR** potassium chloride  •  potassium chloride  •  potassium chloride  •  potassium chloride  •  promethazine **OR** promethazine  •  propofol  •  sodium chloride  •  sodium chloride  •  vasopressin    Problem List:     Active Hospital Problems    Diagnosis  POA   • **ESRD on hemodialysis (CMS/HCC) [N18.6, Z99.2]  Not Applicable   • Hypocalcemia [E83.51]  Unknown   • Anemia of chronic disease [D63.8]  Unknown   • Systolic CHF, acute (CMS/HCC) [I50.21]  Unknown   • Anasarca associated with  disorder of kidney [N04.9]  Yes   • Diabetes mellitus (CMS/HCC) [E11.9]  Unknown   • Cellulitis of left leg [L03.116]  Unknown   • Essential hypertension [I10]  Yes   • Nonrheumatic mitral valve regurgitation, severe [I34.0]  Yes      Resolved Hospital Problems   No resolved problems to display.       Assessment and Plan:     Anasarca/ acute systolic CHF/ severe MR/ CAD  -Echo showed EF reduced to 33% and severe MR and moderate TR  -minimal CAD on cath  -s/p mechanical MVR, PFO closure, TR repair on 3/19/20  -will need anticoagulation once ok with CTS, plans for lovenox to start tomorrow.     DESHAWN/CKD4 now ESRD  -tunneled cath placed and HD started on 3/17/20   -vein mapping completed, will eventually need AV fistula once more stable after surgery.   -nephrology following. Calcium low- defer replacement to nephrology     Cellulitis, left leg  -cellulitis vs dermatitis. Overall improved. Completed 5 day course of doxycycline.     DM  -A1c 6.5. blood sugars well controlled.   -does have some calluses on feet and may benefit from orthotic shoes and possible wound clinic follow up     Anemia  -Hgb holding stable  -iron low and s/p IV iron 3/18/20    VTE prophylaxis: SCDs  CODE status: full code  Disposition: TBD    I discussed the patients findings and my recommendations with nursing staff.    Emily Yan, APRN  03/19/20

## 2020-03-19 NOTE — OP NOTE
[facility] CARDIAC SURGERY OP NOTE    Preop Diagnosis: Severe mitral incompetence.  Degenerative mitral valve disease with fibrosis retraction of subvalvular apparatus.  Moderate severe tricuspid incompetence.  Patent foramen ovale.  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  Dilated cardiomyopathy with ejection fraction of 30%.  Bilateral pleural effusions large.  End-stage renal disease on hemodialysis.    Postop Diagnosis: Same    Indications: This patient was admitted with class IV congestive heart failure.  He had known severe mitral incompetence for several years.  His ejection fraction 2 years ago was 50% and is now 30%.  He started hemodialysis.  The STS Risk and all risks and alternatives were discussed with the patient and family. Operation was advisable to prolong life and relieve symptoms.They understand and wish to proceed.    Procedure: Mitral valve replacement with a 33 mm [place] mechanical valve with preservation of all subvalvular apparatus.  Closure of patent foramen ovale.  Tricuspid valve repair with a 32 mm Triad Sweeney band.  Temporary cardiopulmonary bypass.  Antegrade and retrograde cold blood cardioplegia.  Neurologic monitoring.  Transesophageal echo.  Bilateral intercostal nerve blocks with Exparel.    Surgeon: Ze Frye MD    Assistant: Lana Dominguez CSA    Anesthesia: GET    Findings : PA pressures were in the 70s.  The CVP was 32.  He had had hemodialysis for 2 days through a tunneled catheter.  The heart was enlarged grade 6.  The tricuspid annular dilatation and there was a 1 cm PFO in the superior portion of the septum.  The mitral valve had degenerative disease but there was retraction and scarring of the posterior valve are apparatus with leaflet tethering and this was not repairable.  The anterior leaflet likewise had thickening and calcification and was not repairable.  The postoperative pulmonary artery pressures were  in the 40s and the CVP was 12 now.  The valve is well-seated with no paravalvular leaks.  The tricuspid had no tricuspid incompetence.  The septum was closed.  There was probably 3 L of pleural fluid on the right and 2 L on the left.    Operative Procedure: A primary median sternotomy was made.  Cardiopulmonary bypass was established for 87 minutes drifting 34 °C at appropriate flow rates.  The aorta was crossclamped for 54 minutes and we gave a liter of antegrade cold blood cardioplegia then 800 cc of retrograde cold blood cardioplegia repeating doses every 10 to 15 minutes to good effect.  The right atrium was opened and we placed the coronary sinus catheter directly and used a pursestring.  CO2 gas was given in the pericardial space.  The left atrium was opened on the right side and the valve was examined.  After analysis it was not repairable so the anterior leaflet was incised and split and a portion removed.  A 33 mm mechanical valve was sewn into place in the anti-anatomic position with 2-0 Ethibond horizontal pledgeted mattress sutures with pledgets on the atrial side.  It was lowered into place seated nicely and the sutures were fixed with cor knot device.  The leaflet mobility was good and there was no sub-valve are obstruction.  The left atrium was closed with running 3-0 Prolene.  The PFO was closed with 2 layers of running 4-0 Prolene.  The 32 mm tricuspid annuloplasty band was sewn into place with 4-0 Harris-Aime suture.  With pressure testing there was no leak.  The cross-clamp was released.  The right atrium was closed with 2 layers of running 3-0 Prolene.  Complete de-airing was performed and he regained spontaneous sinus rhythm.  Cardiopulmonary bypass was then discontinued with an improved hemodynamic state.  Decannulation was affected and the usual devices were placed.  Protamine was administered.  The sternum was closed with stainless steel wire.  We placed 5 chest tubes.  We applied vancomycin and  wrist platelets plasma.  Because of the large size we use the zip fix and stainless steel wire.  The fascia, soft tissues and skin were closed usual.  The sponge, needle and instrument counts noted to be correct.  We injected with bilateral intercostal nerve blocks with 20 cc of Exparel.    Complications: None    Tubes: 5     Epicardial Wires: 3    Blood Loss: Minimal    Aortic cross-clamp time: 87 min    CPB time: 54 min     Specimen: Portion of the anterior leaflet of mitral valve    Condition: Improved.      Patient Care Team:  Provider, No Known as PCP - General        Ze Frye MD  3/19/2020  10:56

## 2020-03-19 NOTE — ANESTHESIA PROCEDURE NOTES
Procedure Performed: Emergent/Open-Heart Anesthesia MEGHA     Start Time:        End Time:      Preanesthesia Checklist:  Patient identified, IV assessed, risks and benefits discussed, monitors and equipment assessed, procedure being performed at surgeon's request and anesthesia consent obtained.    General Procedure Information  MEGHA Placed for monitoring purposes only -- This is not a diagnostic MEGHA          Anesthesia Information      Echocardiogram Comments:       MEGHA placed easily x 1 attempt  DMP

## 2020-03-20 PROBLEM — I47.29 NSVT (NONSUSTAINED VENTRICULAR TACHYCARDIA) (HCC): Status: ACTIVE | Noted: 2020-01-01

## 2020-03-20 PROBLEM — Z95.2 HISTORY OF MITRAL VALVE REPLACEMENT WITH MECHANICAL VALVE: Status: ACTIVE | Noted: 2020-01-01

## 2020-03-20 NOTE — NURSING NOTE
Right Tunneled Cath HD Cath Ports accessed.  Both blue and red ports draw and flush without resistance.    Below Hemodialysis completed, tolerated treatment.  Max BFR of 250 and UF Goal of 2000mL achieved without difficulty.   Ramos-Synephrine 0.7mcg/kg/min infusing to maintain MAP greater than 65.  HD Cath ports locked with Heparin per protocol. Patient stable, no distress reported/observed.  Verbal report to primary RN.    Pre Vitals  Post Vitals  BP: 108/63  BP: 100/55  HR: 84  HR: 84  RR:  16   RR:  16  Weight  128.6kg  Weight:  126.6kg    Total Fluid Removed:   2000mL    BVP: 60.1    Ending Profile: B    Sierra Ace RN  [facility]  [phone]    Duration of Treatment 4.0 Hours    Access Site Tunneled Dialysis Catheter    Dialyzer F180     mL/min    Dialysate Temperature (C) 36    BFR-As tolerated to a maximum of: 250 mL/min    Prime Dialyzer With NS to Priming Volume? No    Dialysate Solution Bath: K+ = 3 mEq, Ca = 3mEq    Bicarb 35 mEq    Na+ 138 mEq    Fluid Removal: 1-2 kg    Notify Perfoming Department of order. Who did you speak with? answering machine

## 2020-03-20 NOTE — PROGRESS NOTES
"[facility]    Patient Name: Nico King  :1970  49 y.o.  LOS: 8  Encounter Provider: Ford Crowe Jr, MD      Patient Care Team:  Provider, No Known as PCP - General    Chief Complaint: Follow-up severe mitral regurgitation, severe tricuspid regurgitation, CAD, ESRD    Interval History: 5 beat run of nonsustained ventricular tachycardia seen on telemetry.       Objective   Vital Signs  Heart Rate:  [66-85] 84  Resp:  [11-18] 18  BP: ()/(53-82) 94/58  Arterial Line BP: ()/(45-74) 93/49  FiO2 (%):  [31 %-100 %] 100 %    Intake/Output Summary (Last 24 hours) at 3/20/2020 1206  Last data filed at 3/20/2020 0900  Gross per 24 hour   Intake 1958 ml   Output 2749 ml   Net -791 ml     Flowsheet Rows      First Filed Value   Admission Height  180.3 cm (71\") Documented at 2020 1350   Admission Weight  136 kg (300 lb) Documented at 2020 1350            Physical Exam   Constitutional: He is oriented to person, place, and time. He appears well-developed and well-nourished.   HENT:   Head: Normocephalic and atraumatic.   Eyes: Pupils are equal, round, and reactive to light. Conjunctivae are normal.   Neck: No JVD present. No thyromegaly present.   Cardiovascular: Exam reveals no gallop and no friction rub.   No murmur heard.  Pulmonary/Chest: No respiratory distress. He has rales. He exhibits no tenderness.   Bibasilar crackles   Abdominal: Bowel sounds are normal. He exhibits no distension.   Musculoskeletal: He exhibits edema. He exhibits no tenderness.   Neurological: He is alert and oriented to person, place, and time.   Skin: No rash noted. There is erythema.   Psychiatric: He has a normal mood and affect. Judgment normal.   Vitals reviewed.        Pertinent Test Results:  Results from last 7 days   Lab Units 20  0213 20  1514 20  1132 20  0516 20  0551 20  0654 20  0528   SODIUM mmol/L 140 139 139 139 135* 138 135*   "   POTASSIUM mmol/L 4.4 3.9 3.9 3.7 4.3 4.9 4.2   CHLORIDE mmol/L 104 103 102 102 103 107 106   CO2 mmol/L 20.6* 21.4* 22.3 22.3 20.6* 16.9* 15.7*   BUN mg/dL 43* 38* 37* 39* 57* 82* 74*   CREATININE mg/dL 4.84* 4.38* 4.37* 4.35* 5.68* 7.44* 7.20*   GLUCOSE mg/dL 143* 113* 120* 104* 113* 100* 103*   CALCIUM mg/dL 7.3* 6.9* 6.9* 7.5* 7.4* 7.3* 7.0*   AST (SGOT) U/L  --   --   --   --  9  --   --    ALT (SGPT) U/L  --   --   --   --  18  --   --          Results from last 7 days   Lab Units 03/20/20  0210 03/19/20  1514 03/19/20  1132 03/19/20  1009 03/19/20  0917 03/19/20  0847 03/19/20  0842  03/18/20  0550 03/17/20  0654 03/16/20  0528 03/15/20  0530   WBC 10*3/mm3 12.95* 8.93 8.97  --   --   --   --   --  5.23 4.68 4.83 4.91   HEMOGLOBIN g/dL 8.9* 9.7* 9.1*  --   --   --   --   --  9.9* 10.2* 9.9* 10.9*   HEMOGLOBIN, POC g/dL  --   --   --  8.8* 7.8* 8.8* 8.8*   < >  --   --   --   --    HEMATOCRIT % 29.6* 31.0* 30.0*  --   --   --   --   --  32.0* 33.9* 32.9* 36.6*   HEMATOCRIT POC %  --   --   --  26* 23* 26* 26*   < >  --   --   --   --    PLATELETS 10*3/mm3 123* 119* 106*  --   --   --   --   --  180 183 168 193    < > = values in this interval not displayed.     Results from last 7 days   Lab Units 03/20/20  0210 03/19/20  1132 03/18/20  0550   INR  1.37* 1.58* 1.28*   APTT seconds  --  38.0* 39.3*     Results from last 7 days   Lab Units 03/20/20  0213 03/19/20  1514 03/19/20  1132 03/19/20  0516 03/16/20  0528 03/15/20  0530 03/14/20  0433   MAGNESIUM mg/dL 1.9 1.8 1.9 1.7 1.9 2.2 2.2     Results from last 7 days   Lab Units 03/18/20  0551   CHOLESTEROL mg/dL 120   TRIGLYCERIDES mg/dL 110   HDL CHOL mg/dL 30*     Results from last 7 days   Lab Units 03/18/20  0551   PROBNP pg/mL 57,642.0*               Medication Review:     amiodarone in dextrose 5%      aspirin 81 mg Oral Daily   atorvastatin 40 mg Oral Nightly   ceFAZolin 3 g Intravenous Q24H   chlorhexidine 15 mL Mouth/Throat Q12H   enoxaparin 30 mg  Subcutaneous Q24H   metoprolol tartrate 12.5 mg Oral Q12H   mupirocin  Each Nare BID   pantoprazole 40 mg Oral Q AM   sennosides-docusate 2 tablet Oral Nightly          amiodarone 1 mg/min Last Rate: 1 mg/min (03/20/20 0615)   clevidipine 2-32 mg/hr    dexmedetomidine 0.2-1.5 mcg/kg/hr Last Rate: 0.2 mcg/kg/hr (03/19/20 1450)   DOPamine 2-20 mcg/kg/min    EPINEPHrine 0.02-0.3 mcg/kg/min    insulin 0-50 Units/hr Last Rate: Stopped (03/20/20 0800)   milrinone 0.25-0.75 mcg/kg/min Last Rate: Stopped (03/20/20 0801)   niCARdipine 5-15 mg/hr    nitroglycerin 5-200 mcg/min    norepinephrine 0.02-0.3 mcg/kg/min    phenylephrine 0.2-3 mcg/kg/min Last Rate: 0.6 mcg/kg/min (03/20/20 0852)   propofol 5-50 mcg/kg/min Last Rate: Stopped (03/19/20 1421)   sodium chloride 30 mL/hr Last Rate: 30 mL/hr (03/19/20 1214)   sodium chloride 30 mL/hr Last Rate: Stopped (03/20/20 0801)   vasopressin 0.02-0.1 Units/min        Assessment/Plan      1.  Valvular heart disease -postop day #1 from mitral valve replacement with bioprosthetic and tricuspid valve annuloplasty with surgical PFO closure.  Patient is extubated and oxygenating well.  Chest tubes with significant output mostly from pleural space.  Continue to monitor closely.  Patient with anasarca planned for hemodialysis today with negative fluid balance goal of 4 kg.  Patient seems to be recovering well with no complaints of pain.  He is oxygenating well on minimal supplementation.  Hopefully transfer out of the unit soon.  2.  ESRD -electrolytes are within normal range, still significantly hypervolemic and will plan for hemodialysis per nephrology.  3.  Chronic systolic heart failure -EF 35 to 40% preop secondary to valvular heart disease.  Minimal coronary artery disease on cardiac catheterization.  Volume optimization with hemodialysis.  Will restart and optimize goal-directed medical therapy for heart failure once pressors are completely weaned and blood pressure tolerates.  4.   CAD -nonobstructive.  Will restart aspirin and statin when appropriate.  5.  NSVT -5 beats noted overnight.  Patient is currently on amiodarone drip.  Restart beta-blocker when appropriate.      Ford Crowe Jr, MD  [facility]  03/20/20  12:06 PM

## 2020-03-20 NOTE — PROGRESS NOTES
Progress Note    Name: Nico King ADMIT: 3/12/2020   : 1970  PCP: Provider, No Known    MRN: [id number] LOS: 8 days   AGE/SEX: 49 y.o. male  ROOM:    Date of Encounter Visit: 20    Subjective:     Interval History: had a short run of VT last night and was started on amiodarone. Extubated. HD today. Pressors weaned.     REVIEW OF SYSTEMS:   no fever or chills.  Typical post op chest pains. no palpitations. Edema improving.   SOA, dry cough. Hiccups.   No nausea, vomiting or diarrhea. No abdominal pain    Objective:   Temp:  [96.3 °F (35.7 °C)] 96.3 °F (35.7 °C)  Heart Rate:  [66-85] 84  Resp:  [11-18] 18  BP: ()/(53-68) 98/61  Arterial Line BP: ()/(45-69) 92/49  FiO2 (%):  [31 %-100 %] 100 %   SpO2:  [95 %-100 %] 95 %  on  Flow (L/min):  [1-4] 1 Device (Oxygen Therapy): nasal cannula    Intake/Output Summary (Last 24 hours) at 3/20/2020 1651  Last data filed at 3/20/2020 1500  Gross per 24 hour   Intake 2208 ml   Output 4819 ml   Net -2611 ml     Body mass index is 41.84 kg/m².      20  0500 20  1915 20  0535   Weight: (!) 144 kg (317 lb 0.3 oz) (!) 139 kg (306 lb 7 oz) 136 kg (300 lb)     Weight change:     Physical Exam   Constitutional: No distress.   HENT:   Head: Atraumatic.   Nose: Nose normal.   Eyes: Conjunctivae are normal.   Cardiovascular: Normal rate, regular rhythm and intact distal pulses.   Chest incision drsg- CDI   Pulmonary/Chest: Effort normal. He has no wheezes. He has no rales.   Diminished bases.  Chest tubes in place with serosanginous drainage   Abdominal: Soft. He exhibits no distension. There is no tenderness.   Hypoactive bowel sounds   Genitourinary:   Genitourinary Comments: Mckay present   Musculoskeletal: He exhibits edema (1+ RLE, 2+LLE).   Skin: Skin is warm and dry. He is not diaphoretic. There is erythema (mild left shin and now extending to posterior calf).   Foot calluses        Results Review:      Results from last  7 days   Lab Units 03/20/20  0213 03/19/20  1514 03/19/20  1132 03/19/20  0516 03/18/20  0551 03/17/20  0654 03/16/20  0528   SODIUM mmol/L 140 139 139 139 135* 138 135*   POTASSIUM mmol/L 4.4 3.9 3.9 3.7 4.3 4.9 4.2   CHLORIDE mmol/L 104 103 102 102 103 107 106   CO2 mmol/L 20.6* 21.4* 22.3 22.3 20.6* 16.9* 15.7*   BUN mg/dL 43* 38* 37* 39* 57* 82* 74*   CREATININE mg/dL 4.84* 4.38* 4.37* 4.35* 5.68* 7.44* 7.20*   GLUCOSE mg/dL 143* 113* 120* 104* 113* 100* 103*   CALCIUM mg/dL 7.3* 6.9* 6.9* 7.5* 7.4* 7.3* 7.0*   AST (SGOT) U/L  --   --   --   --  9  --   --    ALT (SGPT) U/L  --   --   --   --  18  --   --      Estimated Creatinine Clearance: 26.3 mL/min (A) (by C-G formula based on SCr of 4.84 mg/dL (H)).  Results from last 7 days   Lab Units 03/17/20  0654   HEMOGLOBIN A1C % 6.50*     Results from last 7 days   Lab Units 03/20/20  1217 03/20/20  0754 03/20/20  0706 03/20/20  0622 03/20/20  0526 03/20/20  0405 03/20/20  0315 03/20/20  0141   GLUCOSE mg/dL 115 116 119 121 123 137* 146* 158*         Results from last 7 days   Lab Units 03/18/20  0551   PROBNP pg/mL 57,642.0*         Results from last 7 days   Lab Units 03/20/20  0213 03/19/20  1514 03/19/20  1132 03/19/20  0516 03/16/20  0528 03/15/20  0530 03/14/20  0433   MAGNESIUM mg/dL 1.9 1.8 1.9 1.7 1.9 2.2 2.2     Results from last 7 days   Lab Units 03/18/20  0551   CHOLESTEROL mg/dL 120   TRIGLYCERIDES mg/dL 110   HDL CHOL mg/dL 30*     Results from last 7 days   Lab Units 03/20/20  0210 03/19/20  1514 03/19/20  1132 03/19/20  1009 03/19/20  0917 03/19/20  0847 03/19/20  0842  03/18/20  0550 03/17/20  0654 03/16/20  0528 03/15/20  0530   WBC 10*3/mm3 12.95* 8.93 8.97  --   --   --   --   --  5.23 4.68 4.83 4.91   HEMOGLOBIN g/dL 8.9* 9.7* 9.1*  --   --   --   --   --  9.9* 10.2* 9.9* 10.9*   HEMOGLOBIN, POC g/dL  --   --   --  8.8* 7.8* 8.8* 8.8*   < >  --   --   --   --    HEMATOCRIT % 29.6* 31.0* 30.0*  --   --   --   --   --  32.0* 33.9* 32.9* 36.6*      HEMATOCRIT POC %  --   --   --  26* 23* 26* 26*   < >  --   --   --   --    PLATELETS 10*3/mm3 123* 119* 106*  --   --   --   --   --  180 183 168 193   MCV fL 77.3* 76.5* 77.7*  --   --   --   --   --  75.7* 76.5* 76.9* 78.0*   MCH pg 23.2* 24.0* 23.6*  --   --   --   --   --  23.4* 23.0* 23.1* 23.2*   MCHC g/dL 30.1* 31.3* 30.3*  --   --   --   --   --  30.9* 30.1* 30.1* 29.8*   RDW % 16.3* 16.7* 16.4*  --   --   --   --   --  17.0* 16.7* 16.7* 17.1*   RDW-SD fl 45.6 46.2 46.0  --   --   --   --   --  45.3 45.3 45.9 47.9   MPV fL 10.0 9.9 10.0  --   --   --   --   --  9.9 10.6 10.7 10.6   NEUTROPHIL % % 88.1*  --  79.4*  --   --   --   --   --   --   --  68.4 66.6   LYMPHOCYTE % % 4.4*  --  10.6*  --   --   --   --   --   --   --  15.5* 17.1*   MONOCYTES % % 6.3  --  6.7  --   --   --   --   --   --   --  11.8 12.0   EOSINOPHIL % % 0.2*  --  1.8  --   --   --   --   --   --   --  3.3 3.3   BASOPHIL % % 0.3  --  0.6  --   --   --   --   --   --   --  0.6 0.6   IMM GRAN % % 0.7*  --  0.9*  --   --   --   --   --   --   --  0.4 0.4   NEUTROS ABS 10*3/mm3 11.42*  --  7.13*  --   --   --   --   --   --   --  3.30 3.27   LYMPHS ABS 10*3/mm3 0.57*  --  0.95  --   --   --   --   --   --   --  0.75 0.84   MONOS ABS 10*3/mm3 0.81  --  0.60  --   --   --   --   --   --   --  0.57 0.59   EOS ABS 10*3/mm3 0.02  --  0.16  --   --   --   --   --   --   --  0.16 0.16   BASOS ABS 10*3/mm3 0.04  --  0.05  --   --   --   --   --   --   --  0.03 0.03   IMMATURE GRANS (ABS) 10*3/mm3 0.09*  --  0.08*  --   --   --   --   --   --   --  0.02 0.02   NRBC /100 WBC 0.0  --  0.0  --   --   --   --   --   --   --  0.0 0.2    < > = values in this interval not displayed.     Results from last 7 days   Lab Units 03/20/20  0210 03/19/20  1132 03/18/20  0550   INR  1.37* 1.58* 1.28*   APTT seconds  --  38.0* 39.3*     Results from last 7 days   Lab Units 03/19/20 2005 03/19/20  1533 03/19/20  1145 03/19/20  1009 03/19/20  0917 03/19/20  0847  03/19/20  0842  03/17/20  2300   PH, ARTERIAL pH units 7.322* 7.354 7.374 7.38 7.34* 7.33* 7.36   < > 7.418   PO2 ART mm Hg 114.7* 128.1* 608.6*  --   --   --   --   --  67.9*   PCO2, ARTERIAL mm Hg 46.9* 44.7 45.2*  --   --   --   --   --  33.4*   HCO3 ART mmol/L 24.3 24.9 26.4  --   --   --   --   --  21.6*    < > = values in this interval not displayed.                         Results from last 7 days   Lab Units 03/17/20  1355   NITRITE UA  Negative   WBC UA /HPF 3-5*   BACTERIA UA /HPF None Seen   SQUAM EPITHEL UA /HPF None Seen     Results from last 7 days   Lab Units 03/16/20  0528   URIC ACID mg/dL 10.2*       Imaging:  Imaging Results (Last 24 Hours)     Procedure Component Value Units Date/Time    XR Chest 1 View [[id number]] Collected:  03/20/20 0635     Updated:  03/20/20 0635    Narrative:       PORTABLE CHEST X-RAY     CLINICAL HISTORY: Post-Op Heart Surgery; N04.9-Nephrotic syndrome with  unspecified morphologic changes; N17.9-Acute kidney failure,  unspecified; I50.23-Acute on chronic systolic (congestive) heart  failure; I34.0-Nonrheumatic mitral (valve) insufficiency;  I34.0-Nonrheumatic mitral (valve) insufficiency     COMPARISON: 03/19/2020.     FINDINGS: Portable AP view of the chest was obtained with overlying  monitor leads in place. ET tube removed. [place] catheter has been  advanced, now extending into a basilar branch of the right pulmonary  artery. Other lines are unchanged. No pneumothorax. Lungs are slightly  under aerated with some mild right lung base atelectasis and pulmonary  vascular congestion but without edema/CHF. There are probable small  effusions, certainly a significant collection of pleural fluid is not  demonstrated by portable imaging. Stable cardiomegaly.             Impression:       Under aeration with mild right lung base atelectasis and  pulmonary vascular congestion.                      I reviewed the patient's new clinical results and medications.            Medication Review:   Scheduled Meds:    amiodarone in dextrose 5%      aspirin 81 mg Oral Daily   atorvastatin 40 mg Oral Nightly   ceFAZolin 3 g Intravenous Q24H   chlorhexidine 15 mL Mouth/Throat Q12H   enoxaparin 30 mg Subcutaneous Q24H   metoprolol tartrate 12.5 mg Oral Q12H   mupirocin  Each Nare BID   pantoprazole 40 mg Oral Q AM   sennosides-docusate 2 tablet Oral Nightly     Continuous Infusions:    amiodarone 1 mg/min Last Rate: 1 mg/min (03/20/20 0615)   clevidipine 2-32 mg/hr    dexmedetomidine 0.2-1.5 mcg/kg/hr Last Rate: 0.2 mcg/kg/hr (03/19/20 1450)   DOPamine 2-20 mcg/kg/min    EPINEPHrine 0.02-0.3 mcg/kg/min    insulin 0-50 Units/hr Last Rate: Stopped (03/20/20 0800)   milrinone 0.25-0.75 mcg/kg/min Last Rate: Stopped (03/20/20 0801)   niCARdipine 5-15 mg/hr    nitroglycerin 5-200 mcg/min    norepinephrine 0.02-0.3 mcg/kg/min    phenylephrine 0.2-3 mcg/kg/min Last Rate: 0.6 mcg/kg/min (03/20/20 0852)   propofol 5-50 mcg/kg/min Last Rate: Stopped (03/19/20 1421)   sodium chloride 30 mL/hr Last Rate: 30 mL/hr (03/19/20 1214)   sodium chloride 30 mL/hr Last Rate: Stopped (03/20/20 0801)   vasopressin 0.02-0.1 Units/min      PRN Meds:.•  acetaminophen **OR** acetaminophen **OR** acetaminophen  •  ALPRAZolam  •  bisacodyl  •  clevidipine  •  cyclobenzaprine  •  docusate sodium  •  DOPamine  •  EPINEPHrine  •  furosemide  •  heparin (porcine)  •  HYDROcodone-acetaminophen  •  insulin  •  midazolam  •  milrinone  •  Morphine **AND** naloxone  •  Morphine  •  niCARdipine  •  norepinephrine  •  ondansetron  •  oxyCODONE  •  phenylephrine  •  polyethylene glycol  •  potassium chloride **OR** potassium chloride  •  potassium chloride **OR** potassium chloride  •  potassium chloride  •  potassium chloride  •  potassium chloride  •  promethazine **OR** promethazine  •  propofol  •  sodium chloride  •  sodium chloride  •  vasopressin    Problem List:     Active Hospital Problems    Diagnosis  POA   • **ESRD on  hemodialysis (CMS/[facility]) [N18.6, Z99.2]  Not Applicable   • History of mitral valve replacement with mechanical valve [Z95.2]  Not Applicable   • NSVT (nonsustained ventricular tachycardia) (CMS/[facility]) [I47.2]  Unknown   • Hypocalcemia [E83.51]  Unknown   • Anemia of chronic disease [D63.8]  Unknown   • Systolic CHF, acute (CMS/[facility]) [I50.21]  Unknown   • Anasarca associated with disorder of kidney [N04.9]  Yes   • Diabetes mellitus (CMS/[facility]) [E11.9]  Unknown   • Cellulitis of left leg [L03.116]  Unknown   • Essential hypertension [I10]  Yes   • Nonrheumatic mitral valve regurgitation, severe [I34.0]  Yes      Resolved Hospital Problems   No resolved problems to display.       Assessment and Plan:     Anasarca/ acute systolic CHF/ severe MR/ CAD/ mechanical MVR  -Echo showed EF reduced to 33% and severe MR and moderate TR  -minimal CAD on cath  -s/p mechanical MVR, PFO closure, TR repair on 3/19/20  -will need anticoagulation once ok with CTS, started on lovenox today.     DESHAWN/CKD4 now ESRD  -tunneled cath placed and HD started on 3/17/20   -vein mapping completed, will eventually need AV fistula once more stable after surgery.   -nephrology following. Calcium improved. HD today    Cellulitis, left leg  -cellulitis vs dermatitis. Completed 5 day course of doxycycline preoperatively.    -left leg has some increased erythema of posterior calf and more edematous than right leg. WBC slightly increased today.   -previous venous doppler LLE on 3/12 was negative, but will repeat.   -if persists, develops fever or worsening leukocytosis we may need to consider resuming antibiotics.     DM  -A1c 6.5. blood sugars well controlled.     Anemia  -Hgb holding stable  -iron low and s/p IV iron 3/18/20    NSVT  - cardiology following. On amiodarone  - electrolytes ok. Will check TSH.     VTE prophylaxis: SCDs  CODE status: full code  Disposition: TBD    I discussed the patients findings and my recommendations with patient and nursing  staff.    Emily Yan, APRN  03/20/20

## 2020-03-20 NOTE — PROGRESS NOTES
"   LOS: 8 days    Patient Care Team:  Provider, No Known as PCP - General    Chief Complaint:    Chief Complaint   Patient presents with   • Edema     Follow-up acute kidney injury on chronic kidney disease  Subjective     Interval History:   S/p mech MVR, PFO closure, and TV repair yesterday; extubated last night. Breathing is comfortable on 2 L/mn    Review of Systems:   As noted above    Objective     Vital Signs  Heart Rate:  [66-85] 84  Resp:  [11-18] 18  BP: ()/(53-82) 94/58  Arterial Line BP: ()/(45-74) 93/49  FiO2 (%):  [31 %-100 %] 100 %    Flowsheet Rows      First Filed Value   Admission Height  180.3 cm (71\") Documented at 03/12/2020 1350   Admission Weight  136 kg (300 lb) Documented at 03/12/2020 1350          I/O this shift:  In: -   Out: 435 [Urine:15; Chest Tube:420]  I/O last 3 completed shifts:  In: 4588 [P.O.:120; I.V.:2858; Blood:1110; IV Piggyback:500]  Out: 34129 [Urine:465; Other:7600; Blood:200; Chest Tube:2814]    Intake/Output Summary (Last 24 hours) at 3/20/2020 1213  Last data filed at 3/20/2020 0900  Gross per 24 hour   Intake 1958 ml   Output 2749 ml   Net -791 ml       Physical Exam:  General Appearance: chronically ill-appearing; awake, conversant, appropriate  Skin: warm and dry  HEENT: MMM, nonicteric sclerae  Neck: supple, no JVD, trachea midline. RIJ TDC  Lungs: Bibasilar rhonchi, unlabored; chest tubes in place  Heart: RRR, no S3, +rub  Abdomen: soft, non-tender, +bs; body wall edema .  :  scrotal and penile edema  Extremities:  3-4+ LE edema bilaterally; chronic venous stasis changes  Neuro: Awake; moving all extremities        Results Review:    Results from last 7 days   Lab Units 03/20/20  0213 03/19/20  1514 03/19/20  1132  03/18/20  0551   SODIUM mmol/L 140 139 139   < > 135*   POTASSIUM mmol/L 4.4 3.9 3.9   < > 4.3   CHLORIDE mmol/L 104 103 102   < > 103   CO2 mmol/L 20.6* 21.4* 22.3   < > 20.6*   BUN mg/dL 43* 38* 37*   < > 57*   CREATININE mg/dL 4.84* " 4.38* 4.37*   < > 5.68*   CALCIUM mg/dL 7.3* 6.9* 6.9*   < > 7.4*   BILIRUBIN mg/dL  --   --   --   --  0.3   ALK PHOS U/L  --   --   --   --  64   ALT (SGPT) U/L  --   --   --   --  18   AST (SGOT) U/L  --   --   --   --  9   GLUCOSE mg/dL 143* 113* 120*   < > 113*    < > = values in this interval not displayed.       Estimated Creatinine Clearance: 26.3 mL/min (A) (by C-G formula based on SCr of 4.84 mg/dL (H)).    Results from last 7 days   Lab Units 03/20/20 0213 03/19/20  1514 03/19/20  1132   MAGNESIUM mg/dL 1.9 1.8 1.9   PHOSPHORUS mg/dL 5.2* 3.9 3.8       Results from last 7 days   Lab Units 03/16/20  0528 03/15/20  0530 03/14/20  0433   URIC ACID mg/dL 10.2* 9.6* 9.4*       Results from last 7 days   Lab Units 03/20/20  0210 03/19/20  1514 03/19/20  1132 03/19/20  1009 03/19/20  0917  03/18/20  0550 03/17/20  0654   WBC 10*3/mm3 12.95* 8.93 8.97  --   --   --  5.23 4.68   HEMOGLOBIN g/dL 8.9* 9.7* 9.1*  --   --   --  9.9* 10.2*   HEMOGLOBIN, POC g/dL  --   --   --  8.8* 7.8*   < >  --   --    PLATELETS 10*3/mm3 123* 119* 106*  --   --   --  180 183    < > = values in this interval not displayed.       Results from last 7 days   Lab Units 03/20/20 0210 03/19/20  1132 03/18/20  0550   INR  1.37* 1.58* 1.28*         Imaging Results (Last 24 Hours)     Procedure Component Value Units Date/Time    XR Chest 1 View [[id number]] Collected:  03/20/20 0635     Updated:  03/20/20 0635    Narrative:       PORTABLE CHEST X-RAY     CLINICAL HISTORY: Post-Op Heart Surgery; N04.9-Nephrotic syndrome with  unspecified morphologic changes; N17.9-Acute kidney failure,  unspecified; I50.23-Acute on chronic systolic (congestive) heart  failure; I34.0-Nonrheumatic mitral (valve) insufficiency;  I34.0-Nonrheumatic mitral (valve) insufficiency     COMPARISON: 03/19/2020.     FINDINGS: Portable AP view of the chest was obtained with overlying  monitor leads in place. ET tube removed. [place]-Massiel catheter has been  advanced, now  extending into a basilar branch of the right pulmonary  artery. Other lines are unchanged. No pneumothorax. Lungs are slightly  under aerated with some mild right lung base atelectasis and pulmonary  vascular congestion but without edema/CHF. There are probable small  effusions, certainly a significant collection of pleural fluid is not  demonstrated by portable imaging. Stable cardiomegaly.             Impression:       Under aeration with mild right lung base atelectasis and  pulmonary vascular congestion.                XR Chest 1 View [[id number]] Collected:  03/19/20 1341     Updated:  03/19/20 1348    Narrative:       XR CHEST 1 VW-     Clinical: Postop tube and line check     COMPARISON 03/17/2020 at 1131 hours, current examination 03/19/2020 at  1145 hours     FINDINGS: Double-lumen central venous catheter remains in satisfactory  position. Endotracheal tube tip located the level of the clavicular  heads, left subclavian [place]-Massiel catheter superimposes the projected  area of the right pulmonary artery. Bilateral chest tubes in position.  Mediastinal tube in place. No pneumothorax. No effusion, edema or  consolidation. There are now median sternotomy wires in place, interval  valvular repair. Cardiac size diminished in the interim, pulmonary edema  new completely resolved. The remainder is unremarkable.     This report was finalized on 3/19/2020 1:45 PM by Dr. Eulalio Arvizu M.D.             amiodarone in dextrose 5%      aspirin 81 mg Oral Daily   atorvastatin 40 mg Oral Nightly   ceFAZolin 3 g Intravenous Q24H   chlorhexidine 15 mL Mouth/Throat Q12H   enoxaparin 30 mg Subcutaneous Q24H   metoprolol tartrate 12.5 mg Oral Q12H   mupirocin  Each Nare BID   pantoprazole 40 mg Oral Q AM   sennosides-docusate 2 tablet Oral Nightly       amiodarone 1 mg/min Last Rate: 1 mg/min (03/20/20 0615)   clevidipine 2-32 mg/hr    dexmedetomidine 0.2-1.5 mcg/kg/hr Last Rate: 0.2 mcg/kg/hr (03/19/20 1450)   DOPamine 2-20  mcg/kg/min    EPINEPHrine 0.02-0.3 mcg/kg/min    insulin 0-50 Units/hr Last Rate: Stopped (03/20/20 0800)   milrinone 0.25-0.75 mcg/kg/min Last Rate: Stopped (03/20/20 0801)   niCARdipine 5-15 mg/hr    nitroglycerin 5-200 mcg/min    norepinephrine 0.02-0.3 mcg/kg/min    phenylephrine 0.2-3 mcg/kg/min Last Rate: 0.6 mcg/kg/min (03/20/20 0852)   propofol 5-50 mcg/kg/min Last Rate: Stopped (03/19/20 1421)   sodium chloride 30 mL/hr Last Rate: 30 mL/hr (03/19/20 1214)   sodium chloride 30 mL/hr Last Rate: Stopped (03/20/20 0801)   vasopressin 0.02-0.1 Units/min        Medication Review:   Current Facility-Administered Medications   Medication Dose Route Frequency Provider Last Rate Last Dose   • acetaminophen (TYLENOL) tablet 650 mg  650 mg Oral Q4H PRN Jr Ze Frye MD   650 mg at 03/20/20 1114    Or   • acetaminophen (TYLENOL) 160 MG/5ML solution 650 mg  650 mg Oral Q4H PRN Jr Ze Frye MD        Or   • acetaminophen (TYLENOL) suppository 650 mg  650 mg Rectal Q4H PRN Jr Ze Frye MD       • ALPRAZolam (XANAX) tablet 0.25 mg  0.25 mg Oral Q8H PRN Jr Ze Frye MD   0.25 mg at 03/20/20 0134   • amiodarone (NEXTERONE) 360 mg/200 mL (1.8 mg/mL) infusion  1 mg/min Intravenous Continuous Cecilio Motta MD 33.3 mL/hr at 03/20/20 0615 1 mg/min at 03/20/20 0615   • amiodarone in dextrose 5% (NEXTERONE) 360-4.14 MG/200ML-% infusion  - ADS Override Pull            • aspirin EC tablet 81 mg  81 mg Oral Daily Jr Ze Frye MD   81 mg at 03/20/20 0900   • atorvastatin (LIPITOR) tablet 40 mg  40 mg Oral Nightly Jr Ze Frye MD   Stopped at 03/19/20 2139   • bisacodyl (DULCOLAX) suppository 10 mg  10 mg Rectal Daily PRN Jr Ze Frye MD       • ceFAZolin in Sodium Chloride (ANCEF) IVPB solution 3 g  3 g Intravenous Q24H Jackeline Cyr, MINGO 200 mL/hr at 03/19/20 2138 3 g at 03/19/20 2138   • chlorhexidine (PERIDEX) 0.12 % solution 15 mL  15 mL Mouth/Throat Q12H  Jr Ze Frye MD   Stopped at 03/20/20 0553   • clevidipine (CLEVIPREX) infusion 0.5 mg/mL  2-32 mg/hr Intravenous Continuous PRN Jr Ze Frye MD       • cyclobenzaprine (FLEXERIL) tablet 10 mg  10 mg Oral Q8H PRN Jr Ze Frye MD       • dexmedetomidine (PRECEDEX) 400 mcg/100mL (4 mcg/mL) NS infusion kit  0.2-1.5 mcg/kg/hr Intravenous Titrated Jr Ze Frye MD 6.8 mL/hr at 03/19/20 1450 0.2 mcg/kg/hr at 03/19/20 1450   • docusate sodium (COLACE) capsule 100 mg  100 mg Oral BID PRN Jr Ze Frye MD       • DOPamine 400 mg in 250 mL D5W (1.6 mg/mL) infusion  2-20 mcg/kg/min Intravenous Continuous PRN Jr Ze Frye MD       • enoxaparin (LOVENOX) syringe 30 mg  30 mg Subcutaneous Q24H Jr Ze Frye MD       • EPINEPHrine (ADRENALIN) 5 mg in sodium chloride 0.9 % 250 mL (0.02 mg/mL) infusion  0.02-0.3 mcg/kg/min Intravenous Continuous PRN Jr Ze Frye MD       • furosemide (LASIX) injection 40 mg  40 mg Intravenous Q6H PRN Jr Ze Frye MD       • HYDROcodone-acetaminophen (NORCO) 5-325 MG per tablet 2 tablet  2 tablet Oral Q4H PRN Jr Ze Frye MD   2 tablet at 03/20/20 0004   • insulin regular (HumuLIN R,NovoLIN R) 100 Units in sodium chloride 0.9 % 100 mL (1 Units/mL) infusion  0-50 Units/hr Intravenous Continuous PRN Jr Ze Frye MD   Stopped at 03/20/20 0800   • metoprolol tartrate (LOPRESSOR) tablet 12.5 mg  12.5 mg Oral Q12H Jr Ze Frye MD       • midazolam (VERSED) injection 2 mg  2 mg Intravenous Q1H PRN Jr Ze Frye MD       • milrinone 20 mg/100 mL (0.2 mg/mL) in 5 % dextrose infusion  0.25-0.75 mcg/kg/min Intravenous Continuous PRN Jr Ze Frye MD   Stopped at 03/20/20 0801   • morphine injection 1 mg  1 mg Intravenous Q4H PRN Jr Ze Frye MD        And   • naloxone (NARCAN) injection 0.4 mg  0.4 mg Intravenous Q5 Min PRN Jr Ze Frye MD       • morphine injection 4 mg  4  mg Intravenous Q30 Min PRN Jr Ze Frye MD       • mupirocin (BACTROBAN) 2 % nasal ointment   Each Nare BID Jr Ze Frye MD   1 application at 03/20/20 0900   • niCARdipine (CARDENE) 25 mg in 250 mL NS (0.1 mg/mL) infusion kit  5-15 mg/hr Intravenous Continuous PRN Jr Ze Frye MD       • nitroglycerin 50 mg/250 mL (0.2 mg/mL) infusion  5-200 mcg/min Intravenous Titrated Jr Ze Frye MD       • norepinephrine (LEVOPHED) 8 mg/250 mL (32 mcg/mL) in sodium chloride 0.9% infusion (premix)  0.02-0.3 mcg/kg/min Intravenous Continuous PRN Jr Ze Frye MD       • ondansetron (ZOFRAN) injection 4 mg  4 mg Intravenous Q6H PRN Jr Ze Frye MD   4 mg at 03/20/20 0320   • oxyCODONE (ROXICODONE) immediate release tablet 10 mg  10 mg Oral Q4H PRN Jr Ze Frye MD   10 mg at 03/20/20 1114   • pantoprazole (PROTONIX) EC tablet 40 mg  40 mg Oral Q AM Jr Ze Frye MD   40 mg at 03/20/20 0612   • phenylephrine (MARGUERITE-SYNEPHRINE) 50 mg in sodium chloride 0.9 % 250 mL (0.2 mg/mL) infusion  0.2-3 mcg/kg/min Intravenous Continuous PRN Jr Ze Frye MD 24.5 mL/hr at 03/20/20 0852 0.6 mcg/kg/min at 03/20/20 0852   • polyethylene glycol (MIRALAX) powder 17 g  17 g Oral Daily PRN Jr Ze Frye MD       • potassium chloride (MICRO-K) CR capsule 40 mEq  40 mEq Oral PRN Jr Ze Frye MD        Or   • potassium chloride (KLOR-CON) packet 40 mEq  40 mEq Oral PRN Jr Ze Frye MD       • potassium chloride 10 mEq in 100 mL IVPB  10 mEq Intravenous Q1H PRN Jr Ze Frye MD        Or   • potassium chloride 10 mEq in 100 mL IVPB  10 mEq Intravenous Q1H PRN Jr Ze Frye MD       • potassium chloride 20 mEq in 50 mL IVPB  20 mEq Intravenous Q1H PRN Jr Ze Frye MD       • potassium chloride 20 mEq in 50 mL IVPB  20 mEq Intravenous Q1H PRN Jr Ze Frye MD       • potassium chloride 20 mEq in 50 mL IVPB  20 mEq Intravenous  Q1H PRN Jr Ze Frye MD       • promethazine (PHENERGAN) tablet 12.5 mg  12.5 mg Oral Q6H PRN Jr Ze Frye MD        Or   • promethazine (PHENERGAN) injection 12.5 mg  12.5 mg Intravenous Q6H PRN Jr Ze Frye MD   12.5 mg at 03/19/20 2258   • propofol (DIPRIVAN) infusion 10 mg/mL 100 mL  5-50 mcg/kg/min Intravenous Continuous PRN Jr Ze Frye MD   Stopped at 03/19/20 1421   • sennosides-docusate (PERICOLACE) 8.6-50 MG per tablet 2 tablet  2 tablet Oral Nightly Jr Ze Frye MD       • sodium chloride 0.9 % flush 30 mL  30 mL Intravenous Once PRN Jr Ze Frye MD       • sodium chloride 0.9 % infusion  30 mL/hr Intravenous Continuous Jr Ze Frye MD 30 mL/hr at 03/19/20 1214 30 mL/hr at 03/19/20 1214   • sodium chloride 0.9 % infusion  30 mL/hr Intravenous Continuous PRN Jr Ze Frye MD   Stopped at 03/20/20 0801   • Vasopressin (PITRESSIN) 20 Units in sodium chloride 0.9 % 100 mL (0.2 Units/mL) infusion  0.02-0.1 Units/min Intravenous Continuous PRN Jr Ze Frye MD           Assessment/Plan   1.  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  Cellulitis of left lower extremity  3.  Severe MR and moderate TR.  Systolic non-ischemic cardiomyopathy EF 33%:  had [facility] MVR, TV repair, and closure of PFO on 3/19   4.  Anasarca and fluid excess due to right-sided heart failure and ESRD.    5.  Hypertension, with hypotension presently  6.  Medical noncompliance.  7.  DM2. Controlled .  8.  Anemia CKD.  Hg stable .  Iron deficient    Plan:  1.  Dialysis again today, with further vol removal as BP's permit  2.  Surveillance labs      Stephen Eng MD  03/20/20  12:13

## 2020-03-20 NOTE — THERAPY EVALUATION
Patient Name: Nico King  : 1970    MRN: [id number]                              Today's Date: 3/20/2020       Admit Date: 3/12/2020    Visit Dx:     ICD-10-CM ICD-9-CM   1. Anasarca associated with disorder of kidney N04.9 581.9   2. DESHAWN (acute kidney injury) (CMS/[facility]) N17.9 584.9   3. Acute on chronic systolic congestive heart failure (CMS/[facility]) I50.23 428.23     428.0   4. Nonrheumatic mitral valve regurgitation, severe I34.0 424.0   5. Nonrheumatic mitral valve regurgitation I34.0 424.0     Patient Active Problem List   Diagnosis   • Nonrheumatic mitral valve regurgitation, severe   • Cigarette smoker   • Essential hypertension   • ESRD on hemodialysis (CMS/[facility])   • Foot callus   • Anasarca associated with disorder of kidney   • Diabetes mellitus (CMS/[facility])   • Cellulitis of left leg   • Systolic CHF, acute (CMS/[facility])   • Anemia of chronic disease   • Hypocalcemia     Past Medical History:   Diagnosis Date   • DESHAWN (acute kidney injury) (CMS/[facility])    • CHF (congestive heart failure) (CMS/[facility])    • Cigarette smoker    • Diabetes mellitus (CMS/[facility])    • Foot callus    • Hypertension    • Mitral regurgitation     severe eccentric per echo 2018   • Pleural effusion 2018    Bilateral, small per x-ray   • Stage III chronic kidney disease (CMS/[facility])      Past Surgical History:   Procedure Laterality Date   • CARDIAC CATHETERIZATION N/A 3/16/2020    Procedure: CORONARY ANGIOGRAPHY;  Surgeon: Aguilar Morataya MD;  Location: [facility] CATH INVASIVE LOCATION;  Service: Cardiovascular;  Laterality: N/A;   • CARDIAC CATHETERIZATION N/A 3/16/2020    Procedure: Left Heart Cath;  Surgeon: Aguilar Morataya MD;  Location: [facility] CATH INVASIVE LOCATION;  Service: Cardiovascular;  Laterality: N/A;   • HIP SURGERY     • INSERTION HEMODIALYSIS CATHETER N/A 3/17/2020    Procedure: HEMODIALYSIS CATHETER INSERTION;  Surgeon: Renard Ro MD;  Location: [facility] MAIN OR;  Service: Vascular;  Laterality: N/A;   •  MITRAL VALVE REPAIR/REPLACEMENT N/A 3/19/2020    Procedure: MEGHA, STERNOTOMY, MITRAL VALVE REPLACEMENT, TRICUSPID VALVE REPAIR, PFO CLOSURE, PRP;  Surgeon: Jr Ze Frye MD;  Location: [facility] OR;  Service: Cardiothoracic;  Laterality: N/A;     General Information     Row Name 03/20/20 0911          PT Evaluation Time/Intention    Document Type  evaluation  (Pended)   -     Mode of Treatment  individual therapy;physical therapy  (Pended)   -     Row Name 03/20/20 0911          General Information    Prior Level of Function  independent:;gait;transfer;bed mobility  (Pended)   -     Existing Precautions/Restrictions  cardiac;fall;sternal  (Pended)   -     Barriers to Rehab  medically complex  (Pended)   -     Row Name 03/20/20 0911          Relationship/Environment    Lives With  alone  (Pended)   -     Row Name 03/20/20 0911          Resource/Environmental Concerns    Current Living Arrangements  home/apartment/condo  (Pended)   -     Row Name 03/20/20 0911          Home Main Entrance    Number of Stairs, Main Entrance  none  (Pended)   -     Row Name 03/20/20 0911          Stairs Within Home, Primary    Number of Stairs, Within Home, Primary  none  (Pended)   -     Row Name 03/20/20 0911          Cognitive Assessment/Intervention- PT/OT    Orientation Status (Cognition)  oriented x 3  (Pended)   -     Row Name 03/20/20 0911          Safety Issues, Functional Mobility    Impairments Affecting Function (Mobility)  balance;coordination;endurance/activity tolerance;pain;strength  (Pended)   -       User Key  (r) = Recorded By, (t) = Taken By, (c) = Cosigned By    Initials Name Provider Type    TW Ovi Allen, PT Student PT Student        Mobility     Row Name 03/20/20 0912          Bed Mobility Assessment/Treatment    Bed Mobility Assessment/Treatment  supine-sit;sit-supine  (Pended)   -     Supine-Sit [place] (Bed Mobility)  moderate assist (50% patient effort);2 person  assist;verbal cues  (Pended)   -TW     Sit-Supine [place] (Bed Mobility)  moderate assist (50% patient effort);verbal cues;2 person assist  (Pended)   -TW     Comment (Bed Mobility)  Pt required repeated cues to not use UE to assist with bed mobility.   (Pended)   -     Row Name 03/20/20 0912          Transfer Assessment/Treatment    Comment (Transfers)  Pt required cues to not use UE. Pt did well when coming to standing, but noted instability. Required mod A x2 and cues to stand tall and keep eyesopen. Pt c/o some dizziness.  (Pended)   -     Row Name 03/20/20 0912          Sit-Stand Transfer    Sit-Stand [place] (Transfers)  moderate assist (50% patient effort);2 person assist;1 person to manage equipment;verbal cues  (Pended)   -     Row Name 03/20/20 0912          Gait/Stairs Assessment/Training    Gait/Stairs Assessment/Training  gait/ambulation independence  (Pended)   -TW     [place] Level (Gait)  moderate assist (50% patient effort);verbal cues;2 person assist;1 person to manage equipment  (Pended)   -TW     Distance in Feet (Gait)  3 steps to HOB  (Pended)   -TW     Pattern (Gait)  step-to  (Pended)   -TW     Deviations/Abnormal Patterns (Gait)  tiffanie decreased;festinating/shuffling;gait speed decreased;stride length decreased  (Pended)   -TW     Comment (Gait/Stairs)  Pt stepped towards HOB  (Pended)   -TW       User Key  (r) = Recorded By, (t) = Taken By, (c) = Cosigned By    Initials Name Provider Type    Ovi Akhtar, PT Student PT Student        Obj/Interventions     Row Name 03/20/20 0916          General ROM    GENERAL ROM COMMENTS  WFL  (Pended)   -     Row Name 03/20/20 0916          MMT (Manual Muscle Testing)    General MMT Comments  Strength grossly 4-/5  (Pended)   -     Row Name 03/20/20 0916          Therapeutic Exercise    Comment (Therapeutic Exercise)  Cardiac protocol x10 reps, with extra 10 reps for AP due to LE swelling  (Pended)   -     Row Name  03/20/20 0916          Static Sitting Balance    Level of [place] (Unsupported Sitting, Static Balance)  minimal assist, 75% patient effort;1 person assist  (Pended)   -TW     Sitting Position (Unsupported Sitting, Static Balance)  sitting on edge of bed  (Pended)   -TW     Row Name 03/20/20 0916          Dynamic Sitting Balance    Level of [place], Reaches Outside Midline (Sitting, Dynamic Balance)  minimal assist, 75% patient effort;1 person assist  (Pended)   -TW     Sitting Position, Reaches Outside Midline (Sitting, Dynamic Balance)  sitting on edge of bed  (Pended)   -TW     Row Name 03/20/20 0916          Static Standing Balance    Level of [place] (Supported Standing, Static Balance)  moderate assist, 50 to 74% patient effort;2 person assist  (Pended)   -TW     Row Name 03/20/20 0916          Dynamic Standing Balance    Level of [place], Reaches Outside Midline (Standing, Dynamic Balance)  moderate assist, 50 to 74% patient effort;2 person assist;1 person to manage equipment  (Pended)   -TW       User Key  (r) = Recorded By, (t) = Taken By, (c) = Cosigned By    Initials Name Provider Type    Ovi Akhtar, PT Student PT Student        Goals/Plan     Row Name 03/20/20 0921          Patient Education Goal (PT)    Activity (Patient Education Goal, PT)  Pt to achieve cardiac level 5  (Pended)   -TW     Time Frame (Patient Education Goal, PT)  1 week  (Pended)   -TW       User Key  (r) = Recorded By, (t) = Taken By, (c) = Cosigned By    Initials Name Provider Type    Ovi Akhtar, PT Student PT Student        Clinical Impression     Row Name 03/20/20 0918          Pain Scale: Numbers Pre/Post-Treatment    Pain Scale: Numbers, Pretreatment  0/10 - no pain  (Pended)   -TW     Pain Scale: Numbers, Post-Treatment  0/10 - no pain  (Pended)   -TW     Pre/Post Treatment Pain Comment  Pt states that he is not in any pain right now, but likely due to medication.  (Pended)   -TW     Pain  Intervention(s)  Repositioned;Ambulation/increased activity  (Pended)   -TW     Row Name 03/20/20 0918          Plan of Care Review    Plan of Care Reviewed With  patient  (Pended)   -TW     Progress  improving  (Pended)   -TW     Outcome Summary  Pt is a 50 y/o M that presents to therapy following post-op mitral valve replacement and tricuspid valve repair. Pt is drowsy this morning, but was agreeable to PT. Pt was able to stand with mod A x2 and step towards the HOB. Pt exhibits deficits in strength, endurance, balance, and coordination that impact gait and functional mobility. PT will follow pt to address these deficits. PT anticipates pt will be able to discharge home with home health, but will monitor progress closely to plan appropriately.   (Pended)   -     Row Name 03/20/20 0918          Physical Therapy Clinical Impression    Patient/Family Goals Statement (PT Clinical Impression)  Return home and PLOF  (Pended)   -TW     Criteria for Skilled Interventions Met (PT Clinical Impression)  yes;treatment indicated  (Pended)   -TW     Rehab Potential (PT Clinical Summary)  good, to achieve stated therapy goals  (Pended)   -TW     Row Name 03/20/20 0918          Vital Signs    Pre Systolic BP Rehab  84  (Pended)   -TW     Pre Treatment Diastolic BP  54  (Pended)   -TW     Post Systolic BP Rehab  94  (Pended)   -TW     Post Treatment Diastolic BP  59  (Pended)   -TW     Pretreatment Heart Rate (beats/min)  85  (Pended)   -TW     Posttreatment Heart Rate (beats/min)  85  (Pended)   -TW     Pre SpO2 (%)  98  (Pended)   -TW     Post SpO2 (%)  97  (Pended)   -     Row Name 03/20/20 0918          Positioning and Restraints    Pre-Treatment Position  in bed  (Pended)   -TW     Post Treatment Position  bed  (Pended)   -TW     In Bed  supine;call light within reach;encouraged to call for assist;with nsg  (Pended)   -TW       User Key  (r) = Recorded By, (t) = Taken By, (c) = Cosigned By    Initials Name Provider Type     Ovi Akhtar, PT Student PT Student        Outcome Measures     Row Name 03/20/20 0922          How much help from another person do you currently need...    Turning from your back to your side while in flat bed without using bedrails?  2  (Pended)   -TW     Moving from lying on back to sitting on the side of a flat bed without bedrails?  2  (Pended)   -TW     Moving to and from a bed to a chair (including a wheelchair)?  2  (Pended)   -TW     Standing up from a chair using your arms (e.g., wheelchair, bedside chair)?  2  (Pended)   -TW     Climbing 3-5 steps with a railing?  1  (Pended)   -TW     To walk in hospital room?  2  (Pended)   -TW     AM-PAC 6 Clicks Score (PT)  11  (Pended)   -TW     Row Name 03/20/20 0922          Functional Assessment    Outcome Measure Options  AM-PAC 6 Clicks Basic Mobility (PT)  (Pended)   -TW       User Key  (r) = Recorded By, (t) = Taken By, (c) = Cosigned By    Initials Name Provider Type    Ovi Akhtar, PT Student PT Student          PT Recommendation and Plan  Planned Therapy Interventions (PT Eval): (P) balance training, bed mobility training, gait training, home exercise program, patient/family education, stair training, strengthening, transfer training  Outcome Summary/Treatment Plan (PT)  Anticipated Discharge Disposition (PT): (P) home with home health  Plan of Care Reviewed With: (P) patient  Progress: (P) improving  Outcome Summary: (P) Pt is a 48 y/o M that presents to therapy following post-op mitral valve replacement and tricuspid valve repair. Pt is drowsy this morning, but was agreeable to PT. Pt was able to stand with mod A x2 and step towards the HOB. Pt exhibits deficits in strength, endurance, balance, and coordination that impact gait and functional mobility. PT will follow pt to address these deficits. PT anticipates pt will be able to discharge home with home health, but will monitor progress closely to plan appropriately.      Time Calculation:    PT Charges     Row Name 03/20/20 0923             Time Calculation    Start Time  0856  (Pended)   -TW      Stop Time  0910  (Pended)   -TW      Time Calculation (min)  14 min  (Pended)   -TW      PT Received On  03/20/20  (Pended)   -TW      PT - Next Appointment  03/21/20  (Pended)   -TW      PT Goal Re-Cert Due Date  03/27/20  (Pended)   -TW         Time Calculation- PT    Total Timed Code Minutes- PT  8 minute(s)  (Pended)   -TW        User Key  (r) = Recorded By, (t) = Taken By, (c) = Cosigned By    Initials Name Provider Type    TW Ovi Allen, PT Student PT Student        Therapy Charges for Today     Code Description Service Date Service Provider Modifiers Qty    37530168927 HC PT EVAL MOD COMPLEXITY 2 3/20/2020 Ovi Allen, PT Student GP 1    76233043331 HC PT THER PROC EA 15 MIN 3/20/2020 Ovi Allen, PT Student GP 1    29121411510 HC PT THER SUPP EA 15 MIN 3/20/2020 Ovi Allen, PT Student GP 1          PT G-Codes  Outcome Measure Options: (P) AM-PAC 6 Clicks Basic Mobility (PT)  AM-PAC 6 Clicks Score (PT): (P) 11    Ovi Allen PT Student  3/20/2020

## 2020-03-20 NOTE — PROGRESS NOTES
LOS: 8 days   Patient Care Team:  Provider, No Known as PCP - General    Chief Complaint: post op    Subjective:  Symptoms:  No shortness of breath.    Diet:  He reports  nausea.    Activity level: Impaired due to weakness.          Vital Signs  Heart Rate:  [66-89] 66  Resp:  [11-18] 18  BP: ()/(53-85) 90/56  Arterial Line BP: ()/(45-74) 93/45  FiO2 (%):  [31 %-100 %] 100 %  Body mass index is 41.84 kg/m².    Intake/Output Summary (Last 24 hours) at 3/20/2020 0726  Last data filed at 3/20/2020 0700  Gross per 24 hour   Intake 4468 ml   Output 7079 ml   Net -2611 ml     No intake/output data recorded.    Chest tube drainage last 8 hours 85/930/10        03/18/20  0500 03/18/20  1915 03/19/20  0535   Weight: (!) 144 kg (317 lb 0.3 oz) (!) 139 kg (306 lb 7 oz) 136 kg (300 lb)         Objective    Results Review:        WBC WBC   Date Value Ref Range Status   03/20/2020 12.95 (H) 3.40 - 10.80 10*3/mm3 Final   03/19/2020 8.93 3.40 - 10.80 10*3/mm3 Final   03/19/2020 8.97 3.40 - 10.80 10*3/mm3 Final   03/18/2020 5.23 3.40 - 10.80 10*3/mm3 Final      HGB Hemoglobin   Date Value Ref Range Status   03/20/2020 8.9 (L) 13.0 - 17.7 g/dL Final   03/19/2020 9.7 (L) 13.0 - 17.7 g/dL Final   03/19/2020 9.1 (L) 13.0 - 17.7 g/dL Final   03/19/2020 8.8 (L) 12.0 - 17.0 g/dL Final   03/19/2020 7.8 (L) 12.0 - 17.0 g/dL Final   03/19/2020 8.8 (L) 12.0 - 17.0 g/dL Final   03/19/2020 8.8 (L) 12.0 - 17.0 g/dL Final   03/19/2020 10.5 (L) 12.0 - 17.0 g/dL Final   03/18/2020 9.9 (L) 13.0 - 17.7 g/dL Final      HCT Hematocrit   Date Value Ref Range Status   03/20/2020 29.6 (L) 37.5 - 51.0 % Final   03/19/2020 31.0 (L) 37.5 - 51.0 % Final   03/19/2020 30.0 (L) 37.5 - 51.0 % Final   03/19/2020 26 (L) 38 - 51 % Final   03/19/2020 23 (L) 38 - 51 % Final   03/19/2020 26 (L) 38 - 51 % Final   03/19/2020 26 (L) 38 - 51 % Final   03/19/2020 31 (L) 38 - 51 % Final   03/18/2020 32.0 (L) 37.5 - 51.0 % Final      Platelets Platelets   Date  Value Ref Range Status   03/20/2020 123 (L) 140 - 450 10*3/mm3 Final   03/19/2020 119 (L) 140 - 450 10*3/mm3 Final   03/19/2020 106 (L) 140 - 450 10*3/mm3 Final   03/18/2020 180 140 - 450 10*3/mm3 Final        PT/INR:    Protime   Date Value Ref Range Status   03/20/2020 16.5 (H) 11.7 - 14.2 Seconds Final   03/19/2020 18.6 (H) 11.7 - 14.2 Seconds Final   03/18/2020 15.7 (H) 11.7 - 14.2 Seconds Final   /  INR   Date Value Ref Range Status   03/20/2020 1.37 (H) 0.90 - 1.10 Final   03/19/2020 1.58 (H) 0.90 - 1.10 Final   03/18/2020 1.28 (H) 0.90 - 1.10 Final       Sodium Sodium   Date Value Ref Range Status   03/20/2020 140 136 - 145 mmol/L Final   03/19/2020 139 136 - 145 mmol/L Final   03/19/2020 139 136 - 145 mmol/L Final   03/19/2020 139 136 - 145 mmol/L Final   03/18/2020 135 (L) 136 - 145 mmol/L Final      Potassium Potassium   Date Value Ref Range Status   03/20/2020 4.4 3.5 - 5.2 mmol/L Final   03/19/2020 3.9 3.5 - 5.2 mmol/L Final   03/19/2020 3.9 3.5 - 5.2 mmol/L Final   03/19/2020 3.7 3.5 - 5.2 mmol/L Final   03/18/2020 4.3 3.5 - 5.2 mmol/L Final      Chloride Chloride   Date Value Ref Range Status   03/20/2020 104 98 - 107 mmol/L Final   03/19/2020 103 98 - 107 mmol/L Final   03/19/2020 102 98 - 107 mmol/L Final   03/19/2020 102 98 - 107 mmol/L Final   03/18/2020 103 98 - 107 mmol/L Final      Bicarbonate CO2   Date Value Ref Range Status   03/20/2020 20.6 (L) 22.0 - 29.0 mmol/L Final   03/19/2020 21.4 (L) 22.0 - 29.0 mmol/L Final   03/19/2020 22.3 22.0 - 29.0 mmol/L Final   03/19/2020 22.3 22.0 - 29.0 mmol/L Final   03/18/2020 20.6 (L) 22.0 - 29.0 mmol/L Final      BUN BUN   Date Value Ref Range Status   03/20/2020 43 (H) 6 - 20 mg/dL Final   03/19/2020 38 (H) 6 - 20 mg/dL Final   03/19/2020 37 (H) 6 - 20 mg/dL Final   03/19/2020 39 (H) 6 - 20 mg/dL Final   03/18/2020 57 (H) 6 - 20 mg/dL Final      Creatinine Creatinine   Date Value Ref Range Status   03/20/2020 4.84 (H) 0.76 - 1.27 mg/dL Final    03/19/2020 4.38 (H) 0.76 - 1.27 mg/dL Final   03/19/2020 4.37 (H) 0.76 - 1.27 mg/dL Final   03/19/2020 4.35 (H) 0.76 - 1.27 mg/dL Final   03/18/2020 5.68 (H) 0.76 - 1.27 mg/dL Final      Calcium Calcium   Date Value Ref Range Status   03/20/2020 7.3 (L) 8.6 - 10.5 mg/dL Final   03/19/2020 6.9 (L) 8.6 - 10.5 mg/dL Final   03/19/2020 6.9 (L) 8.6 - 10.5 mg/dL Final   03/19/2020 7.5 (L) 8.6 - 10.5 mg/dL Final   03/18/2020 7.4 (L) 8.6 - 10.5 mg/dL Final      Magnesium Magnesium   Date Value Ref Range Status   03/20/2020 1.9 1.6 - 2.6 mg/dL Final   03/19/2020 1.8 1.6 - 2.6 mg/dL Final   03/19/2020 1.9 1.6 - 2.6 mg/dL Final   03/19/2020 1.7 1.6 - 2.6 mg/dL Final            acetaminophen 650 mg Oral Q4H   Or      acetaminophen 650 mg Oral Q4H   Or      acetaminophen 650 mg Rectal Q4H   amiodarone in dextrose 5%      aspirin 81 mg Oral Daily   atorvastatin 40 mg Oral Nightly   ceFAZolin 3 g Intravenous Q24H   chlorhexidine 15 mL Mouth/Throat Q12H   enoxaparin 30 mg Subcutaneous Q24H   metoclopramide 10 mg Intravenous Q6H   metoprolol tartrate 12.5 mg Oral Q12H   mupirocin  Each Nare BID   pantoprazole 40 mg Oral Q AM   sennosides-docusate 2 tablet Oral Nightly       amiodarone 1 mg/min Last Rate: 1 mg/min (03/20/20 0615)   clevidipine 2-32 mg/hr    dexmedetomidine 0.2-1.5 mcg/kg/hr Last Rate: 0.2 mcg/kg/hr (03/19/20 1450)   DOPamine 2-20 mcg/kg/min    EPINEPHrine 0.02-0.3 mcg/kg/min    insulin 0-50 Units/hr Last Rate: 1.2 Units/hr (03/20/20 0700)   milrinone 0.25-0.75 mcg/kg/min Last Rate: 0.125 mcg/kg/min (03/20/20 0418)   niCARdipine 5-15 mg/hr    nitroglycerin 5-200 mcg/min    norepinephrine 0.02-0.3 mcg/kg/min    phenylephrine 0.2-3 mcg/kg/min Last Rate: 0.45 mcg/kg/min (03/20/20 0646)   propofol 5-50 mcg/kg/min Last Rate: Stopped (03/19/20 1421)   sodium chloride 30 mL/hr Last Rate: 30 mL/hr (03/19/20 1214)   sodium chloride 30 mL/hr    vasopressin 0.02-0.1 Units/min            Patient Active Problem List   Diagnosis  Code   • Nonrheumatic mitral valve regurgitation, severe I34.0   • Cigarette smoker F17.210   • Essential hypertension I10   • ESRD on hemodialysis (CMS/[facility]) N18.6, Z99.2   • Foot callus L84   • Anasarca associated with disorder of kidney N04.9   • Diabetes mellitus (CMS/[facility]) E11.9   • Cellulitis of left leg L03.116   • Systolic CHF, acute (CMS/[facility]) I50.21   • Anemia of chronic disease D63.8   • Hypocalcemia E83.51       Assessment & Plan    -Severe mitral incompetence, severe tricuspid incompetence--s/p MVR (mechanical), TV repair, closure of PFO--POD#1 Melva  -Severe pulmonary hypertension-- intraoperatively PA pressures 70s-postop improved PA pressures 40s  -Dilated cardiomyopathy EF 30%  -ESRD on HD  -bilateral pleural effusions- intraoperatively 2L off right, 3L off left  -Anemia of chronic disease, post op anemia acutely worsened expected acute blood loss  -leukocytosis- probable reactive     Hypotensive this morning.   On slava synephrine, wean as tolerated. Replete calcium.  On 0.125 milrinone, , will stop milrinone   32 beat run of VT overnight, on amiodarone  Sinus rhythm under pacemaker  Discontinue swan  Mobilize.  Leave tubes with output.  Dialysis planned for today.  Continue routine care.    Jackeline Altamirano, MINGO  03/20/20  07:26

## 2020-03-21 NOTE — PLAN OF CARE
Problem: Patient Care Overview  Goal: Plan of Care Review  Outcome: Ongoing (interventions implemented as appropriate)  Flowsheets (Taken 3/21/2020 1232)  Outcome Summary: Pt bed to chair 4' with mod/min asst of 2. Amb held today due to BP 79/50. Back to bed from chair only per RN . Pt encouraged to amb to door with nsg staff later today as able.

## 2020-03-21 NOTE — PROGRESS NOTES
" LOS: 9 days   Patient Care Team:  Provider, No Known as PCP - General    Chief Complaint: post op    Subjective  \"sleepy\"    Vital Signs  Temp:  [96.3 °F (35.7 °C)-97.8 °F (36.6 °C)] 97.6 °F (36.4 °C)  Heart Rate:  [84] 84  Resp:  [16-18] 18  BP: ()/(53-68) 113/64  Arterial Line BP: ()/(45-60) 99/48  Body mass index is 39.05 kg/m².    Intake/Output Summary (Last 24 hours) at 3/21/2020 0721  Last data filed at 3/21/2020 0600  Gross per 24 hour   Intake 1663 ml   Output 4840 ml   Net -3177 ml     No intake/output data recorded.    Chest tube drainage last 8 hours40/40/770        03/18/20  1915 03/19/20  0535 03/21/20  0557   Weight: (!) 139 kg (306 lb 7 oz) 136 kg (300 lb) 127 kg (280 lb)         Objective    Results Review:        WBC WBC   Date Value Ref Range Status   03/21/2020 14.37 (H) 3.40 - 10.80 10*3/mm3 Final   03/20/2020 12.95 (H) 3.40 - 10.80 10*3/mm3 Final   03/19/2020 8.93 3.40 - 10.80 10*3/mm3 Final   03/19/2020 8.97 3.40 - 10.80 10*3/mm3 Final      HGB Hemoglobin   Date Value Ref Range Status   03/21/2020 9.9 (L) 13.0 - 17.7 g/dL Final   03/20/2020 8.9 (L) 13.0 - 17.7 g/dL Final   03/19/2020 9.7 (L) 13.0 - 17.7 g/dL Final   03/19/2020 9.1 (L) 13.0 - 17.7 g/dL Final   03/19/2020 8.8 (L) 12.0 - 17.0 g/dL Final   03/19/2020 7.8 (L) 12.0 - 17.0 g/dL Final   03/19/2020 8.8 (L) 12.0 - 17.0 g/dL Final   03/19/2020 8.8 (L) 12.0 - 17.0 g/dL Final   03/19/2020 10.5 (L) 12.0 - 17.0 g/dL Final      HCT Hematocrit   Date Value Ref Range Status   03/21/2020 32.5 (L) 37.5 - 51.0 % Final   03/20/2020 29.6 (L) 37.5 - 51.0 % Final   03/19/2020 31.0 (L) 37.5 - 51.0 % Final   03/19/2020 30.0 (L) 37.5 - 51.0 % Final   03/19/2020 26 (L) 38 - 51 % Final   03/19/2020 23 (L) 38 - 51 % Final   03/19/2020 26 (L) 38 - 51 % Final   03/19/2020 26 (L) 38 - 51 % Final   03/19/2020 31 (L) 38 - 51 % Final      Platelets Platelets   Date Value Ref Range Status   03/21/2020 131 (L) 140 - 450 10*3/mm3 Final   03/20/2020 123 " (L) 140 - 450 10*3/mm3 Final   03/19/2020 119 (L) 140 - 450 10*3/mm3 Final   03/19/2020 106 (L) 140 - 450 10*3/mm3 Final        PT/INR:    Protime   Date Value Ref Range Status   03/20/2020 16.5 (H) 11.7 - 14.2 Seconds Final   03/19/2020 18.6 (H) 11.7 - 14.2 Seconds Final   /  INR   Date Value Ref Range Status   03/20/2020 1.37 (H) 0.90 - 1.10 Final   03/19/2020 1.58 (H) 0.90 - 1.10 Final       Sodium Sodium   Date Value Ref Range Status   03/21/2020 134 (L) 136 - 145 mmol/L Final   03/20/2020 140 136 - 145 mmol/L Final   03/19/2020 139 136 - 145 mmol/L Final   03/19/2020 139 136 - 145 mmol/L Final   03/19/2020 139 136 - 145 mmol/L Final      Potassium Potassium   Date Value Ref Range Status   03/21/2020 4.6 3.5 - 5.2 mmol/L Final   03/20/2020 4.4 3.5 - 5.2 mmol/L Final   03/19/2020 3.9 3.5 - 5.2 mmol/L Final   03/19/2020 3.9 3.5 - 5.2 mmol/L Final   03/19/2020 3.7 3.5 - 5.2 mmol/L Final      Chloride Chloride   Date Value Ref Range Status   03/21/2020 101 98 - 107 mmol/L Final   03/20/2020 104 98 - 107 mmol/L Final   03/19/2020 103 98 - 107 mmol/L Final   03/19/2020 102 98 - 107 mmol/L Final   03/19/2020 102 98 - 107 mmol/L Final      Bicarbonate CO2   Date Value Ref Range Status   03/21/2020 20.4 (L) 22.0 - 29.0 mmol/L Final   03/20/2020 20.6 (L) 22.0 - 29.0 mmol/L Final   03/19/2020 21.4 (L) 22.0 - 29.0 mmol/L Final   03/19/2020 22.3 22.0 - 29.0 mmol/L Final   03/19/2020 22.3 22.0 - 29.0 mmol/L Final      BUN BUN   Date Value Ref Range Status   03/21/2020 32 (H) 6 - 20 mg/dL Final   03/20/2020 43 (H) 6 - 20 mg/dL Final   03/19/2020 38 (H) 6 - 20 mg/dL Final   03/19/2020 37 (H) 6 - 20 mg/dL Final   03/19/2020 39 (H) 6 - 20 mg/dL Final      Creatinine Creatinine   Date Value Ref Range Status   03/21/2020 4.17 (H) 0.76 - 1.27 mg/dL Final   03/20/2020 4.84 (H) 0.76 - 1.27 mg/dL Final   03/19/2020 4.38 (H) 0.76 - 1.27 mg/dL Final   03/19/2020 4.37 (H) 0.76 - 1.27 mg/dL Final   03/19/2020 4.35 (H) 0.76 - 1.27 mg/dL  Final      Calcium Calcium   Date Value Ref Range Status   03/21/2020 7.8 (L) 8.6 - 10.5 mg/dL Final   03/20/2020 7.3 (L) 8.6 - 10.5 mg/dL Final   03/19/2020 6.9 (L) 8.6 - 10.5 mg/dL Final   03/19/2020 6.9 (L) 8.6 - 10.5 mg/dL Final   03/19/2020 7.5 (L) 8.6 - 10.5 mg/dL Final      Magnesium Magnesium   Date Value Ref Range Status   03/20/2020 1.9 1.6 - 2.6 mg/dL Final   03/19/2020 1.8 1.6 - 2.6 mg/dL Final   03/19/2020 1.9 1.6 - 2.6 mg/dL Final   03/19/2020 1.7 1.6 - 2.6 mg/dL Final            aspirin 81 mg Oral Daily   atorvastatin 40 mg Oral Nightly   ceFAZolin 3 g Intravenous Q24H   chlorhexidine 15 mL Mouth/Throat Q12H   enoxaparin 30 mg Subcutaneous Q24H   influenza vaccine 0.5 mL Intramuscular Once   metoprolol tartrate 12.5 mg Oral Q12H   mupirocin  Each Nare BID   pantoprazole 40 mg Oral Q AM   sennosides-docusate 2 tablet Oral Nightly       sodium chloride 30 mL/hr Last Rate: 30 mL/hr (03/19/20 1214)   sodium chloride 30 mL/hr Last Rate: Stopped (03/20/20 0801)           Patient Active Problem List   Diagnosis Code   • Nonrheumatic mitral valve regurgitation, severe I34.0   • Cigarette smoker F17.210   • Essential hypertension I10   • ESRD on hemodialysis (CMS/[facility]) N18.6, Z99.2   • Foot callus L84   • Anasarca associated with disorder of kidney N04.9   • Diabetes mellitus (CMS/[facility]) E11.9   • Cellulitis of left leg L03.116   • Systolic CHF, acute (CMS/[facility]) I50.21   • Anemia of chronic disease D63.8   • Hypocalcemia E83.51   • History of mitral valve replacement with mechanical valve Z95.2   • NSVT (nonsustained ventricular tachycardia) (CMS/[facility]) I47.2       Assessment & Plan    -Severe mitral incompetence, severe tricuspid incompetence--s/p MVR (mechanical), TV repair, closure of PFO--POD#2 Melva  -Severe pulmonary hypertension-- intraoperatively PA pressures 70s-postop improved PA pressures 40s  -Dilated cardiomyopathy EF 30%  -ESRD on HD  -bilateral pleural effusions- intraoperatively 2L off right, 3L  off left  -Anemia of chronic disease, post op anemia acutely worsened expected acute blood loss  -leukocytosis- probable reactive        Hypotensive this morning, pacing for higher pressure.  Replete calcium.    Will start dopamine.  Drowsy this morning, will decrease pain medication.  Mobilize.  Separate right and left pleural tubes.  Will discuss with Dr. Frye about when to start coumdain  Continue routine care.      MINGO Whitmore  03/21/20  07:21

## 2020-03-21 NOTE — THERAPY TREATMENT NOTE
Patient Name: Nico King  : 1970    MRN: [id number]                              Today's Date: 3/21/2020       Admit Date: 3/12/2020    Visit Dx:     ICD-10-CM ICD-9-CM   1. Anasarca associated with disorder of kidney N04.9 581.9   2. DESHAWN (acute kidney injury) (CMS/[facility]) N17.9 584.9   3. Acute on chronic systolic congestive heart failure (CMS/[facility]) I50.23 428.23     428.0   4. Nonrheumatic mitral valve regurgitation, severe I34.0 424.0   5. Nonrheumatic mitral valve regurgitation I34.0 424.0     Patient Active Problem List   Diagnosis   • Nonrheumatic mitral valve regurgitation, severe   • Cigarette smoker   • Essential hypertension   • ESRD on hemodialysis (CMS/[facility])   • Foot callus   • Anasarca associated with disorder of kidney   • Diabetes mellitus (CMS/[facility])   • Cellulitis of left leg   • Systolic CHF, acute (CMS/[facility])   • Anemia of chronic disease   • Hypocalcemia   • History of mitral valve replacement with mechanical valve   • NSVT (nonsustained ventricular tachycardia) (CMS/[facility])     Past Medical History:   Diagnosis Date   • DESHAWN (acute kidney injury) (CMS/[facility])    • CHF (congestive heart failure) (CMS/HCC)    • Cigarette smoker    • Diabetes mellitus (CMS/[facility])    • Foot callus    • Hypertension    • Mitral regurgitation     severe eccentric per echo 2018   • Pleural effusion 2018    Bilateral, small per x-ray   • Stage III chronic kidney disease (CMS/[facility])      Past Surgical History:   Procedure Laterality Date   • CARDIAC CATHETERIZATION N/A 3/16/2020    Procedure: CORONARY ANGIOGRAPHY;  Surgeon: Aguilar Morataya MD;  [facility] C[facility];  Service: Cardiovascular;  Laterality: N/A;   • CARDIAC CATHETERIZATION N/A 3/16/2020    Procedure: Left Heart Cath;  Surgeon: Aguilar Morataya MD;  Location:  [facility] CATH INVASIVE LOCATION;  Service: Cardiovascular;  Laterality: N/A;   • HIP SURGERY     • INSERTION HEMODIALYSIS CATHETER N/A 3/17/2020    Procedure: HEMODIALYSIS  CATHETER INSERTION;  Surgeon: Renard Ro MD;  Location: [facility];  Service: Vascular;  Laterality: N/A;   • MITRAL VALVE REPAIR/REPLACEMENT N/A 3/19/2020    Procedure: MEGHA, STERNOTOMY, MITRAL VALVE REPLACEMENT, TRICUSPID VALVE REPAIR, PFO CLOSURE, PRP;  Surgeon: Jr Ze Frye MD;  Location: [facility] MAIN OR;  Service: Cardiothoracic;  Laterality: N/A;     General Information     Row Name 03/21/20 1155          PT Evaluation Time/Intention    Document Type  therapy note (daily note)  -SV     Mode of Treatment  individual therapy;physical therapy  -SV     Row Name 03/21/20 1155          General Information    Patient Profile Reviewed?  yes  -SV       User Key  (r) = Recorded By, (t) = Taken By, (c) = Cosigned By    Initials Name Provider Type    SV Aminah Lopez, PT Physical Therapist        Mobility     Row Name 03/21/20 1226          Bed Mobility Assessment/Treatment    Sit-Supine [place] (Bed Mobility)  moderate assist (50% patient effort);2 person assist  -SV     Row Name 03/21/20 1226          Sit-Stand Transfer    Sit-Stand [place] (Transfers)  verbal cues;moderate assist (50% patient effort);2 person assist  -SV     Row Name 03/21/20 1226          Gait/Stairs Assessment/Training    [place] Level (Gait)  moderate assist (50% patient effort);2 person assist  -SV     Assistive Device (Gait)  -- HHA of 2  -SV     Distance in Feet (Gait)  4' chair to bed( low BP today RN request back to bed only this am)  -SV       User Key  (r) = Recorded By, (t) = Taken By, (c) = Cosigned By    Initials Name Provider Type    SV Aminah Lopez, PT Physical Therapist        Obj/Interventions     Row Name 03/21/20 1227          Therapeutic Exercise    Sets/Reps (Therapeutic Exercise)  5 reps cardiac ex with vc  -SV     Row Name 03/21/20 1227          Static Standing Balance    Level of [place] (Supported Standing, Static Balance)  minimal assist, 75% patient effort;2 person assist  -SV      Time Able to Maintain Position (Supported Standing, Static Balance)  1 to 2 minutes  -SV       User Key  (r) = Recorded By, (t) = Taken By, (c) = Cosigned By    Initials Name Provider Type    Aminah Stout, PT Physical Therapist        Goals/Plan    No documentation.       Clinical Impression     Row Name 03/21/20 1228          Pain Assessment    Additional Documentation  -- 1-2/10  -SV     Row Name 03/21/20 1228          Vital Signs    Pre Systolic BP Rehab  79  -SV     Pre Treatment Diastolic BP  50  -SV     Post Systolic BP Rehab  108  -SV     Post Treatment Diastolic BP  66  -SV     Pretreatment Heart Rate (beats/min)  82  -SV     Posttreatment Heart Rate (beats/min)  87  -SV     Pre SpO2 (%)  97  -SV     O2 Delivery Pre Treatment  supplemental O2  -SV     O2 Delivery Intra Treatment  supplemental O2  -SV     Post SpO2 (%)  5  -SV     O2 Delivery Post Treatment  supplemental O2  -SV     Pre Patient Position  Sitting  -SV     Intra Patient Position  Standing  -SV     Post Patient Position  Supine  -SV     Row Name 03/21/20 1228          Positioning and Restraints    Pre-Treatment Position  sitting in chair/recliner  -SV     Post Treatment Position  bed  -SV     In Bed  fowlers;exit alarm on;with nsg;call light within reach;encouraged to call for assist  -SV       User Key  (r) = Recorded By, (t) = Taken By, (c) = Cosigned By    Initials Name Provider Type    Aminah Stout, PT Physical Therapist        Outcome Measures     Row Name 03/21/20 1230          How much help from another person do you currently need...    Turning from your back to your side while in flat bed without using bedrails?  3  -SV     Moving from lying on back to sitting on the side of a flat bed without bedrails?  2  -SV     Moving to and from a bed to a chair (including a wheelchair)?  2  -SV     Standing up from a chair using your arms (e.g., wheelchair, bedside chair)?  2  -SV     Climbing 3-5 steps with a railing?  1  -SV      To walk in hospital room?  2  -SV     AM-PAC 6 Clicks Score (PT)  12  -SV     Row Name 03/21/20 1230          Functional Assessment    Outcome Measure Options  AM-PAC 6 Clicks Basic Mobility (PT)  -       User Key  (r) = Recorded By, (t) = Taken By, (c) = Cosigned By    Initials Name Provider Type    Aminah Stout, PT Physical Therapist          PT Recommendation and Plan     Outcome Summary: Pt bed to chair 4' with mod/min asst of 2. Amb held today due to BP 79/50. Back to bed from chair only per RN . Pt encouraged to amb to door with [facility] [place] today as able.     Time Calculation:   PT Charges     Row Name 03/21/20 1234             Time Calculation    Start Time  0855  -      Stop Time  0918  -      Time Calculation (min)  23 min  -SV      PT Received On  03/21/20  -SV      PT - Next Appointment  03/22/20  -SV        User Key  (r) = Recorded By, (t) = Taken By, (c) = Cosigned By    Initials Name Provider Type    Aminah Stout, PT Physical Therapist        Therapy Charges for Today     Code Description Service Date Service Provider Modifiers Qty    38214031568 HC PT THERAPEUTIC ACT EA 15 MIN 3/21/2020 Aminah Lopez, PT GP 2    68951006050 HC PT THER SUPP EA 15 MIN 3/21/2020 Aminah Lopez, PT GP 2          PT G-Codes  Outcome Measure Options: AM-PAC 6 Clicks Basic Mobility (PT)  AM-PAC 6 Clicks Score (PT): 12    Aminah Lopez PT  3/21/2020

## 2020-03-21 NOTE — PROGRESS NOTES
LOS: 9 days   Patient Care Team:  Provider, No Known as PCP - General    Chief Complaint:     F/u MV replacement and TV ring    Interval History:     He had some mild nausea and low blood pressure this morning.  Overall, his breathing is stable and he is having no chest pain.  He does not feel his heart racing or skipping and has had no diarrhea.  He generally says he feels like he is making improvement.  His chest is sore.  He does not have a cough, fevers or chills.    Objective   Vital Signs  Temp:  [96.3 °F (35.7 °C)-98 °F (36.7 °C)] 98 °F (36.7 °C)  Heart Rate:  [68-84] 82  Resp:  [16-18] 18  BP: ()/(46-68) 79/50  Arterial Line BP: ()/(48-60) 99/48    Intake/Output Summary (Last 24 hours) at 3/21/2020 0946  Last data filed at 3/21/2020 0600  Gross per 24 hour   Intake 1663 ml   Output 4405 ml   Net -2742 ml       Comfortable NAD  PERRL, conjunctivae clear  Neck supple, no JVD or thyromegaly appreciated  S1/S2 RRR, no m/r/g  Lungs CTA B, normal effort  Abdomen S/NT/ND (+) BS, no HSM appreciated  Extremities warm, no clubbing, cyanosis, 2+ LLE edema  No visible or palpable skin lesions  A/Ox4, mood and affect appropriate    Results Review:      Results from last 7 days   Lab Units 03/21/20  0357 03/20/20  0213 03/19/20  1514   SODIUM mmol/L 134* 140 139   POTASSIUM mmol/L 4.6 4.4 3.9   CHLORIDE mmol/L 101 104 103   CO2 mmol/L 20.4* 20.6* 21.4*   BUN mg/dL 32* 43* 38*   CREATININE mg/dL 4.17* 4.84* 4.38*   GLUCOSE mg/dL 114* 143* 113*   CALCIUM mg/dL 7.8* 7.3* 6.9*         Results from last 7 days   Lab Units 03/21/20  0357 03/20/20  0210 03/19/20  1514   WBC 10*3/mm3 14.37* 12.95* 8.93   HEMOGLOBIN g/dL 9.9* 8.9* 9.7*   HEMATOCRIT % 32.5* 29.6* 31.0*   PLATELETS 10*3/mm3 131* 123* 119*     Results from last 7 days   Lab Units 03/20/20  0210 03/19/20  1132 03/18/20  0550   INR  1.37* 1.58* 1.28*   APTT seconds  --  38.0* 39.3*     Results from last 7 days   Lab Units 03/18/20  0551   CHOLESTEROL  mg/dL 120     Results from last 7 days   Lab Units 03/20/20  0213   MAGNESIUM mg/dL 1.9     Results from last 7 days   Lab Units 03/18/20  0551   CHOLESTEROL mg/dL 120   TRIGLYCERIDES mg/dL 110   HDL CHOL mg/dL 30*   LDL CHOL mg/dL 68       I reviewed the patient's new clinical results.  I personally viewed and interpreted the patient's EKG/Telemetry data        Medication Review:     aspirin 81 mg Oral Daily   ceFAZolin 3 g Intravenous Q24H   chlorhexidine 15 mL Mouth/Throat Q12H   enoxaparin 30 mg Subcutaneous Q24H   erythromycin base 250 mg Oral 4x Daily With Meals & Nightly   influenza vaccine 0.5 mL Intramuscular Once   mupirocin  Each Nare BID   pantoprazole 40 mg Oral Q AM   sennosides-docusate 2 tablet Oral Nightly   warfarin 2 mg Oral Daily         DOPamine 3 mcg/kg/min Last Rate: 3 mcg/kg/min (03/21/20 0908)   sodium chloride 30 mL/hr Last Rate: 30 mL/hr (03/19/20 1214)   sodium chloride 30 mL/hr Last Rate: Stopped (03/20/20 0801)       Assessment/Plan       ESRD on hemodialysis (CMS/[facility])    Nonrheumatic mitral valve regurgitation, severe    Essential hypertension    Anasarca associated with disorder of kidney    Diabetes mellitus (CMS/[facility])    Cellulitis of left leg    Systolic CHF, acute (CMS/[facility])    Anemia of chronic disease    Hypocalcemia    History of mitral valve replacement with mechanical valve    NSVT (nonsustained ventricular tachycardia) (CMS/[facility])    1.  Valvular heart disease -postop day #1 from mitral valve replacement with bioprosthetic and tricuspid valve annuloplasty with surgical PFO closure.  Patient is extubated and oxygenating well.   2.  ESRD -electrolytes are within normal range, hypervolemia improving and hemodialysis per nephrology.  3.  Chronic systolic heart failure -EF 35 to 40% preop secondary to valvular heart disease.  Minimal coronary artery disease on cardiac catheterization.  Volume optimization with hemodialysis.  BP high now but was low this am, no med changes for now  4.   CAD -nonobstructive.  On aspirin   5.  NSVT - no further VT.     Will follow.     Meggan Hinton MD  03/21/20  09:47

## 2020-03-21 NOTE — PROGRESS NOTES
"   LOS: 9 days    Patient Care Team:  Provider, No Known as PCP - General    Chief Complaint:    Chief Complaint   Patient presents with   • Edema     Follow-up acute kidney injury on chronic kidney disease  Subjective     Interval History:   [facility]; had HD yesterday with 2 L removed; is now on room air, but requiring dopamine for blood pressure support; tolerating liquid diet    Review of Systems:   As noted above    Objective     Vital Signs  Temp:  [96.3 °F (35.7 °C)-98 °F (36.7 °C)] 98 °F (36.7 °C)  Heart Rate:  [68-84] 82  Resp:  [16-18] 18  BP: ()/(46-68) 79/50  Arterial Line BP: ()/(48-60) 99/48    Flowsheet Rows      First Filed Value   Admission Height  180.3 cm (71\") Documented at 03/12/2020 1350   Admission Weight  136 kg (300 lb) Documented at 03/12/2020 1350          No intake/output data recorded.  I/O last 3 completed shifts:  In: 2558 [P.O.:890; I.V.:1668]  Out: 6394 [Urine:335; Other:2000; Chest Tube:4059]    Intake/Output Summary (Last 24 hours) at 3/21/2020 0949  Last data filed at 3/21/2020 0600  Gross per 24 hour   Intake 1663 ml   Output 4405 ml   Net -2742 ml       Physical Exam:  General Appearance: chronically ill-appearing; awake, conversant, appropriate  Skin: warm and dry  HEENT: MMM, nonicteric sclerae  Neck: supple, no JVD, trachea midline. RIJ TDC  Lungs: Bibasilar rhonchi, unlabored on room air; chest tubes in place  Heart: RRR, no S3, +rub  Abdomen: soft, non-tender, +bs; body wall edema distended.  :  scrotal and penile edema  Extremities:  3-4+ LE edema bilaterally; chronic venous stasis changes  Neuro: Awake; moving all extremities        Results Review:    Results from last 7 days   Lab Units 03/21/20  0357 03/20/20  0213 03/19/20  1514  03/18/20  0551   SODIUM mmol/L 134* 140 139   < > 135*   POTASSIUM mmol/L 4.6 4.4 3.9   < > 4.3   CHLORIDE mmol/L 101 104 103   < > 103   CO2 mmol/L 20.4* 20.6* 21.4*   < > 20.6*   BUN " mg/dL 32* 43* 38*   < > 57*   CREATININE mg/dL 4.17* 4.84* 4.38*   < > 5.68*   CALCIUM mg/dL 7.8* 7.3* 6.9*   < > 7.4*   BILIRUBIN mg/dL  --   --   --   --  0.3   ALK PHOS U/L  --   --   --   --  64   ALT (SGPT) U/L  --   --   --   --  18   AST (SGOT) U/L  --   --   --   --  9   GLUCOSE mg/dL 114* 143* 113*   < > 113*    < > = values in this interval not displayed.       Estimated Creatinine Clearance: 29.4 mL/min (A) (by C-G formula based on SCr of 4.17 mg/dL (H)).    Results from last 7 days   Lab Units 03/20/20  0213 03/19/20  1514 03/19/20  1132   MAGNESIUM mg/dL 1.9 1.8 1.9   PHOSPHORUS mg/dL 5.2* 3.9 3.8       Results from last 7 days   Lab Units 03/16/20  0528 03/15/20  0530   URIC ACID mg/dL 10.2* 9.6*       Results from last 7 days   Lab Units 03/21/20  0357 03/20/20  0210 03/19/20  1514 03/19/20  1132 03/19/20  1009  03/18/20  0550   WBC 10*3/mm3 14.37* 12.95* 8.93 8.97  --   --  5.23   HEMOGLOBIN g/dL 9.9* 8.9* 9.7* 9.1*  --   --  9.9*   HEMOGLOBIN, POC g/dL  --   --   --   --  8.8*   < >  --    PLATELETS 10*3/mm3 131* 123* 119* 106*  --   --  180    < > = values in this interval not displayed.       Results from last 7 days   Lab Units 03/20/20  0210 03/19/20  1132 03/18/20  0550   INR  1.37* 1.58* 1.28*         Imaging Results (Last 24 Hours)     Procedure Component Value Units Date/Time    XR Chest 1 View [[id number]] Collected:  03/21/20 0444     Updated:  03/21/20 0459    Narrative:       PORTABLE CHEST X-RAY     CLINICAL HISTORY: Post-Op Heart Surgery; N04.9-Nephrotic syndrome with  unspecified morphologic changes; N17.9-Acute kidney failure,  unspecified; I50.23-Acute on chronic systolic (congestive) heart  failure; I34.0-Nonrheumatic mitral (valve) insufficiency;  I34.0-Nonrheumatic mitral (valve) insufficiency     COMPARISON: 03/20/2020     FINDINGS: Portable AP view of the chest obtained with overlying monitor  leads in place. [place]-Mass[place] catheter removed. Other lines are unchanged.  No  pneumothorax. Mild bibasilar atelectasis, slightly increased on the  right side along with increasing right effusion. Stable cardiomegaly.  Diminishing vascular congestion.             Impression:       Improving vascular congestion with Slight increase in right  lung base atelectasis and effusion.     This report was finalized on 3/21/2020 4:56 AM by Ben Pickens M.D.       XR Chest 1 View [[id number]] Collected:  03/20/20 0635     Updated:  03/20/20 230    Narrative:       PORTABLE CHEST X-RAY     CLINICAL HISTORY: Post-Op Heart Surgery; N04.9-Nephrotic syndrome with  unspecified morphologic changes; N17.9-Acute kidney failure,  unspecified; I50.23-Acute on chronic systolic (congestive) heart  failure; I34.0-Nonrheumatic mitral (valve) insufficiency;  I34.0-Nonrheumatic mitral (valve) insufficiency     COMPARISON: 03/19/2020.     FINDINGS: Portable AP view of the chest was obtained with overlying  monitor leads in place. ET tube removed. [place]-Mass[place] catheter has been  advanced, now extending into a basilar branch of the right pulmonary  artery. Other lines are unchanged. No pneumothorax. Lungs are slightly  under aerated with some mild right lung base atelectasis and pulmonary  vascular congestion but without edema/CHF. There are probable small  effusions, certainly a significant collection of pleural fluid is not  demonstrated by portable imaging. Stable cardiomegaly.             Impression:       Under aeration with mild right lung base atelectasis and  pulmonary vascular congestion.        This report was finalized on 3/20/2020 11:06 PM by Ben Pickens M.D.             aspirin 81 mg Oral Daily   ceFAZolin 3 g Intravenous Q24H   chlorhexidine 15 mL Mouth/Throat Q12H   enoxaparin 30 mg Subcutaneous Q24H   erythromycin base 250 mg Oral 4x Daily With Meals & Nightly   influenza vaccine 0.5 mL Intramuscular Once   mupirocin  Each Nare BID   pantoprazole 40 mg Oral Q AM   sennosides-docusate 2 tablet Oral Nightly    warfarin 2 mg Oral Daily       DOPamine 3 mcg/kg/min Last Rate: 3 mcg/kg/min (03/21/20 0908)   sodium chloride 30 mL/hr Last Rate: 30 mL/hr (03/19/20 1214)   sodium chloride 30 mL/hr Last Rate: Stopped (03/20/20 0801)       Medication Review:   Current Facility-Administered Medications   Medication Dose Route Frequency Provider Last Rate Last Dose   • acetaminophen (TYLENOL) tablet 650 mg  650 mg Oral Q4H PRN Jr Ze Frye MD   650 mg at 03/20/20 1114    Or   • acetaminophen (TYLENOL) 160 MG/5ML solution 650 mg  650 mg Oral Q4H PRN Jr Ze Frye MD        Or   • acetaminophen (TYLENOL) suppository 650 mg  650 mg Rectal Q4H PRN Jr Ze Frye MD       • ALPRAZolam (XANAX) tablet 0.25 mg  0.25 mg Oral Q8H PRN Jr Ze Frye MD   0.25 mg at 03/20/20 1743   • aspirin EC tablet 81 mg  81 mg Oral Daily Jr Ze Frye MD   81 mg at 03/21/20 0907   • bisacodyl (DULCOLAX) suppository 10 mg  10 mg Rectal Daily PRN Jr Ze Frye MD       • ceFAZolin in Sodium Chloride (ANCEF) IVPB solution 3 g  3 g Intravenous Q24H Jackeline Cyr APRN 200 mL/hr at 03/20/20 2216 3 g at 03/20/20 2216   • chlorhexidine (PERIDEX) 0.12 % solution 15 mL  15 mL Mouth/Throat Q12H Jr Ze Frye MD   15 mL at 03/21/20 0603   • cyclobenzaprine (FLEXERIL) tablet 10 mg  10 mg Oral Q8H PRN Jr Ze Frye MD       • docusate sodium (COLACE) capsule 100 mg  100 mg Oral BID PRN Jr Ze Frye MD       • DOPamine 400 mg in 250 mL D5W (1.6 mg/mL) infusion  3 mcg/kg/min Intravenous Titrated Jackeline Cyr APRN 14.29 mL/hr at 03/21/20 0908 3 mcg/kg/min at 03/21/20 0908   • enoxaparin (LOVENOX) syringe 30 mg  30 mg Subcutaneous Q24H Jr Ze Frye MD   30 mg at 03/20/20 1807   • erythromycin base (E-MYCIN) tablet 250 mg  250 mg Oral 4x Daily With Meals & Nightly Jackeline Cyr APRN       • heparin (porcine) injection 3,800 Units  3,800 Units Intracatheter PRStephen Quintero  MD Mckinley   3,800 Units at 03/20/20 1419   • HYDROcodone-acetaminophen (NORCO) 5-325 MG per tablet 1 tablet  1 tablet Oral Q4H PRN Jackeline Cyr APRN       • influenza vac split quad (FLUZONE,FLUARIX,AFLURIA) injection 0.5 mL  0.5 mL Intramuscular Once Jr Ze Frye MD       • morphine injection 1 mg  1 mg Intravenous Q4H PRN Jr Ze Frye MD        And   • naloxone (NARCAN) injection 0.4 mg  0.4 mg Intravenous Q5 Min PRN Jr Ze Frye MD       • mupirocin (BACTROBAN) 2 % nasal ointment   Each Nare BID Jr Ze Frye MD   1 application at 03/21/20 0907   • ondansetron (ZOFRAN) injection 4 mg  4 mg Intravenous Q6H PRN Jr Ze Frye MD   4 mg at 03/21/20 0929   • pantoprazole (PROTONIX) EC tablet 40 mg  40 mg Oral Q AM Jr Ze Frye MD   40 mg at 03/21/20 0603   • polyethylene glycol (MIRALAX) powder 17 g  17 g Oral Daily PRN Jr Ze Frye MD       • potassium chloride (MICRO-K) CR capsule 40 mEq  40 mEq Oral PRN Jr Ze Frye MD        Or   • potassium chloride (KLOR-CON) packet 40 mEq  40 mEq Oral PRN Jr Ze Frye MD       • potassium chloride 10 mEq in 100 mL IVPB  10 mEq Intravenous Q1H PRN Jr Ze Frye MD        Or   • potassium chloride 10 mEq in 100 mL IVPB  10 mEq Intravenous Q1H PRN Jr Ze Frye MD       • promethazine (PHENERGAN) tablet 12.5 mg  12.5 mg Oral Q6H PRN Jr Ze Frye MD        Or   • promethazine (PHENERGAN) injection 12.5 mg  12.5 mg Intravenous Q6H PRN Jr Ze Frye MD   12.5 mg at 03/20/20 1758   • sennosides-docusate (PERICOLACE) 8.6-50 MG per tablet 2 tablet  2 tablet Oral Nightly Jr Ze Frye MD   2 tablet at 03/20/20 2018   • sodium chloride 0.9 % flush 30 mL  30 mL Intravenous Once PRN Jr Ze Frye MD       • sodium chloride 0.9 % infusion  30 mL/hr Intravenous Continuous Jr Ze Frye MD 30 mL/hr at 03/19/20 1214 30 mL/hr at 03/19/20 1214   • sodium  chloride 0.9 % infusion  30 mL/hr Intravenous Continuous PRN Jr Ze Frye MD   Stopped at 03/20/20 0801   • traMADol (ULTRAM) tablet 50 mg  50 mg Oral Q12H PRN Jr Ze Frye MD   50 mg at 03/20/20 1743   • warfarin (COUMADIN) tablet 2 mg  2 mg Oral Daily Jackeline Cyr APRN           Assessment/Plan   1.  DESHAWN on CKD4-5 with progression to ESRD: refractory fluid overload, improving; normal K; has had 3 HD treatments so far  2.  Cellulitis of left lower extremity  3.  Severe MR and moderate TR.  Systolic non-ischemic cardiomyopathy EF 33%:  had [facility] MVR, TV repair, and closure of PFO on 3/19   4.  Anasarca and fluid excess due to right-sided heart failure and ESRD.    5.  Hypertension, with hypotension presently: Responding well to dopamine  6.  Medical noncompliance.  7.  DM2. Controlled .  8.  Anemia CKD.  Hg stable .  Iron deficient    Plan:  1.  iUF tomorrow for additional volume removal  2.  Surveillance labs      Stephen Eng MD  03/21/20  09:49

## 2020-03-21 NOTE — PROGRESS NOTES
Name: Nico King ADMIT: 3/12/2020   : 1970  PCP: Provider, No Known    MRN: [id number] LOS: 9 days   AGE/SEX: 49 y.o. male  ROOM: 3/     Subjective   Subjective   Patient is lying in the bed and is in no major distress.  Pain is fairly well-controlled.  Denies nausea, vomiting, abdominal pain, chest pain.       Objective   Objective   Vital Signs  Temp:  [97.5 °F (36.4 °C)-98.1 °F (36.7 °C)] 98.1 °F (36.7 °C)  Heart Rate:  [62-86] 62  Resp:  [16-18] 16  BP: ()/(46-93) 98/63  Arterial Line BP: (99)/(48) 99/48  SpO2:  [91 %-97 %] 96 %  on   ;   Device (Oxygen Therapy): room air  Body mass index is 39.05 kg/m².  Physical Exam   Constitutional: He is oriented to person, place, and time. He appears well-developed and well-nourished.   HENT:   Head: Normocephalic and atraumatic.   Nose: Nose normal.   Eyes: Pupils are equal, round, and reactive to light. EOM are normal. No scleral icterus.   Neck: Neck supple. No JVD present.   Cardiovascular: Normal rate, regular rhythm, normal heart sounds and intact distal pulses.   Pulmonary/Chest: Effort normal and breath sounds normal. No respiratory distress.   Abdominal: Soft. Bowel sounds are normal. He exhibits no distension. There is no tenderness.   Musculoskeletal: Normal range of motion. He exhibits no edema.   Neurological: He is alert and oriented to person, place, and time. No cranial nerve deficit.   Skin: Skin is warm and dry.   Chest incision is dressed   Psychiatric: He has a normal mood and affect. His behavior is normal.       Results Review:       I reviewed the patient's new clinical results.  Results from last 7 days   Lab Units 20  0357 20  0210 20  1514 20  1132   WBC 10*3/mm3 14.37* 12.95* 8.93 8.97   HEMOGLOBIN g/dL 9.9* 8.9* 9.7* 9.1*   PLATELETS 10*3/mm3 131* 123* 119* 106*     Results from last 7 days   Lab Units 20  0357 20  0213 20  1514 20  1132   SODIUM mmol/L 134* 140  139 139   POTASSIUM mmol/L 4.6 4.4 3.9 3.9   CHLORIDE mmol/L 101 104 103 102   CO2 mmol/L 20.4* 20.6* 21.4* 22.3   BUN mg/dL 32* 43* 38* 37*   CREATININE mg/dL 4.17* 4.84* 4.38* 4.37*   GLUCOSE mg/dL 114* 143* 113* 120*   Estimated Creatinine Clearance: 29.4 mL/min (A) (by C-G formula based on SCr of 4.17 mg/dL (H)).  Results from last 7 days   Lab Units 03/20/20  0213 03/19/20  1514 03/19/20  1132 03/18/20  0551   ALBUMIN g/dL 2.50* 2.80* 2.40* 2.20*   BILIRUBIN mg/dL  --   --   --  0.3   ALK PHOS U/L  --   --   --  64   AST (SGOT) U/L  --   --   --  9   ALT (SGPT) U/L  --   --   --  18     Results from last 7 days   Lab Units 03/21/20  0357 03/20/20  0213 03/19/20  1514 03/19/20  1132 03/19/20  0516 03/18/20  0551 03/17/20  0654   CALCIUM mg/dL 7.8* 7.3* 6.9* 6.9* 7.5* 7.4* 7.3*   ALBUMIN g/dL  --  2.50* 2.80* 2.40*  --  2.20* 2.20*   MAGNESIUM mg/dL  --  1.9 1.8 1.9 1.7  --   --    PHOSPHORUS mg/dL  --  5.2* 3.9 3.8  --   --  6.4*       Glucose   Date/Time Value Ref Range Status   03/21/2020 1540 127 70 - 130 mg/dL Final   03/21/2020 1048 108 70 - 130 mg/dL Final   03/21/2020 0536 115 70 - 130 mg/dL Final   03/20/2020 1953 109 70 - 130 mg/dL Final   03/20/2020 1217 115 70 - 130 mg/dL Final   03/20/2020 0754 116 70 - 130 mg/dL Final   03/20/2020 0706 119 70 - 130 mg/dL Final         aspirin 81 mg Oral Daily   ceFAZolin 3 g Intravenous Q24H   chlorhexidine 15 mL Mouth/Throat Q12H   enoxaparin 30 mg Subcutaneous Q24H   erythromycin base 250 mg Oral 4x Daily With Meals & Nightly   influenza vaccine 0.5 mL Intramuscular Once   mupirocin  Each Nare BID   pantoprazole 40 mg Oral Q AM   sennosides-docusate 2 tablet Oral Nightly   warfarin 2 mg Oral Daily       DOPamine 3 mcg/kg/min Last Rate: Stopped (03/21/20 1300)   sodium chloride 30 mL/hr Last Rate: 30 mL/hr (03/19/20 1214)   sodium chloride 30 mL/hr Last Rate: Stopped (03/20/20 0801)   Diet Full Liquid       Assessment/Plan     Active Hospital Problems    Diagnosis   POA   • **ESRD on hemodialysis (CMS/[facility]) [N18.6, Z99.2]  Not Applicable   • History of mitral valve replacement with mechanical valve [Z95.2]  Not Applicable   • NSVT (nonsustained ventricular tachycardia) (CMS/[facility]) [I47.2]  Unknown   • Hypocalcemia [E83.51]  Unknown   • Anemia of chronic disease [D63.8]  Unknown   • Systolic CHF, acute (CMS/[facility]) [I50.21]  Unknown   • Anasarca associated with disorder of kidney [N04.9]  Yes   • Diabetes mellitus (CMS/[facility]) [E11.9]  Unknown   • Cellulitis of left leg [L03.116]  Unknown   • Essential hypertension [I10]  Yes   • Nonrheumatic mitral valve regurgitation, severe [I34.0]  Yes      Resolved Hospital Problems   No resolved problems to display.       Assessment and plan:  1.  Acute diastolic dysfunction with ejection fraction of 33%, patient is currently compensated.  Was blood pressure tolerates will consider beta-blockers and ACE inhibitor's.  2.  Status post mitral valve replacement with bioprosthetic valve, postop day 1, underwent tricuspid valve annuloplasty and PFO closure as well.  Continue with analgesics.  Have encouraged him to use incentive spirometry.  3.  Stage IV CKD which has progressed to end-stage renal disease, will continue with routine dialysis and appreciate nephrology input.  4.  Left leg cellulitis, erythema has been improving and will continue with cefazolin.  5.  Hyperglycemia, blood sugars are reasonably well controlled.  Hemoglobin A1c level will be checked.  Most likely has prediabetes.  6.  Nonsustained V. tach, continue monitoring electrolytes closely.    Calvin Mchugh MD  [facility]  03/21/20  17:07

## 2020-03-22 NOTE — THERAPY TREATMENT NOTE
Patient Name: Nico King  : 1970    MRN: [id number]                              Today's Date: 3/22/2020       Admit Date: 3/12/2020    Visit Dx:     ICD-10-CM ICD-9-CM   1. Anasarca associated with disorder of kidney N04.9 581.9   2. DESHAWN (acute kidney injury) (CMS/[facility]) N17.9 584.9   3. Acute on chronic systolic congestive heart failure (CMS/[facility]) I50.23 428.23     428.0   4. Nonrheumatic mitral valve regurgitation, severe I34.0 424.0   5. Nonrheumatic mitral valve regurgitation I34.0 424.0     Patient Active Problem List   Diagnosis   • Nonrheumatic mitral valve regurgitation, severe   • Cigarette smoker   • Essential hypertension   • ESRD on hemodialysis (CMS/[facility])   • Foot callus   • Anasarca associated with disorder of kidney   • Diabetes mellitus (CMS/[facility])   • Cellulitis of left leg   • Systolic CHF, acute (CMS/[facility])   • Anemia of chronic disease   • Hypocalcemia   • History of mitral valve replacement with mechanical valve   • NSVT (nonsustained ventricular tachycardia) (CMS/[facility])     Past Medical History:   Diagnosis Date   • DESHAWN (acute kidney injury) (CMS/[facility])    • CHF (congestive heart failure) (CMS/[facility])    • Cigarette smoker    • Diabetes mellitus (CMS/[facility])    • Foot callus    • Hypertension    • Mitral regurgitation     severe eccentric per echo 2018   • Pleural effusion 2018    Bilateral, small per x-ray   • Stage III chronic kidney disease (CMS/[facility])      Past Surgical History:   Procedure Laterality Date   • CARDIAC CATHETERIZATION N/A 3/16/2020    Procedure: CORONARY ANGIOGRAPHY;  Surgeon: Aguilar Morataya MD;  Location: [facility] CATH INVASIVE LOCATION;  Service: Cardiovascular;  Laterality: N/A;   • CARDIAC CATHETERIZATION N/A 3/16/2020    Procedure: Left Heart Cath;  Surgeon: Aguilar Morataya MD;  Location:  [facility] CATH INVASIVE LOCATION;  Service: Cardiovascular;  Laterality: N/A;   • HIP SURGERY     • INSERTION HEMODIALYSIS CATHETER N/A 3/17/2020    Procedure: HEMODIALYSIS  CATHETER INSERTION;  Surgeon: Renard Ro MD;  Location: [facility] OR;  Service: Vascular;  Laterality: N/A;   • MITRAL VALVE REPAIR/REPLACEMENT N/A 3/19/2020    Procedure: MEGHA, STERNOTOMY, MITRAL VALVE REPLACEMENT, TRICUSPID VALVE REPAIR, PFO CLOSURE, PRP;  Surgeon: Jr Ze Frye MD;  Location: [facility] MAIN OR;  Service: Cardiothoracic;  Laterality: N/A;     General Information     Row Name 03/22/20 0933          PT Evaluation Time/Intention    Document Type  evaluation  -SV     Mode of Treatment  physical therapy;individual therapy  -SV     Row Name 03/22/20 0933          General Information    Patient Profile Reviewed?  yes  -SV       User Key  (r) = Recorded By, (t) = Taken By, (c) = Cosigned By    Initials Name Provider Type    SV Aminah Lpoez, PT Physical Therapist        Mobility     Row Name 03/22/20 1033          Bed Mobility Assessment/Treatment    Supine-Sit [place] (Bed Mobility)  moderate assist (50% patient effort)  -SV     Row Name 03/22/20 1033          Sit-Stand Transfer    Sit-Stand [place] (Transfers)  minimum assist (75% patient effort);2 person assist  -SV     Assistive Device (Sit-Stand Transfers)  walker, front-wheeled  -SV     Row Name 03/22/20 1033          Gait/Stairs Assessment/Training    [place] Level (Gait)  contact guard;minimum assist (75% patient effort);2 person assist  -SV     Assistive Device (Gait)  walker, front-wheeled  -SV     Distance in Feet (Gait)  120' shuffle gait, cues for PLB ( external pacer, CT x2, cath)  -SV       User Key  (r) = Recorded By, (t) = Taken By, (c) = Cosigned By    Initials Name Provider Type    SV Aminah Lopez, PT Physical Therapist        Obj/Interventions     Row Name 03/22/20 1034          Therapeutic Exercise    Sets/Reps (Therapeutic Exercise)  5 reps cardiac ex at eob with vc  -SV     Row Name 03/22/20 1034          Static Sitting Balance    Level of [place] (Unsupported Sitting, Static Balance)   supervision  -SV     Sitting Position (Unsupported Sitting, Static Balance)  sitting on edge of bed  -SV     Time Able to Maintain Position (Unsupported Sitting, Static Balance)  more than 5 minutes  -SV     Row Name 03/22/20 1034          Static Standing Balance    Level of [place] (Supported Standing, Static Balance)  contact guard assist;2 person assist  -SV     Time Able to Maintain Position (Supported Standing, Static Balance)  1 to 2 minutes  -SV     Assistive Device Utilized (Supported Standing, Static Balance)  walker, rolling  -SV       User Key  (r) = Recorded By, (t) = Taken By, (c) = Cosigned By    Initials Name Provider Type    SV Aminah Lopez, PT Physical Therapist        Goals/Plan    No documentation.       Clinical Impression     Row Name 03/22/20 1036          Pain Assessment    Additional Documentation  -- 0/10 no signs  -SV     Row Name 03/22/20 1036          Plan of Care Review    Progress  improving  -     Row Name 03/22/20 1036          Vital Signs    Pre Systolic BP Rehab  104  -SV     Pre Treatment Diastolic BP  57  -SV     Post Systolic BP Rehab  112  -SV     Post Treatment Diastolic BP  67  -SV     Pretreatment Heart Rate (beats/min)  67  -SV     Pre SpO2 (%)  95  -SV     O2 Delivery Pre Treatment  room air  -SV     O2 Delivery Intra Treatment  room air  -SV     Post SpO2 (%)  95  -SV     O2 Delivery Post Treatment  room air  -SV     Pre Patient Position  Supine  -SV     Intra Patient Position  Standing  -SV     Post Patient Position  Sitting  -SV     Row Name 03/22/20 1036          Positioning and Restraints    Pre-Treatment Position  in bed  -SV     Post Treatment Position  chair  -SV     In Chair  sitting;call light within reach;encouraged to call for assist;with nsg  -SV       User Key  (r) = Recorded By, (t) = Taken By, (c) = Cosigned By    Initials Name Provider Type    SV Aminah Lopez, PT Physical Therapist        Outcome Measures     Row Name 03/22/20 1037           How much help from another person do you currently need...    Turning from your back to your side while in flat bed without using bedrails?  3  -SV     Moving from lying on back to sitting on the side of a flat bed without bedrails?  2  -SV     Moving to and from a bed to a chair (including a wheelchair)?  3  -SV     Standing up from a chair using your arms (e.g., wheelchair, bedside chair)?  3  -SV     Climbing 3-5 steps with a railing?  1  -SV     To walk in hospital room?  3  -SV     AM-PAC 6 Clicks Score (PT)  15  -SV     Row Name 03/22/20 [facility]          Functional Assessment    Outcome Measure Options  AM-PAC 6 Clicks Basic Mobility (PT)  -SV       User Key  (r) = Recorded By, (t) = Taken By, (c) = Cosigned By    Initials Name Provider Type    Aminah Stout, PT Physical Therapist          PT Recommendation and Plan     Progress: improving  Outcome Summary: Pt much improved today with ability to amb 120' in hallway with rwx on RA with cga of 2. Occaisional min/cga to guide rwx. Pt required asst of 2 due to CT x2/pacer/catheter. HD pending today. Pt encouraged to amb with nsg BID today as able.     Time Calculation:   PT Charges     Row Name 03/22/20 1040             Time Calculation    Start Time  0933  -SV      Stop Time  0956  -SV      Time Calculation (min)  23 min  -SV      PT Received On  03/22/20  -SV      PT - Next Appointment  03/23/20  -        User Key  (r) = Recorded By, (t) = Taken By, (c) = Cosigned By    Initials Name Provider Type    Aminah Stout, PT Physical Therapist        Therapy Charges for Today     Code Description Service Date Service Provider Modifiers Qty    10832011961 HC PT THERAPEUTIC ACT EA 15 MIN 3/21/2020 Aminah Lopez, PT GP 2    74161891104 HC PT THER SUPP EA 15 MIN 3/21/2020 Aminah Lopez, PT GP 2    66663772553 HC PT THERAPEUTIC ACT EA 15 MIN 3/22/2020 Aminah Lopez, PT GP 2    67597501594 HC PT THER SUPP EA 15 MIN 3/22/2020 Aminah Lopez, PT GP 2           PT G-Codes  Outcome Measure Options: AM-PAC 6 Clicks Basic Mobility (PT)  AM-PAC 6 Clicks Score (PT): 15    Aminah Lopez, PT  3/22/2020

## 2020-03-22 NOTE — PROGRESS NOTES
LOS: 10 days   Patient Care Team:  Provider, No Known as PCP - General    Chief Complaint:     F/u CABG    Interval History:     He has no chest pain, difficulty breathing, tachycardia, dizziness, nausea.  His chest is sore but overall he is feeling quite good.    Objective   Vital Signs  Temp:  [97.6 °F (36.4 °C)-98.1 °F (36.7 °C)] 97.6 °F (36.4 °C)  Heart Rate:  [59-86] 66  Resp:  [16-18] 16  BP: ()/(49-93) 104/57    Intake/Output Summary (Last 24 hours) at 3/22/2020 0841  Last data filed at 3/22/2020 0645  Gross per 24 hour   Intake 900 ml   Output 1563 ml   Net -663 ml       Comfortable NAD  PERRL, conjunctivae clear  Neck supple, no JVD or thyromegaly appreciated  S1/S2 RRR, no m/r/g  Lungs CTA B, normal effort  Abdomen S/NT/ND (+) BS, no HSM appreciated  Extremities warm, no clubbing, cyanosis, or edema  No visible or palpable skin lesions  A/Ox4, mood and affect appropriate    Results Review:      Results from last 7 days   Lab Units 03/22/20  0345 03/21/20  0357 03/20/20  0213   SODIUM mmol/L 133* 134* 140   POTASSIUM mmol/L 5.1 4.6 4.4   CHLORIDE mmol/L 100 101 104   CO2 mmol/L 17.5* 20.4* 20.6*   BUN mg/dL 40* 32* 43*   CREATININE mg/dL 5.31* 4.17* 4.84*   GLUCOSE mg/dL 100* 114* 143*   CALCIUM mg/dL 7.5* 7.8* 7.3*         Results from last 7 days   Lab Units 03/22/20  0403 03/21/20  0357 03/20/20  0210   WBC 10*3/mm3 11.94* 14.37* 12.95*   HEMOGLOBIN g/dL 9.8* 9.9* 8.9*   HEMATOCRIT % 32.1* 32.5* 29.6*   PLATELETS 10*3/mm3 144 131* 123*     Results from last 7 days   Lab Units 03/22/20  0403 03/20/20  0210 03/19/20  1132 03/18/20  0550   INR  1.31* 1.37* 1.58* 1.28*   APTT seconds  --   --  38.0* 39.3*     Results from last 7 days   Lab Units 03/18/20  0551   CHOLESTEROL mg/dL 120     Results from last 7 days   Lab Units 03/20/20  0213   MAGNESIUM mg/dL 1.9     Results from last 7 days   Lab Units 03/18/20  0551   CHOLESTEROL mg/dL 120   TRIGLYCERIDES mg/dL 110   HDL CHOL mg/dL 30*   LDL CHOL  mg/dL 68       I reviewed the patient's new clinical results.  I personally viewed and interpreted the patient's EKG/Telemetry data        Medication Review:     aspirin 81 mg Oral Daily   carvedilol 3.125 mg Oral Q12H   enoxaparin 30 mg Subcutaneous Q24H   erythromycin base 250 mg Oral 4x Daily With Meals & Nightly   influenza vaccine 0.5 mL Intramuscular Once   mupirocin  Each Nare BID   pantoprazole 40 mg Oral Q AM   sennosides-docusate 2 tablet Oral Nightly   warfarin 2 mg Oral Daily         sodium chloride 30 mL/hr Last Rate: 30 mL/hr (03/19/20 1214)   sodium chloride 30 mL/hr Last Rate: Stopped (03/20/20 0801)       Assessment/Plan       ESRD on hemodialysis (CMS/[facility])    Nonrheumatic mitral valve regurgitation, severe    Essential hypertension    Anasarca associated with disorder of kidney    Diabetes mellitus (CMS/[facility])    Cellulitis of left leg    Systolic CHF, acute (CMS/[facility])    Anemia of chronic disease    Hypocalcemia    History of mitral valve replacement with mechanical valve    NSVT (nonsustained ventricular tachycardia) (CMS/[facility])    1.  Valvular heart disease -postop day #1 from mitral valve replacement with mechanical and tricuspid valve annuloplasty with surgical PFO closure.  warfarin target 2.5-3.5  2.  ESRD -electrolytes are within normal range, hypervolemia improving and hemodialysis per nephrology.  3.  Chronic systolic heart failure -EF 35 to 40% preop secondary to valvular heart disease.  Minimal coronary artery disease on cardiac catheterization.  Volume optimization with hemodialysis.   4.  CAD -nonobstructive.  On aspirin   5.  NSVT - no further VT.   6. Wenckebach. Stop coreg.     Chest tubes to stay in place because still a lot of drainage.  Overall making slow progress.  He is on warfarin.    Meggan Hinton MD  03/22/20  08:41

## 2020-03-22 NOTE — PLAN OF CARE
Problem: Patient Care Overview  Goal: Plan of Care Review  Outcome: Ongoing (interventions implemented as appropriate)  Flowsheets (Taken 3/22/2020 1038)  Outcome Summary: Pt much improved today with ability to amb 120' in hallway with rwx on RA with cga of 2. Occaisional min/cga to guide rwx. Pt required asst of 2 due to CT x2/pacer/catheter. HD pending today. Pt encouraged to amb with nsg BID today as able.

## 2020-03-22 NOTE — PROGRESS NOTES
Name: Nico King ADMIT: 3/12/2020   : 1970  PCP: Provider, No Known    MRN: [id number] LOS: 10 days   AGE/SEX: 49 y.o. male  ROOM: /     Subjective   Subjective   Patient is lying in the bed and is in no major distress. Denies nausea, vomiting, abdominal pain, chest pain.       Objective   Objective   Vital Signs  Temp:  [97.6 °F (36.4 °C)-98.1 °F (36.7 °C)] 97.6 °F (36.4 °C)  Heart Rate:  [54-72] 63  Resp:  [16-17] 16  BP: ()/(57-78) 107/74  SpO2:  [92 %-97 %] 97 %  on   ;   Device (Oxygen Therapy): room air  Body mass index is 39.47 kg/m².  Physical Exam   Constitutional: He is oriented to person, place, and time. He appears well-developed and well-nourished.   HENT:   Head: Normocephalic and atraumatic.   Nose: Nose normal.   Eyes: Pupils are equal, round, and reactive to light. EOM are normal. No scleral icterus.   Neck: Neck supple. No JVD present.   Cardiovascular: Normal rate, regular rhythm, normal heart sounds and intact distal pulses.   Pulmonary/Chest: Effort normal and breath sounds normal. No respiratory distress.   Abdominal: Soft. Bowel sounds are normal. He exhibits no distension. There is no tenderness.   Musculoskeletal: Normal range of motion. He exhibits no edema.   Neurological: He is alert and oriented to person, place, and time. No cranial nerve deficit.   Skin: Skin is warm and dry.   Chest incision is dressed   Psychiatric: He has a normal mood and affect. His behavior is normal.       Results Review:       I reviewed the patient's new clinical results.  Results from last 7 days   Lab Units 20  0403 20  0357 20  0210 20  1514   WBC 10*3/mm3 11.94* 14.37* 12.95* 8.93   HEMOGLOBIN g/dL 9.8* 9.9* 8.9* 9.7*   PLATELETS 10*3/mm3 144 131* 123* 119*     Results from last 7 days   Lab Units 20  0345 20  0357 20  0213 20  1514   SODIUM mmol/L 133* 134* 140 139   POTASSIUM mmol/L 5.1 4.6 4.4 3.9   CHLORIDE mmol/L 100  101 104 103   CO2 mmol/L 17.5* 20.4* 20.6* 21.4*   BUN mg/dL 40* 32* 43* 38*   CREATININE mg/dL 5.31* 4.17* 4.84* 4.38*   GLUCOSE mg/dL 100* 114* 143* 113*   Estimated Creatinine Clearance: 23.2 mL/min (A) (by C-G formula based on SCr of 5.31 mg/dL (H)).  Results from last 7 days   Lab Units 03/20/20  0213 03/19/20  1514 03/19/20  1132 03/18/20  0551   ALBUMIN g/dL 2.50* 2.80* 2.40* 2.20*   BILIRUBIN mg/dL  --   --   --  0.3   ALK PHOS U/L  --   --   --  64   AST (SGOT) U/L  --   --   --  9   ALT (SGPT) U/L  --   --   --  18     Results from last 7 days   Lab Units 03/22/20  0345 03/21/20  0357 03/20/20  0213 03/19/20  1514 03/19/20  1132 03/19/20  0516 03/18/20  0551 03/17/20  0654   CALCIUM mg/dL 7.5* 7.8* 7.3* 6.9* 6.9* 7.5* 7.4* 7.3*   ALBUMIN g/dL  --   --  2.50* 2.80* 2.40*  --  2.20* 2.20*   MAGNESIUM mg/dL  --   --  1.9 1.8 1.9 1.7  --   --    PHOSPHORUS mg/dL  --   --  5.2* 3.9 3.8  --   --  6.4*       Glucose   Date/Time Value Ref Range Status   03/21/2020 2001 136 (H) 70 - 130 mg/dL Final   03/21/2020 1540 127 70 - 130 mg/dL Final   03/21/2020 1048 108 70 - 130 mg/dL Final   03/21/2020 0536 115 70 - 130 mg/dL Final   03/20/2020 1953 109 70 - 130 mg/dL Final   03/20/2020 1217 115 70 - 130 mg/dL Final   03/20/2020 0754 116 70 - 130 mg/dL Final         aspirin 81 mg Oral Daily   enoxaparin 30 mg Subcutaneous Q24H   erythromycin base 250 mg Oral 4x Daily With Meals & Nightly   influenza vaccine 0.5 mL Intramuscular Once   mupirocin  Each Nare BID   pantoprazole 40 mg Oral Q AM   sennosides-docusate 2 tablet Oral Nightly   warfarin 3 mg Oral Daily       sodium chloride 30 mL/hr Last Rate: 30 mL/hr (03/19/20 1214)   sodium chloride 30 mL/hr Last Rate: Stopped (03/20/20 0801)   Diet Regular; Cardiac, Consistent Carbohydrate, Renal       Assessment/Plan     Active Hospital Problems    Diagnosis  POA   • **ESRD on hemodialysis (CMS/[facility]) [N18.6, Z99.2]  Not Applicable   • History of mitral valve replacement with  mechanical valve [Z95.2]  Not Applicable   • NSVT (nonsustained ventricular tachycardia) (CMS/[facility]) [I47.2]  Unknown   • Hypocalcemia [E83.51]  Unknown   • Anemia of chronic disease [D63.8]  Unknown   • Systolic CHF, acute (CMS/[facility]) [I50.21]  Unknown   • Anasarca associated with disorder of kidney [N04.9]  Yes   • Diabetes mellitus (CMS/[facility]) [E11.9]  Unknown   • Cellulitis of left leg [L03.116]  Unknown   • Essential hypertension [I10]  Yes   • Nonrheumatic mitral valve regurgitation, severe [I34.0]  Yes      Resolved Hospital Problems   No resolved problems to display.       Assessment and plan:  1.  Acute systolic dysfunction with ejection fraction of 33%, patient is currently compensated. Once blood pressure tolerates will consider beta-blockers and ACE inhibitor's.  2.  Status post mitral valve replacement with bioprosthetic valve, postop day 2, underwent tricuspid valve annuloplasty and PFO closure as well.  Continue with analgesics.  Have encouraged him to use incentive spirometry.  3.  Stage IV CKD which has progressed to end-stage renal disease, will continue with routine dialysis and appreciate nephrology input.  4.  Left leg cellulitis, erythema has been improving and will continue with cefazolin.  5.  Hyperglycemia, blood sugars are reasonably well controlled.  Hemoglobin A1c level will be checked.  Most likely has prediabetes.  6.  Nonsustained V. tach, continue monitoring electrolytes closely.    Calvin Mchugh MD  [facility]  03/22/20  17:02

## 2020-03-22 NOTE — PROGRESS NOTES
"   LOS: 10 days    Patient Care Team:  Provider, No Known as PCP - General    Chief Complaint:    Chief Complaint   Patient presents with   • Edema     Follow-up acute kidney injury on chronic kidney disease  Subjective     Interval History:   [facility]   Breathing is comfortable on room air; appetite is good; no N/V; feels less puffy      Review of Systems:   As noted above    Objective     Vital Signs  Temp:  [97.6 °F (36.4 °C)-98.1 °F (36.7 °C)] 97.6 °F (36.4 °C)  Heart Rate:  [59-86] 66  Resp:  [16-18] 16  BP: ()/(57-93) 104/57    Flowsheet Rows      First Filed Value   Admission Height  180.3 cm (71\") Documented at 03/12/2020 1350   Admission Weight  136 kg (300 lb) Documented at 03/12/2020 1350          No intake/output data recorded.  I/O last 3 completed shifts:  In: 1290 [P.O.:1290]  Out: 3203 [Urine:645; Chest Tube:2558]    Intake/Output Summary (Last 24 hours) at 3/22/2020 1002  Last data filed at 3/22/2020 0645  Gross per 24 hour   Intake 900 ml   Output 1418 ml   Net -518 ml       Physical Exam:  General Appearance: chronically ill-appearing; awake, conversant, appropriate  Skin: warm and dry  HEENT: MMM, nonicteric sclerae  Neck: supple, no JVD, trachea midline. RIJ TDC  Lungs: Bibasilar rhonchi, unlabored on room air; chest tubes in place  Heart: RRR, no S3, +rub  Abdomen: soft, non-tender, +bs; body wall edema distended.  :  scrotal and penile edema  Extremities:  3+ LE edema bilaterally; chronic venous stasis changes  Neuro: awake; moving all extremities        Results Review:    Results from last 7 days   Lab Units 03/22/20  0345 03/21/20  0357 03/20/20  0213  03/18/20  0551   SODIUM mmol/L 133* 134* 140   < > 135*   POTASSIUM mmol/L 5.1 4.6 4.4   < > 4.3   CHLORIDE mmol/L 100 101 104   < > 103   CO2 mmol/L 17.5* 20.4* 20.6*   < > 20.6*   BUN mg/dL 40* 32* 43*   < > 57*   CREATININE mg/dL 5.31* 4.17* 4.84*   < > 5.68*   CALCIUM mg/dL 7.5* 7.8* 7.3*  "  < > 7.4*   BILIRUBIN mg/dL  --   --   --   --  0.3   ALK PHOS U/L  --   --   --   --  64   ALT (SGPT) U/L  --   --   --   --  18   AST (SGOT) U/L  --   --   --   --  9   GLUCOSE mg/dL 100* 114* 143*   < > 113*    < > = values in this interval not displayed.       Estimated Creatinine Clearance: 23.2 mL/min (A) (by C-G formula based on SCr of 5.31 mg/dL (H)).    Results from last 7 days   Lab Units 03/20/20  0213 03/19/20  1514 03/19/20  1132   MAGNESIUM mg/dL 1.9 1.8 1.9   PHOSPHORUS mg/dL 5.2* 3.9 3.8       Results from last 7 days   Lab Units 03/16/20  0528   URIC ACID mg/dL 10.2*       Results from last 7 days   Lab Units 03/22/20  0403 03/21/20  0357 03/20/20  0210 03/19/20  1514 03/19/20  1132   WBC 10*3/mm3 11.94* 14.37* 12.95* 8.93 8.97   HEMOGLOBIN g/dL 9.8* 9.9* 8.9* 9.7* 9.1*   PLATELETS 10*3/mm3 144 131* 123* 119* 106*       Results from last 7 days   Lab Units 03/22/20  0403 03/20/20  0210 03/19/20  1132 03/18/20  0550   INR  1.31* 1.37* 1.58* 1.28*         Imaging Results (Last 24 Hours)     ** No results found for the last 24 hours. **          aspirin 81 mg Oral Daily   enoxaparin 30 mg Subcutaneous Q24H   erythromycin base 250 mg Oral 4x Daily With Meals & Nightly   influenza vaccine 0.5 mL Intramuscular Once   mupirocin  Each Nare BID   pantoprazole 40 mg Oral Q AM   sennosides-docusate 2 tablet Oral Nightly   warfarin 3 mg Oral Daily       sodium chloride 30 mL/hr Last Rate: 30 mL/hr (03/19/20 1214)   sodium chloride 30 mL/hr Last Rate: Stopped (03/20/20 0801)       Medication Review:   Current Facility-Administered Medications   Medication Dose Route Frequency Provider Last Rate Last Dose   • acetaminophen (TYLENOL) tablet 650 mg  650 mg Oral Q4H PRN Jr Ze Frye MD   650 mg at 03/20/20 1114    Or   • acetaminophen (TYLENOL) 160 MG/5ML solution 650 mg  650 mg Oral Q4H PRN Jr Ze Frye MD        Or   • acetaminophen (TYLENOL) suppository 650 mg  650 mg Rectal Q4H PRN Jr Melva  Ze ORTIZ MD       • ALPRAZolam (XANAX) tablet 0.25 mg  0.25 mg Oral Q8H PRN Jr Ze Frye MD   0.25 mg at 03/20/20 1743   • aspirin EC tablet 81 mg  81 mg Oral Daily Jr Ze Frye MD   81 mg at 03/22/20 0804   • bisacodyl (DULCOLAX) suppository 10 mg  10 mg Rectal Daily PRN Jr Ze Frye MD       • cyclobenzaprine (FLEXERIL) tablet 10 mg  10 mg Oral Q8H PRN Jr Ze Frye MD       • docusate sodium (COLACE) capsule 100 mg  100 mg Oral BID PRN Jr Ze Frye MD       • enoxaparin (LOVENOX) syringe 30 mg  30 mg Subcutaneous Q24H Jr Ze Frye MD   30 mg at 03/21/20 1841   • erythromycin base (E-MYCIN) tablet 250 mg  250 mg Oral 4x Daily With Meals & Nightly Jackeline Cyr APRN   250 mg at 03/22/20 0804   • heparin (porcine) injection 3,800 Units  3,800 Units Intracatheter PRN Stephen Eng MD   3,800 Units at 03/20/20 1419   • HYDROcodone-acetaminophen (NORCO) 5-325 MG per tablet 1 tablet  1 tablet Oral Q4H PRN Jackeline Cyr APRN       • influenza vac split quad (FLUZONE,FLUARIX,AFLURIA) injection 0.5 mL  0.5 mL Intramuscular Once Jr Ze Frye MD       • morphine injection 1 mg  1 mg Intravenous Q4H PRN Jr Ze Frye MD        And   • naloxone (NARCAN) injection 0.4 mg  0.4 mg Intravenous Q5 Min PRN Jr Ze Frye MD       • mupirocin (BACTROBAN) 2 % nasal ointment   Each Nare BID Jr Ze Frye MD   1 application at 03/22/20 0804   • ondansetron (ZOFRAN) injection 4 mg  4 mg Intravenous Q6H PRN Jr Ze Frye MD   4 mg at 03/21/20 0929   • pantoprazole (PROTONIX) EC tablet 40 mg  40 mg Oral Q AM Jr Ze Frye MD   40 mg at 03/22/20 0627   • polyethylene glycol (MIRALAX) powder 17 g  17 g Oral Daily PRN Jr Ze Frye MD       • potassium chloride (MICRO-K) CR capsule 40 mEq  40 mEq Oral PRN Jr Ze Frye MD        Or   • potassium chloride (KLOR-CON) packet 40 mEq  40 mEq Oral PRN Jr Melva  Ze ORTIZ MD       • potassium chloride 10 mEq in 100 mL IVPB  10 mEq Intravenous Q1H PRN Jr Ze Frye MD        Or   • potassium chloride 10 mEq in 100 mL IVPB  10 mEq Intravenous Q1H PRN Jr Ze Frye MD       • promethazine (PHENERGAN) tablet 12.5 mg  12.5 mg Oral Q6H PRN Jr Ze Frye MD        Or   • promethazine (PHENERGAN) injection 12.5 mg  12.5 mg Intravenous Q6H PRN Jr Ze Frye MD   12.5 mg at 03/20/20 1758   • sennosides-docusate (PERICOLACE) 8.6-50 MG per tablet 2 tablet  2 tablet Oral Nightly Jr Ze Frye MD   2 tablet at 03/21/20 2004   • sodium chloride 0.9 % flush 30 mL  30 mL Intravenous Once PRN Jr Ze Frye MD       • sodium chloride 0.9 % infusion  30 mL/hr Intravenous Continuous Jr Ze Frye MD 30 mL/hr at 03/19/20 1214 30 mL/hr at 03/19/20 1214   • sodium chloride 0.9 % infusion  30 mL/hr Intravenous Continuous PRN Jr Ze Frye MD   Stopped at 03/20/20 0801   • traMADol (ULTRAM) tablet 50 mg  50 mg Oral Q12H PRN Jr Ze Frye MD   50 mg at 03/20/20 1743   • warfarin (COUMADIN) tablet 3 mg  3 mg Oral Daily Meggan Hinton MD           Assessment/Plan   1.  DESHAWN on CKD4-5 with progression to ESRD: refractory fluid overload, improving; high-normal K; has had 3 HD treatments so far  2.  Cellulitis of left lower extremity  3.  Severe MR and moderate TR.  Systolic non-ischemic cardiomyopathy EF 33%:  had [facility] MVR, TV repair, and closure of PFO on 3/19   4.  Anasarca and fluid excess due to right-sided heart failure and ESRD: slowly better  5.  Hypertension, with hypotension presently: Responding well to dopamine  6.  Medical noncompliance.  7.  DM2. Controlled .  8.  Anemia CKD.  Hg stable .  Iron deficient    Plan:  1.  iUF today for additional volume removal  2.  HD again tomorrow      Stephen Eng MD  03/22/20  10:02

## 2020-03-22 NOTE — PROGRESS NOTES
LOS: 10 days   Patient Care Team:  Provider, No Known as PCP - General    Chief Complaint: post op    Subjective:  Symptoms:  No shortness of breath or chest pain.    Diet:  No nausea.          Vital Signs  Temp:  [97.7 °F (36.5 °C)-98.1 °F (36.7 °C)] 98.1 °F (36.7 °C)  Heart Rate:  [59-86] 59  Resp:  [16-18] 16  BP: ()/(49-93) 119/67  Body mass index is 39.47 kg/m².    Intake/Output Summary (Last 24 hours) at 3/22/2020 0714  Last data filed at 3/22/2020 0645  Gross per 24 hour   Intake 900 ml   Output 1803 ml   Net -903 ml     No intake/output data recorded.    Chest tube drainage last 8 hours 390/450        03/19/20  0535 03/21/20  0557 03/22/20  0613   Weight: 136 kg (300 lb) 127 kg (280 lb) 128 kg (283 lb)         Objective    Results Review:        WBC WBC   Date Value Ref Range Status   03/22/2020 11.94 (H) 3.40 - 10.80 10*3/mm3 Final   03/21/2020 14.37 (H) 3.40 - 10.80 10*3/mm3 Final   03/20/2020 12.95 (H) 3.40 - 10.80 10*3/mm3 Final   03/19/2020 8.93 3.40 - 10.80 10*3/mm3 Final   03/19/2020 8.97 3.40 - 10.80 10*3/mm3 Final      HGB Hemoglobin   Date Value Ref Range Status   03/22/2020 9.8 (L) 13.0 - 17.7 g/dL Final   03/21/2020 9.9 (L) 13.0 - 17.7 g/dL Final   03/20/2020 8.9 (L) 13.0 - 17.7 g/dL Final   03/19/2020 9.7 (L) 13.0 - 17.7 g/dL Final   03/19/2020 9.1 (L) 13.0 - 17.7 g/dL Final   03/19/2020 8.8 (L) 12.0 - 17.0 g/dL Final   03/19/2020 7.8 (L) 12.0 - 17.0 g/dL Final   03/19/2020 8.8 (L) 12.0 - 17.0 g/dL Final   03/19/2020 8.8 (L) 12.0 - 17.0 g/dL Final   03/19/2020 10.5 (L) 12.0 - 17.0 g/dL Final      HCT Hematocrit   Date Value Ref Range Status   03/22/2020 32.1 (L) 37.5 - 51.0 % Final   03/21/2020 32.5 (L) 37.5 - 51.0 % Final   03/20/2020 29.6 (L) 37.5 - 51.0 % Final   03/19/2020 31.0 (L) 37.5 - 51.0 % Final   03/19/2020 30.0 (L) 37.5 - 51.0 % Final   03/19/2020 26 (L) 38 - 51 % Final   03/19/2020 23 (L) 38 - 51 % Final   03/19/2020 26 (L) 38 - 51 % Final   03/19/2020 26 (L) 38 - 51 % Final    03/19/2020 31 (L) 38 - 51 % Final      Platelets Platelets   Date Value Ref Range Status   03/22/2020 144 140 - 450 10*3/mm3 Final   03/21/2020 131 (L) 140 - 450 10*3/mm3 Final   03/20/2020 123 (L) 140 - 450 10*3/mm3 Final   03/19/2020 119 (L) 140 - 450 10*3/mm3 Final   03/19/2020 106 (L) 140 - 450 10*3/mm3 Final        PT/INR:    Protime   Date Value Ref Range Status   03/22/2020 16.0 (H) 11.7 - 14.2 Seconds Final   03/20/2020 16.5 (H) 11.7 - 14.2 Seconds Final   03/19/2020 18.6 (H) 11.7 - 14.2 Seconds Final   /  INR   Date Value Ref Range Status   03/22/2020 1.31 (H) 0.90 - 1.10 Final   03/20/2020 1.37 (H) 0.90 - 1.10 Final   03/19/2020 1.58 (H) 0.90 - 1.10 Final       Sodium Sodium   Date Value Ref Range Status   03/22/2020 133 (L) 136 - 145 mmol/L Final   03/21/2020 134 (L) 136 - 145 mmol/L Final   03/20/2020 140 136 - 145 mmol/L Final   03/19/2020 139 136 - 145 mmol/L Final   03/19/2020 139 136 - 145 mmol/L Final      Potassium Potassium   Date Value Ref Range Status   03/22/2020 5.1 3.5 - 5.2 mmol/L Final   03/21/2020 4.6 3.5 - 5.2 mmol/L Final   03/20/2020 4.4 3.5 - 5.2 mmol/L Final   03/19/2020 3.9 3.5 - 5.2 mmol/L Final   03/19/2020 3.9 3.5 - 5.2 mmol/L Final      Chloride Chloride   Date Value Ref Range Status   03/22/2020 100 98 - 107 mmol/L Final   03/21/2020 101 98 - 107 mmol/L Final   03/20/2020 104 98 - 107 mmol/L Final   03/19/2020 103 98 - 107 mmol/L Final   03/19/2020 102 98 - 107 mmol/L Final      Bicarbonate CO2   Date Value Ref Range Status   03/22/2020 17.5 (L) 22.0 - 29.0 mmol/L Final   03/21/2020 20.4 (L) 22.0 - 29.0 mmol/L Final   03/20/2020 20.6 (L) 22.0 - 29.0 mmol/L Final   03/19/2020 21.4 (L) 22.0 - 29.0 mmol/L Final   03/19/2020 22.3 22.0 - 29.0 mmol/L Final      BUN BUN   Date Value Ref Range Status   03/22/2020 40 (H) 6 - 20 mg/dL Final   03/21/2020 32 (H) 6 - 20 mg/dL Final   03/20/2020 43 (H) 6 - 20 mg/dL Final   03/19/2020 38 (H) 6 - 20 mg/dL Final   03/19/2020 37 (H) 6 - 20  mg/dL Final      Creatinine Creatinine   Date Value Ref Range Status   03/22/2020 5.31 (H) 0.76 - 1.27 mg/dL Final   03/21/2020 4.17 (H) 0.76 - 1.27 mg/dL Final   03/20/2020 4.84 (H) 0.76 - 1.27 mg/dL Final   03/19/2020 4.38 (H) 0.76 - 1.27 mg/dL Final   03/19/2020 4.37 (H) 0.76 - 1.27 mg/dL Final      Calcium Calcium   Date Value Ref Range Status   03/22/2020 7.5 (L) 8.6 - 10.5 mg/dL Final   03/21/2020 7.8 (L) 8.6 - 10.5 mg/dL Final   03/20/2020 7.3 (L) 8.6 - 10.5 mg/dL Final   03/19/2020 6.9 (L) 8.6 - 10.5 mg/dL Final   03/19/2020 6.9 (L) 8.6 - 10.5 mg/dL Final      Magnesium Magnesium   Date Value Ref Range Status   03/20/2020 1.9 1.6 - 2.6 mg/dL Final   03/19/2020 1.8 1.6 - 2.6 mg/dL Final   03/19/2020 1.9 1.6 - 2.6 mg/dL Final            aspirin 81 mg Oral Daily   enoxaparin 30 mg Subcutaneous Q24H   erythromycin base 250 mg Oral 4x Daily With Meals & Nightly   influenza vaccine 0.5 mL Intramuscular Once   mupirocin  Each Nare BID   pantoprazole 40 mg Oral Q AM   sennosides-docusate 2 tablet Oral Nightly   warfarin 2 mg Oral Daily       DOPamine 3 mcg/kg/min Last Rate: Stopped (03/21/20 1300)   sodium chloride 30 mL/hr Last Rate: 30 mL/hr (03/19/20 1214)   sodium chloride 30 mL/hr Last Rate: Stopped (03/20/20 0801)           Patient Active Problem List   Diagnosis Code   • Nonrheumatic mitral valve regurgitation, severe I34.0   • Cigarette smoker F17.210   • Essential hypertension I10   • ESRD on hemodialysis (CMS/[facility]) N18.6, Z99.2   • Foot callus L84   • Anasarca associated with disorder of kidney N04.9   • Diabetes mellitus (CMS/[facility]) E11.9   • Cellulitis of left leg L03.116   • Systolic CHF, acute (CMS/[facility]) I50.21   • Anemia of chronic disease D63.8   • Hypocalcemia E83.51   • History of mitral valve replacement with mechanical valve Z95.2   • NSVT (nonsustained ventricular tachycardia) (CMS/[facility]) I47.2       Assessment & Plan    -Severe mitral incompetence, severe tricuspid incompetence--s/p MVR (mechanical),  TV repair, closure of PFO--POD#3 Melva  -Severe pulmonary hypertension-- intraoperatively PA pressures 70s-postop improved PA pressures 40s  -Dilated cardiomyopathy EF 30%  -ESRD on HD  -bilateral pleural effusions- intraoperatively 2L off right, 3L off left  -Anemia of chronic disease, post op anemia acutely worsened expected acute blood loss  -leukocytosis- probable reactive           Looks much better this morning.  Blood pressure improved.  Nausea improved.  He is in sinus rhythm, rate 70s, will add low dose coreg to see if he tolerates this.  Plans for dialysis today.  Mobilize.  INR 1.31 today, coumadin started yesterday. Will discuss with Dr. Frye about when to start heparin gtt  Chest tubes still with quite a bit of drainage   Would like to discontinue AV wires.  Continue routine care.         Jackeline Altamirano, APRN  03/22/20  07:14

## 2020-03-23 PROBLEM — I44.1 WENCKEBACH: Status: ACTIVE | Noted: 2020-01-01

## 2020-03-23 NOTE — PROGRESS NOTES
Adult Nutrition  Assessment/PES    Patient Name:  Nico King  YOB: 1970  MRN: [id number]  Admit Date:  3/12/2020    Assessment Date:  3/23/2020    Comments:  LOS >10 days - chart reviewed, intake/appetite good.     Reason for Assessment     Row Name 03/23/20 1034          Reason for Assessment    Reason For Assessment  other (see comments) LOS 11     Diagnosis  cardiac disease;renal disease;diabetes diagnosis/complications         Nutrition/Diet History     Row Name 03/23/20 1034          Nutrition/Diet History    Typical Food/Fluid Intake  Limited PO data in graphics. Reports appetite good.          Anthropometrics     Row Name 03/23/20 1035 03/23/20 0604       Anthropometrics    Weight  --  123 kg (272 lb 3.2 oz)       Admit Weight    Admit Weight  136 kg (300 lb)  --       Usual Body Weight (UBW)    Usual Body Weight  136 kg (300 lb)  --    Weight Loss Time Frame  fluid loss/HD  --       Body Mass Index (BMI)    BMI Assessment  BMI 40 or greater: obesity grade III  --        Labs/Tests/Procedures/Meds     Row Name 03/23/20 1036          Labs/Procedures/Meds    Lab Results Reviewed  reviewed     Lab Results Comments  Aic 6.5%        Diagnostic Tests/Procedures    Diagnostic Test/Procedure Reviewed  reviewed     Diagnostic Test/Procedures Comments  s/p MVR (mechanical), TV repair, closure of PFO--POD#4 [place]; HD; EP consult for heart block        Medications    Pertinent Medications Reviewed  reviewed         Physical Findings     Row Name 03/23/20 1036          Physical Findings    Overall Physical Appearance  obese           Nutrition Prescription Ordered     Row Name 03/23/20 1038          Nutrition Prescription PO    Common Modifiers  Cardiac;Consistent Carbohydrate;Renal         Evaluation of Received Nutrient/Fluid Intake     Row Name 03/23/20 1038          PO Evaluation    % PO Intake                 Problem/Interventions:  Problem 1     Row Name 03/23/20 1036           Nutrition Diagnoses Problem 1    Problem 1  Nutrition Appropriate for Condition at this Time     Etiology (related to)  Medical Diagnosis     Cardiac  CAD;HTN     Endocrine  DM     Renal  CKD;Hemodialysis               Intervention Goal     Row Name 03/23/20 1039          Intervention Goal    General  Maintain nutrition     PO  Maintain intake;PO intake (%)     PO Intake %  85 %     Weight  Appropriate weight loss         Nutrition Intervention     Row Name 03/23/20 1039          Nutrition Intervention    RD/Tech Action  Follow Tx progress;Care plan reviewd;Interview for preference;Encourage intake           Education/Evaluation     Row Name 03/23/20 1039          Education    Education  Will Instruct as appropriate        Monitor/Evaluation    Monitor  Per protocol           Electronically signed by:  Jackeline Burger RD  03/23/20 10:40

## 2020-03-23 NOTE — PROGRESS NOTES
His rhythm has been reviewed.  Per Dr. Morataya and Dr. Kirkland we can DC wires and continue with Coumadin.  I rather not give him heparin today if we do not have to because of the pleural effusions.

## 2020-03-23 NOTE — PROGRESS NOTES
Progress Note    Name: Nico King ADMIT: 3/12/2020   : 1970  PCP: Provider, No Known    MRN: [id number] LOS: 11 days   AGE/SEX: 49 y.o. male  ROOM: [facility]3/[facility]   Date of Encounter Visit: 20    Subjective:     Interval History: plan to remove poole cath and central line. Right chest tube still draining quite a bit. Having some intermittent wenckebach- cards aware.  HD today.     REVIEW OF SYSTEMS:   no fever or chills.  Typical post op chest pains. no palpitations. Edema worse today.    SOA improving. dry cough.   No nausea, vomiting or diarrhea. No abdominal pain. Had a BM yesterday.   Dizziness when sitting up on side of bed or ambulating.     Objective:   Temp:  [97.5 °F (36.4 °C)-98.7 °F (37.1 °C)] 98.1 °F (36.7 °C)  Heart Rate:  [54-81] 70  Resp:  [16] 16  BP: ()/(61-86) 134/81   SpO2:  [93 %-100 %] 96 %  on    Device (Oxygen Therapy): room air    Intake/Output Summary (Last 24 hours) at 3/23/2020 1406  Last data filed at 3/23/2020 1240  Gross per 24 hour   Intake 480 ml   Output 1315 ml   Net -835 ml     Body mass index is 37.96 kg/m².      20  0557 20  0613 20  0604   Weight: 127 kg (280 lb) 128 kg (283 lb) 123 kg (272 lb 3.2 oz)     Weight change: -4.899 kg (-10 lb 12.8 oz)    Physical Exam   Constitutional: No distress.   HENT:   Head: Atraumatic.   Nose: Nose normal.   Eyes: Conjunctivae are normal.   Cardiovascular: Normal rate, regular rhythm and intact distal pulses.   Chest incision drsg- CDI   Pulmonary/Chest: Effort normal. He has no wheezes. He has no rales.   Diminished bases.  Chest tubes in place with serosanginous drainage   Abdominal: Soft. He exhibits no distension. There is no tenderness.   Hypoactive bowel sounds   Musculoskeletal: He exhibits edema (2+BLE).   Skin: Skin is warm and dry. He is not diaphoretic. There is erythema (mild left shin- improved from friday).       Results Review:      Results from last 7 days   Lab Units 20  7030  03/22/20  0345 03/21/20  0357 03/20/20  0213 03/19/20  1514 03/19/20  1132 03/19/20  0516 03/18/20  0551   SODIUM mmol/L 133* 133* 134* 140 139 139 139 135*   POTASSIUM mmol/L 4.4 5.1 4.6 4.4 3.9 3.9 3.7 4.3   CHLORIDE mmol/L 99 100 101 104 103 102 102 103   CO2 mmol/L 20.2* 17.5* 20.4* 20.6* 21.4* 22.3 22.3 20.6*   BUN mg/dL 50* 40* 32* 43* 38* 37* 39* 57*   CREATININE mg/dL 6.45* 5.31* 4.17* 4.84* 4.38* 4.37* 4.35* 5.68*   GLUCOSE mg/dL 94 100* 114* 143* 113* 120* 104* 113*   CALCIUM mg/dL 7.6* 7.5* 7.8* 7.3* 6.9* 6.9* 7.5* 7.4*   AST (SGOT) U/L  --   --   --   --   --   --   --  9   ALT (SGPT) U/L  --   --   --   --   --   --   --  18     Estimated Creatinine Clearance: 18.7 mL/min (A) (by C-G formula based on SCr of 6.45 mg/dL (H)).  Results from last 7 days   Lab Units 03/17/20  0654   HEMOGLOBIN A1C % 6.50*     Results from last 7 days   Lab Units 03/23/20  1047 03/21/20 2001 03/21/20  1540 03/21/20  1048 03/21/20  0536 03/20/20  1953 03/20/20  1217 03/20/20  0754   GLUCOSE mg/dL 135* 136* 127 108 115 109 115 116         Results from last 7 days   Lab Units 03/18/20  0551   PROBNP pg/mL 57,642.0*     Results from last 7 days   Lab Units 03/20/20  0213   TSH uIU/mL 2.550     Results from last 7 days   Lab Units 03/23/20  0332 03/20/20  0213 03/19/20  1514 03/19/20  1132 03/19/20  0516   MAGNESIUM mg/dL 2.0 1.9 1.8 1.9 1.7     Results from last 7 days   Lab Units 03/18/20  0551   CHOLESTEROL mg/dL 120   TRIGLYCERIDES mg/dL 110   HDL CHOL mg/dL 30*     Results from last 7 days   Lab Units 03/23/20  0825 03/23/20  0332 03/22/20  0403 03/21/20  0357 03/20/20  0210 03/19/20  1514 03/19/20  1132   WBC 10*3/mm3 9.85 10.68 11.94* 14.37* 12.95* 8.93 8.97   HEMOGLOBIN g/dL 9.7* 9.5* 9.8* 9.9* 8.9* 9.7* 9.1*   HEMATOCRIT % 32.1* 29.8* 32.1* 32.5* 29.6* 31.0* 30.0*   PLATELETS 10*3/mm3 161 156 144 131* 123* 119* 106*   MCV fL 78.5* 77.0* 77.5* 77.4* 77.3* 76.5* 77.7*   MCH pg 23.7* 24.5* 23.7* 23.6* 23.2* 24.0* 23.6*      MCHC g/dL 30.2* 31.9 30.5* 30.5* 30.1* 31.3* 30.3*   RDW % 16.7* 17.1* 16.9* 16.6* 16.3* 16.7* 16.4*   RDW-SD fl 46.5 47.3 47.7 46.4 45.6 46.2 46.0   MPV fL 10.5 10.3 10.1 10.3 10.0 9.9 10.0   NEUTROPHIL % % 77.3* 74.2 76.2* 83.6* 88.1*  --  79.4*   LYMPHOCYTE % % 9.0* 10.6* 9.0* 6.3* 4.4*  --  10.6*   MONOCYTES % % 8.8 10.6 11.6 8.0 6.3  --  6.7   EOSINOPHIL % % 3.7 3.3 1.9 1.2 0.2*  --  1.8   BASOPHIL % % 0.7 0.7 0.7 0.5 0.3  --  0.6   IMM GRAN % % 0.5 0.6* 0.6* 0.4 0.7*  --  0.9*   NEUTROS ABS 10*3/mm3 7.61* 7.94* 9.10* 12.01* 11.42*  --  7.13*   LYMPHS ABS 10*3/mm3 0.89 1.13 1.07 0.91 0.57*  --  0.95   MONOS ABS 10*3/mm3 0.87 1.13* 1.39* 1.15* 0.81  --  0.60   EOS ABS 10*3/mm3 0.36 0.35 0.23 0.17 0.02  --  0.16   BASOS ABS 10*3/mm3 0.07 0.07 0.08 0.07 0.04  --  0.05   IMMATURE GRANS (ABS) 10*3/mm3 0.05 0.06* 0.07* 0.06* 0.09*  --  0.08*   NRBC /100 WBC 0.0 0.0 0.0 0.1 0.0  --  0.0     Results from last 7 days   Lab Units 03/23/20  0825 03/23/20  0332 03/22/20  0403 03/20/20  0210 03/19/20  1132 03/18/20  0550   INR  1.67* 1.60* 1.31* 1.37* 1.58* 1.28*   APTT seconds 44.9*  --   --   --  38.0* 39.3*     Results from last 7 days   Lab Units 03/19/20 2005 03/19/20  1533 03/19/20  1145 03/19/20  1009 03/19/20  0917 03/19/20  0847 03/19/20  0842  03/17/20  2300   PH, ARTERIAL pH units 7.322* 7.354 7.374 7.38 7.34* 7.33* 7.36   < > 7.418   PO2 ART mm Hg 114.7* 128.1* 608.6*  --   --   --   --   --  67.9*   PCO2, ARTERIAL mm Hg 46.9* 44.7 45.2*  --   --   --   --   --  33.4*   HCO3 ART mmol/L 24.3 24.9 26.4  --   --   --   --   --  21.6*    < > = values in this interval not displayed.                         Results from last 7 days   Lab Units 03/17/20  1355   NITRITE UA  Negative   WBC UA /HPF 3-5*   BACTERIA UA /HPF None Seen   SQUAM EPITHEL UA /HPF None Seen           Imaging:  Imaging Results (Last 24 Hours)     Procedure Component Value Units Date/Time    XR Chest 1 View [[id number]] Collected:  03/23/20 0611      Updated:  03/23/20 1324    Narrative:       XR CHEST 1 VW-     HISTORY: Male who is 49 years-old,  chest tube removal     TECHNIQUE: Frontal views of the chest     COMPARISON: 03/22/2020     FINDINGS: Mediastinal drain has been removed. Bilateral chest tubes  remain. Sternotomy wires, cardiac valve marker, stable appearing  right-sided central venous catheter noted. The heart is enlarged.  Pulmonary vasculature is unremarkable. Small likely atelectasis at the  lung bases. Suggestion of minimal, 2 mm right apical pneumothorax. No  pleural effusion, or left pneumothorax. No acute osseous process.       Impression:       Drain removal. Suggestion of minimal, 2 mm right apical  pneumothorax.     This report was finalized on 3/23/2020 1:21 PM by Dr. Michael Platt M.D.       XR Chest PA & Lateral [[id number]] Collected:  03/22/20 1614     Updated:  03/22/20 1618    Narrative:       TWO-VIEW CHEST     HISTORY: Recent cardiac surgery. Chest tubes.     FINDINGS: There are bilateral chest tubes and mediastinal tubes in place  and no evidence of pneumothorax. There is some minimal atelectasis at  the bases showing improvement from yesterday. The heart remains enlarged  with 2 prosthetic cardiac valves in place. A right-sided vascular  catheter ends near the junction of the SVC and right atrium without  change. There also appears to be a left subclavian catheter ending near  the junction of the innominate vein and SVC without change.     This report was finalized on 3/22/2020 4:15 PM by Dr. Ovi Maier M.D.             I reviewed the patient's new clinical results and medications.         Medication Review:   Scheduled Meds:    aspirin 81 mg Oral Daily   enoxaparin 30 mg Subcutaneous Q24H   erythromycin base 250 mg Oral 4x Daily With Meals & Nightly   influenza vaccine 0.5 mL Intramuscular Once   mupirocin  Each Nare BID   pantoprazole 40 mg Oral Q AM   sennosides-docusate 2 tablet Oral Nightly   warfarin 4 mg Oral  Daily     Continuous Infusions:    sodium chloride 30 mL/hr Last Rate: 30 mL/hr (03/19/20 1214)   sodium chloride 30 mL/hr Last Rate: Stopped (03/20/20 0801)     PRN Meds:.•  acetaminophen **OR** acetaminophen **OR** acetaminophen  •  ALPRAZolam  •  bisacodyl  •  cyclobenzaprine  •  docusate sodium  •  heparin (porcine)  •  HYDROcodone-acetaminophen  •  melatonin  •  Morphine **AND** naloxone  •  ondansetron  •  polyethylene glycol  •  potassium chloride **OR** potassium chloride  •  potassium chloride **OR** potassium chloride  •  promethazine **OR** promethazine  •  sodium chloride  •  sodium chloride  •  traMADol    Problem List:     Active Hospital Problems    Diagnosis  POA   • **ESRD on hemodialysis (CMS/[facility]) [N18.6, Z99.2]  Not Applicable   • History of mitral valve replacement with mechanical valve [Z95.2]  Not Applicable   • NSVT (nonsustained ventricular tachycardia) (CMS/[facility]) [I47.2]  Unknown   • Hypocalcemia [E83.51]  Unknown   • Anemia of chronic disease [D63.8]  Unknown   • Systolic CHF, acute (CMS/[facility]) [I50.21]  Unknown   • Anasarca associated with disorder of kidney [N04.9]  Yes   • Diabetes mellitus (CMS/[facility]) [E11.9]  Unknown   • Cellulitis of left leg [L03.116]  Unknown   • Essential hypertension [I10]  Yes   • Nonrheumatic mitral valve regurgitation, severe [I34.0]  Yes      Resolved Hospital Problems   No resolved problems to display.       Assessment and Plan:     Anasarca/ acute systolic CHF/ severe MR/ CAD/ mechanical MVR  -Echo showed EF reduced to 33% and severe MR and moderate TR  -minimal CAD on cath  -s/p mechanical MVR, PFO closure, TR repair on 3/19/20  -started coumadin on 3/21. Plan to start heparin bridge once AV wires removed, awaiting EP input.     DESHAWN/CKD4 now ESRD  -tunneled cath placed and HD started on 3/17/20   -vein mapping completed, will eventually need AV fistula once more stable after surgery.   -nephrology following, HD today  -ask CCP to help with setting up outpatient  HD     Cellulitis, left leg  -cellulitis vs dermatitis. Completed 5 day course of doxycycline preoperatively.    -repeat LLE doppler on 3/20 was negative.   -redness improved. Will monitor.     DM  -A1c 6.5. blood sugars well controlled.     Anemia  -Hgb holding stable  -iron low and s/p IV iron 3/18/20    NSVT/ wenckebach  - cardiology following. No further VT off amiodarone.   - electrolytes and TSH ok.    -still having intermittent episodes of wenckebach off coreg, awaiting EP input    Ambulate with PT.     VTE prophylaxis: SCDs  CODE status: full code  Disposition: TBD    I discussed the patients findings and my recommendations with patient and nursing staff.    Emily Yan, APRN  03/23/20

## 2020-03-23 NOTE — PROGRESS NOTES
"   LOS: 11 days    Patient Care Team:  Provider, No Known as PCP - General    Chief Complaint:    Chief Complaint   Patient presents with   • Edema     Follow-up acute kidney injury on chronic kidney disease  Subjective     Interval History:   S/p mech MVR, PFO closure, and TV repair on 3.19.20   Breathing is comfortable on room air; appetite is good; no N/V      Review of Systems:   As noted above    Objective     Vital Signs  Temp:  [97.5 °F (36.4 °C)-98.7 °F (37.1 °C)] 98.1 °F (36.7 °C)  Heart Rate:  [56-76] 63  Resp:  [16] 16  BP: ()/(61-86) 148/75    Flowsheet Rows      First Filed Value   Admission Height  180.3 cm (71\") Documented at 03/12/2020 1350   Admission Weight  136 kg (300 lb) Documented at 03/12/2020 1350          I/O this shift:  In: 480 [P.O.:480]  Out: 330 [Chest Tube:330]  I/O last 3 completed shifts:  In: 240 [P.O.:240]  Out: 1755 [Urine:670; Chest Tube:1085]    Intake/Output Summary (Last 24 hours) at 3/23/2020 1227  Last data filed at 3/23/2020 1200  Gross per 24 hour   Intake 480 ml   Output 1135 ml   Net -655 ml       Physical Exam:  General Appearance: chronically ill-appearing; awake, conversant, appropriate  Skin: warm and dry  HEENT: MMM, nonicteric sclerae  Neck: supple, no JVD, trachea midline. RIJ TDC  Lungs: Bibasilar rhonchi, unlabored on room air; chest tubes in place  Heart: RRR, no S3, +rub  Abdomen: soft, non-tender, +bs; body wall edema distended.  :  scrotal and penile edema  Extremities:  3+ LE edema bilaterally; chronic venous stasis changes  Neuro: awake; moving all extremities        Results Review:    Results from last 7 days   Lab Units 03/23/20  0332 03/22/20  0345 03/21/20  0357  03/18/20  0551   SODIUM mmol/L 133* 133* 134*   < > 135*   POTASSIUM mmol/L 4.4 5.1 4.6   < > 4.3   CHLORIDE mmol/L 99 100 101   < > 103   CO2 mmol/L 20.2* 17.5* 20.4*   < > 20.6*   BUN mg/dL 50* 40* 32*   < > 57*   CREATININE mg/dL 6.45* 5.31* 4.17*   < > 5.68*   CALCIUM mg/dL 7.6* " 7.5* 7.8*   < > 7.4*   BILIRUBIN mg/dL  --   --   --   --  0.3   ALK PHOS U/L  --   --   --   --  64   ALT (SGPT) U/L  --   --   --   --  18   AST (SGOT) U/L  --   --   --   --  9   GLUCOSE mg/dL 94 100* 114*   < > 113*    < > = values in this interval not displayed.       Estimated Creatinine Clearance: 18.7 mL/min (A) (by C-G formula based on SCr of 6.45 mg/dL (H)).    Results from last 7 days   Lab Units 03/23/20  0332 03/20/20  0213 03/19/20  1514   MAGNESIUM mg/dL 2.0 1.9 1.8   PHOSPHORUS mg/dL 5.3* 5.2* 3.9             Results from last 7 days   Lab Units 03/23/20  0825 03/23/20  0332 03/22/20  0403 03/21/20  0357 03/20/20  0210   WBC 10*3/mm3 9.85 10.68 11.94* 14.37* 12.95*   HEMOGLOBIN g/dL 9.7* 9.5* 9.8* 9.9* 8.9*   PLATELETS 10*3/mm3 161 156 144 131* 123*       Results from last 7 days   Lab Units 03/23/20  0825 03/23/20  0332 03/22/20  0403 03/20/20  0210 03/19/20  1132   INR  1.67* 1.60* 1.31* 1.37* 1.58*         Imaging Results (Last 24 Hours)     Procedure Component Value Units Date/Time    XR Chest PA & Lateral [[id number]] Collected:  03/22/20 1614     Updated:  03/22/20 1618    Narrative:       TWO-VIEW CHEST     HISTORY: Recent cardiac surgery. Chest tubes.     FINDINGS: There are bilateral chest tubes and mediastinal tubes in place  and no evidence of pneumothorax. There is some minimal atelectasis at  the bases showing improvement from yesterday. The heart remains enlarged  with 2 prosthetic cardiac valves in place. A right-sided vascular  catheter ends near the junction of the SVC and right atrium without  change. There also appears to be a left subclavian catheter ending near  the junction of the innominate vein and SVC without change.     This report was finalized on 3/22/2020 4:15 PM by Dr. Ovi Maier M.D.             aspirin 81 mg Oral Daily   enoxaparin 30 mg Subcutaneous Q24H   erythromycin base 250 mg Oral 4x Daily With Meals & Nightly   influenza vaccine 0.5 mL Intramuscular Once    mupirocin  Each Nare BID   pantoprazole 40 mg Oral Q AM   sennosides-docusate 2 tablet Oral Nightly   warfarin 4 mg Oral Daily       sodium chloride 30 mL/hr Last Rate: 30 mL/hr (03/19/20 1214)   sodium chloride 30 mL/hr Last Rate: Stopped (03/20/20 0801)       Medication Review:   Current Facility-Administered Medications   Medication Dose Route Frequency Provider Last Rate Last Dose   • acetaminophen (TYLENOL) tablet 650 mg  650 mg Oral Q4H PRN Jr Ze Frye MD   650 mg at 03/20/20 1114    Or   • acetaminophen (TYLENOL) 160 MG/5ML solution 650 mg  650 mg Oral Q4H PRN Jr Ze Frye MD        Or   • acetaminophen (TYLENOL) suppository 650 mg  650 mg Rectal Q4H PRN Jr Ze Frye MD       • ALPRAZolam (XANAX) tablet 0.25 mg  0.25 mg Oral Q8H PRN Jr Ze Frye MD   0.25 mg at 03/20/20 1743   • aspirin EC tablet 81 mg  81 mg Oral Daily Jr Ze Frye MD   81 mg at 03/23/20 0933   • bisacodyl (DULCOLAX) suppository 10 mg  10 mg Rectal Daily PRN Jr Ze Frye MD       • cyclobenzaprine (FLEXERIL) tablet 10 mg  10 mg Oral Q8H PRN Jr Ze Frye MD       • docusate sodium (COLACE) capsule 100 mg  100 mg Oral BID PRN Jr Ze Frye MD       • enoxaparin (LOVENOX) syringe 30 mg  30 mg Subcutaneous Q24H Niya Dobson APRN       • erythromycin base (E-MYCIN) tablet 250 mg  250 mg Oral 4x Daily With Meals & Nightly Jackeline Cyr APRN   250 mg at 03/23/20 0933   • heparin (porcine) injection 3,800 Units  3,800 Units Intracatheter PRN Stephen Eng MD   3,800 Units at 03/22/20 1352   • HYDROcodone-acetaminophen (NORCO) 5-325 MG per tablet 1 tablet  1 tablet Oral Q4H PRN Jackeline Cyr APRN   1 tablet at 03/23/20 1048   • influenza vac split quad (FLUZONE,FLUARIX,AFLURIA) injection 0.5 mL  0.5 mL Intramuscular Once Jr Ze Frye MD       • melatonin tablet 5 mg  5 mg Oral Nightly PRN Niya Dobson, APRN       • morphine  injection 1 mg  1 mg Intravenous Q4H PRN Jr Ze Frye MD        And   • naloxone (NARCAN) injection 0.4 mg  0.4 mg Intravenous Q5 Min PRN Jr Ze Frye MD       • mupirocin (BACTROBAN) 2 % nasal ointment   Each Nare BID Jr Ze Frye MD   1 application at 03/23/20 0933   • ondansetron (ZOFRAN) injection 4 mg  4 mg Intravenous Q6H PRN Jr Ze Frye MD   4 mg at 03/22/20 1958   • pantoprazole (PROTONIX) EC tablet 40 mg  40 mg Oral Q AM Jr Ze Frye MD   40 mg at 03/23/20 0611   • polyethylene glycol (MIRALAX) powder 17 g  17 g Oral Daily PRN Jr Ze Frye MD       • potassium chloride (MICRO-K) CR capsule 40 mEq  40 mEq Oral PRN Jr Ze Frye MD        Or   • potassium chloride (KLOR-CON) packet 40 mEq  40 mEq Oral PRN Jr Ze Frye MD       • potassium chloride 10 mEq in 100 mL IVPB  10 mEq Intravenous Q1H PRN Jr Ze Frye MD        Or   • potassium chloride 10 mEq in 100 mL IVPB  10 mEq Intravenous Q1H PRN Jr Ze Frye MD       • promethazine (PHENERGAN) tablet 12.5 mg  12.5 mg Oral Q6H PRN Jr Ze Frye MD        Or   • promethazine (PHENERGAN) injection 12.5 mg  12.5 mg Intravenous Q6H PRN Jr Ze Frye MD   12.5 mg at 03/23/20 0945   • sennosides-docusate (PERICOLACE) 8.6-50 MG per tablet 2 tablet  2 tablet Oral Nightly Jr Ze Frye MD   2 tablet at 03/22/20 2007   • sodium chloride 0.9 % flush 30 mL  30 mL Intravenous Once PRN Jr Ze Frye MD       • sodium chloride 0.9 % infusion  30 mL/hr Intravenous Continuous Jr Ze Frye MD 30 mL/hr at 03/19/20 1214 30 mL/hr at 03/19/20 1214   • sodium chloride 0.9 % infusion  30 mL/hr Intravenous Continuous PRN Jr Ze Frye MD   Stopped at 03/20/20 0801   • traMADol (ULTRAM) tablet 50 mg  50 mg Oral Q12H PRN Jr Ze Frye MD   50 mg at 03/20/20 1743   • warfarin (COUMADIN) tablet 4 mg  4 mg Oral Daily Jr Ze Frye MD            Assessment/Plan   1.  DESHAWN on CKD4-5 with progression to ESRD: refractory fluid overload, improving; has had 3 HD treatments so far  2.  Cellulitis of left lower extremity  3.  Severe MR and moderate TR.  Systolic non-ischemic cardiomyopathy EF 33%:  had [facility] MVR, TV repair, and closure of PFO on 3/19   4.  Anasarca and fluid excess due to right-sided heart failure and ESRD: slowly better  5.  Hypertension, with hypotension presently: Responding well to dopamine  6.  Medical noncompliance.  7.  DM2. Controlled .  8.  Anemia CKD.  Hg stable .  Iron deficient    Plan:  1.  HD again today  2. Probably UF only at AM as well  3. Surveillance labs      Padmaja Lee MD  03/23/20  12:27

## 2020-03-23 NOTE — NURSING NOTE
HD Cath Ports accessed.  Both blue and red ports draw and flush without resistance.  Dressing changed per protocol.    Below Hemodialysis completed, tolerated treatment.  Max BFR of 350 and UF Goal of 4000mL achieved without difficulty.    HD Cath ports locked with Heparin per protocol. Patient stable, no distress reported/observed.  Verbal report to primary RN.    Pre Vitals  Post Vitals  BP: 134/81  BP: 151/89  HR: 77  HR: 63  RR:  16   RR:  16  Weight: 123.47kg   Weight:  119.5kg    Total Fluid Removed:   4000mL    BVP:  70.6    Sierra Ace, RN  [facility]  [phone]    Duration of Treatment 4.0 Hours    Access Site Tunneled Dialysis Catheter    Dialyzer F180     mL/min    Dialysate Temperature (C) 36    BFR-As tolerated to a maximum of: 350 mL/min    Prime Dialyzer With NS to Priming Volume? No    Dialysate Solution Bath: K+ = 2 mEq, Ca = 3mEq    Bicarb 35 mEq    Na+ 138 mEq    Fluid Removal: 3-4 kg

## 2020-03-23 NOTE — PROGRESS NOTES
" LOS: 11 days   Patient Care Team:  Provider, No Known as PCP - General    Chief Complaint: post op    Subjective:  Symptoms:  No shortness of breath, cough or chest pain.    Diet:  Adequate intake.  No nausea or vomiting.    Activity level: Returning to normal.    Pain:  He complains of pain that is mild.  Pain is well controlled and requiring pain medication.      States that he feels good this morning sitting up in the chair for breakfast    Vital Signs  Temp:  [97.6 °F (36.4 °C)-98.7 °F (37.1 °C)] 98.3 °F (36.8 °C)  Heart Rate:  [54-76] 76  Resp:  [16] 16  BP: ()/(57-86) 137/86  Body mass index is 37.96 kg/m².    Intake/Output Summary (Last 24 hours) at 3/23/2020 0719  Last data filed at 3/23/2020 0604  Gross per 24 hour   Intake --   Output 975 ml   Net -975 ml     No intake/output data recorded.    Chest tube drainage last 8 hours: 120/30/0        03/21/20  0557 03/22/20  0613 03/23/20  0604   Weight: 127 kg (280 lb) 128 kg (283 lb) 123 kg (272 lb 3.2 oz)       Objective:  General Appearance:  Comfortable and in no acute distress.    Vital signs: (most recent): Blood pressure 137/86, pulse 76, temperature 98.3 °F (36.8 °C), temperature source Oral, resp. rate 16, height 180.3 cm (71\"), weight 123 kg (272 lb 3.2 oz), SpO2 93 %.  Vital signs are normal.  No fever.    Output: Producing urine.    Lungs:  Normal effort and normal respiratory rate.  There are decreased breath sounds.    Heart: Normal rate.  Regular rhythm.  (SR on tele monitor)  Abdomen: Abdomen is soft.  Bowel sounds are normal.     Extremities: There is dependent edema.  (+ 3 edema bilateral LE)  Pulses: Distal pulses are intact.  There are decreased pulses.    Neurological: Patient is alert and oriented to person, place and time.    Skin:  Warm and dry.  (Surgical dressing clean, dry, and intact)        Results Review:        WBC WBC   Date Value Ref Range Status   03/23/2020 10.68 3.40 - 10.80 10*3/mm3 Final   03/22/2020 11.94 (H) 3.40 - " 10.80 10*3/mm3 Final   03/21/2020 14.37 (H) 3.40 - 10.80 10*3/mm3 Final      HGB Hemoglobin   Date Value Ref Range Status   03/23/2020 9.5 (L) 13.0 - 17.7 g/dL Final   03/22/2020 9.8 (L) 13.0 - 17.7 g/dL Final   03/21/2020 9.9 (L) 13.0 - 17.7 g/dL Final      HCT Hematocrit   Date Value Ref Range Status   03/23/2020 29.8 (L) 37.5 - 51.0 % Final   03/22/2020 32.1 (L) 37.5 - 51.0 % Final   03/21/2020 32.5 (L) 37.5 - 51.0 % Final      Platelets Platelets   Date Value Ref Range Status   03/23/2020 156 140 - 450 10*3/mm3 Final   03/22/2020 144 140 - 450 10*3/mm3 Final   03/21/2020 131 (L) 140 - 450 10*3/mm3 Final        PT/INR:    Protime   Date Value Ref Range Status   03/23/2020 18.7 (H) 11.7 - 14.2 Seconds Final   03/22/2020 16.0 (H) 11.7 - 14.2 Seconds Final   /  INR   Date Value Ref Range Status   03/23/2020 1.60 (H) 0.90 - 1.10 Final   03/22/2020 1.31 (H) 0.90 - 1.10 Final       Sodium Sodium   Date Value Ref Range Status   03/23/2020 133 (L) 136 - 145 mmol/L Final   03/22/2020 133 (L) 136 - 145 mmol/L Final   03/21/2020 134 (L) 136 - 145 mmol/L Final      Potassium Potassium   Date Value Ref Range Status   03/23/2020 4.4 3.5 - 5.2 mmol/L Final   03/22/2020 5.1 3.5 - 5.2 mmol/L Final   03/21/2020 4.6 3.5 - 5.2 mmol/L Final      Chloride Chloride   Date Value Ref Range Status   03/23/2020 99 98 - 107 mmol/L Final   03/22/2020 100 98 - 107 mmol/L Final   03/21/2020 101 98 - 107 mmol/L Final      Bicarbonate CO2   Date Value Ref Range Status   03/23/2020 20.2 (L) 22.0 - 29.0 mmol/L Final   03/22/2020 17.5 (L) 22.0 - 29.0 mmol/L Final   03/21/2020 20.4 (L) 22.0 - 29.0 mmol/L Final      BUN BUN   Date Value Ref Range Status   03/23/2020 50 (H) 6 - 20 mg/dL Final   03/22/2020 40 (H) 6 - 20 mg/dL Final   03/21/2020 32 (H) 6 - 20 mg/dL Final      Creatinine Creatinine   Date Value Ref Range Status   03/23/2020 6.45 (H) 0.76 - 1.27 mg/dL Final   03/22/2020 5.31 (H) 0.76 - 1.27 mg/dL Final   03/21/2020 4.17 (H) 0.76 - 1.27  mg/dL Final      Calcium Calcium   Date Value Ref Range Status   03/23/2020 7.6 (L) 8.6 - 10.5 mg/dL Final   03/22/2020 7.5 (L) 8.6 - 10.5 mg/dL Final   03/21/2020 7.8 (L) 8.6 - 10.5 mg/dL Final      Magnesium Magnesium   Date Value Ref Range Status   03/23/2020 2.0 1.6 - 2.6 mg/dL Final            aspirin 81 mg Oral Daily   enoxaparin 30 mg Subcutaneous Q24H   erythromycin base 250 mg Oral 4x Daily With Meals & Nightly   influenza vaccine 0.5 mL Intramuscular Once   mupirocin  Each Nare BID   pantoprazole 40 mg Oral Q AM   sennosides-docusate 2 tablet Oral Nightly   warfarin 3 mg Oral Daily       sodium chloride 30 mL/hr Last Rate: 30 mL/hr (03/19/20 1214)   sodium chloride 30 mL/hr Last Rate: Stopped (03/20/20 0801)           Patient Active Problem List   Diagnosis Code   • Nonrheumatic mitral valve regurgitation, severe I34.0   • Cigarette smoker F17.210   • Essential hypertension I10   • ESRD on hemodialysis (CMS/[facility]) N18.6, Z99.2   • Foot callus L84   • Anasarca associated with disorder of kidney N04.9   • Diabetes mellitus (CMS/[facility]) E11.9   • Cellulitis of left leg L03.116   • Systolic CHF, acute (CMS/[facility]) I50.21   • Anemia of chronic disease D63.8   • Hypocalcemia E83.51   • History of mitral valve replacement with mechanical valve Z95.2   • NSVT (nonsustained ventricular tachycardia) (CMS/[facility]) I47.2       Assessment & Plan   -Severe mitral incompetence, severe tricuspid incompetence--s/p MVR (mechanical), TV repair, closure of PFO--POD#4 Melva  -Severe pulmonary hypertension-- intraoperatively PA pressures 70s-postop improved PA pressures 40s  -Dilated cardiomyopathy EF 30%  -ESRD on HD  - left leg cellulitis  -bilateral pleural effusions- intraoperatively 2L off right, 3L off left  -Anemia of chronic disease, post op anemia acutely worsened expected acute blood loss  -leukocytosis- probable reactive; trending down     Looks good this morning  Weaned to RA and tolerating  He appears in sinus rhythm,  nursing reports intermittent Wenckebach overnight; continue to hold BB  Discussed with Dr. Frye--EP to see if okay with them--will discontinue AV wires  INR 1.60 today, coumadin started over the weekend--will give 4 mg tonight; will start heparin gtt 2 hours after wire removal  Mobilize  Creatinine increased this morning--HD yesterday, plan for HD again today  Pleural chest tubes still with quite a bit of drainage---will reassess after ambulation  Discontinue subclavian central line and zulema Dobson, APRN  03/23/20  07:19

## 2020-03-23 NOTE — CONSULTS
Met with patient, discussed benefits of cardiac rehab. Provided phase II information along with  the contact information for cardiac rehab here at [facility].    Explained if receiving home health would not be able to attend cardiac rehab until finished with home health. Instructed to bring a copy of After Visit Summary to initial assessment. Explained to patient due to Covid-19, program is temporary suspended. Will call patient when program reopens,

## 2020-03-23 NOTE — PAYOR COMM NOTE
"Nico Washington (49 y.o. Male)     PLEASE SEE ATTACHED CLINICAL REVIEW.     REF#[id number]    PLEASE CALL  OR  [phone] WITH INPT CONTINUED STAY AUTH AND DAYS.     THANK YOU    AUSTIN WALKER LPN CCP    Date of Birth Social Security Number Address Home Phone MRN    1970  [address] [place] [address] [phone] [id number]    Christianity Marital Status          None Single       Admission Date Admission Type Admitting Provider Attending Provider Department, Room/Bed    3/12/20 Emergency Mark Pal MD Richards, Stephen J, MD [facility] CARDIOVASC UNIT, 2223/1    Discharge Date Discharge Disposition Discharge Destination                       Attending Provider:  Bao Bettencourt MD    Allergies:  No Known Allergies    Isolation:  None   Infection:  None   Code Status:  CPR    Ht:  180.3 cm (71\")   Wt:  123 kg (272 lb 3.2 oz)    Admission Cmt:  None   Principal Problem:  ESRD on hemodialysis (CMS/[facility]) [N18.6,Z99.2]                 Active Insurance as of 3/12/2020     Primary Coverage     Payor Plan Insurance Group Employer/Plan Group    [employer]HE[employer] BLUE CROSS [facility] [facility] PPO [id number]     Payor Plan Address Payor Plan Phone Number Payor Plan Fax Number Effective Dates    [id number] [phone]  8/1/2018 - None Entered    [facility] [id number]       Subscriber Name Subscriber Birth Date Member ID       NICO WASHINGTON 1970 [id number]                 Emergency Contacts      (Rel.) Home Phone Work Phone Mobile Phone    Eleazar Washington (Father) [phone] -- --            Oxygen Therapy (last 3 days)     Date/Time   SpO2   Device (Oxygen Therapy)   Flow (L/min)   Oxygen Concentration (%)   ETCO2 (mmHg)    03/23/20 0813   95   room air   --   --   --    03/23/20 0559   93   room air   --   --   --    03/23/20 0013   96   room air   --   --   --    03/22/20 2100   100   room air   --   --   --    " 03/22/20 1810   96   room air   --   --   --    03/22/20 1400   97   --   --   --   --    03/22/20 1345   97   --   --   --   --    03/22/20 1330   95   --   --   --   --    03/22/20 1315   94   --   --   --   --    03/22/20 1300   97   --   --   --   --    03/22/20 1245   96   --   --   --   --    03/22/20 1230   97   --   --   --   --    03/22/20 1215   96   --   --   --   --    03/22/20 1200   95   room air   --   --   --    03/22/20 1145   96   --   --   --   --    03/22/20 1130   96   --   --   --   --    03/22/20 1115   95   --   --   --   --    03/22/20 1100   97   --   --   --   --    03/22/20 1045   97   --   --   --   --    03/22/20 1030   97   --   --   --   --    03/22/20 1025   96   --   --   --   --    03/22/20 0955   96   --   --   --   --    03/22/20 0800   93   room air   --   --   --    03/22/20 0321   92   room air   --   --   --    03/22/20 0003   --   room air   --   --   --    03/21/20 2004   --   room air   --   --   --    03/21/20 1940   96   room air   --   --   --    03/21/20 1830   94   --   --   --   --    03/21/20 1800   95   --   --   --   --    03/21/20 1715   96   --   --   --   --    03/21/20 1700   97   --   --   --   --    03/21/20 1645   97   --   --   --   --    03/21/20 1630   96   --   --   --   --    03/21/20 1615   96   --   --   --   --    03/21/20 1600   97   room air   --   --   --    03/21/20 1545   96   --   --   --   --    03/21/20 1530   95   --   --   --   --    03/21/20 1515   96   --   --   --   --    03/21/20 1500   97   --   --   --   --    03/21/20 1445   95   --   --   --   --    03/21/20 1430   96   --   --   --   --    03/21/20 1415   96   --   --   --   --    03/21/20 1400   95   --   --   --   --    03/21/20 1345   95   --   --   --   --    03/21/20 1330   96   --   --   --   --    03/21/20 1315   95   --   --   --   --    03/21/20 1300   93   --   --   --   --    03/21/20 1245   93   --   --   --   --    03/21/20 1230   93   --   --   --   --    03/21/20  1215   93   --   --   --   --    03/21/20 1200   93   --   --   --   --    03/21/20 1145   94   --   --   --   --    03/21/20 1130   96   room air   --   --   --    03/21/20 1115   92   --   --   --   --    03/21/20 1100   93   --   --   --   --    03/21/20 1045   92   --   --   --   --    03/21/20 0700   97   room air   --   --   --    03/21/20 0330   --   room air   --   --   --    03/21/20 0318   91   room air   --   --   --    03/20/20 2259   94   room air   --   --   --    03/20/20 2008   --   room air   --   --   --    03/20/20 1942   97   room air   --   --   --    03/20/20 1700   97   room air   --   --   --    03/20/20 1600   95   room air   --   --   --    03/20/20 1500   95   --   --   --   --    03/20/20 1450   97   --   --   --   --    03/20/20 1415   97   --   --   --   --    03/20/20 1401   95   --   --   --   --    03/20/20 1400   95   --   --   --   --    03/20/20 1355   96   --   --   --   --    03/20/20 1325   99   --   --   --   --    03/20/20 1300   99   --   --   --   --    03/20/20 1230   99   --   --   --   --    03/20/20 1202   98   --   --   --   --    03/20/20 1200   --   nasal cannula   1   --   --    03/20/20 1001   99   --   --   --   --    03/20/20 0930   99   --   --   --   --    03/20/20 0900   98   --   --   --   --    03/20/20 0800   98   nasal cannula   2   --   --    03/20/20 0757   98   --   --   --   --    03/20/20 0730   98   --   --   --   --    03/20/20 0723   98   --   --   --   --    03/20/20 0700   98   nasal cannula   2   --   --    03/20/20 0600   99   nasal cannula   2   --   --    03/20/20 0500   99   nasal cannula   2   --   --    03/20/20 0400   99   nasal cannula   2   --   --    03/20/20 0300   98   nasal cannula   2   --   --    03/20/20 0200   100   nasal cannula   2   --   --    03/20/20 0100   100   nasal cannula   4   --   --    03/20/20 0000   100   nasal cannula   4   --   --            Intake & Output (last 3 days)       03/20 0701 - 03/21 0700 03/21 0701  - 03/22 0700 03/22 0701 - 03/23 0700 03/23 0701 - 03/24 0700    P.O. 890 900      I.V. (mL/kg) 773 (6.1)       Blood        IV Piggyback        Total Intake(mL/kg) 1663 (13.1) 900 (7)      Urine (mL/kg/hr) 140 (0) 575 (0.2) 295 (0.1)     Other 2000       Stool   0     Blood        Chest Tube 2700 1228 680 330    Total Output 4840 1803 975 330    Net -3177 -903 -975 -330            Stool Unmeasured Occurrence   2 x          Lines, Drains & Airways    Active LDAs     Name:   Placement date:   Placement time:   Site:   Days:    CVC Double Lumen 03/19/20 Left Subclavian   03/19/20    0708 created via procedure documentation    Subclavian   4    Chest Tube 5 Mediastinal   03/19/20    1032    Mediastinal   4    Chest Tube Left Pleural   03/19/20    1030    Pleural   4    Chest Tube Right Pleural   03/19/20    1030    Pleural   4    Urethral Catheter Non-latex;Temperature probe 16 Fr.   03/19/20    0703     4    Y Chest Tube 3 and 4 Mediastinal 32 Fr. Mediastinal 32 Fr.   03/19/20    1031     4    Hemodialysis Cath Double   03/17/20    1055    Internal Jugular   6                     Physician Progress Notes (last 72 hours) (Notes from 03/20/20 1108 through 03/23/20 1108)      Niya Dobson APRN at 03/23/20 0718     Attestation signed by Jr Ze Frye MD at 03/23/20 0936    I have reviewed the documentation above and agree.    Ask EP cardiology about his second-degree heart block.  He needs a stronger dialysis to take off fluid.                     LOS: 11 days   Patient Care Team:  Provider, No Known as PCP - General    Chief Complaint: post op    Subjective:  Symptoms:  No shortness of breath, cough or chest pain.    Diet:  Adequate intake.  No nausea or vomiting.    Activity level: Returning to normal.    Pain:  He complains of pain that is mild.  Pain is well controlled and requiring pain medication.      States that he feels good this morning sitting up in the chair for breakfast    Vital Signs  Temp:  " [97.6 °F (36.4 °C)-98.7 °F (37.1 °C)] 98.3 °F (36.8 °C)  Heart Rate:  [54-76] 76  Resp:  [16] 16  BP: ()/(57-86) 137/86  Body mass index is 37.96 kg/m².    Intake/Output Summary (Last 24 hours) at 3/23/2020 0719  Last data filed at 3/23/2020 0604  Gross per 24 hour   Intake --   Output 975 ml   Net -975 ml     No intake/output data recorded.    Chest tube drainage last 8 hours: 120/30/0        03/21/20  0557 03/22/20  0613 03/23/20  0604   Weight: 127 kg (280 lb) 128 kg (283 lb) 123 kg (272 lb 3.2 oz)       Objective:  General Appearance:  Comfortable and in no acute distress.    Vital signs: (most recent): Blood pressure 137/86, pulse 76, temperature 98.3 °F (36.8 °C), temperature source Oral, resp. rate 16, height 180.3 cm (71\"), weight 123 kg (272 lb 3.2 oz), SpO2 93 %.  Vital signs are normal.  No fever.    Output: Producing urine.    Lungs:  Normal effort and normal respiratory rate.  There are decreased breath sounds.    Heart: Normal rate.  Regular rhythm.  (SR on tele monitor)  Abdomen: Abdomen is soft.  Bowel sounds are normal.     Extremities: There is dependent edema.  (+ 3 edema bilateral LE)  Pulses: Distal pulses are intact.  There are decreased pulses.    Neurological: Patient is alert and oriented to person, place and time.    Skin:  Warm and dry.  (Surgical dressing clean, dry, and intact)        Results Review:        WBC WBC   Date Value Ref Range Status   03/23/2020 10.68 3.40 - 10.80 10*3/mm3 Final   03/22/2020 11.94 (H) 3.40 - 10.80 10*3/mm3 Final   03/21/2020 14.37 (H) 3.40 - 10.80 10*3/mm3 Final      HGB Hemoglobin   Date Value Ref Range Status   03/23/2020 9.5 (L) 13.0 - 17.7 g/dL Final   03/22/2020 9.8 (L) 13.0 - 17.7 g/dL Final   03/21/2020 9.9 (L) 13.0 - 17.7 g/dL Final      HCT Hematocrit   Date Value Ref Range Status   03/23/2020 29.8 (L) 37.5 - 51.0 % Final   03/22/2020 32.1 (L) 37.5 - 51.0 % Final   03/21/2020 32.5 (L) 37.5 - 51.0 % Final      Platelets Platelets   Date Value " Ref Range Status   03/23/2020 156 140 - 450 10*3/mm3 Final   03/22/2020 144 140 - 450 10*3/mm3 Final   03/21/2020 131 (L) 140 - 450 10*3/mm3 Final        PT/INR:    Protime   Date Value Ref Range Status   03/23/2020 18.7 (H) 11.7 - 14.2 Seconds Final   03/22/2020 16.0 (H) 11.7 - 14.2 Seconds Final   /  INR   Date Value Ref Range Status   03/23/2020 1.60 (H) 0.90 - 1.10 Final   03/22/2020 1.31 (H) 0.90 - 1.10 Final       Sodium Sodium   Date Value Ref Range Status   03/23/2020 133 (L) 136 - 145 mmol/L Final   03/22/2020 133 (L) 136 - 145 mmol/L Final   03/21/2020 134 (L) 136 - 145 mmol/L Final      Potassium Potassium   Date Value Ref Range Status   03/23/2020 4.4 3.5 - 5.2 mmol/L Final   03/22/2020 5.1 3.5 - 5.2 mmol/L Final   03/21/2020 4.6 3.5 - 5.2 mmol/L Final      Chloride Chloride   Date Value Ref Range Status   03/23/2020 99 98 - 107 mmol/L Final   03/22/2020 100 98 - 107 mmol/L Final   03/21/2020 101 98 - 107 mmol/L Final      Bicarbonate CO2   Date Value Ref Range Status   03/23/2020 20.2 (L) 22.0 - 29.0 mmol/L Final   03/22/2020 17.5 (L) 22.0 - 29.0 mmol/L Final   03/21/2020 20.4 (L) 22.0 - 29.0 mmol/L Final      BUN BUN   Date Value Ref Range Status   03/23/2020 50 (H) 6 - 20 mg/dL Final   03/22/2020 40 (H) 6 - 20 mg/dL Final   03/21/2020 32 (H) 6 - 20 mg/dL Final      Creatinine Creatinine   Date Value Ref Range Status   03/23/2020 6.45 (H) 0.76 - 1.27 mg/dL Final   03/22/2020 5.31 (H) 0.76 - 1.27 mg/dL Final   03/21/2020 4.17 (H) 0.76 - 1.27 mg/dL Final      Calcium Calcium   Date Value Ref Range Status   03/23/2020 7.6 (L) 8.6 - 10.5 mg/dL Final   03/22/2020 7.5 (L) 8.6 - 10.5 mg/dL Final   03/21/2020 7.8 (L) 8.6 - 10.5 mg/dL Final      Magnesium Magnesium   Date Value Ref Range Status   03/23/2020 2.0 1.6 - 2.6 mg/dL Final            aspirin 81 mg Oral Daily   enoxaparin 30 mg Subcutaneous Q24H   erythromycin base 250 mg Oral 4x Daily With Meals & Nightly   influenza vaccine 0.5 mL Intramuscular Once    mupirocin  Each Nare BID   pantoprazole 40 mg Oral Q AM   sennosides-docusate 2 tablet Oral Nightly   warfarin 3 mg Oral Daily       sodium chloride 30 mL/hr Last Rate: 30 mL/hr (03/19/20 1214)   sodium chloride 30 mL/hr Last Rate: Stopped (03/20/20 0801)           Patient Active Problem List   Diagnosis Code   • Nonrheumatic mitral valve regurgitation, severe I34.0   • Cigarette smoker F17.210   • Essential hypertension I10   • ESRD on hemodialysis (CMS/HCC) N18.6, Z99.2   • Foot callus L84   • Anasarca associated with disorder of kidney N04.9   • Diabetes mellitus (CMS/[facility]) E11.9   • Cellulitis of left leg L03.116   • Systolic CHF, acute (CMS/HCC) I50.21   • Anemia of chronic disease D63.8   • Hypocalcemia E83.51   • History of mitral valve replacement with mechanical valve Z95.2   • NSVT (nonsustained ventricular tachycardia) (CMS/HCC) I47.2       Assessment & Plan   -Severe mitral incompetence, severe tricuspid incompetence--s/p MVR (mechanical), TV repair, closure of PFO--POD#4 Melva  -Severe pulmonary hypertension-- intraoperatively PA pressures 70s-postop improved PA pressures 40s  -Dilated cardiomyopathy EF 30%  -ESRD on HD  - left leg cellulitis  -bilateral pleural effusions- intraoperatively 2L off right, 3L off left  -Anemia of chronic disease, post op anemia acutely worsened expected acute blood loss  -leukocytosis- probable reactive; trending down     Looks good this morning  Weaned to RA and tolerating  He appears in sinus rhythm, nursing reports intermittent Wenckebach overnight; continue to hold BB  Discussed with Dr. Frye--EP to see if okay with them--will discontinue AV wires  INR 1.60 today, coumadin started over the weekend--will give 4 mg tonight; will start heparin gtt 2 hours after wire removal  Mobilize  Creatinine increased this morning--HD yesterday, plan for HD again today  Pleural chest tubes still with quite a bit of drainage---will reassess after ambulation  Discontinue  subclavian central line and poole    Niya Dobson, APRN  20  07:19    Electronically signed by Jr Ze Frye MD at 20 0936     Calvin Mchugh MD at 20 1705              Name: Nico King ADMIT: 3/12/2020   : 1970  PCP: Provider, No Known    MRN: [id number] LOS: 10 days   AGE/SEX: 49 y.o. male  ROOM: [facility]     Subjective   Subjective   Patient is lying in the bed and is in no major distress. Denies nausea, vomiting, abdominal pain, chest pain.      Objective   Objective   Vital Signs  Temp:  [97.6 °F (36.4 °C)-98.1 °F (36.7 °C)] 97.6 °F (36.4 °C)  Heart Rate:  [54-72] 63  Resp:  [16-17] 16  BP: ()/(57-78) 107/74  SpO2:  [92 %-97 %] 97 %  on   ;   Device (Oxygen Therapy): room air  Body mass index is 39.47 kg/m².  Physical Exam   Constitutional: He is oriented to person, place, and time. He appears well-developed and well-nourished.   HENT:   Head: Normocephalic and atraumatic.   Nose: Nose normal.   Eyes: Pupils are equal, round, and reactive to light. EOM are normal. No scleral icterus.   Neck: Neck supple. No JVD present.   Cardiovascular: Normal rate, regular rhythm, normal heart sounds and intact distal pulses.   Pulmonary/Chest: Effort normal and breath sounds normal. No respiratory distress.   Abdominal: Soft. Bowel sounds are normal. He exhibits no distension. There is no tenderness.   Musculoskeletal: Normal range of motion. He exhibits no edema.   Neurological: He is alert and oriented to person, place, and time. No cranial nerve deficit.   Skin: Skin is warm and dry.   Chest incision is dressed   Psychiatric: He has a normal mood and affect. His behavior is normal.       Results Review:       I reviewed the patient's new clinical results.  Results from last 7 days   Lab Units 20  0403 20  0357 20  0210 20  1514   WBC 10*3/mm3 11.94* 14.37* 12.95* 8.93   HEMOGLOBIN g/dL 9.8* 9.9* 8.9* 9.7*   PLATELETS 10*3/mm3 144  131* 123* 119*     Results from last 7 days   Lab Units 03/22/20  0345 03/21/20  0357 03/20/20 0213 03/19/20  1514   SODIUM mmol/L 133* 134* 140 139   POTASSIUM mmol/L 5.1 4.6 4.4 3.9   CHLORIDE mmol/L 100 101 104 103   CO2 mmol/L 17.5* 20.4* 20.6* 21.4*   BUN mg/dL 40* 32* 43* 38*   CREATININE mg/dL 5.31* 4.17* 4.84* 4.38*   GLUCOSE mg/dL 100* 114* 143* 113*   Estimated Creatinine Clearance: 23.2 mL/min (A) (by C-G formula based on SCr of 5.31 mg/dL (H)).  Results from last 7 days   Lab Units 03/20/20 0213 03/19/20  1514 03/19/20  1132 03/18/20  0551   ALBUMIN g/dL 2.50* 2.80* 2.40* 2.20*   BILIRUBIN mg/dL  --   --   --  0.3   ALK PHOS U/L  --   --   --  64   AST (SGOT) U/L  --   --   --  9   ALT (SGPT) U/L  --   --   --  18     Results from last 7 days   Lab Units 03/22/20 0345 03/21/20 0357 03/20/20 0213 03/19/20  1514 03/19/20  1132 03/19/20  0516 03/18/20  0551 03/17/20  0654   CALCIUM mg/dL 7.5* 7.8* 7.3* 6.9* 6.9* 7.5* 7.4* 7.3*   ALBUMIN g/dL  --   --  2.50* 2.80* 2.40*  --  2.20* 2.20*   MAGNESIUM mg/dL  --   --  1.9 1.8 1.9 1.7  --   --    PHOSPHORUS mg/dL  --   --  5.2* 3.9 3.8  --   --  6.4*       Glucose   Date/Time Value Ref Range Status   03/21/2020 2001 136 (H) 70 - 130 mg/dL Final   03/21/2020 1540 127 70 - 130 mg/dL Final   03/21/2020 1048 108 70 - 130 mg/dL Final   03/21/2020 0536 115 70 - 130 mg/dL Final   03/20/2020 1953 109 70 - 130 mg/dL Final   03/20/2020 1217 115 70 - 130 mg/dL Final   03/20/2020 0754 116 70 - 130 mg/dL Final         aspirin 81 mg Oral Daily   enoxaparin 30 mg Subcutaneous Q24H   erythromycin base 250 mg Oral 4x Daily With Meals & Nightly   influenza vaccine 0.5 mL Intramuscular Once   mupirocin  Each Nare BID   pantoprazole 40 mg Oral Q AM   sennosides-docusate 2 tablet Oral Nightly   warfarin 3 mg Oral Daily       sodium chloride 30 mL/hr Last Rate: 30 mL/hr (03/19/20 1214)   sodium chloride 30 mL/hr Last Rate: Stopped (03/20/20 0801)   Diet Regular; Cardiac,  Consistent Carbohydrate, Renal      Assessment/Plan     Active Hospital Problems    Diagnosis  POA   • **ESRD on hemodialysis (CMS/[facility]) [N18.6, Z99.2]  Not Applicable   • History of mitral valve replacement with mechanical valve [Z95.2]  Not Applicable   • NSVT (nonsustained ventricular tachycardia) (CMS/[facility]) [I47.2]  Unknown   • Hypocalcemia [E83.51]  Unknown   • Anemia of chronic disease [D63.8]  Unknown   • Systolic CHF, acute (CMS/[facility]) [I50.21]  Unknown   • Anasarca associated with disorder of kidney [N04.9]  Yes   • Diabetes mellitus (CMS/[facility]) [E11.9]  Unknown   • Cellulitis of left leg [L03.116]  Unknown   • Essential hypertension [I10]  Yes   • Nonrheumatic mitral valve regurgitation, severe [I34.0]  Yes      Resolved Hospital Problems   No resolved problems to display.       Assessment and plan:  1.  Acute systolic dysfunction with ejection fraction of 33%, patient is currently compensated. Once blood pressure tolerates will consider beta-blockers and ACE inhibitor's.  2.  Status post mitral valve replacement with bioprosthetic valve, postop day 2, underwent tricuspid valve annuloplasty and PFO closure as well.  Continue with analgesics.  Have encouraged him to use incentive spirometry.  3.  Stage IV CKD which has progressed to end-stage renal disease, will continue with routine dialysis and appreciate nephrology input.  4.  Left leg cellulitis, erythema has been improving and will continue with cefazolin.  5.  Hyperglycemia, blood sugars are reasonably well controlled.  Hemoglobin A1c level will be checked.  Most likely has prediabetes.  6.  Nonsustained V. tach, continue monitoring electrolytes closely.    Calvin Mchugh MD  [facility]  03/22/20  17:02          Electronically signed by Calvin Mchugh MD at 03/22/20 1703     Stephen Eng MD at 03/22/20 1002             LOS: 10 days    Patient Care Team:  Provider, No Known as PCP - General    Chief Complaint:   "  Chief Complaint   Patient presents with   • Edema     Follow-up acute kidney injury on chronic kidney disease  Subjective     Interval History:   [facility], PFO closure, and TV repair on 3.19.20   Breathing is comfortable on room air; appetite is good; no N/V; feels less puffy      Review of Systems:   As noted above    Objective     Vital Signs  Temp:  [97.6 °F (36.4 °C)-98.1 °F (36.7 °C)] 97.6 °F (36.4 °C)  Heart Rate:  [59-86] 66  Resp:  [16-18] 16  BP: ()/(57-93) 104/57    Flowsheet Rows      First Filed Value   Admission Height  180.3 cm (71\") Documented at 03/12/2020 1350   Admission Weight  136 kg (300 lb) Documented at 03/12/2020 1350          No intake/output data recorded.  I/O last 3 completed shifts:  In: 1290 [P.O.:1290]  Out: 3203 [Urine:645; Chest Tube:2558]    Intake/Output Summary (Last 24 hours) at 3/22/2020 1002  Last data filed at 3/22/2020 0645  Gross per 24 hour   Intake 900 ml   Output 1418 ml   Net -518 ml       Physical Exam:  General Appearance: chronically ill-appearing; awake, conversant, appropriate  Skin: warm and dry  HEENT: MMM, nonicteric sclerae  Neck: supple, no JVD, trachea midline. RIJ TDC  Lungs: Bibasilar rhonchi, unlabored on room air; chest tubes in place  Heart: RRR, no S3, +rub  Abdomen: soft, non-tender, +bs; body wall edema distended.  :  scrotal and penile edema  Extremities:  3+ LE edema bilaterally; chronic venous stasis changes  Neuro: awake; moving all extremities        Results Review:    Results from last 7 days   Lab Units 03/22/20  0345 03/21/20  0357 03/20/20  0213  03/18/20  0551   SODIUM mmol/L 133* 134* 140   < > 135*   POTASSIUM mmol/L 5.1 4.6 4.4   < > 4.3   CHLORIDE mmol/L 100 101 104   < > 103   CO2 mmol/L 17.5* 20.4* 20.6*   < > 20.6*   BUN mg/dL 40* 32* 43*   < > 57*   CREATININE mg/dL 5.31* 4.17* 4.84*   < > 5.68*   CALCIUM mg/dL 7.5* 7.8* 7.3*   < > 7.4*   BILIRUBIN mg/dL  --   --   --   --  0.3   ALK PHOS U/L  --   --   --   --  64   ALT " (SGPT) U/L  --   --   --   --  18   AST (SGOT) U/L  --   --   --   --  9   GLUCOSE mg/dL 100* 114* 143*   < > 113*    < > = values in this interval not displayed.       Estimated Creatinine Clearance: 23.2 mL/min (A) (by C-G formula based on SCr of 5.31 mg/dL (H)).    Results from last 7 days   Lab Units 03/20/20  0213 03/19/20  1514 03/19/20  1132   MAGNESIUM mg/dL 1.9 1.8 1.9   PHOSPHORUS mg/dL 5.2* 3.9 3.8       Results from last 7 days   Lab Units 03/16/20  0528   URIC ACID mg/dL 10.2*       Results from last 7 days   Lab Units 03/22/20  0403 03/21/20  0357 03/20/20  0210 03/19/20  1514 03/19/20  1132   WBC 10*3/mm3 11.94* 14.37* 12.95* 8.93 8.97   HEMOGLOBIN g/dL 9.8* 9.9* 8.9* 9.7* 9.1*   PLATELETS 10*3/mm3 144 131* 123* 119* 106*       Results from last 7 days   Lab Units 03/22/20  0403 03/20/20  0210 03/19/20  1132 03/18/20  0550   INR  1.31* 1.37* 1.58* 1.28*         Imaging Results (Last 24 Hours)     ** No results found for the last 24 hours. **          aspirin 81 mg Oral Daily   enoxaparin 30 mg Subcutaneous Q24H   erythromycin base 250 mg Oral 4x Daily With Meals & Nightly   influenza vaccine 0.5 mL Intramuscular Once   mupirocin  Each Nare BID   pantoprazole 40 mg Oral Q AM   sennosides-docusate 2 tablet Oral Nightly   warfarin 3 mg Oral Daily       sodium chloride 30 mL/hr Last Rate: 30 mL/hr (03/19/20 1214)   sodium chloride 30 mL/hr Last Rate: Stopped (03/20/20 0801)       Medication Review:   Current Facility-Administered Medications   Medication Dose Route Frequency Provider Last Rate Last Dose   • acetaminophen (TYLENOL) tablet 650 mg  650 mg Oral Q4H PRN Jr Ze Frye MD   650 mg at 03/20/20 1114    Or   • acetaminophen (TYLENOL) 160 MG/5ML solution 650 mg  650 mg Oral Q4H PRN Jr Ze Frye MD        Or   • acetaminophen (TYLENOL) suppository 650 mg  650 mg Rectal Q4H PRN Jr Ze Frye MD       • ALPRAZolam (XANAX) tablet 0.25 mg  0.25 mg Oral Q8H PRN Jr Ze Frye  MD ANGEL   0.25 mg at 03/20/20 1743   • aspirin EC tablet 81 mg  81 mg Oral Daily Jr Ze Frye MD   81 mg at 03/22/20 0804   • bisacodyl (DULCOLAX) suppository 10 mg  10 mg Rectal Daily PRN Jr Ze Frye MD       • cyclobenzaprine (FLEXERIL) tablet 10 mg  10 mg Oral Q8H PRN Jr Ze Frye MD       • docusate sodium (COLACE) capsule 100 mg  100 mg Oral BID PRN Jr Ze Frye MD       • enoxaparin (LOVENOX) syringe 30 mg  30 mg Subcutaneous Q24H Jr Ze Frye MD   30 mg at 03/21/20 1841   • erythromycin base (E-MYCIN) tablet 250 mg  250 mg Oral 4x Daily With Meals & Nightly Jackeline Cyr APRN   250 mg at 03/22/20 0804   • heparin (porcine) injection 3,800 Units  3,800 Units Intracatheter PRN Stephen Eng MD   3,800 Units at 03/20/20 1419   • HYDROcodone-acetaminophen (NORCO) 5-325 MG per tablet 1 tablet  1 tablet Oral Q4H PRN Jackeline Cyr APRN       • influenza vac split quad (FLUZONE,FLUARIX,AFLURIA) injection 0.5 mL  0.5 mL Intramuscular Once Jr Ze Frye MD       • morphine injection 1 mg  1 mg Intravenous Q4H PRN Jr Ze Frye MD        And   • naloxone (NARCAN) injection 0.4 mg  0.4 mg Intravenous Q5 Min PRN Jr Ze Frye MD       • mupirocin (BACTROBAN) 2 % nasal ointment   Each Nare BID Jr Ze Frye MD   1 application at 03/22/20 0804   • ondansetron (ZOFRAN) injection 4 mg  4 mg Intravenous Q6H PRN Jr Ze Frye MD   4 mg at 03/21/20 0929   • pantoprazole (PROTONIX) EC tablet 40 mg  40 mg Oral Q AM Jr Ze Frye MD   40 mg at 03/22/20 0627   • polyethylene glycol (MIRALAX) powder 17 g  17 g Oral Daily PRN Jr eZ Frye MD       • potassium chloride (MICRO-K) CR capsule 40 mEq  40 mEq Oral PRN Jr Ze Frye MD        Or   • potassium chloride (KLOR-CON) packet 40 mEq  40 mEq Oral PRN Jr Ze Frye MD       • potassium chloride 10 mEq in 100 mL IVPB  10 mEq Intravenous Q1H PRN  Jr Ze Frye MD        Or   • potassium chloride 10 mEq in 100 mL IVPB  10 mEq Intravenous Q1H PRN Jr Ze Frye MD       • promethazine (PHENERGAN) tablet 12.5 mg  12.5 mg Oral Q6H PRN Jr Ze Frye MD        Or   • promethazine (PHENERGAN) injection 12.5 mg  12.5 mg Intravenous Q6H PRN Jr Ze Frye MD   12.5 mg at 03/20/20 1758   • sennosides-docusate (PERICOLACE) 8.6-50 MG per tablet 2 tablet  2 tablet Oral Nightly Jr Ze Frye MD   2 tablet at 03/21/20 2004   • sodium chloride 0.9 % flush 30 mL  30 mL Intravenous Once PRN Jr Ze Frye MD       • sodium chloride 0.9 % infusion  30 mL/hr Intravenous Continuous Jr Ze Frye MD 30 mL/hr at 03/19/20 1214 30 mL/hr at 03/19/20 1214   • sodium chloride 0.9 % infusion  30 mL/hr Intravenous Continuous PRN Jr Ze Frye MD   Stopped at 03/20/20 0801   • traMADol (ULTRAM) tablet 50 mg  50 mg Oral Q12H PRN Jr Ze Frye MD   50 mg at 03/20/20 1743   • warfarin (COUMADIN) tablet 3 mg  3 mg Oral Daily Meggan Hinton MD           Assessment/Plan   1.  DESHAWN on CKD4-5 with progression to ESRD: refractory fluid overload, improving; high-normal K; has had 3 HD treatments so far  2.  Cellulitis of left lower extremity  3.  Severe MR and moderate TR.  Systolic non-ischemic cardiomyopathy EF 33%:  had [facility] MVR, TV repair, and closure of PFO on 3/19   4.  Anasarca and fluid excess due to right-sided heart failure and ESRD: slowly better  5.  Hypertension, with hypotension presently: Responding well to dopamine  6.  Medical noncompliance.  7.  DM2. Controlled .  8.  Anemia CKD.  Hg stable .  Iron deficient    Plan:  1.  iUF today for additional volume removal  2.  HD again tomorrow      Stephen Eng MD  03/22/20  10:02      Electronically signed by Stephen Eng MD at 03/22/20 1004     Meggan Hinton MD at 03/22/20 0802             LOS: 10 days   Patient Care Team:  Provider, Tomasa  Known as PCP - General    Chief Complaint:     F/u CABG    Interval History:     He has no chest pain, difficulty breathing, tachycardia, dizziness, nausea.  His chest is sore but overall he is feeling quite good.    Objective   Vital Signs  Temp:  [97.6 °F (36.4 °C)-98.1 °F (36.7 °C)] 97.6 °F (36.4 °C)  Heart Rate:  [59-86] 66  Resp:  [16-18] 16  BP: ()/(49-93) 104/57    Intake/Output Summary (Last 24 hours) at 3/22/2020 0841  Last data filed at 3/22/2020 0645  Gross per 24 hour   Intake 900 ml   Output 1563 ml   Net -663 ml       Comfortable NAD  PERRL, conjunctivae clear  Neck supple, no JVD or thyromegaly appreciated  S1/S2 RRR, no m/r/g  Lungs CTA B, normal effort  Abdomen S/NT/ND (+) BS, no HSM appreciated  Extremities warm, no clubbing, cyanosis, or edema  No visible or palpable skin lesions  A/Ox4, mood and affect appropriate    Results Review:      Results from last 7 days   Lab Units 03/22/20  0345 03/21/20  0357 03/20/20  0213   SODIUM mmol/L 133* 134* 140   POTASSIUM mmol/L 5.1 4.6 4.4   CHLORIDE mmol/L 100 101 104   CO2 mmol/L 17.5* 20.4* 20.6*   BUN mg/dL 40* 32* 43*   CREATININE mg/dL 5.31* 4.17* 4.84*   GLUCOSE mg/dL 100* 114* 143*   CALCIUM mg/dL 7.5* 7.8* 7.3*         Results from last 7 days   Lab Units 03/22/20  0403 03/21/20  0357 03/20/20  0210   WBC 10*3/mm3 11.94* 14.37* 12.95*   HEMOGLOBIN g/dL 9.8* 9.9* 8.9*   HEMATOCRIT % 32.1* 32.5* 29.6*   PLATELETS 10*3/mm3 144 131* 123*     Results from last 7 days   Lab Units 03/22/20  0403 03/20/20  0210 03/19/20  1132 03/18/20  0550   INR  1.31* 1.37* 1.58* 1.28*   APTT seconds  --   --  38.0* 39.3*     Results from last 7 days   Lab Units 03/18/20  0551   CHOLESTEROL mg/dL 120     Results from last 7 days   Lab Units 03/20/20  0213   MAGNESIUM mg/dL 1.9     Results from last 7 days   Lab Units 03/18/20  0551   CHOLESTEROL mg/dL 120   TRIGLYCERIDES mg/dL 110   HDL CHOL mg/dL 30*   LDL CHOL mg/dL 68       I reviewed the patient's new clinical  results.  I personally viewed and interpreted the patient's EKG/Telemetry data        Medication Review:     aspirin 81 mg Oral Daily   carvedilol 3.125 mg Oral Q12H   enoxaparin 30 mg Subcutaneous Q24H   erythromycin base 250 mg Oral 4x Daily With Meals & Nightly   influenza vaccine 0.5 mL Intramuscular Once   mupirocin  Each Nare BID   pantoprazole 40 mg Oral Q AM   sennosides-docusate 2 tablet Oral Nightly   warfarin 2 mg Oral Daily         sodium chloride 30 mL/hr Last Rate: 30 mL/hr (03/19/20 1214)   sodium chloride 30 mL/hr Last Rate: Stopped (03/20/20 0801)       Assessment/Plan       ESRD on hemodialysis (CMS/[facility])    Nonrheumatic mitral valve regurgitation, severe    Essential hypertension    Anasarca associated with disorder of kidney    Diabetes mellitus (CMS/[facility])    Cellulitis of left leg    Systolic CHF, acute (CMS/[facility])    Anemia of chronic disease    Hypocalcemia    History of mitral valve replacement with mechanical valve    NSVT (nonsustained ventricular tachycardia) (CMS/[facility])    1.  Valvular heart disease -postop day #1 from mitral valve replacement with mechanical and tricuspid valve annuloplasty with surgical PFO closure.  warfarin target 2.5-3.5  2.  ESRD -electrolytes are within normal range, hypervolemia improving and hemodialysis per nephrology.  3.  Chronic systolic heart failure -EF 35 to 40% preop secondary to valvular heart disease.  Minimal coronary artery disease on cardiac catheterization.  Volume optimization with hemodialysis.   4.  CAD -nonobstructive.  On aspirin   5.  NSVT - no further VT.   6. Wenckebach. Stop coreg.     Chest tubes to stay in place because still a lot of drainage.  Overall making slow progress.  He is on warfarin.    Meggan Hinton MD  03/22/20  08:41        Electronically signed by Meggan Hinton MD at 03/22/20 0926     Jackeline Cyr APRN at 03/22/20 0714     Attestation signed by Jr Ze Frye MD at 03/22/20 0940    I have reviewed the  documentation above and agree.                     LOS: 10 days   Patient Care Team:  Provider, No Known as PCP - General    Chief Complaint: post op    Subjective:  Symptoms:  No shortness of breath or chest pain.    Diet:  No nausea.          Vital Signs  Temp:  [97.7 °F (36.5 °C)-98.1 °F (36.7 °C)] 98.1 °F (36.7 °C)  Heart Rate:  [59-86] 59  Resp:  [16-18] 16  BP: ()/(49-93) 119/67  Body mass index is 39.47 kg/m².    Intake/Output Summary (Last 24 hours) at 3/22/2020 0714  Last data filed at 3/22/2020 0645  Gross per 24 hour   Intake 900 ml   Output 1803 ml   Net -903 ml     No intake/output data recorded.    Chest tube drainage last 8 hours 390/450        03/19/20  0535 03/21/20  0557 03/22/20  0613   Weight: 136 kg (300 lb) 127 kg (280 lb) 128 kg (283 lb)         Objective    Results Review:        WBC WBC   Date Value Ref Range Status   03/22/2020 11.94 (H) 3.40 - 10.80 10*3/mm3 Final   03/21/2020 14.37 (H) 3.40 - 10.80 10*3/mm3 Final   03/20/2020 12.95 (H) 3.40 - 10.80 10*3/mm3 Final   03/19/2020 8.93 3.40 - 10.80 10*3/mm3 Final   03/19/2020 8.97 3.40 - 10.80 10*3/mm3 Final      HGB Hemoglobin   Date Value Ref Range Status   03/22/2020 9.8 (L) 13.0 - 17.7 g/dL Final   03/21/2020 9.9 (L) 13.0 - 17.7 g/dL Final   03/20/2020 8.9 (L) 13.0 - 17.7 g/dL Final   03/19/2020 9.7 (L) 13.0 - 17.7 g/dL Final   03/19/2020 9.1 (L) 13.0 - 17.7 g/dL Final   03/19/2020 8.8 (L) 12.0 - 17.0 g/dL Final   03/19/2020 7.8 (L) 12.0 - 17.0 g/dL Final   03/19/2020 8.8 (L) 12.0 - 17.0 g/dL Final   03/19/2020 8.8 (L) 12.0 - 17.0 g/dL Final   03/19/2020 10.5 (L) 12.0 - 17.0 g/dL Final      HCT Hematocrit   Date Value Ref Range Status   03/22/2020 32.1 (L) 37.5 - 51.0 % Final   03/21/2020 32.5 (L) 37.5 - 51.0 % Final   03/20/2020 29.6 (L) 37.5 - 51.0 % Final   03/19/2020 31.0 (L) 37.5 - 51.0 % Final   03/19/2020 30.0 (L) 37.5 - 51.0 % Final   03/19/2020 26 (L) 38 - 51 % Final   03/19/2020 23 (L) 38 - 51 % Final   03/19/2020 26 (L) 38  - 51 % Final   03/19/2020 26 (L) 38 - 51 % Final   03/19/2020 31 (L) 38 - 51 % Final      Platelets Platelets   Date Value Ref Range Status   03/22/2020 144 140 - 450 10*3/mm3 Final   03/21/2020 131 (L) 140 - 450 10*3/mm3 Final   03/20/2020 123 (L) 140 - 450 10*3/mm3 Final   03/19/2020 119 (L) 140 - 450 10*3/mm3 Final   03/19/2020 106 (L) 140 - 450 10*3/mm3 Final        PT/INR:    Protime   Date Value Ref Range Status   03/22/2020 16.0 (H) 11.7 - 14.2 Seconds Final   03/20/2020 16.5 (H) 11.7 - 14.2 Seconds Final   03/19/2020 18.6 (H) 11.7 - 14.2 Seconds Final   /  INR   Date Value Ref Range Status   03/22/2020 1.31 (H) 0.90 - 1.10 Final   03/20/2020 1.37 (H) 0.90 - 1.10 Final   03/19/2020 1.58 (H) 0.90 - 1.10 Final       Sodium Sodium   Date Value Ref Range Status   03/22/2020 133 (L) 136 - 145 mmol/L Final   03/21/2020 134 (L) 136 - 145 mmol/L Final   03/20/2020 140 136 - 145 mmol/L Final   03/19/2020 139 136 - 145 mmol/L Final   03/19/2020 139 136 - 145 mmol/L Final      Potassium Potassium   Date Value Ref Range Status   03/22/2020 5.1 3.5 - 5.2 mmol/L Final   03/21/2020 4.6 3.5 - 5.2 mmol/L Final   03/20/2020 4.4 3.5 - 5.2 mmol/L Final   03/19/2020 3.9 3.5 - 5.2 mmol/L Final   03/19/2020 3.9 3.5 - 5.2 mmol/L Final      Chloride Chloride   Date Value Ref Range Status   03/22/2020 100 98 - 107 mmol/L Final   03/21/2020 101 98 - 107 mmol/L Final   03/20/2020 104 98 - 107 mmol/L Final   03/19/2020 103 98 - 107 mmol/L Final   03/19/2020 102 98 - 107 mmol/L Final      Bicarbonate CO2   Date Value Ref Range Status   03/22/2020 17.5 (L) 22.0 - 29.0 mmol/L Final   03/21/2020 20.4 (L) 22.0 - 29.0 mmol/L Final   03/20/2020 20.6 (L) 22.0 - 29.0 mmol/L Final   03/19/2020 21.4 (L) 22.0 - 29.0 mmol/L Final   03/19/2020 22.3 22.0 - 29.0 mmol/L Final      BUN BUN   Date Value Ref Range Status   03/22/2020 40 (H) 6 - 20 mg/dL Final   03/21/2020 32 (H) 6 - 20 mg/dL Final   03/20/2020 43 (H) 6 - 20 mg/dL Final   03/19/2020 38 (H) 6  - 20 mg/dL Final   03/19/2020 37 (H) 6 - 20 mg/dL Final      Creatinine Creatinine   Date Value Ref Range Status   03/22/2020 5.31 (H) 0.76 - 1.27 mg/dL Final   03/21/2020 4.17 (H) 0.76 - 1.27 mg/dL Final   03/20/2020 4.84 (H) 0.76 - 1.27 mg/dL Final   03/19/2020 4.38 (H) 0.76 - 1.27 mg/dL Final   03/19/2020 4.37 (H) 0.76 - 1.27 mg/dL Final      Calcium Calcium   Date Value Ref Range Status   03/22/2020 7.5 (L) 8.6 - 10.5 mg/dL Final   03/21/2020 7.8 (L) 8.6 - 10.5 mg/dL Final   03/20/2020 7.3 (L) 8.6 - 10.5 mg/dL Final   03/19/2020 6.9 (L) 8.6 - 10.5 mg/dL Final   03/19/2020 6.9 (L) 8.6 - 10.5 mg/dL Final      Magnesium Magnesium   Date Value Ref Range Status   03/20/2020 1.9 1.6 - 2.6 mg/dL Final   03/19/2020 1.8 1.6 - 2.6 mg/dL Final   03/19/2020 1.9 1.6 - 2.6 mg/dL Final            aspirin 81 mg Oral Daily   enoxaparin 30 mg Subcutaneous Q24H   erythromycin base 250 mg Oral 4x Daily With Meals & Nightly   influenza vaccine 0.5 mL Intramuscular Once   mupirocin  Each Nare BID   pantoprazole 40 mg Oral Q AM   sennosides-docusate 2 tablet Oral Nightly   warfarin 2 mg Oral Daily       DOPamine 3 mcg/kg/min Last Rate: Stopped (03/21/20 1300)   sodium chloride 30 mL/hr Last Rate: 30 mL/hr (03/19/20 1214)   sodium chloride 30 mL/hr Last Rate: Stopped (03/20/20 0801)           Patient Active Problem List   Diagnosis Code   • Nonrheumatic mitral valve regurgitation, severe I34.0   • Cigarette smoker F17.210   • Essential hypertension I10   • ESRD on hemodialysis (CMS/[facility]) N18.6, Z99.2   • Foot callus L84   • Anasarca associated with disorder of kidney N04.9   • Diabetes mellitus (CMS/HCC) E11.9   • Cellulitis of left leg L03.116   • Systolic CHF, acute (CMS/HCC) I50.21   • Anemia of chronic disease D63.8   • Hypocalcemia E83.51   • History of mitral valve replacement with mechanical valve Z95.2   • NSVT (nonsustained ventricular tachycardia) (CMS/HCC) I47.2       Assessment & Plan    -Severe mitral incompetence, severe  tricuspid incompetence--s/p MVR (mechanical), TV repair, closure of PFO--POD#3 Melva  -Severe pulmonary hypertension-- intraoperatively PA pressures 70s-postop improved PA pressures 40s  -Dilated cardiomyopathy EF 30%  -ESRD on HD  -bilateral pleural effusions- intraoperatively 2L off right, 3L off left  -Anemia of chronic disease, post op anemia acutely worsened expected acute blood loss  -leukocytosis- probable reactive           Looks much better this morning.  Blood pressure improved.  Nausea improved.  He is in sinus rhythm, rate 70s, will add low dose coreg to see if he tolerates this.  Plans for dialysis today.  Mobilize.  INR 1.31 today, coumadin started yesterday. Will discuss with Dr. Frye about when to start heparin gtt  Chest tubes still with quite a bit of drainage   Would like to discontinue AV wires.  Continue routine care.         Jackeline Altamirano, MINGO  20  07:14      Electronically signed by Jr Ze Frye MD at 20 6030     Calvin Mchugh MD at 20 [phone]              Name: Nico King ADMIT: 3/12/2020   : 1970  PCP: Provider, No Known    MRN: [id number] LOS: 9 days   AGE/SEX: 49 y.o. male  ROOM:      Subjective   Subjective   Patient is lying in the bed and is in no major distress.  Pain is fairly well-controlled.  Denies nausea, vomiting, abdominal pain, chest pain.      Objective   Objective   Vital Signs  Temp:  [97.5 °F (36.4 °C)-98.1 °F (36.7 °C)] 98.1 °F (36.7 °C)  Heart Rate:  [62-86] 62  Resp:  [16-18] 16  BP: ()/(46-93) 98/63  Arterial Line BP: (99)/(48) 99/48  SpO2:  [91 %-97 %] 96 %  on   ;   Device (Oxygen Therapy): room air  Body mass index is 39.05 kg/m².  Physical Exam   Constitutional: He is oriented to person, place, and time. He appears well-developed and well-nourished.   HENT:   Head: Normocephalic and atraumatic.   Nose: Nose normal.   Eyes: Pupils are equal, round, and reactive to light. EOM are normal. No  scleral icterus.   Neck: Neck supple. No JVD present.   Cardiovascular: Normal rate, regular rhythm, normal heart sounds and intact distal pulses.   Pulmonary/Chest: Effort normal and breath sounds normal. No respiratory distress.   Abdominal: Soft. Bowel sounds are normal. He exhibits no distension. There is no tenderness.   Musculoskeletal: Normal range of motion. He exhibits no edema.   Neurological: He is alert and oriented to person, place, and time. No cranial nerve deficit.   Skin: Skin is warm and dry.   Chest incision is dressed   Psychiatric: He has a normal mood and affect. His behavior is normal.       Results Review:       I reviewed the patient's new clinical results.  Results from last 7 days   Lab Units 03/21/20  0357 03/20/20  0210 03/19/20  1514 03/19/20  1132   WBC 10*3/mm3 14.37* 12.95* 8.93 8.97   HEMOGLOBIN g/dL 9.9* 8.9* 9.7* 9.1*   PLATELETS 10*3/mm3 131* 123* 119* 106*     Results from last 7 days   Lab Units 03/21/20  0357 03/20/20  0213 03/19/20  1514 03/19/20  1132   SODIUM mmol/L 134* 140 139 139   POTASSIUM mmol/L 4.6 4.4 3.9 3.9   CHLORIDE mmol/L 101 104 103 102   CO2 mmol/L 20.4* 20.6* 21.4* 22.3   BUN mg/dL 32* 43* 38* 37*   CREATININE mg/dL 4.17* 4.84* 4.38* 4.37*   GLUCOSE mg/dL 114* 143* 113* 120*   Estimated Creatinine Clearance: 29.4 mL/min (A) (by C-G formula based on SCr of 4.17 mg/dL (H)).  Results from last 7 days   Lab Units 03/20/20  0213 03/19/20  1514 03/19/20  1132 03/18/20  0551   ALBUMIN g/dL 2.50* 2.80* 2.40* 2.20*   BILIRUBIN mg/dL  --   --   --  0.3   ALK PHOS U/L  --   --   --  64   AST (SGOT) U/L  --   --   --  9   ALT (SGPT) U/L  --   --   --  18     Results from last 7 days   Lab Units 03/21/20  0357 03/20/20  0213 03/19/20  1514 03/19/20  1132 03/19/20  0516 03/18/20  0551 03/17/20  0654   CALCIUM mg/dL 7.8* 7.3* 6.9* 6.9* 7.5* 7.4* 7.3*   ALBUMIN g/dL  --  2.50* 2.80* 2.40*  --  2.20* 2.20*   MAGNESIUM mg/dL  --  1.9 1.8 1.9 1.7  --   --    PHOSPHORUS mg/dL   --  5.2* 3.9 3.8  --   --  6.4*       Glucose   Date/Time Value Ref Range Status   03/21/2020 1540 127 70 - 130 mg/dL Final   03/21/2020 1048 108 70 - 130 mg/dL Final   03/21/2020 0536 115 70 - 130 mg/dL Final   03/20/2020 1953 109 70 - 130 mg/dL Final   03/20/2020 1217 115 70 - 130 mg/dL Final   03/20/2020 0754 116 70 - 130 mg/dL Final   03/20/2020 0706 119 70 - 130 mg/dL Final         aspirin 81 mg Oral Daily   ceFAZolin 3 g Intravenous Q24H   chlorhexidine 15 mL Mouth/Throat Q12H   enoxaparin 30 mg Subcutaneous Q24H   erythromycin base 250 mg Oral 4x Daily With Meals & Nightly   influenza vaccine 0.5 mL Intramuscular Once   mupirocin  Each Nare BID   pantoprazole 40 mg Oral Q AM   sennosides-docusate 2 tablet Oral Nightly   warfarin 2 mg Oral Daily       DOPamine 3 mcg/kg/min Last Rate: Stopped (03/21/20 1300)   sodium chloride 30 mL/hr Last Rate: 30 mL/hr (03/19/20 1214)   sodium chloride 30 mL/hr Last Rate: Stopped (03/20/20 0801)   Diet Full Liquid      Assessment/Plan     Active Hospital Problems    Diagnosis  POA   • **ESRD on hemodialysis (CMS/[facility]) [N18.6, Z99.2]  Not Applicable   • History of mitral valve replacement with mechanical valve [Z95.2]  Not Applicable   • NSVT (nonsustained ventricular tachycardia) (CMS/[facility]) [I47.2]  Unknown   • Hypocalcemia [E83.51]  Unknown   • Anemia of chronic disease [D63.8]  Unknown   • Systolic CHF, acute (CMS/[facility]) [I50.21]  Unknown   • Anasarca associated with disorder of kidney [N04.9]  Yes   • Diabetes mellitus (CMS/[facility]) [E11.9]  Unknown   • Cellulitis of left leg [L03.116]  Unknown   • Essential hypertension [I10]  Yes   • Nonrheumatic mitral valve regurgitation, severe [I34.0]  Yes      Resolved Hospital Problems   No resolved problems to display.       Assessment and plan:  1.  Acute diastolic dysfunction with ejection fraction of 33%, patient is currently compensated.  Was blood pressure tolerates will consider beta-blockers and ACE inhibitor's.  2.  Status post  "mitral valve replacement with bioprosthetic valve, postop day 1, underwent tricuspid valve annuloplasty and PFO closure as well.  Continue with analgesics.  Have encouraged him to use incentive spirometry.  3.  Stage IV CKD which has progressed to end-stage renal disease, will continue with routine dialysis and appreciate nephrology input.  4.  Left leg cellulitis, erythema has been improving and will continue with cefazolin.  5.  Hyperglycemia, blood sugars are reasonably well controlled.  Hemoglobin A1c level will be checked.  Most likely has prediabetes.  6.  Nonsustained V. tach, continue monitoring electrolytes closely.    Calvin Mchugh MD  [facility]  03/21/20  17:07          Electronically signed by Calvin Mchugh MD at 03/21/20 1711     Stephen Eng MD at 03/21/20 0905             LOS: 9 days    Patient Care Team:  Provider, No Known as PCP - General    Chief Complaint:    Chief Complaint   Patient presents with   • Edema     Follow-up acute kidney injury on chronic kidney disease  Subjective     Interval History:   S/p [facility] MVR, PFO closure, and TV repair on 3.19.20; had HD yesterday with 2 L removed; is now on room air, but requiring dopamine for blood pressure support; tolerating liquid diet    Review of Systems:   As noted above    Objective     Vital Signs  Temp:  [96.3 °F (35.7 °C)-98 °F (36.7 °C)] 98 °F (36.7 °C)  Heart Rate:  [68-84] 82  Resp:  [16-18] 18  BP: ()/(46-68) 79/50  Arterial Line BP: ()/(48-60) 99/48    Flowsheet Rows      First Filed Value   Admission Height  180.3 cm (71\") Documented at 03/12/2020 1350   Admission Weight  136 kg (300 lb) Documented at 03/12/2020 1350          No intake/output data recorded.  I/O last 3 completed shifts:  In: 2558 [P.O.:890; I.V.:1668]  Out: 6394 [Urine:335; Other:2000; Chest Tube:4059]    Intake/Output Summary (Last 24 hours) at 3/21/2020 0949  Last data filed at 3/21/2020 0600  Gross per 24 " hour   Intake 1663 ml   Output 4405 ml   Net -2742 ml       Physical Exam:  General Appearance: chronically ill-appearing; awake, conversant, appropriate  Skin: warm and dry  HEENT: MMM, nonicteric sclerae  Neck: supple, no JVD, trachea midline. RIJ TDC  Lungs: Bibasilar rhonchi, unlabored on room air; chest tubes in place  Heart: RRR, no S3, +rub  Abdomen: soft, non-tender, +bs; body wall edema distended.  :  scrotal and penile edema  Extremities:  3-4+ LE edema bilaterally; chronic venous stasis changes  Neuro: Awake; moving all extremities        Results Review:    Results from last 7 days   Lab Units 03/21/20  0357 03/20/20  0213 03/19/20  1514 03/18/20  0551   SODIUM mmol/L 134* 140 139   < > 135*   POTASSIUM mmol/L 4.6 4.4 3.9   < > 4.3   CHLORIDE mmol/L 101 104 103   < > 103   CO2 mmol/L 20.4* 20.6* 21.4*   < > 20.6*   BUN mg/dL 32* 43* 38*   < > 57*   CREATININE mg/dL 4.17* 4.84* 4.38*   < > 5.68*   CALCIUM mg/dL 7.8* 7.3* 6.9*   < > 7.4*   BILIRUBIN mg/dL  --   --   --   --  0.3   ALK PHOS U/L  --   --   --   --  64   ALT (SGPT) U/L  --   --   --   --  18   AST (SGOT) U/L  --   --   --   --  9   GLUCOSE mg/dL 114* 143* 113*   < > 113*    < > = values in this interval not displayed.       Estimated Creatinine Clearance: 29.4 mL/min (A) (by C-G formula based on SCr of 4.17 mg/dL (H)).    Results from last 7 days   Lab Units 03/20/20  0213 03/19/20  1514 03/19/20  1132   MAGNESIUM mg/dL 1.9 1.8 1.9   PHOSPHORUS mg/dL 5.2* 3.9 3.8       Results from last 7 days   Lab Units 03/16/20  0528 03/15/20  0530   URIC ACID mg/dL 10.2* 9.6*       Results from last 7 days   Lab Units 03/21/20  0357 03/20/20  0210 03/19/20  1514 03/19/20  1132 03/19/20  1009  03/18/20  0550   WBC 10*3/mm3 14.37* 12.95* 8.93 8.97  --   --  5.23   HEMOGLOBIN g/dL 9.9* 8.9* 9.7* 9.1*  --   --  9.9*   HEMOGLOBIN, POC g/dL  --   --   --   --  8.8*   < >  --    PLATELETS 10*3/mm3 131* 123* 119* 106*  --   --  180    < > = values in this  interval not displayed.       Results from last 7 days   Lab Units 03/20/20  0210 03/19/20  1132 03/18/20  0550   INR  1.37* 1.58* 1.28*         Imaging Results (Last 24 Hours)     Procedure Component Value Units Date/Time    XR Chest 1 View [[id number]] Collected:  03/21/20 0444     Updated:  03/21/20 0459    Narrative:       PORTABLE CHEST X-RAY     CLINICAL HISTORY: Post-Op Heart Surgery; N04.9-Nephrotic syndrome with  unspecified morphologic changes; N17.9-Acute kidney failure,  unspecified; I50.23-Acute on chronic systolic (congestive) heart  failure; I34.0-Nonrheumatic mitral (valve) insufficiency;  I34.0-Nonrheumatic mitral (valve) insufficiency     COMPARISON: 03/20/2020     FINDINGS: Portable AP view of the chest obtained with overlying monitor  leads in place. [place] catheter removed. Other lines are unchanged.  No pneumothorax. Mild bibasilar atelectasis, slightly increased on the  right side along with increasing right effusion. Stable cardiomegaly.  Diminishing vascular congestion.             Impression:       Improving vascular congestion with Slight increase in right  lung base atelectasis and effusion.     This report was finalized on 3/21/2020 4:56 AM by Ben Pickens M.D.       XR Chest 1 View [[id number]] Collected:  03/20/20 0635     Updated:  03/20/20 2309    Narrative:       PORTABLE CHEST X-RAY     CLINICAL HISTORY: Post-Op Heart Surgery; N04.9-Nephrotic syndrome with  unspecified morphologic changes; N17.9-Acute kidney failure,  unspecified; I50.23-Acute on chronic systolic (congestive) heart  failure; I34.0-Nonrheumatic mitral (valve) insufficiency;  I34.0-Nonrheumatic mitral (valve) insufficiency     COMPARISON: 03/19/2020.     FINDINGS: Portable AP view of the chest was obtained with overlying  monitor leads in place. ET tube removed. [place]-Massiel catheter has been  advanced, now extending into a basilar branch of the right pulmonary  artery. Other lines are unchanged. No pneumothorax.  Lungs are slightly  under aerated with some mild right lung base atelectasis and pulmonary  vascular congestion but without edema/CHF. There are probable small  effusions, certainly a significant collection of pleural fluid is not  demonstrated by portable imaging. Stable cardiomegaly.             Impression:       Under aeration with mild right lung base atelectasis and  pulmonary vascular congestion.        This report was finalized on 3/20/2020 11:06 PM by Ben Pickens M.D.             aspirin 81 mg Oral Daily   ceFAZolin 3 g Intravenous Q24H   chlorhexidine 15 mL Mouth/Throat Q12H   enoxaparin 30 mg Subcutaneous Q24H   erythromycin base 250 mg Oral 4x Daily With Meals & Nightly   influenza vaccine 0.5 mL Intramuscular Once   mupirocin  Each Nare BID   pantoprazole 40 mg Oral Q AM   sennosides-docusate 2 tablet Oral Nightly   warfarin 2 mg Oral Daily       DOPamine 3 mcg/kg/min Last Rate: 3 mcg/kg/min (03/21/20 0908)   sodium chloride 30 mL/hr Last Rate: 30 mL/hr (03/19/20 1214)   sodium chloride 30 mL/hr Last Rate: Stopped (03/20/20 0801)       Medication Review:   Current Facility-Administered Medications   Medication Dose Route Frequency Provider Last Rate Last Dose   • acetaminophen (TYLENOL) tablet 650 mg  650 mg Oral Q4H PRN Jr Ze Frye MD   650 mg at 03/20/20 1114    Or   • acetaminophen (TYLENOL) 160 MG/5ML solution 650 mg  650 mg Oral Q4H PRN Jr Ze Frye MD        Or   • acetaminophen (TYLENOL) suppository 650 mg  650 mg Rectal Q4H PRN Jr Ze Frye MD       • ALPRAZolam (XANAX) tablet 0.25 mg  0.25 mg Oral Q8H PRN Jr Ze Frye MD   0.25 mg at 03/20/20 1743   • aspirin EC tablet 81 mg  81 mg Oral Daily Jr Ze Frye MD   81 mg at 03/21/20 0907   • bisacodyl (DULCOLAX) suppository 10 mg  10 mg Rectal Daily PRN Jr Ze Frye MD       • ceFAZolin in Sodium Chloride (ANCEF) IVPB solution 3 g  3 g Intravenous Q24H Jackeline Cyr APRN 200 mL/hr at  03/20/20 2216 3 g at 03/20/20 2216   • chlorhexidine (PERIDEX) 0.12 % solution 15 mL  15 mL Mouth/Throat Q12H Jr Ze Frye MD   15 mL at 03/21/20 0603   • cyclobenzaprine (FLEXERIL) tablet 10 mg  10 mg Oral Q8H PRN Jr Ze Frye MD       • docusate sodium (COLACE) capsule 100 mg  100 mg Oral BID PRN Jr Ze Frye MD       • DOPamine 400 mg in 250 mL D5W (1.6 mg/mL) infusion  3 mcg/kg/min Intravenous Titrated Jackeline Cyr APRN 14.29 mL/hr at 03/21/20 0908 3 mcg/kg/min at 03/21/20 0908   • enoxaparin (LOVENOX) syringe 30 mg  30 mg Subcutaneous Q24H Jr Ze Frye MD   30 mg at 03/20/20 1807   • erythromycin base (E-MYCIN) tablet 250 mg  250 mg Oral 4x Daily With Meals & Nightly Jackeline Cyr APRN       • heparin (porcine) injection 3,800 Units  3,800 Units Intracatheter PRN Stephen Eng MD   3,800 Units at 03/20/20 1419   • HYDROcodone-acetaminophen (NORCO) 5-325 MG per tablet 1 tablet  1 tablet Oral Q4H PRN Jackeline Cyr APRN       • influenza vac split quad (FLUZONE,FLUARIX,AFLURIA) injection 0.5 mL  0.5 mL Intramuscular Once Jr Ze Frye MD       • morphine injection 1 mg  1 mg Intravenous Q4H PRN Jr Ze Frye MD        And   • naloxone (NARCAN) injection 0.4 mg  0.4 mg Intravenous Q5 Min PRN Jr Ze Frye MD       • mupirocin (BACTROBAN) 2 % nasal ointment   Each Nare BID Jr Ze Frye MD   1 application at 03/21/20 0907   • ondansetron (ZOFRAN) injection 4 mg  4 mg Intravenous Q6H PRN Jr Ze Frye MD   4 mg at 03/21/20 0929   • pantoprazole (PROTONIX) EC tablet 40 mg  40 mg Oral Q AM Jr Ze Frye MD   40 mg at 03/21/20 0603   • polyethylene glycol (MIRALAX) powder 17 g  17 g Oral Daily PRN Jr Ze Frye MD       • potassium chloride (MICRO-K) CR capsule 40 mEq  40 mEq Oral PRN Jr Ze Frye MD        Or   • potassium chloride (KLOR-CON) packet 40 mEq  40 mEq Oral PRN Jr Ze Frye MD        • potassium chloride 10 mEq in 100 mL IVPB  10 mEq Intravenous Q1H PRN Jr Ze Frye MD        Or   • potassium chloride 10 mEq in 100 mL IVPB  10 mEq Intravenous Q1H PRN Jr Ze Frye MD       • promethazine (PHENERGAN) tablet 12.5 mg  12.5 mg Oral Q6H PRN Jr Ze Frye MD        Or   • promethazine (PHENERGAN) injection 12.5 mg  12.5 mg Intravenous Q6H PRN Jr Ze Frye MD   12.5 mg at 03/20/20 1758   • sennosides-docusate (PERICOLACE) 8.6-50 MG per tablet 2 tablet  2 tablet Oral Nightly Jr Ze Frye MD   2 tablet at 03/20/20 2018   • sodium chloride 0.9 % flush 30 mL  30 mL Intravenous Once PRN Jr Ze Frye MD       • sodium chloride 0.9 % infusion  30 mL/hr Intravenous Continuous Jr Ze Frye MD 30 mL/hr at 03/19/20 1214 30 mL/hr at 03/19/20 1214   • sodium chloride 0.9 % infusion  30 mL/hr Intravenous Continuous PRN Jr Ze Frye MD   Stopped at 03/20/20 0801   • traMADol (ULTRAM) tablet 50 mg  50 mg Oral Q12H PRN Jr Ze Frye MD   50 mg at 03/20/20 1743   • warfarin (COUMADIN) tablet 2 mg  2 mg Oral Daily Jackeline Cyr APRN           Assessment/Plan   1.  DESHAWN on CKD4-5 with progression to ESRD: refractory fluid overload, improving; normal K; has had 3 HD treatments so far  2.  Cellulitis of left lower extremity  3.  Severe MR and moderate TR.  Systolic non-ischemic cardiomyopathy EF 33%:  had [facility] MVR, TV repair, and closure of PFO on 3/19   4.  Anasarca and fluid excess due to right-sided heart failure and ESRD.    5.  Hypertension, with hypotension presently: Responding well to dopamine  6.  Medical noncompliance.  7.  DM2. Controlled .  8.  Anemia CKD.  Hg stable .  Iron deficient    Plan:  1.  iUF tomorrow for additional volume removal  2.  Surveillance labs      Stephen Eng MD  03/21/20  09:49      Electronically signed by Stephen Eng MD at 03/21/20 0952     Meggan Hinton MD at 03/21/20  0946             LOS: 9 days   Patient Care Team:  Provider, No Known as PCP - General    Chief Complaint:     F/u MV replacement and TV ring    Interval History:     He had some mild nausea and low blood pressure this morning.  Overall, his breathing is stable and he is having no chest pain.  He does not feel his heart racing or skipping and has had no diarrhea.  He generally says he feels like he is making improvement.  His chest is sore.  He does not have a cough, fevers or chills.    Objective   Vital Signs  Temp:  [96.3 °F (35.7 °C)-98 °F (36.7 °C)] 98 °F (36.7 °C)  Heart Rate:  [68-84] 82  Resp:  [16-18] 18  BP: ()/(46-68) 79/50  Arterial Line BP: ()/(48-60) 99/48    Intake/Output Summary (Last 24 hours) at 3/21/2020 0946  Last data filed at 3/21/2020 0600  Gross per 24 hour   Intake 1663 ml   Output 4405 ml   Net -2742 ml       Comfortable NAD  PERRL, conjunctivae clear  Neck supple, no JVD or thyromegaly appreciated  S1/S2 RRR, no m/r/g  Lungs CTA B, normal effort  Abdomen S/NT/ND (+) BS, no HSM appreciated  Extremities warm, no clubbing, cyanosis, 2+ LLE edema  No visible or palpable skin lesions  A/Ox4, mood and affect appropriate    Results Review:      Results from last 7 days   Lab Units 03/21/20  0357 03/20/20 0213 03/19/20  1514   SODIUM mmol/L 134* 140 139   POTASSIUM mmol/L 4.6 4.4 3.9   CHLORIDE mmol/L 101 104 103   CO2 mmol/L 20.4* 20.6* 21.4*   BUN mg/dL 32* 43* 38*   CREATININE mg/dL 4.17* 4.84* 4.38*   GLUCOSE mg/dL 114* 143* 113*   CALCIUM mg/dL 7.8* 7.3* 6.9*         Results from last 7 days   Lab Units 03/21/20  0357 03/20/20  0210 03/19/20  1514   WBC 10*3/mm3 14.37* 12.95* 8.93   HEMOGLOBIN g/dL 9.9* 8.9* 9.7*   HEMATOCRIT % 32.5* 29.6* 31.0*   PLATELETS 10*3/mm3 131* 123* 119*     Results from last 7 days   Lab Units 03/20/20  0210 03/19/20  1132 03/18/20  0550   INR  1.37* 1.58* 1.28*   APTT seconds  --  38.0* 39.3*     Results from last 7 days   Lab Units 03/18/20  0551    CHOLESTEROL mg/dL 120     Results from last 7 days   Lab Units 03/20/20  0213   MAGNESIUM mg/dL 1.9     Results from last 7 days   Lab Units 03/18/20  0551   CHOLESTEROL mg/dL 120   TRIGLYCERIDES mg/dL 110   HDL CHOL mg/dL 30*   LDL CHOL mg/dL 68       I reviewed the patient's new clinical results.  I personally viewed and interpreted the patient's EKG/Telemetry data        Medication Review:     aspirin 81 mg Oral Daily   ceFAZolin 3 g Intravenous Q24H   chlorhexidine 15 mL Mouth/Throat Q12H   enoxaparin 30 mg Subcutaneous Q24H   erythromycin base 250 mg Oral 4x Daily With Meals & Nightly   influenza vaccine 0.5 mL Intramuscular Once   mupirocin  Each Nare BID   pantoprazole 40 mg Oral Q AM   sennosides-docusate 2 tablet Oral Nightly   warfarin 2 mg Oral Daily         DOPamine 3 mcg/kg/min Last Rate: 3 mcg/kg/min (03/21/20 0908)   sodium chloride 30 mL/hr Last Rate: 30 mL/hr (03/19/20 1214)   sodium chloride 30 mL/hr Last Rate: Stopped (03/20/20 0801)       Assessment/Plan       ESRD on hemodialysis (CMS/[facility])    Nonrheumatic mitral valve regurgitation, severe    Essential hypertension    Anasarca associated with disorder of kidney    Diabetes mellitus (CMS/[facility])    Cellulitis of left leg    Systolic CHF, acute (CMS/[facility])    Anemia of chronic disease    Hypocalcemia    History of mitral valve replacement with mechanical valve    NSVT (nonsustained ventricular tachycardia) (CMS/[facility])    1.  Valvular heart disease -postop day #1 from mitral valve replacement with bioprosthetic and tricuspid valve annuloplasty with surgical PFO closure.  Patient is extubated and oxygenating well.   2.  ESRD -electrolytes are within normal range, hypervolemia improving and hemodialysis per nephrology.  3.  Chronic systolic heart failure -EF 35 to 40% preop secondary to valvular heart disease.  Minimal coronary artery disease on cardiac catheterization.  Volume optimization with hemodialysis.   BP high now but was low this am, no med  "changes for now  4.  CAD -nonobstructive.   On aspirin   5.  NSVT - no further VT.     Will follow.     Meggan Hinton MD  03/21/20  09:47        Electronically signed by Meggan Hinton MD at 03/21/20 1157     Jackeline Cyr APRN at 03/21/20 0721     Attestation signed by Jr Ze Frye MD at 03/21/20 0948    I have reviewed the documentation above and agree.                     LOS: 9 days   Patient Care Team:  Provider, No Known as PCP - General    Chief Complaint: post op    Subjective  \"sleepy\"    Vital Signs  Temp:  [96.3 °F (35.7 °C)-97.8 °F (36.6 °C)] 97.6 °F (36.4 °C)  Heart Rate:  [84] 84  Resp:  [16-18] 18  BP: ()/(53-68) 113/64  Arterial Line BP: ()/(45-60) 99/48  Body mass index is 39.05 kg/m².    Intake/Output Summary (Last 24 hours) at 3/21/2020 0721  Last data filed at 3/21/2020 0600  Gross per 24 hour   Intake 1663 ml   Output 4840 ml   Net -3177 ml     No intake/output data recorded.    Chest tube drainage last 8 hours40/40/770        03/18/20  1915 03/19/20  0535 03/21/20  0557   Weight: (!) 139 kg (306 lb 7 oz) 136 kg (300 lb) 127 kg (280 lb)         Objective    Results Review:        WBC WBC   Date Value Ref Range Status   03/21/2020 14.37 (H) 3.40 - 10.80 10*3/mm3 Final   03/20/2020 12.95 (H) 3.40 - 10.80 10*3/mm3 Final   03/19/2020 8.93 3.40 - 10.80 10*3/mm3 Final   03/19/2020 8.97 3.40 - 10.80 10*3/mm3 Final      HGB Hemoglobin   Date Value Ref Range Status   03/21/2020 9.9 (L) 13.0 - 17.7 g/dL Final   03/20/2020 8.9 (L) 13.0 - 17.7 g/dL Final   03/19/2020 9.7 (L) 13.0 - 17.7 g/dL Final   03/19/2020 9.1 (L) 13.0 - 17.7 g/dL Final   03/19/2020 8.8 (L) 12.0 - 17.0 g/dL Final   03/19/2020 7.8 (L) 12.0 - 17.0 g/dL Final   03/19/2020 8.8 (L) 12.0 - 17.0 g/dL Final   03/19/2020 8.8 (L) 12.0 - 17.0 g/dL Final   03/19/2020 10.5 (L) 12.0 - 17.0 g/dL Final      HCT Hematocrit   Date Value Ref Range Status   03/21/2020 32.5 (L) 37.5 - 51.0 % Final   03/20/2020 29.6 " (L) 37.5 - 51.0 % Final   03/19/2020 31.0 (L) 37.5 - 51.0 % Final   03/19/2020 30.0 (L) 37.5 - 51.0 % Final   03/19/2020 26 (L) 38 - 51 % Final   03/19/2020 23 (L) 38 - 51 % Final   03/19/2020 26 (L) 38 - 51 % Final   03/19/2020 26 (L) 38 - 51 % Final   03/19/2020 31 (L) 38 - 51 % Final      Platelets Platelets   Date Value Ref Range Status   03/21/2020 131 (L) 140 - 450 10*3/mm3 Final   03/20/2020 123 (L) 140 - 450 10*3/mm3 Final   03/19/2020 119 (L) 140 - 450 10*3/mm3 Final   03/19/2020 106 (L) 140 - 450 10*3/mm3 Final        PT/INR:    Protime   Date Value Ref Range Status   03/20/2020 16.5 (H) 11.7 - 14.2 Seconds Final   03/19/2020 18.6 (H) 11.7 - 14.2 Seconds Final   /  INR   Date Value Ref Range Status   03/20/2020 1.37 (H) 0.90 - 1.10 Final   03/19/2020 1.58 (H) 0.90 - 1.10 Final       Sodium Sodium   Date Value Ref Range Status   03/21/2020 134 (L) 136 - 145 mmol/L Final   03/20/2020 140 136 - 145 mmol/L Final   03/19/2020 139 136 - 145 mmol/L Final   03/19/2020 139 136 - 145 mmol/L Final   03/19/2020 139 136 - 145 mmol/L Final      Potassium Potassium   Date Value Ref Range Status   03/21/2020 4.6 3.5 - 5.2 mmol/L Final   03/20/2020 4.4 3.5 - 5.2 mmol/L Final   03/19/2020 3.9 3.5 - 5.2 mmol/L Final   03/19/2020 3.9 3.5 - 5.2 mmol/L Final   03/19/2020 3.7 3.5 - 5.2 mmol/L Final      Chloride Chloride   Date Value Ref Range Status   03/21/2020 101 98 - 107 mmol/L Final   03/20/2020 104 98 - 107 mmol/L Final   03/19/2020 103 98 - 107 mmol/L Final   03/19/2020 102 98 - 107 mmol/L Final   03/19/2020 102 98 - 107 mmol/L Final      Bicarbonate CO2   Date Value Ref Range Status   03/21/2020 20.4 (L) 22.0 - 29.0 mmol/L Final   03/20/2020 20.6 (L) 22.0 - 29.0 mmol/L Final   03/19/2020 21.4 (L) 22.0 - 29.0 mmol/L Final   03/19/2020 22.3 22.0 - 29.0 mmol/L Final   03/19/2020 22.3 22.0 - 29.0 mmol/L Final      BUN BUN   Date Value Ref Range Status   03/21/2020 32 (H) 6 - 20 mg/dL Final   03/20/2020 43 (H) 6 - 20 mg/dL  Final   03/19/2020 38 (H) 6 - 20 mg/dL Final   03/19/2020 37 (H) 6 - 20 mg/dL Final   03/19/2020 39 (H) 6 - 20 mg/dL Final      Creatinine Creatinine   Date Value Ref Range Status   03/21/2020 4.17 (H) 0.76 - 1.27 mg/dL Final   03/20/2020 4.84 (H) 0.76 - 1.27 mg/dL Final   03/19/2020 4.38 (H) 0.76 - 1.27 mg/dL Final   03/19/2020 4.37 (H) 0.76 - 1.27 mg/dL Final   03/19/2020 4.35 (H) 0.76 - 1.27 mg/dL Final      Calcium Calcium   Date Value Ref Range Status   03/21/2020 7.8 (L) 8.6 - 10.5 mg/dL Final   03/20/2020 7.3 (L) 8.6 - 10.5 mg/dL Final   03/19/2020 6.9 (L) 8.6 - 10.5 mg/dL Final   03/19/2020 6.9 (L) 8.6 - 10.5 mg/dL Final   03/19/2020 7.5 (L) 8.6 - 10.5 mg/dL Final      Magnesium Magnesium   Date Value Ref Range Status   03/20/2020 1.9 1.6 - 2.6 mg/dL Final   03/19/2020 1.8 1.6 - 2.6 mg/dL Final   03/19/2020 1.9 1.6 - 2.6 mg/dL Final   03/19/2020 1.7 1.6 - 2.6 mg/dL Final            aspirin 81 mg Oral Daily   atorvastatin 40 mg Oral Nightly   ceFAZolin 3 g Intravenous Q24H   chlorhexidine 15 mL Mouth/Throat Q12H   enoxaparin 30 mg Subcutaneous Q24H   influenza vaccine 0.5 mL Intramuscular Once   metoprolol tartrate 12.5 mg Oral Q12H   mupirocin  Each Nare BID   pantoprazole 40 mg Oral Q AM   sennosides-docusate 2 tablet Oral Nightly       sodium chloride 30 mL/hr Last Rate: 30 mL/hr (03/19/20 1214)   sodium chloride 30 mL/hr Last Rate: Stopped (03/20/20 0801)           Patient Active Problem List   Diagnosis Code   • Nonrheumatic mitral valve regurgitation, severe I34.0   • Cigarette smoker F17.210   • Essential hypertension I10   • ESRD on hemodialysis (CMS/[facility]) N18.6, Z99.2   • Foot callus L84   • Anasarca associated with disorder of kidney N04.9   • Diabetes mellitus (CMS/[facility]) E11.9   • Cellulitis of left leg L03.116   • Systolic CHF, acute (CMS/[facility]) I50.21   • Anemia of chronic disease D63.8   • Hypocalcemia E83.51   • History of mitral valve replacement with mechanical valve Z95.2   • NSVT (nonsustained  ventricular tachycardia) (CMS/[facility]) I47.2       Assessment & Plan    -Severe mitral incompetence, severe tricuspid incompetence--s/p MVR (mechanical), TV repair, closure of PFO--POD#2 Melva  -Severe pulmonary hypertension-- intraoperatively PA pressures 70s-postop improved PA pressures 40s  -Dilated cardiomyopathy EF 30%  -ESRD on HD  -bilateral pleural effusions- intraoperatively 2L off right, 3L off left  -Anemia of chronic disease, post op anemia acutely worsened expected acute blood loss  -leukocytosis- probable reactive        Hypotensive this morning, pacing for higher pressure.  Replete calcium.    Will start dopamine.  Drowsy this morning, will decrease pain medication.  Mobilize.  Separate right and left pleural tubes.  Will discuss with Dr. Frye about when to start coumdain  Continue routine care.      MINGO Whitmore  03/21/20  07:21      Electronically signed by Jr Ze Frye MD at 03/21/20 0971

## 2020-03-23 NOTE — PLAN OF CARE
Problem: Patient Care Overview  Goal: Plan of Care Review  Flowsheets (Taken 3/23/2020 1108)  Outcome Summary:     Due to current events, physical therapy must limits visits to those only absolutely necessary.  Pt was seen yesterday, able to ambulate 120' w/ minimal assist using a rolling walker.  Plan to follow up 3/24 as needed. Discussed with HUE Anthony.

## 2020-03-24 PROBLEM — J96.01 ACUTE RESPIRATORY FAILURE WITH HYPOXIA (HCC): Status: ACTIVE | Noted: 2020-01-01

## 2020-03-24 NOTE — PROGRESS NOTES
"   LOS: 12 days    Patient Care Team:  Provider, No Known as PCP - General    Chief Complaint:    Chief Complaint   Patient presents with   • Edema     Follow-up acute kidney injury on chronic kidney disease  Subjective     Interval History:   Looks much better.  Eating well.  Pain controlled. Walked in gil.  Last bm 2 days ago. Not soa. HD yesterday . 4 kg off .  Weight not correct.  RN to re-weigh this am .    Review of Systems:   As noted above    Objective     Vital Signs  Temp:  [97.5 °F (36.4 °C)-98.2 °F (36.8 °C)] 97.8 °F (36.6 °C)  Heart Rate:  [54-84] 81  Resp:  [16-17] 17  BP: (110-150)/(65-90) 125/70    Flowsheet Rows      First Filed Value   Admission Height  180.3 cm (71\") Documented at 03/12/2020 1350   Admission Weight  136 kg (300 lb) Documented at 03/12/2020 1350          No intake/output data recorded.  I/O last 3 completed shifts:  In: 1002 [P.O.:1002]  Out: 5380 [Urine:655; Other:4000; Chest Tube:725]    Intake/Output Summary (Last 24 hours) at 3/24/2020 0744  Last data filed at 3/24/2020 0400  Gross per 24 hour   Intake 1002 ml   Output 5055 ml   Net -4053 ml       Physical Exam:  General Appearance: alert, oriented x 3, no acute distress,  chronically ill. Looks much stronger.   Skin: warm and dry  HEENT: pupils round and reactive to light, oral mucosa normal, nonicteric sclerae  Neck: supple, no JVD, trachea midline. RIJ TDC.  Lungs: Clear to auscultation bilaterally.  Unlabored.  Chest tubes .  Heart: RRR, no S3, no rub,  3/6 systolic murmur  Abdomen: soft, non-tender, +bs. Improved body wall edema .  :  scrotal and penile edema improved.   Extremities:  2+ LE edema bilaterally; chronic  venous stasis changes  Neuro: normal speech and mental status. Brighter affect.       Results Review:    Results from last 7 days   Lab Units 03/23/20  0332 03/22/20  0345 03/21/20  0357  03/18/20  0551   SODIUM mmol/L 133* 133* 134*   < > 135*   POTASSIUM mmol/L 4.4 5.1 4.6   < > 4.3   CHLORIDE mmol/L 99 " 100 101   < > 103   CO2 mmol/L 20.2* 17.5* 20.4*   < > 20.6*   BUN mg/dL 50* 40* 32*   < > 57*   CREATININE mg/dL 6.45* 5.31* 4.17*   < > 5.68*   CALCIUM mg/dL 7.6* 7.5* 7.8*   < > 7.4*   BILIRUBIN mg/dL  --   --   --   --  0.3   ALK PHOS U/L  --   --   --   --  64   ALT (SGPT) U/L  --   --   --   --  18   AST (SGOT) U/L  --   --   --   --  9   GLUCOSE mg/dL 94 100* 114*   < > 113*    < > = values in this interval not displayed.       Estimated Creatinine Clearance: 17.4 mL/min (A) (by C-G formula based on SCr of 6.45 mg/dL (H)).    Results from last 7 days   Lab Units 03/23/20  0332 03/20/20  0213 03/19/20  1514   MAGNESIUM mg/dL 2.0 1.9 1.8   PHOSPHORUS mg/dL 5.3* 5.2* 3.9             Results from last 7 days   Lab Units 03/24/20  0337 03/23/20  0825 03/23/20  0332 03/22/20  0403 03/21/20  0357   WBC 10*3/mm3 7.93 9.85 10.68 11.94* 14.37*   HEMOGLOBIN g/dL 8.7* 9.7* 9.5* 9.8* 9.9*   PLATELETS 10*3/mm3 158 161 156 144 131*       Results from last 7 days   Lab Units 03/24/20  0337 03/23/20  0825 03/23/20  0332 03/22/20  0403 03/20/20  0210   INR  1.91* 1.67* 1.60* 1.31* 1.37*         Imaging Results (Last 24 Hours)     Procedure Component Value Units Date/Time    XR Chest 1 View [[id number]] Collected:  03/23/20 1317     Updated:  03/23/20 1324    Narrative:       XR CHEST 1 VW-     HISTORY: Male who is 49 years-old,  chest tube removal     TECHNIQUE: Frontal views of the chest     COMPARISON: 03/22/2020     FINDINGS: Mediastinal drain has been removed. Bilateral chest tubes  remain. Sternotomy wires, cardiac valve marker, stable appearing  right-sided central venous catheter noted. The heart is enlarged.  Pulmonary vasculature is unremarkable. Small likely atelectasis at the  lung bases. Suggestion of minimal, 2 mm right apical pneumothorax. No  pleural effusion, or left pneumothorax. No acute osseous process.       Impression:       Drain removal. Suggestion of minimal, 2 mm right apical  pneumothorax.     This  report was finalized on 3/23/2020 1:21 PM by Dr. Michael Platt M.D.             aspirin 81 mg Oral Daily   enoxaparin 30 mg Subcutaneous Q24H   erythromycin base 250 mg Oral 4x Daily With Meals & Nightly   influenza vaccine 0.5 mL Intramuscular Once   mupirocin  Each Nare BID   pantoprazole 40 mg Oral Q AM   sennosides-docusate 2 tablet Oral Nightly   warfarin 4 mg Oral Daily       sodium chloride 30 mL/hr Last Rate: 30 mL/hr (03/19/20 1214)   sodium chloride 30 mL/hr Last Rate: Stopped (03/20/20 0801)       Medication Review:   Current Facility-Administered Medications   Medication Dose Route Frequency Provider Last Rate Last Dose   • acetaminophen (TYLENOL) tablet 650 mg  650 mg Oral Q4H PRN Jr Ze Frye MD   650 mg at 03/20/20 1114    Or   • acetaminophen (TYLENOL) 160 MG/5ML solution 650 mg  650 mg Oral Q4H PRN Jr Ze Frye MD        Or   • acetaminophen (TYLENOL) suppository 650 mg  650 mg Rectal Q4H PRN Jr Ze Frye MD       • ALPRAZolam (XANAX) tablet 0.25 mg  0.25 mg Oral Q8H PRN Jr Ze Frye MD   0.25 mg at 03/20/20 1743   • aspirin EC tablet 81 mg  81 mg Oral Daily Jr Ze Frye MD   81 mg at 03/23/20 0933   • bisacodyl (DULCOLAX) suppository 10 mg  10 mg Rectal Daily PRN Jr Ze Frye MD       • cyclobenzaprine (FLEXERIL) tablet 10 mg  10 mg Oral Q8H PRN Jr Ze Frye MD       • docusate sodium (COLACE) capsule 100 mg  100 mg Oral BID PRN Jr Ze Frye MD       • enoxaparin (LOVENOX) syringe 30 mg  30 mg Subcutaneous Q24H Niya Dobson APRCANDI   30 mg at 03/23/20 1743   • erythromycin base (E-MYCIN) tablet 250 mg  250 mg Oral 4x Daily With Meals & Nightly Jackeline Cyr APRN   250 mg at 03/23/20 2039   • heparin (porcine) injection 3,800 Units  3,800 Units Intracatheter PRN Stephen Eng MD   3,800 Units at 03/22/20 1352   • HYDROcodone-acetaminophen (NORCO) 5-325 MG per tablet 1 tablet  1 tablet Oral Q4H PRN  Jackeline Cyr, APRCANDI   1 tablet at 03/23/20 1048   • influenza vac split quad (FLUZONE,FLUARIX,AFLURIA) injection 0.5 mL  0.5 mL Intramuscular Once Jr Ze Frye MD       • melatonin tablet 5 mg  5 mg Oral Nightly PRN Niya Dobson, APRCANDI       • morphine injection 1 mg  1 mg Intravenous Q4H PRN Jr Ze Frye MD        And   • naloxone (NARCAN) injection 0.4 mg  0.4 mg Intravenous Q5 Min PRN Jr Ze Frye MD       • mupirocin (BACTROBAN) 2 % nasal ointment   Each Nare BID Jr Ze Frye MD   1 application at 03/23/20 2039   • ondansetron (ZOFRAN) injection 4 mg  4 mg Intravenous Q6H PRN Jr Ze Frye MD   4 mg at 03/22/20 1958   • pantoprazole (PROTONIX) EC tablet 40 mg  40 mg Oral Q AM Jr Ze Frye MD   40 mg at 03/24/20 0615   • polyethylene glycol (MIRALAX) powder 17 g  17 g Oral Daily PRN Jr Ze Frye MD       • potassium chloride (MICRO-K) CR capsule 40 mEq  40 mEq Oral PRN Jr Ze Frye MD        Or   • potassium chloride (KLOR-CON) packet 40 mEq  40 mEq Oral PRN Jr Ze Frye MD       • potassium chloride 10 mEq in 100 mL IVPB  10 mEq Intravenous Q1H PRN Jr Ze Frye MD        Or   • potassium chloride 10 mEq in 100 mL IVPB  10 mEq Intravenous Q1H PRN Jr Ze Frye MD       • promethazine (PHENERGAN) tablet 12.5 mg  12.5 mg Oral Q6H PRN Jr Ze Frye MD        Or   • promethazine (PHENERGAN) injection 12.5 mg  12.5 mg Intravenous Q6H PRN Jr Ze Frye MD   12.5 mg at 03/23/20 0945   • sennosides-docusate (PERICOLACE) 8.6-50 MG per tablet 2 tablet  2 tablet Oral Nightly Jr Ze Frye MD   2 tablet at 03/23/20 2039   • sodium chloride 0.9 % flush 30 mL  30 mL Intravenous Once PRN Jr Ze Frye MD       • sodium chloride 0.9 % infusion  30 mL/hr Intravenous Continuous Jr Ze Frye MD 30 mL/hr at 03/19/20 1214 30 mL/hr at 03/19/20 1214   • sodium chloride 0.9 % infusion  30  mL/hr Intravenous Continuous PRN Jr Ze Frye MD   Stopped at 03/20/20 0801   • traMADol (ULTRAM) tablet 50 mg  50 mg Oral Q12H PRN Jr Ze Frye MD   50 mg at 03/20/20 1743   • warfarin (COUMADIN) tablet 4 mg  4 mg Oral Daily Jr Ze Frye MD   4 mg at 03/23/20 1743       Assessment/Plan   1.  Acute on chronic kidney disease stage IV with progression to ESRD: refractory fluid overload; NAGMA; stable potassium.  Initiate dialysis today.  HD again tomorrow .  Dr. Bains is going to assess for AVG while here since he is going to be on coumadin going forward. DW. Niya Dobson and she will talk with Dr. Frye about temporary heparin.   2.  POD 5 MV replacement, mechanical, PFO closure,  TV repair .  3. Systolic non-ischemic cardiomyopathy EF 33%.   4.  Anasarca and fluid excess due to right-sided heart failure and ESRD.  Improving .  5.  Hypertension,  Controlled.  6. LLE cellulitis improved.   7.  DM2. Controlled .  8. Anemia CKD.  Hg8.7.   Iron deficient. SP IV iron with dialysis 3/18.    Plan:  1. Dialysis tomorrow.   2. IV iron with HD tomorrow.         Laurita Mcneil MD  03/24/20  07:44

## 2020-03-24 NOTE — DISCHARGE PLACEMENT REQUEST
"Nico Washington (49 y.o. Male)     Date of Birth Social Security Number Address Home Phone MRN    1970  [address] [place] [id number] [phone] [id number]    Jew Marital Status          None Single       Admission Date Admission Type Admitting Provider Attending Provider Department, Room/Bed    3/12/20 Emergency Mark Pal MD Richards, Stephen J, MD [facility] CARDIOVASC UNIT, 2223/1    Discharge Date Discharge Disposition Discharge Destination                       Attending Provider:  Bao Bettencourt MD    Allergies:  No Known Allergies    Isolation:  None   Infection:  None   Code Status:  CPR    Ht:  180.3 cm (71\")   Wt:  120 kg (265 lb 3.2 oz)    Admission Cmt:  None   Principal Problem:  ESRD on hemodialysis (CMS/[facility]) [N18.6,Z99.2]                 Active Insurance as of 3/12/2020     Primary Coverage     Payor Plan Insurance Group Employer/Plan Group    [facility] [employer] [facility] [employer] BLUE [facility] PPO [id number]     Payor Plan Address Payor Plan Phone Number Payor Plan Fax Number Effective Dates    [address] [phone]  8/1/2018 - None Entered    [facility] [id number]       Subscriber Name Subscriber Birth Date Member ID       NICO WASHINGTON 1970 [id number]                 Emergency Contacts      (Rel.) Home Phone Work Phone Mobile Phone    Eleazar Washington (Father) [phone] -- --            "

## 2020-03-24 NOTE — SIGNIFICANT NOTE
03/24/20 [facility]   Rehab Treatment   Discipline physical therapist   Reason Treatment Not Performed other (see comments)  (Spoke with RN, pt has ambulated in hallway already this am. Nsg plans to ambulate with him again today. Due to current situation and limiting PT visits, will follow up with pt tomorrow. RN discussed possible rehab needs upon DC.)   Recommendation   PT - Next Appointment 03/25/20

## 2020-03-24 NOTE — PROGRESS NOTES
Progress Note    Name: Nico King ADMIT: 3/12/2020   : 1970  PCP: Provider, No Known    MRN: [id number] LOS: 12 days   AGE/SEX: 49 y.o. male  ROOM: [facility]   Date of Encounter Visit: 20    Subjective:     Interval History: AV wires removed yesterday and still having some mild intermittent block. 4 L removed with HD yesterday.   Right chest tube removed with morning and CXR showed small right apical ptx.     REVIEW OF SYSTEMS:   no fever or chills.  Typical post op chest pains. no palpitations. Edema improved some.   SOA improving. dry cough.   Nausea and vomiting after ambulating today. No abdominal pain. Had a BM  Today.   Dizziness when getting up.   Urinating ok since catheter removed.     Objective:   Temp:  [97.5 °F (36.4 °C)-98.2 °F (36.8 °C)] 97.5 °F (36.4 °C)  Heart Rate:  [57-84] 84  Resp:  [16-17] 17  BP: (119-150)/(65-90) 126/72   SpO2:  [96 %-97 %] 96 %  on    Device (Oxygen Therapy): room air    Intake/Output Summary (Last 24 hours) at 3/24/2020 1238  Last data filed at 3/24/2020 0800  Gross per 24 hour   Intake 642 ml   Output 4745 ml   Net -4103 ml     Body mass index is 36.99 kg/m².      20  0604 20  0551 20  0700   Weight: 123 kg (272 lb 3.2 oz) 74.8 kg (165 lb) 120 kg (265 lb 3.2 oz)     Weight change: -48.6 kg (-107 lb 3.2 oz)    Physical Exam   Constitutional: No distress.   HENT:   Head: Atraumatic.   Nose: Nose normal.   Eyes: Conjunctivae are normal.   Cardiovascular: Normal rate, regular rhythm and intact distal pulses.   Chest incision-CDI   Pulmonary/Chest: Effort normal. He has no wheezes. He has no rales.   Diminished breath sounds.  Chest tube in place with serosanginous drainage   Abdominal: Soft. He exhibits no distension. There is no tenderness.   Hypoactive bowel sounds   Musculoskeletal: He exhibits edema (1-2+BLE (L>R)).   Skin: Skin is warm and dry. He is not diaphoretic. There is erythema (mild left shin- improved from friday).              Results Review:      Results from last 7 days   Lab Units 03/24/20  0727 03/23/20  0332 03/22/20  0345 03/21/20  0357 03/20/20  0213 03/19/20  1514 03/19/20  1132  03/18/20  0551   SODIUM mmol/L 133* 133* 133* 134* 140 139 139   < > 135*   POTASSIUM mmol/L 4.0 4.4 5.1 4.6 4.4 3.9 3.9   < > 4.3   CHLORIDE mmol/L 96* 99 100 101 104 103 102   < > 103   CO2 mmol/L 25.2 20.2* 17.5* 20.4* 20.6* 21.4* 22.3   < > 20.6*   BUN mg/dL 32* 50* 40* 32* 43* 38* 37*   < > 57*   CREATININE mg/dL 5.04* 6.45* 5.31* 4.17* 4.84* 4.38* 4.37*   < > 5.68*   GLUCOSE mg/dL 90 94 100* 114* 143* 113* 120*   < > 113*   CALCIUM mg/dL 8.1* 7.6* 7.5* 7.8* 7.3* 6.9* 6.9*   < > 7.4*   AST (SGOT) U/L  --   --   --   --   --   --   --   --  9   ALT (SGPT) U/L  --   --   --   --   --   --   --   --  18    < > = values in this interval not displayed.     Estimated Creatinine Clearance: 23.6 mL/min (A) (by C-G formula based on SCr of 5.04 mg/dL (H)).      Results from last 7 days   Lab Units 03/24/20  0541 03/23/20 2003 03/23/20  1535 03/23/20  1047 03/21/20 2001 03/21/20  1540 03/21/20  1048 03/21/20  0536   GLUCOSE mg/dL 117 207* 105 135* 136* 127 108 115         Results from last 7 days   Lab Units 03/18/20  0551   PROBNP pg/mL 57,642.0*     Results from last 7 days   Lab Units 03/20/20  0213   TSH uIU/mL 2.550     Results from last 7 days   Lab Units 03/23/20  0332 03/20/20  0213 03/19/20  1514 03/19/20  1132 03/19/20  0516   MAGNESIUM mg/dL 2.0 1.9 1.8 1.9 1.7     Results from last 7 days   Lab Units 03/18/20  0551   CHOLESTEROL mg/dL 120   TRIGLYCERIDES mg/dL 110   HDL CHOL mg/dL 30*     Results from last 7 days   Lab Units 03/24/20  0337 03/23/20  0825 03/23/20  0332 03/22/20  0403 03/21/20  0357 03/20/20  0210 03/19/20  1514   WBC 10*3/mm3 7.93 9.85 10.68 11.94* 14.37* 12.95* 8.93   HEMOGLOBIN g/dL 8.7* 9.7* 9.5* 9.8* 9.9* 8.9* 9.7*   HEMATOCRIT % 29.1* 32.1* 29.8* 32.1* 32.5* 29.6* 31.0*   PLATELETS 10*3/mm3 158 161 156 144 131* 123*  119*   MCV fL 77.6* 78.5* 77.0* 77.5* 77.4* 77.3* 76.5*   MCH pg 23.2* 23.7* 24.5* 23.7* 23.6* 23.2* 24.0*   MCHC g/dL 29.9* 30.2* 31.9 30.5* 30.5* 30.1* 31.3*   RDW % 16.7* 16.7* 17.1* 16.9* 16.6* 16.3* 16.7*   RDW-SD fl 46.7 46.5 47.3 47.7 46.4 45.6 46.2   MPV fL 9.5 10.5 10.3 10.1 10.3 10.0 9.9   NEUTROPHIL % % 71.3 77.3* 74.2 76.2* 83.6* 88.1*  --    LYMPHOCYTE % % 10.8* 9.0* 10.6* 9.0* 6.3* 4.4*  --    MONOCYTES % % 12.7* 8.8 10.6 11.6 8.0 6.3  --    EOSINOPHIL % % 4.0 3.7 3.3 1.9 1.2 0.2*  --    BASOPHIL % % 0.6 0.7 0.7 0.7 0.5 0.3  --    IMM GRAN % % 0.6* 0.5 0.6* 0.6* 0.4 0.7*  --    NEUTROS ABS 10*3/mm3 5.64 7.61* 7.94* 9.10* 12.01* 11.42*  --    LYMPHS ABS 10*3/mm3 0.86 0.89 1.13 1.07 0.91 0.57*  --    MONOS ABS 10*3/mm3 1.01* 0.87 1.13* 1.39* 1.15* 0.81  --    EOS ABS 10*3/mm3 0.32 0.36 0.35 0.23 0.17 0.02  --    BASOS ABS 10*3/mm3 0.05 0.07 0.07 0.08 0.07 0.04  --    IMMATURE GRANS (ABS) 10*3/mm3 0.05 0.05 0.06* 0.07* 0.06* 0.09*  --    NRBC /100 WBC 0.0 0.0 0.0 0.0 0.1 0.0  --      Results from last 7 days   Lab Units 03/24/20  0727 03/24/20  0337 03/23/20  0825 03/23/20  0332 03/22/20  0403 03/20/20  0210 03/19/20  1132 03/18/20  0550   INR   --  1.91* 1.67* 1.60* 1.31* 1.37* 1.58* 1.28*   APTT seconds 50.3* 42.4* 44.9*  --   --   --  38.0* 39.3*     Results from last 7 days   Lab Units 03/19/20 2005 03/19/20  1533 03/19/20  1145 03/19/20  1009 03/19/20  0917 03/19/20  0847 03/19/20  0842  03/17/20  2300   PH, ARTERIAL pH units 7.322* 7.354 7.374 7.38 7.34* 7.33* 7.36   < > 7.418   PO2 ART mm Hg 114.7* 128.1* 608.6*  --   --   --   --   --  67.9*   PCO2, ARTERIAL mm Hg 46.9* 44.7 45.2*  --   --   --   --   --  33.4*   HCO3 ART mmol/L 24.3 24.9 26.4  --   --   --   --   --  21.6*    < > = values in this interval not displayed.                         Results from last 7 days   Lab Units 03/17/20  1355   NITRITE UA  Negative   WBC UA /HPF 3-5*   BACTERIA UA /HPF None Seen   SQUAM EPITHEL UA /HPF None  Seen           Imaging:  Imaging Results (Last 24 Hours)     Procedure Component Value Units Date/Time    XR Chest 1 View [[id number]] Collected:  03/24/20 0904     Updated:  03/24/20 0908    Narrative:       XR CHEST 1 VW-  03/24/2020     HISTORY: Chest tube removal.     A tiny right apical pneumothorax is seen with approximately 5 mm to 6 mm  pleural separation in the right apex. There is a mild increased density  in the right lung base likely combination of pleural fluid and  atelectasis. Left lung appears relatively clear.     There is mild cardiomegaly. Sternotomy wires prosthetic valve central  venous catheter and left chest tube are again seen. The right chest tube  appears to been removed.       Impression:       Tiny right apical pneumothorax.     This report was finalized on 3/24/2020 9:05 AM by Dr. Robin Sanchez M.D.       XR Chest 1 View [[id number]] Collected:  03/23/20 1317     Updated:  03/23/20 1324    Narrative:       XR CHEST 1 VW-     HISTORY: Male who is 49 years-old,  chest tube removal     TECHNIQUE: Frontal views of the chest     COMPARISON: 03/22/2020     FINDINGS: Mediastinal drain has been removed. Bilateral chest tubes  remain. Sternotomy wires, cardiac valve marker, stable appearing  right-sided central venous catheter noted. The heart is enlarged.  Pulmonary vasculature is unremarkable. Small likely atelectasis at the  lung bases. Suggestion of minimal, 2 mm right apical pneumothorax. No  pleural effusion, or left pneumothorax. No acute osseous process.       Impression:       Drain removal. Suggestion of minimal, 2 mm right apical  pneumothorax.     This report was finalized on 3/23/2020 1:21 PM by Dr. Michael Platt M.D.             I reviewed the patient's new clinical results and medications.         Medication Review:   Scheduled Meds:    aspirin 81 mg Oral Daily   enoxaparin 30 mg Subcutaneous Q24H   erythromycin base 250 mg Oral 4x Daily With Meals & Nightly   influenza  vaccine 0.5 mL Intramuscular Once   [START ON 3/25/2020] iron sucrose 100 mg Intravenous Once   mupirocin  Each Nare BID   pantoprazole 40 mg Oral Q AM   sennosides-docusate 2 tablet Oral Nightly   warfarin 4 mg Oral Daily     Continuous Infusions:    sodium chloride 30 mL/hr Last Rate: Stopped (03/20/20 0801)     PRN Meds:.•  acetaminophen **OR** acetaminophen **OR** acetaminophen  •  ALPRAZolam  •  bisacodyl  •  cyclobenzaprine  •  docusate sodium  •  heparin (porcine)  •  HYDROcodone-acetaminophen  •  melatonin  •  Morphine **AND** naloxone  •  ondansetron  •  polyethylene glycol  •  promethazine **OR** promethazine  •  sodium chloride  •  sodium chloride  •  traMADol    Problem List:     Active Hospital Problems    Diagnosis  POA   • **ESRD on hemodialysis (CMS/[facility]) [N18.6, Z99.2]  Not Applicable   • Wenckebach [I44.1]  Unknown   • History of mitral valve replacement with mechanical valve [Z95.2]  Not Applicable   • NSVT (nonsustained ventricular tachycardia) (CMS/[facility]) [I47.2]  Unknown   • Hypocalcemia [E83.51]  Unknown   • Anemia of chronic disease [D63.8]  Unknown   • Systolic CHF, acute (CMS/[facility]) [I50.21]  Unknown   • Anasarca associated with disorder of kidney [N04.9]  Yes   • Diabetes mellitus (CMS/[facility]) [E11.9]  Unknown   • Cellulitis of left leg [L03.116]  Unknown   • Essential hypertension [I10]  Yes   • Nonrheumatic mitral valve regurgitation, severe [I34.0]  Yes      Resolved Hospital Problems   No resolved problems to display.       Assessment and Plan:     Anasarca/ acute systolic CHF/ severe MR/ CAD/ mechanical MVR  -Echo showed EF reduced to 33% and severe MR and moderate TR  -minimal CAD on cath  -s/p mechanical MVR, PFO closure, TR repair on 3/19/20  -started coumadin on 3/21. CTS managing dosing, Goal INR 2.5-3.5. INR 1.91. Plan to get 4 mg coumadin tonight and heparin on hold. would consider holding coumadin and switching to heparin drip if plan to get AVG soon.     DESHAWN/CKD4 now ESRD  -tunneled  cath placed and HD started on 3/17/20   -vein mapping completed, will eventually need AV fistula. Discussed with Dr. Mcneil and will have vascular re-evaluate for placement while here.   -nephrology following, HD tomorrow    Cellulitis, left leg  -cellulitis vs dermatitis. Completed 5 day course of doxycycline preoperatively.    -repeat LLE doppler on 3/20 was negative.   -redness improved. Will monitor.     DM  -A1c 6.5. blood sugars well controlled.     Anemia  -Hgb dropped from 9.7 to 8.7 today.  -iron low and s/p IV iron 3/18/20 and will repeat IV iron tomorrow    NSVT/ wenckebach  - cardiology following. No further VT off amiodarone.   -still having intermittent episodes of wenckebach off coreg, mostly when sleeping.     Hypoxia  -wean O2 to keep sats greater than 90%  -encouraged hourly deep breathing and IS.  -will need overnight oximetry closer to discharge.      Ambulate with PT.     VTE prophylaxis: SCDs  CODE status: full code  Disposition: TBD- plan to go to [facility] rehab with HD once stable and approved by insurance.     I discussed the patients findings and my recommendations with patient and nursing staff. Discussed with Dr. Mcneil who reviewed case with CTS and vascular surgery.     Emily Yan, APRN  03/24/20

## 2020-03-24 NOTE — PLAN OF CARE
Problem: Patient Care Overview  Goal: Plan of Care Review  3/24/2020 1832 by Emmy Villanueva RN  Outcome: Ongoing (interventions implemented as appropriate)  Flowsheets  Taken 3/22/2020 1036 by Aminah Lopez PT  Progress: improving  Taken 3/24/2020 1615 by Emmy Villanueva, RN  Plan of Care Reviewed With: patient  Taken 3/24/2020 1832 by Emmy Villanueva RN  Outcome Summary: Pt walked a couple laps today. Had nausea all afternoon and evening, vomited after lunch. Feels more sick when walking right after eating. Gave phenergan. Right chest tube was removed, less than 100cc out of the left chest tube. VSS.

## 2020-03-24 NOTE — PROGRESS NOTES
LOS: 12 days   Patient Care Team:  Provider, No Known as PCP - General    Chief Complaint:     F/u CMP, valve disease    Interval History:     His right chest tube was pulled today and his left chest tube is still in.  We will recheck chest x-ray in the morning to see if that can come out.  His winky block is slowly improving.  He has no dizziness or nausea today.  He has chest soreness but no tightness or pressure.  His breathing is good.  He denies orthopnea or PND.  He does not feel his heart racing or skipping.    Objective   Vital Signs  Temp:  [97.5 °F (36.4 °C)-98.2 °F (36.8 °C)] 97.8 °F (36.6 °C)  Heart Rate:  [54-84] 81  Resp:  [16-17] 17  BP: (110-150)/(65-90) 125/70    Intake/Output Summary (Last 24 hours) at 3/24/2020 0812  Last data filed at 3/24/2020 0400  Gross per 24 hour   Intake 1002 ml   Output 5055 ml   Net -4053 ml       Comfortable NAD  PERRL, conjunctivae clear  Neck supple, no JVD or thyromegaly appreciated  S1/S2 RRR, no m/r/g  Lungs CTA B, normal effort  Abdomen S/NT/ND (+) BS, no HSM appreciated  Extremities warm, no clubbing, cyanosis, 2+ LLE and 1+ RLE edema  No visible or palpable skin lesions  A/Ox4, mood and affect appropriate    Results Review:      Results from last 7 days   Lab Units 03/24/20  0727 03/23/20  0332 03/22/20  0345   SODIUM mmol/L 133* 133* 133*   POTASSIUM mmol/L 4.0 4.4 5.1   CHLORIDE mmol/L 96* 99 100   CO2 mmol/L 25.2 20.2* 17.5*   BUN mg/dL 32* 50* 40*   CREATININE mg/dL 5.04* 6.45* 5.31*   GLUCOSE mg/dL 90 94 100*   CALCIUM mg/dL 8.1* 7.6* 7.5*         Results from last 7 days   Lab Units 03/24/20  0337 03/23/20  0825 03/23/20  0332   WBC 10*3/mm3 7.93 9.85 10.68   HEMOGLOBIN g/dL 8.7* 9.7* 9.5*   HEMATOCRIT % 29.1* 32.1* 29.8*   PLATELETS 10*3/mm3 158 161 156     Results from last 7 days   Lab Units 03/24/20  0727 03/24/20  0337 03/23/20  0825 03/23/20 0332   INR   --  1.91* 1.67* 1.60*   APTT seconds 50.3* 42.4* 44.9*  --      Results from last 7 days   Lab  Units 03/18/20  0551   CHOLESTEROL mg/dL 120     Results from last 7 days   Lab Units 03/23/20  0332   MAGNESIUM mg/dL 2.0     Results from last 7 days   Lab Units 03/18/20  0551   CHOLESTEROL mg/dL 120   TRIGLYCERIDES mg/dL 110   HDL CHOL mg/dL 30*   LDL CHOL mg/dL 68       I reviewed the patient's new clinical results.  I personally viewed and interpreted the patient's EKG/Telemetry data        Medication Review:     aspirin 81 mg Oral Daily   enoxaparin 30 mg Subcutaneous Q24H   erythromycin base 250 mg Oral 4x Daily With Meals & Nightly   influenza vaccine 0.5 mL Intramuscular Once   [START ON 3/25/2020] iron sucrose 100 mg Intravenous Once   mupirocin  Each Nare BID   pantoprazole 40 mg Oral Q AM   sennosides-docusate 2 tablet Oral Nightly   warfarin 4 mg Oral Daily         sodium chloride 30 mL/hr Last Rate: Stopped (03/20/20 0801)       Assessment/Plan       ESRD on hemodialysis (CMS/[facility])    Nonrheumatic mitral valve regurgitation, severe    Essential hypertension    Anasarca associated with disorder of kidney    Diabetes mellitus (CMS/[facility])    Cellulitis of left leg    Systolic CHF, acute (CMS/[facility])    Anemia of chronic disease    Hypocalcemia    History of mitral valve replacement with mechanical valve    NSVT (nonsustained ventricular tachycardia) (CMS/[facility])    Nnekakebach    1.  Valvular heart disease -s/p mechanical mitral valve replacement mechanical and tricuspid valve annuloplasty with surgical PFO closure done 3/19/20.  warfarin target 2.5-3.5  2.  ESRD -electrolytes are within normal range, hypervolemia improving and hemodialysis per nephrology.  3.  Chronic systolic heart failure -EF 35 to 40% preop secondary to valvular heart disease.  Minimal coronary artery disease on cardiac catheterization.  Volume optimization with hemodialysis.   4.  CAD -nonobstructive.  On aspirin   5.  NSVT - no further VT.   6. Wenckebach only when really relaxed or sleeping, not when active or talking,  off coreg.   7.  Anemia, watch for now - is expected.     HD tomorrow.  Only left CT left in.  Overall looks pretty good.     Meggan Hinton MD  03/24/20  08:12

## 2020-03-24 NOTE — PROGRESS NOTES
Continued Stay Note  [facility]     Patient Name: Nico King  MRN: [id number]  Today's Date: 3/24/2020    Admit Date: 3/12/2020    Discharge Plan     Row Name 03/24/20 1131       Plan    Plan  Eval pending for SNF at [facility];  Will require [facility] PRECERT.    Provided Post Acute Provider List?  Yes    Post Acute Provider List  Nursing Home    Delivered To  Patient    Method of Delivery  In person    Patient/Family in Agreement with Plan  yes    Plan Comments  Spoke with Pt at bedside and he advised his Dad has gone back to [place] and he has no family or friends available here in [place] to assist him if he discharges home.  Pt requesting [facility] sub-acute rehab at discharge.  Referral given to neal Klein [phone] at 11:32 AM.  CCP following.  HUE KIMBROUGH/CCP        Discharge Codes    No documentation.             Sierra Aceves RN

## 2020-03-24 NOTE — PROGRESS NOTES
" LOS: 12 days   Patient Care Team:  Provider, No Known as PCP - General    Chief Complaint: post op    Subjective:  Symptoms:  No shortness of breath, cough or chest pain.    Diet:  Adequate intake.  No nausea or vomiting.    Activity level: Returning to normal.    Pain:  He complains of pain that is mild.  Pain is well controlled and requiring pain medication.      Feeling good this morning, states that he slept well    Vital Signs  Temp:  [97.5 °F (36.4 °C)-98.2 °F (36.8 °C)] 97.8 °F (36.6 °C)  Heart Rate:  [54-84] 81  Resp:  [16-17] 17  BP: (110-150)/(65-90) 125/70  Body mass index is 33.02 kg/m².    Intake/Output Summary (Last 24 hours) at 3/24/2020 0725  Last data filed at 3/24/2020 0400  Gross per 24 hour   Intake 1002 ml   Output 5055 ml   Net -4053 ml     No intake/output data recorded.    Chest tube drainage last 8 hours: 50/10        03/23/20  0604 03/24/20  0551 03/24/20  0600   Weight: 123 kg (272 lb 3.2 oz) 74.8 kg (165 lb) 107 kg (236 lb 12.4 oz)       Objective:  General Appearance:  Comfortable and in no acute distress.    Vital signs: (most recent): Blood pressure 125/70, pulse 81, temperature 97.8 °F (36.6 °C), temperature source Oral, resp. rate 17, height 180.3 cm (71\"), weight 107 kg (236 lb 12.4 oz), SpO2 96 %.  Vital signs are normal.  No fever.    Output: Producing urine and producing stool.    Lungs:  Normal effort and normal respiratory rate.  There are decreased breath sounds.    Heart: Normal rate.  Regular rhythm.  (SR on tele monitor)  Abdomen: Abdomen is soft.  Bowel sounds are normal.     Extremities: There is dependent edema.  (+ 3 edema bilateral LE/ankles/feet)  Pulses: Distal pulses are intact.    Neurological: Patient is alert and oriented to person, place and time.    Skin:  Warm and dry.  (Sternal incision clean, dry, and intact)        Results Review:        WBC WBC   Date Value Ref Range Status   03/24/2020 7.93 3.40 - 10.80 10*3/mm3 Final   03/23/2020 9.85 3.40 - 10.80 " 10*3/mm3 Final   03/23/2020 10.68 3.40 - 10.80 10*3/mm3 Final   03/22/2020 11.94 (H) 3.40 - 10.80 10*3/mm3 Final      HGB Hemoglobin   Date Value Ref Range Status   03/24/2020 8.7 (L) 13.0 - 17.7 g/dL Final   03/23/2020 9.7 (L) 13.0 - 17.7 g/dL Final   03/23/2020 9.5 (L) 13.0 - 17.7 g/dL Final   03/22/2020 9.8 (L) 13.0 - 17.7 g/dL Final      HCT Hematocrit   Date Value Ref Range Status   03/24/2020 29.1 (L) 37.5 - 51.0 % Final   03/23/2020 32.1 (L) 37.5 - 51.0 % Final   03/23/2020 29.8 (L) 37.5 - 51.0 % Final   03/22/2020 32.1 (L) 37.5 - 51.0 % Final      Platelets Platelets   Date Value Ref Range Status   03/24/2020 158 140 - 450 10*3/mm3 Final   03/23/2020 161 140 - 450 10*3/mm3 Final   03/23/2020 156 140 - 450 10*3/mm3 Final   03/22/2020 144 140 - 450 10*3/mm3 Final        PT/INR:    Protime   Date Value Ref Range Status   03/24/2020 21.6 (H) 11.7 - 14.2 Seconds Final   03/23/2020 19.3 (H) 11.7 - 14.2 Seconds Final   03/23/2020 18.7 (H) 11.7 - 14.2 Seconds Final   03/22/2020 16.0 (H) 11.7 - 14.2 Seconds Final   /  INR   Date Value Ref Range Status   03/24/2020 1.91 (H) 0.90 - 1.10 Final   03/23/2020 1.67 (H) 0.90 - 1.10 Final   03/23/2020 1.60 (H) 0.90 - 1.10 Final   03/22/2020 1.31 (H) 0.90 - 1.10 Final       Sodium Sodium   Date Value Ref Range Status   03/23/2020 133 (L) 136 - 145 mmol/L Final   03/22/2020 133 (L) 136 - 145 mmol/L Final      Potassium Potassium   Date Value Ref Range Status   03/23/2020 4.4 3.5 - 5.2 mmol/L Final   03/22/2020 5.1 3.5 - 5.2 mmol/L Final      Chloride Chloride   Date Value Ref Range Status   03/23/2020 99 98 - 107 mmol/L Final   03/22/2020 100 98 - 107 mmol/L Final      Bicarbonate CO2   Date Value Ref Range Status   03/23/2020 20.2 (L) 22.0 - 29.0 mmol/L Final   03/22/2020 17.5 (L) 22.0 - 29.0 mmol/L Final      BUN BUN   Date Value Ref Range Status   03/23/2020 50 (H) 6 - 20 mg/dL Final   03/22/2020 40 (H) 6 - 20 mg/dL Final      Creatinine Creatinine   Date Value Ref Range  Status   03/23/2020 6.45 (H) 0.76 - 1.27 mg/dL Final   03/22/2020 5.31 (H) 0.76 - 1.27 mg/dL Final      Calcium Calcium   Date Value Ref Range Status   03/23/2020 7.6 (L) 8.6 - 10.5 mg/dL Final   03/22/2020 7.5 (L) 8.6 - 10.5 mg/dL Final      Magnesium Magnesium   Date Value Ref Range Status   03/23/2020 2.0 1.6 - 2.6 mg/dL Final            aspirin 81 mg Oral Daily   enoxaparin 30 mg Subcutaneous Q24H   erythromycin base 250 mg Oral 4x Daily With Meals & Nightly   influenza vaccine 0.5 mL Intramuscular Once   mupirocin  Each Nare BID   pantoprazole 40 mg Oral Q AM   sennosides-docusate 2 tablet Oral Nightly   warfarin 4 mg Oral Daily       sodium chloride 30 mL/hr Last Rate: 30 mL/hr (03/19/20 1214)   sodium chloride 30 mL/hr Last Rate: Stopped (03/20/20 0801)       Patient Active Problem List   Diagnosis Code   • Nonrheumatic mitral valve regurgitation, severe I34.0   • Cigarette smoker F17.210   • Essential hypertension I10   • ESRD on hemodialysis (CMS/HCC) N18.6, Z99.2   • Foot callus L84   • Anasarca associated with disorder of kidney N04.9   • Diabetes mellitus (CMS/HCC) E11.9   • Cellulitis of left leg L03.116   • Systolic CHF, acute (CMS/HCC) I50.21   • Anemia of chronic disease D63.8   • Hypocalcemia E83.51   • History of mitral valve replacement with mechanical valve Z95.2   • NSVT (nonsustained ventricular tachycardia) (CMS/HCC) I47.2   • Elaine I44.1       Assessment & Plan   -Severe mitral incompetence, severe tricuspid incompetence--s/p MVR (mechanical), TV repair, closure of PFO--POD#5 Melva  -Severe pulmonary hypertension-- intraoperatively PA pressures 70s-postop improved PA pressures 40s  -Dilated cardiomyopathy EF 30%  -ESRD on HD  -left leg cellulitis  -bilateral pleural effusions- intraoperatively 2L off right, 3L off left  -Anemia of chronic disease, post op anemia acutely worsened expected acute blood loss  -leukocytosis- probable reactive; trending down     Looks good this  morning  Weaned to RA and tolerating  He appears in sinus rhythm this morning with intermittent wenckebach; wires removed yesterday  INR 1.91 this morning--will give 4mg again tonight; Discussed with Dr. Frye, will hold off on heparin gtt for now  Mobilize  Weight down significantly yesterday after HD, 4L removed  Discontinue right pleural chest tube    Niya Dobson, MINGO  03/24/20  07:25

## 2020-03-25 NOTE — THERAPY TREATMENT NOTE
Patient Name: Nico King  : 1970    MRN: [id number]                              Today's Date: 3/25/2020       Admit Date: 3/12/2020    Visit Dx:     ICD-10-CM ICD-9-CM   1. Anasarca associated with disorder of kidney N04.9 581.9   2. DESHAWN (acute kidney injury) (CMS/[facility]) N17.9 584.9   3. Acute on chronic systolic congestive heart failure (CMS/[facility]) I50.23 428.23     428.0   4. Nonrheumatic mitral valve regurgitation, severe I34.0 424.0   5. Nonrheumatic mitral valve regurgitation I34.0 424.0   6. S/P MVR (mitral valve replacement) Z95.2 V43.3     Patient Active Problem List   Diagnosis   • Nonrheumatic mitral valve regurgitation, severe   • Cigarette smoker   • Essential hypertension   • ESRD on hemodialysis (CMS/[facility])   • Foot callus   • Anasarca associated with disorder of kidney   • Diabetes mellitus (CMS/[facility])   • Cellulitis of left leg   • Systolic CHF, acute (CMS/[facility])   • Anemia of chronic disease   • Hypocalcemia   • History of mitral valve replacement with mechanical valve   • NSVT (nonsustained ventricular tachycardia) (CMS/[facility])   • Wenckebach   • Acute respiratory failure with hypoxia (CMS/[facility])     Past Medical History:   Diagnosis Date   • DESHAWN (acute kidney injury) (CMS/[facility])    • CHF (congestive heart failure) (CMS/[facility])    • Cigarette smoker    • Diabetes mellitus (CMS/[facility])    • Foot callus    • Hypertension    • Mitral regurgitation     severe eccentric per echo 2018   • Pleural effusion 2018    Bilateral, small per x-ray   • Stage III chronic kidney disease (CMS/[facility])      Past Surgical History:   Procedure Laterality Date   • CARDIAC CATHETERIZATION N/A 3/16/2020    Procedure: CORONARY ANGIOGRAPHY;  Surgeon: Aguilar Morataya MD;  Location:  [facility];  Service: Cardiovascular;  Laterality: N/A;   • CARDIAC CATHETERIZATION N/A 3/16/2020    Procedure: Left Heart Cath;  Surgeon: Aguilar Morataya MD;  Location:  [facility];  Service:  Cardiovascular;  Laterality: N/A;   • HIP SURGERY     • INSERTION HEMODIALYSIS CATHETER N/A 3/17/2020    Procedure: HEMODIALYSIS CATHETER INSERTION;  Surgeon: Renard Ro MD;  Location: [facility];  Service: Vascular;  Laterality: N/A;   • MITRAL VALVE REPAIR/REPLACEMENT N/A 3/19/2020    Procedure: MEGHA, STERNOTOMY, MITRAL VALVE REPLACEMENT, TRICUSPID VALVE REPAIR, PFO CLOSURE, PRP;  Surgeon: Jr Ze Frye MD;  Location: [facility];  Service: Cardiothoracic;  Laterality: N/A;     General Information     Row Name 03/25/20 1003          PT Evaluation Time/Intention    Document Type  therapy note (daily note)  -     Mode of Treatment  physical therapy;individual therapy  -     Row Name 03/25/20 1003          General Information    Existing Precautions/Restrictions  cardiac;fall;sternal  -     Row Name 03/25/20 1003          Cognitive Assessment/Intervention- PT/OT    Orientation Status (Cognition)  oriented x 3  -     Row Name 03/25/20 1003          Safety Issues, Functional Mobility    Impairments Affecting Function (Mobility)  balance;coordination;endurance/activity tolerance;pain;strength  -       User Key  (r) = Recorded By, (t) = Taken By, (c) = Cosigned By    Initials Name Provider Type     Ara Jefferson PT Physical Therapist        Mobility     Row Name 03/25/20 1004          Bed Mobility Assessment/Treatment    Bed Mobility Assessment/Treatment  supine-sit;sit-supine  -     Supine-Sit [place] (Bed Mobility)  not tested sitting in chair  -     Sit-Supine [place] (Bed Mobility)  supervision  -     Assistive Device (Bed Mobility)  bed rails;head of bed elevated  -     Comment (Bed Mobility)  pt able to get into bed without assist of PT  -     Row Name 03/25/20 1004          Transfer Assessment/Treatment    Comment (Transfers)  cues to lean forward and push up through LEs  -     Row Name 03/25/20 1004          Sit-Stand Transfer    Sit-Stand  [place] (Transfers)  verbal cues;nonverbal cues (demo/gesture);minimum assist (75% patient effort)  -     Assistive Device (Sit-Stand Transfers)  walker, front-wheeled  -     Row Name 03/25/20 1004          Gait/Stairs Assessment/Training    Gait/Stairs Assessment/Training  gait/ambulation independence  -     [place] Level (Gait)  contact guard  -     Assistive Device (Gait)  walker, front-wheeled  -     Distance in Feet (Gait)  120  -CH     Pattern (Gait)  step-to  -     Deviations/Abnormal Patterns (Gait)  tiffanie decreased;festinating/shuffling;gait speed decreased;stride length decreased  -     Comment (Gait/Stairs)  pt with unsteady gait and continues to lean heavily throught walker, pt walked 5ft to bed without AD and required [place]  -       User Key  (r) = Recorded By, (t) = Taken By, (c) = Cosigned By    Initials Name Provider Type    Ara Gee, PT Physical Therapist        Obj/Interventions     Row Name 03/25/20 1100          Therapeutic Exercise    Comment (Therapeutic Exercise)  cardiac protocol 10 reps. Level 3  -       User Key  (r) = Recorded By, (t) = Taken By, (c) = Cosigned By    Initials Name Provider Type    Ara Gee, PT Physical Therapist        Goals/Plan    No documentation.       Clinical Impression     Row Name 03/25/20 1101          Pain Scale: Numbers Pre/Post-Treatment    Pain Scale: Numbers, Pretreatment  0/10 - no pain  -     Pain Scale: Numbers, Post-Treatment  0/10 - no pain  -     Row Name 03/25/20 1101          Plan of Care Review    Plan of Care Reviewed With  patient  -     Progress  improving  -     Outcome Summary  Pt continues to make steady progress with PT as he was able to walk 120ft with walker and was able to participate in protocol exercises. Pt continues to be unsteady during gait and pushes heavily throught the walker. Pt also required increased assistance when walking a few feet without AD. PT will continue to  follow to address strength, mobility, and gait.  -     Row Name 03/25/20 1101          Positioning and Restraints    Pre-Treatment Position  sitting in chair/recliner  -CH     Post Treatment Position  bed  -CH     In Bed  supine;call light within reach;encouraged to call for assist  -       User Key  (r) = Recorded By, (t) = Taken By, (c) = Cosigned By    Initials Name Provider Type    Ara Gee, PT Physical Therapist        Outcome Measures     Row Name 03/25/20 1105          How much help from another person do you currently need...    Turning from your back to your side while in flat bed without using bedrails?  4  -CH     Moving from lying on back to sitting on the side of a flat bed without bedrails?  4  -CH     Moving to and from a bed to a chair (including a wheelchair)?  3  -CH     Standing up from a chair using your arms (e.g., wheelchair, bedside chair)?  3  -CH     Climbing 3-5 steps with a railing?  1  -CH     To walk in hospital room?  3  -CH     AM-PAC 6 Clicks Score (PT)  18  -     Row Name 03/25/20 1105          Functional Assessment    Outcome Measure Options  AM-PAC 6 Clicks Basic Mobility (PT)  -       User Key  (r) = Recorded By, (t) = Taken By, (c) = Cosigned By    Initials Name Provider Type    Aar Gee, PT Physical Therapist          PT Recommendation and Plan     Outcome Summary/Treatment Plan (PT)  Anticipated Equipment Needs at Discharge (PT): front wheeled walker  Anticipated Discharge Disposition (PT): home with home health  Plan of Care Reviewed With: patient  Progress: improving  Outcome Summary: Pt continues to make steady progress with PT as he was able to walk 120ft with walker and was able to participate in protocol exercises. Pt continues to be unsteady during gait and pushes heavily throught the walker. Pt also required increased assistance when walking a few feet without AD. PT will continue to follow to address strength, mobility, and gait.      Time Calculation:   PT Charges     Row Name 03/25/20 1106             Time Calculation    Start Time  0844  -      Stop Time  0854  -      Time Calculation (min)  10 min  -      PT Received On  03/25/20  -      PT - Next Appointment  03/26/20  -         Time Calculation- PT    Total Timed Code Minutes- PT  10 minute(s)  -        User Key  (r) = Recorded By, (t) = Taken By, (c) = Cosigned By    Initials Name Provider Type     Ara Jefferson, PT Physical Therapist        Therapy Charges for Today     Code Description Service Date Service Provider Modifiers Qty    [id number]  PT THER PROC EA 15 MIN 3/25/2020 Ara Jefferson, PT GP 1          PT G-Codes  Outcome Measure Options: AM-PAC 6 Clicks Basic Mobility (PT)  AM-PAC 6 Clicks Score (PT): 18    Ara Jefferson, PT  3/25/2020

## 2020-03-25 NOTE — PROGRESS NOTES
"   LOS: 13 days    Patient Care Team:  Provider, No Known as PCP - General    Chief Complaint:    Chief Complaint   Patient presents with   • Edema     Follow-up acute kidney injury on chronic kidney disease  Subjective     Interval History:   Looks better. UF 3 kg last night, getting ready now to go on dialysis.  Cough with white sputum yesterday. No fever or chills .  Chest tubes out. Dr. Hinton called me this am with concern about worsening CXR.    Review of Systems:   As noted above    Objective     Vital Signs  Temp:  [98 °F (36.7 °C)-98.3 °F (36.8 °C)] 98.3 °F (36.8 °C)  Heart Rate:  [84-97] 97  Resp:  [16-18] 16  BP: (121-134)/(71-96) 133/87    Flowsheet Rows      First Filed Value   Admission Height  180.3 cm (71\") Documented at 03/12/2020 1350   Admission Weight  136 kg (300 lb) Documented at 03/12/2020 1350          I/O this shift:  In: -   Out: 300 [Urine:300]  I/O last 3 completed shifts:  In: 870 [P.O.:870]  Out: 3768 [Urine:600; Other:3000; Chest Tube:168]    Intake/Output Summary (Last 24 hours) at 3/25/2020 1454  Last data filed at 3/25/2020 1110  Gross per 24 hour   Intake 450 ml   Output 3650 ml   Net -3200 ml       Physical Exam:  General Appearance: alert, oriented x 3, no acute distress,  chronically ill. Looks much stronger.   Skin: warm and dry  HEENT: pupils round and reactive to light, oral mucosa normal, nonicteric sclerae  Neck: supple, no JVD, trachea midline. RIJ TDC.  Lungs: Decreased bs bases.   Unlabored.   Heart: RRR, no S3, no rub,  3/6 systolic murmur  Abdomen: soft, non-tender, +bs. Improved body wall edema .  :  scrotal and penile edema improved.   Extremities:  2+ LE edema bilaterally; chronic  venous stasis changes. Erythema LLE unchanged. No warmth.   Neuro: normal speech and mental status. Brighter affect.       Results Review:    Results from last 7 days   Lab Units 03/25/20  0323 03/24/20  0727 03/23/20  0332   SODIUM mmol/L 134* 133* 133*   POTASSIUM mmol/L 3.6 4.0 " 4.4   CHLORIDE mmol/L 97* 96* 99   CO2 mmol/L 23.7 25.2 20.2*   BUN mg/dL 35* 32* 50*   CREATININE mg/dL 4.95* 5.04* 6.45*   CALCIUM mg/dL 7.5* 8.1* 7.6*   GLUCOSE mg/dL 114* 90 94       Estimated Creatinine Clearance: 23.6 mL/min (A) (by C-G formula based on SCr of 4.95 mg/dL (H)).    Results from last 7 days   Lab Units 03/24/20  0727 03/23/20  0332 03/20/20  0213 03/19/20  1514   MAGNESIUM mg/dL  --  2.0 1.9 1.8   PHOSPHORUS mg/dL 3.5 5.3* 5.2* 3.9             Results from last 7 days   Lab Units 03/25/20  0323 03/24/20  0337 03/23/20  0825 03/23/20  0332 03/22/20  0403   WBC 10*3/mm3 7.38 7.93 9.85 10.68 11.94*   HEMOGLOBIN g/dL 9.3* 8.7* 9.7* 9.5* 9.8*   PLATELETS 10*3/mm3 179 158 161 156 144       Results from last 7 days   Lab Units 03/25/20  0323 03/24/20  0337 03/23/20  0825 03/23/20  0332 03/22/20  0403   INR  1.90* 1.91* 1.67* 1.60* 1.31*         Imaging Results (Last 24 Hours)     Procedure Component Value Units Date/Time    XR Chest 1 View [[id number]] Collected:  03/25/20 1040     Updated:  03/25/20 1044    Narrative:       XR CHEST 1 VW-     Clinical: Chest tube removal     COMPARISON examination 0400 hours, current examination 1027 hours     FINDINGS: Left base chest tube has been removed, no pneumothorax.  Central venous catheter position remains stable. Stable cardiac  enlargement. Improved aeration at the right lung base with decreasing  infiltrate/atelectasis. The remainder is unremarkable.     CONCLUSION: No pneumothorax status post left chest tube removal.  Diminished opacity right lung base.     This report was finalized on 3/25/2020 10:41 AM by Dr. Eulalio Arvizu M.D.       XR Chest 1 View [[id number]] Collected:  03/25/20 0457     Updated:  03/25/20 0504    Narrative:       PORTABLE CHEST RADIOGRAPH     HISTORY: Pneumothorax     COMPARISON: 03/24/2020     FINDINGS:  Cardiomegaly is present. There is persistent vascular congestion. This  is increased when compared to prior exam. Tubes and  lines are stable in  position. Previously identified right apical pneumothorax is not  well-seen on this examination. Patient does have a small left apical  pneumothorax. There is persistent left basilar consolidation. There is  increasing consolidation at the right lung base, worrisome for  pneumonia.       Impression:          1. Worsening vascular congestion.  2. Small left apical pneumothorax.  3. No pneumothorax seen on the right.  4. Worsening consolidation seen at the right lung base, worrisome for  pneumonia.     This report was finalized on 3/25/2020 5:01 AM by Dr. Rhoda Rao M.D.             aspirin 81 mg Oral Daily   erythromycin base 250 mg Oral 4x Daily With Meals & Nightly   influenza vaccine 0.5 mL Intramuscular Once   mupirocin  Each Nare BID   pantoprazole 40 mg Oral Q AM   sennosides-docusate 2 tablet Oral Nightly   warfarin 6 mg Oral Once       sodium chloride 30 mL/hr Last Rate: Stopped (03/20/20 0801)       Medication Review:   Current Facility-Administered Medications   Medication Dose Route Frequency Provider Last Rate Last Dose   • acetaminophen (TYLENOL) tablet 650 mg  650 mg Oral Q4H PRN Jr Ze Frye MD   650 mg at 03/20/20 1114    Or   • acetaminophen (TYLENOL) 160 MG/5ML solution 650 mg  650 mg Oral Q4H PRN Jr Ze Frye MD        Or   • acetaminophen (TYLENOL) suppository 650 mg  650 mg Rectal Q4H PRN Jr Ze Frye MD       • ALPRAZolam (XANAX) tablet 0.25 mg  0.25 mg Oral Q8H PRN Jr Ze Frye MD   0.25 mg at 03/20/20 1743   • aspirin EC tablet 81 mg  81 mg Oral Daily Jr Ze Frye MD   81 mg at 03/25/20 0816   • bisacodyl (DULCOLAX) suppository 10 mg  10 mg Rectal Daily PRN Jr Ze Frye MD       • cyclobenzaprine (FLEXERIL) tablet 10 mg  10 mg Oral Q8H PRN Jr Ze Frye MD       • docusate sodium (COLACE) capsule 100 mg  100 mg Oral BID PRN Jr Ze Frye MD       • erythromycin base (E-MYCIN) tablet 250 mg  250  mg Oral 4x Daily With Meals & Nightly Jackeline Cyr APRN   250 mg at 03/25/20 1221   • heparin (porcine) injection 3,800 Units  3,800 Units Intracatheter PRN Stephen Eng MD   3,800 Units at 03/25/20 0152   • HYDROcodone-acetaminophen (NORCO) 5-325 MG per tablet 1 tablet  1 tablet Oral Q4H PRN Jackeline Cyr APRN   1 tablet at 03/23/20 1048   • influenza vac split quad (FLUZONE,FLUARIX,AFLURIA) injection 0.5 mL  0.5 mL Intramuscular Once Jr Ze Frye MD       • melatonin tablet 5 mg  5 mg Oral Nightly PRN Niya Dobson APRN       • morphine injection 1 mg  1 mg Intravenous Q4H PRN Jr Ze Frye MD        And   • naloxone (NARCAN) injection 0.4 mg  0.4 mg Intravenous Q5 Min PRN Jr Ze Frye MD       • mupirocin (BACTROBAN) 2 % nasal ointment   Each Nare BID Jr Ze Frye MD   1 application at 03/25/20 0816   • ondansetron (ZOFRAN) injection 4 mg  4 mg Intravenous Q6H PRN Jr Ze Frye MD   4 mg at 03/22/20 1958   • pantoprazole (PROTONIX) EC tablet 40 mg  40 mg Oral Q AM Jr Ze Frye MD   40 mg at 03/25/20 0739   • polyethylene glycol (MIRALAX) powder 17 g  17 g Oral Daily PRN Jr Ze Frye MD       • promethazine (PHENERGAN) tablet 12.5 mg  12.5 mg Oral Q6H PRN Jr Ze Frye MD        Or   • promethazine (PHENERGAN) injection 12.5 mg  12.5 mg Intravenous Q6H PRN Jr Ze Frye MD   12.5 mg at 03/24/20 1812   • sennosides-docusate (PERICOLACE) 8.6-50 MG per tablet 2 tablet  2 tablet Oral Nightly Jr Ze Frye MD   2 tablet at 03/24/20 2104   • sodium chloride 0.9 % flush 30 mL  30 mL Intravenous Once PRN Jr Ze Frye MD       • sodium chloride 0.9 % infusion  30 mL/hr Intravenous Continuous PRN Jr Ze Frye MD   Stopped at 03/20/20 0801   • traMADol (ULTRAM) tablet 50 mg  50 mg Oral Q12H PRN Jr Ze Frye MD   50 mg at 03/20/20 1743   • warfarin (COUMADIN) tablet 6 mg  6 mg Oral Once  Jr Ze Frye MD           Assessment/Plan   1.  Acute on chronic kidney disease stage IV with progression to ESRD: refractory fluid overload; NAGMA; stable potassium.  Initiate dialysis today.  HD today, had UF last night.   Remove more volume today, then check CT chest to see if fluid or PNA .  Has TDC   2.  POD 6 MV replacement, mechanical, PFO closure,  TV repair .  3. Systolic non-ischemic cardiomyopathy EF 33%.   4.  Anasarca and fluid excess due to right-sided heart failure and ESRD.  Improving .  5.  Hypertension,  Controlled.  6. LLE cellulitis improved.   7.  DM2. Controlled .  8. Anemia CKD.  Hg8.7.   Iron deficient. SP IV iron with dialysis 3/18.    Plan:  1. Dialysis today.  Remove 3 kg  2. CT chest to assess RLL.          Laurita Mcneil MD  03/25/20  14:54

## 2020-03-25 NOTE — PAYOR COMM NOTE
"Nico Washington (49 y.o. Male)     PLEASE CALL   OR  [phone] WITH CONTINUED STAY AUTH AND DAYS APPROVED.     REF#[id number]    PLEASE CALL   OR  [phone] WITH INPT CONTINUED STAY AUTH.     THANK YOU    AUSTIN WALKER LPN CCP    Date of Birth Social Security Number Address Home Phone MRN    1970  [address] [place] [address] [phone] [id number]    Shinto Marital Status          None Single       Admission Date Admission Type Admitting Provider Attending Provider Department, Room/Bed    3/12/20 Emergency Mark Pal MD Richards, Stephen J, MD [facility] CARDIOVASC UNIT, 2223/1    Discharge Date Discharge Disposition Discharge Destination                       Attending Provider:  Bao Bettencourt MD    Allergies:  No Known Allergies    Isolation:  None   Infection:  None   Code Status:  CPR    Ht:  180.3 cm (71\")   Wt:  116 kg (255 lb 3.2 oz)    Admission Cmt:  None   Principal Problem:  ESRD on hemodialysis (CMS/[facility]) [N18.6,Z99.2]                 Active Insurance as of 3/12/2020     Primary Coverage     Payor Plan Insurance Group Employer/Plan Group    [facility] [employer] [facility] [employer] [facility] PPO [id number]     Payor Plan Address Payor Plan Phone Number Payor Plan Fax Number Effective Dates    [address] [phone]  8/1/2018 - None Entered    [place] [address]       Subscriber Name Subscriber Birth Date Member ID       NICO WASHINGTON 1970 [id number]                 Emergency Contacts      (Rel.) Home Phone Work Phone Mobile Phone    Eleazar Washington (Father) [phone] -- --            Oxygen Therapy (last day)     Date/Time   SpO2   Device (Oxygen Therapy)   Flow (L/min)   Oxygen Concentration (%)   ETCO2 (mmHg)    03/25/20 0820   --   room air   --   --   --    03/25/20 0720   95   room air   --   --   --    03/25/20 0400   --   room air   --   --   --    03/25/20 0330  "  93   room air   --   --   --    03/24/20 2300   95   room air   --   --   --    03/24/20 2000   --   room air   --   --   --    03/24/20 1930   98   room air   --   --   --    03/24/20 1615   --   nasal cannula   2   --   --    03/24/20 1605   --   nasal cannula   --   --   --    03/24/20 1230   100   nasal cannula   2   --   --    03/24/20 1200   --   room air   --   --   --    03/24/20 0400   --   room air   --   --   --    03/24/20 0327   96   room air   --   --   --    03/24/20 0000   --   room air   --   --   --              Intake & Output (last day)       03/24 0701 - 03/25 0700 03/25 0701 - 03/26 0700    P.O. 570     Total Intake(mL/kg) 570 (4.9)     Urine (mL/kg/hr) 300 (0.1) 300 (0.6)    Other 3000     Chest Tube 88     Total Output 3388 300    Net -2818 -300          Urine Unmeasured Occurrence 1 x         Lines, Drains & Airways    Active LDAs     Name:   Placement date:   Placement time:   Site:   Days:    Peripheral IV 03/23/20 1322 Left Forearm   03/23/20    1322    Forearm   1    Hemodialysis Cath Double   03/17/20    1055    Internal Jugular   8                Blood Administration Record (From admission, onward)    None          Lab Results (last 24 hours)     Procedure Component Value Units Date/Time    POC Glucose Once [[id number]]  (Normal) Collected:  03/25/20 0552    Specimen:  Blood Updated:  03/25/20 0554     Glucose 125 mg/dL     Basic Metabolic Panel [[id number]]  (Abnormal) Collected:  03/25/20 0323    Specimen:  Blood Updated:  03/25/20 0442     Glucose 114 mg/dL      BUN 35 mg/dL      Creatinine 4.95 mg/dL      Sodium 134 mmol/L      Potassium 3.6 mmol/L      Chloride 97 mmol/L      CO2 23.7 mmol/L      Calcium 7.5 mg/dL      eGFR   Amer --     Comment: <15 Indicative of kidney failure.        eGFR Non African Amer 13 mL/min/1.73      Comment: <15 Indicative of kidney failure.        BUN/Creatinine Ratio 7.1     Anion Gap 13.3 mmol/L     Narrative:       GFR Normal >60  Chronic  Kidney Disease <60  Kidney Failure <15      Protime-INR [[id number]]  (Abnormal) Collected:  03/25/20 0323    Specimen:  Blood Updated:  03/25/20 0424     Protime 21.5 Seconds      INR 1.90    aPTT [[id number]]  (Abnormal) Collected:  03/25/20 0323    Specimen:  Blood Updated:  03/25/20 0424     PTT 54.3 seconds     CBC & Differential [[id number]] Collected:  03/25/20 0323    Specimen:  Blood Updated:  03/25/20 0415    Narrative:       The following orders were created for panel order CBC & Differential.  Procedure                               Abnormality         Status                     ---------                               -----------         ------                     CBC Auto Differential[[id number]]        Abnormal            Final result                 Please view results for these tests on the individual orders.    CBC Auto Differential [[id number]]  (Abnormal) Collected:  03/25/20 0323    Specimen:  Blood Updated:  03/25/20 0415     WBC 7.38 10*3/mm3      RBC 3.97 10*6/mm3      Hemoglobin 9.3 g/dL      Hematocrit 30.2 %      MCV 76.1 fL      MCH 23.4 pg      MCHC 30.8 g/dL      RDW 16.5 %      RDW-SD 45.4 fl      MPV 9.7 fL      Platelets 179 10*3/mm3      Neutrophil % 71.3 %      Lymphocyte % 10.8 %      Monocyte % 13.0 %      Eosinophil % 3.9 %      Basophil % 0.5 %      Immature Grans % 0.5 %      Neutrophils, Absolute 5.25 10*3/mm3      Lymphocytes, Absolute 0.80 10*3/mm3      Monocytes, Absolute 0.96 10*3/mm3      Eosinophils, Absolute 0.29 10*3/mm3      Basophils, Absolute 0.04 10*3/mm3      Immature Grans, Absolute 0.04 10*3/mm3      nRBC 0.0 /100 WBC     POC Glucose Once [[id number]]  (Abnormal) Collected:  03/24/20 2006    Specimen:  Blood Updated:  03/24/20 2008     Glucose 236 mg/dL     POC Glucose Once [[id number]]  (Abnormal) Collected:  03/24/20 1541    Specimen:  Blood Updated:  03/24/20 1543     Glucose 146 mg/dL         Imaging Results (Last 24 Hours)     Procedure Component Value Units  Date/Time    XR Chest 1 View [[id number]] Collected:  03/25/20 1040     Updated:  03/25/20 1044    Narrative:       XR CHEST 1 VW-     Clinical: Chest tube removal     COMPARISON examination 0400 hours, current examination 1027 hours     FINDINGS: Left base chest tube has been removed, no pneumothorax.  Central venous catheter position remains stable. Stable cardiac  enlargement. Improved aeration at the right lung base with decreasing  infiltrate/atelectasis. The remainder is unremarkable.     CONCLUSION: No pneumothorax status post left chest tube removal.  Diminished opacity right lung base.     This report was finalized on 3/25/2020 10:41 AM by Dr. Eulalio Arvizu M.D.       XR Chest 1 View [[id number]] Collected:  03/25/20 0457     Updated:  03/25/20 0504    Narrative:       PORTABLE CHEST RADIOGRAPH     HISTORY: Pneumothorax     COMPARISON: 03/24/2020     FINDINGS:  Cardiomegaly is present. There is persistent vascular congestion. This  is increased when compared to prior exam. Tubes and lines are stable in  position. Previously identified right apical pneumothorax is not  well-seen on this examination. Patient does have a small left apical  pneumothorax. There is persistent left basilar consolidation. There is  increasing consolidation at the right lung base, worrisome for  pneumonia.       Impression:          1. Worsening vascular congestion.  2. Small left apical pneumothorax.  3. No pneumothorax seen on the right.  4. Worsening consolidation seen at the right lung base, worrisome for  pneumonia.     This report was finalized on 3/25/2020 5:01 AM by Dr. Rhoda Rao M.D.           ECG/EMG Results (last 24 hours)     ** No results found for the last 24 hours. **        Orders (last 24 hrs)      Start     Ordered    03/25/20 1700  warfarin (COUMADIN) tablet 6 mg  Once (Warfarin)      03/25/20 0706    03/25/20 1009  XR Chest 1 View  1 Time Imaging      03/25/20 1009    03/25/20 0706  Chest Tube Removal   Once      03/25/20 0705    03/25/20 0700  Hemodialysis Inpatient  Once     Comments:  Heparin to ports only .  Draw RENAL PANEL going on dialysis 3/25.  Stand to weigh before and after.  Give venofer 100 mg on dialysis 3.25.    03/25/20 0700    03/25/20 0632  Hemodialysis Inpatient  Once,   Status:  Canceled     Comments:  Heparin to ports only .  Draw RENAL PANEL going on dialysis 3/25.  Stand to weigh before and after.  Give venofer 100 mg on dialysis 3.25.    03/25/20 0631    03/25/20 0600  Basic Metabolic Panel  Daily      03/24/20 0743    03/25/20 0600  XR Chest 1 View  1 Time Imaging      03/24/20 0920    03/25/20 0600  CBC Auto Differential  PROCEDURE ONCE      03/25/20 0001    03/25/20 0555  POC Glucose Once  Once      03/25/20 0552    03/25/20 0000  Hemodialysis Inpatient  Once,   Status:  Canceled     Comments:  Heparin to ports only .  Draw RENAL PANEL going on dialysis 3/25.  Stand to weigh before and after.    03/24/20 0754    03/25/20 0000  iron sucrose (VENOFER) 100 mg in sodium chloride 0.9 % 100 mL IVPB  Once      03/24/20 0754    03/24/20 2109  albumin human 25 % IV SOLN 12.5 g  As Needed      03/24/20 2109    03/24/20 2009  POC Glucose Once  Once      03/24/20 2006    03/24/20 1544  POC Glucose Once  Once      03/24/20 1541    03/24/20 0430  aPTT  Daily     Comments:  Cancel If Patient Has Infusion Changes      03/23/20 0739    03/23/20 1800  enoxaparin (LOVENOX) syringe 30 mg  Every 24 Hours,   Status:  Discontinued      03/23/20 1028    03/23/20 1800  warfarin (COUMADIN) tablet 4 mg  Daily Warfarin,   Status:  Discontinued      03/23/20 1101    03/23/20 0812  melatonin tablet 5 mg  Nightly PRN      03/23/20 0812    03/23/20 0000  Ambulatory Referral to Cardiac Rehab      03/23/20 0701    03/22/20 0600  Protime-INR  Daily      03/21/20 0749    03/22/20 0600  Protime-INR  Daily      03/21/20 1711    03/21/20 1200  influenza vac split quad (FLUZONE,FLUARIX,AFLURIA) injection 0.5 mL  Once       03/20/20 1846    03/21/20 0845  erythromycin base (E-MYCIN) tablet 250 mg  4 Times Daily With Meals & Nightly      03/21/20 0756    03/21/20 0830  Oscillating Positive Expiratory Pressure (OPEP)  4 Times Daily - RT      03/21/20 0749    03/21/20 0736  HYDROcodone-acetaminophen (NORCO) 5-325 MG per tablet 1 tablet  Every 4 Hours PRN      03/21/20 0749    03/21/20 0600  CBC & Differential  Daily      03/20/20 1704    03/20/20 2100  sennosides-docusate (PERICOLACE) 8.6-50 MG per tablet 2 tablet  Nightly      03/19/20 1109    03/20/20 1728  traMADol (ULTRAM) tablet 50 mg  Every 12 Hours PRN      03/20/20 1728    03/20/20 1319  heparin (porcine) injection 3,800 Units  As Needed      03/20/20 1321    03/20/20 0900  aspirin EC tablet 81 mg  Daily      03/19/20 1109    03/20/20 0600  pantoprazole (PROTONIX) EC tablet 40 mg  Every Early Morning      03/19/20 1109    03/20/20 0353  acetaminophen (TYLENOL) tablet 650 mg  Every 4 Hours PRN      03/19/20 1109    03/20/20 0353  acetaminophen (TYLENOL) 160 MG/5ML solution 650 mg  Every 4 Hours PRN      03/19/20 1109    03/20/20 0353  acetaminophen (TYLENOL) suppository 650 mg  Every 4 Hours PRN      03/19/20 1109    03/20/20 0000  cyclobenzaprine (FLEXERIL) tablet 10 mg  Every 8 Hours PRN      03/19/20 1109    03/20/20 0000  bisacodyl (DULCOLAX) suppository 10 mg  Daily PRN      03/19/20 1109    03/19/20 2330  Patient May Use Home CPAP / BIPAP For Sleep  At Bedtime - RT      03/19/20 1109    03/19/20 2100  mupirocin (BACTROBAN) 2 % nasal ointment  2 Times Daily      03/19/20 1109    03/19/20 1109  morphine injection 1 mg  Every 4 Hours PRN      03/19/20 1109    03/19/20 1109  naloxone (NARCAN) injection 0.4 mg  Every 5 Minutes PRN      03/19/20 1109    03/19/20 1109  promethazine (PHENERGAN) tablet 12.5 mg  Every 6 Hours PRN      03/19/20 1109    03/19/20 1109  promethazine (PHENERGAN) injection 12.5 mg  Every 6 Hours PRN      03/19/20 1109    03/19/20 1109  sodium chloride 0.9 %  flush 30 mL  Once As Needed      03/19/20 1109    03/19/20 1109  sodium chloride 0.9 % infusion  Continuous PRN      03/19/20 1109    03/19/20 1109  ondansetron (ZOFRAN) injection 4 mg  Every 6 Hours PRN      03/19/20 1109    03/19/20 1109  polyethylene glycol (MIRALAX) powder 17 g  Daily PRN      03/19/20 1109    03/19/20 1109  docusate sodium (COLACE) capsule 100 mg  2 Times Daily PRN      03/19/20 1109    03/19/20 1109  ALPRAZolam (XANAX) tablet 0.25 mg  Every 8 Hours PRN      03/19/20 1109    Unscheduled  ECG 12 Lead  As Needed     Comments:  Nurse to Release if Patient Expericences Acute Chest Pain or Dysrhythmias    03/12/20 2200    Unscheduled  Troponin  As Needed     Comments:  For Chest Pain      03/12/20 2200    Unscheduled  Check Peripheral Pulses As Needed  As Needed      03/19/20 1109    Unscheduled  Pacemaker Settings - Initiate for Heart Rate Less Than 60 And / Or Hemodynamically Unstable  As Needed     Comments:  Mode: AAI  Atrial rate: 90  Atrial mA: 10  Atrial mV: 0.5    03/19/20 1109    Unscheduled  Dangle at Bedside After Extubation  As Needed      03/19/20 1109    Unscheduled  Insert Nasogastric Tube If Indicated & Not Already in Place  As Needed     Comments:  Indications: Nausea, Vomiting, Prolonged Intubation or to Administer Medications  Attach to Low Wall Suction if Any Residual    03/19/20 1109    Unscheduled  Cleanse Incision With Normal Saline and Redress With Dry 4x4 Gauze if Incision is Draining  As Needed      03/19/20 1109    Unscheduled  Change Chest Tube Dressings PRN to Keep Dry - Do NOT Reinforce  As Needed      03/19/20 1109    Unscheduled  Oxygen Therapy- Nasal Cannula; 2 LPM; Titrate for SPO2: 92%  Continuous PRN      03/19/20 1109    Unscheduled  Blood Gas, Arterial  As Needed     Comments:  30 Minutes After Ventilator Changes, 30 Minutes After Extubation and PRN      03/19/20 1109    Unscheduled  CBC & Differential  As Needed     Comments:  Chest Tube Drainage Greater Than  200mL/hr      03/19/20 1109    Unscheduled  aPTT  As Needed     Comments:  Chest Tube Drainage Greater Than 200mL/hr      03/19/20 1109    Unscheduled  Protime-INR  As Needed     Comments:  Chest Tube Drainage Greater Than 200mL/hr      03/19/20 1109    Unscheduled  Fibrinogen  As Needed     Comments:  Chest Tube Drainage Greater Than 200mL/hr      03/19/20 1109    Unscheduled  Potassium  As Needed     Comments:  Four hours after dose given.      03/19/20 1109    Unscheduled  Bladder Scan if Patient Unable to Void 4-6 Hours After Catheter Removal  As Needed      03/23/20 0736    Unscheduled  If Bladder Scan Volume is Less Than 500mL & Patient is Without Symptoms of Bladder Discomfort / Distention Monitor Every 1-2 Hours for Spontaneous Void  As Needed      03/23/20 0736    Unscheduled  Straight Cath Every 4-6 Hours As Needed If Patient is Unable to Void After 4-6 Hours, Bladder Scan Volume is Greater Than 500mL & Patient Has Symptoms of Bladder Discomfort / Distention  As Needed      03/23/20 0736    Unscheduled  Schedule / Prompt Voiding For Patients With Urinary Incontinence  As Needed      03/23/20 0736    Unscheduled  aPTT  As Needed      03/23/20 0739    --  aspirin 81 MG EC tablet  Daily      03/12/20 1412                Operative/Procedure Notes (last 24 hours) (Notes from 03/24/20 1130 through 03/25/20 1130)    No notes of this type exist for this encounter.            Physician Progress Notes (last 24 hours) (Notes from 03/24/20 1130 through 03/25/20 1130)      Meggan Hinton MD at 03/25/20 0848             LOS: 13 days   Patient Care Team:  Provider, No Known as PCP - General    Chief Complaint:     F/u valve disease, chf.     Interval History:     He denies orthopnea, chest pain.  Had one episode of vomiting yesterday.  He has a cough but no fevers.  He is bringing up clear sputum.  He does not feels heart racing or skipping.  He has mild dizziness with walking.  He has had no falls or syncope.  He  denies feeling short winded.  He is still making some urine.    Objective   Vital Signs  Temp:  [98 °F (36.7 °C)-98.3 °F (36.8 °C)] 98.3 °F (36.8 °C)  Heart Rate:  [84-94] 94  Resp:  [16-18] 16  BP: (121-134)/(71-96) 133/86    Intake/Output Summary (Last 24 hours) at 3/25/2020 0848  Last data filed at 3/25/2020 0700  Gross per 24 hour   Intake 450 ml   Output 3368 ml   Net -2918 ml       Comfortable NAD  PERRL, conjunctivae clear  Neck supple, elevated JVD or thyromegaly appreciated  S1/S2 RRR, no m/r/g, mechanical click  Lungs decreased throughout, no breath sounds right base, normal effort  Abdomen S/NT/ND (+) BS, no HSM appreciated  Extremities warm, no clubbing, cyanosis, 1+ left lower extremity edema  No visible or palpable skin lesions  A/Ox4, mood and affect appropriate    Results Review:      Results from last 7 days   Lab Units 03/25/20 0323 03/24/20 0727 03/23/20  0332   SODIUM mmol/L 134* 133* 133*   POTASSIUM mmol/L 3.6 4.0 4.4   CHLORIDE mmol/L 97* 96* 99   CO2 mmol/L 23.7 25.2 20.2*   BUN mg/dL 35* 32* 50*   CREATININE mg/dL 4.95* 5.04* 6.45*   GLUCOSE mg/dL 114* 90 94   CALCIUM mg/dL 7.5* 8.1* 7.6*         Results from last 7 days   Lab Units 03/25/20 0323 03/24/20 0337 03/23/20  0825   WBC 10*3/mm3 7.38 7.93 9.85   HEMOGLOBIN g/dL 9.3* 8.7* 9.7*   HEMATOCRIT % 30.2* 29.1* 32.1*   PLATELETS 10*3/mm3 179 158 161     Results from last 7 days   Lab Units 03/25/20 0323 03/24/20 0727 03/24/20  0337 03/23/20  0825   INR  1.90*  --  1.91* 1.67*   APTT seconds 54.3* 50.3* 42.4* 44.9*         Results from last 7 days   Lab Units 03/23/20  0332   MAGNESIUM mg/dL 2.0           I reviewed the patient's new clinical results.  I personally viewed and interpreted the patient's EKG/Telemetry data        Medication Review:     aspirin 81 mg Oral Daily   erythromycin base 250 mg Oral 4x Daily With Meals & Nightly   influenza vaccine 0.5 mL Intramuscular Once   mupirocin  Each Nare BID   pantoprazole 40 mg Oral Q  AM   sennosides-docusate 2 tablet Oral Nightly   warfarin 6 mg Oral Once         sodium chloride 30 mL/hr Last Rate: Stopped (03/20/20 0801)       Assessment/Plan       ESRD on hemodialysis (CMS/[facility])    Nonrheumatic mitral valve regurgitation, severe    Essential hypertension    Anasarca associated with disorder of kidney    Diabetes mellitus (CMS/[facility])    Cellulitis of left leg    Systolic CHF, acute (CMS/[facility])    Anemia of chronic disease    Hypocalcemia    History of mitral valve replacement with mechanical valve    NSVT (nonsustained ventricular tachycardia) (CMS/[facility])    Elaine    1.  Valvular heart disease -s/p mechanical mitral valve replacement mechanical and tricuspid valve annuloplasty with surgical PFO closure done 3/19/20.  warfarin target 2.5-3.5  2.  ESRD -electrolytes are within normal range, hypervolemia improving and hemodialysis per nephrology.  3.  Chronic systolic heart failure -EF 35 to 40% secondary to valvular heart disease.  Minimal coronary artery disease on cardiac catheterization.  Volume optimization with hemodialysis.   4.  CAD -nonobstructive.  On aspirin   5.  NSVT - no further VT.   6. Wenckebach only when really relaxed or sleeping, not when active or talking,  off coreg.   7. Anemia, watch for now - is expected.     Warfarin increased today. cxr now worsening vascular congestion with infiltrate vs effusion right base.  Clinically though he looks better.     Spoke with nephrology, Dr. Mcneil and had ultrafiltration last night, removing 3kg.  To get HD today with CT of chest afterwards.        No med changes for now.     Meggan Hinton MD  03/25/20  08:48        Electronically signed by Meggan Hinton MD at 03/25/20 1117     Jackeline Cyr APRN at 03/25/20 0732           LOS: 13 days   Patient Care Team:  Provider, No Known as PCP - General    Chief Complaint: post op    Subjective  No new complaints    Vital Signs  Temp:  [97.5 °F (36.4 °C)-98.3 °F (36.8 °C)] 98.3  °F (36.8 °C)  Heart Rate:  [84-89] 89  Resp:  [16-18] 16  BP: (121-134)/(71-96) 133/71  Body mass index is 35.59 kg/m².    Intake/Output Summary (Last 24 hours) at 3/25/2020 0732  Last data filed at 3/25/2020 0531  Gross per 24 hour   Intake 570 ml   Output 3358 ml   Net -2788 ml     No intake/output data recorded.            03/24/20  0551 03/24/20  0700 03/25/20  0531   Weight: 74.8 kg (165 lb) 120 kg (265 lb 3.2 oz) 116 kg (255 lb 3.2 oz)         Objective    Results Review:        WBC WBC   Date Value Ref Range Status   03/25/2020 7.38 3.40 - 10.80 10*3/mm3 Final   03/24/2020 7.93 3.40 - 10.80 10*3/mm3 Final   03/23/2020 9.85 3.40 - 10.80 10*3/mm3 Final   03/23/2020 10.68 3.40 - 10.80 10*3/mm3 Final      HGB Hemoglobin   Date Value Ref Range Status   03/25/2020 9.3 (L) 13.0 - 17.7 g/dL Final   03/24/2020 8.7 (L) 13.0 - 17.7 g/dL Final   03/23/2020 9.7 (L) 13.0 - 17.7 g/dL Final   03/23/2020 9.5 (L) 13.0 - 17.7 g/dL Final      HCT Hematocrit   Date Value Ref Range Status   03/25/2020 30.2 (L) 37.5 - 51.0 % Final   03/24/2020 29.1 (L) 37.5 - 51.0 % Final   03/23/2020 32.1 (L) 37.5 - 51.0 % Final   03/23/2020 29.8 (L) 37.5 - 51.0 % Final      Platelets Platelets   Date Value Ref Range Status   03/25/2020 179 140 - 450 10*3/mm3 Final   03/24/2020 158 140 - 450 10*3/mm3 Final   03/23/2020 161 140 - 450 10*3/mm3 Final   03/23/2020 156 140 - 450 10*3/mm3 Final        PT/INR:    Protime   Date Value Ref Range Status   03/25/2020 21.5 (H) 11.7 - 14.2 Seconds Final   03/24/2020 21.6 (H) 11.7 - 14.2 Seconds Final   03/23/2020 19.3 (H) 11.7 - 14.2 Seconds Final   03/23/2020 18.7 (H) 11.7 - 14.2 Seconds Final   /  INR   Date Value Ref Range Status   03/25/2020 1.90 (H) 0.90 - 1.10 Final   03/24/2020 1.91 (H) 0.90 - 1.10 Final   03/23/2020 1.67 (H) 0.90 - 1.10 Final   03/23/2020 1.60 (H) 0.90 - 1.10 Final       Sodium Sodium   Date Value Ref Range Status   03/25/2020 134 (L) 136 - 145 mmol/L Final   03/24/2020 133 (L) 136 -  145 mmol/L Final   03/23/2020 133 (L) 136 - 145 mmol/L Final      Potassium Potassium   Date Value Ref Range Status   03/25/2020 3.6 3.5 - 5.2 mmol/L Final   03/24/2020 4.0 3.5 - 5.2 mmol/L Final   03/23/2020 4.4 3.5 - 5.2 mmol/L Final      Chloride Chloride   Date Value Ref Range Status   03/25/2020 97 (L) 98 - 107 mmol/L Final   03/24/2020 96 (L) 98 - 107 mmol/L Final   03/23/2020 99 98 - 107 mmol/L Final      Bicarbonate CO2   Date Value Ref Range Status   03/25/2020 23.7 22.0 - 29.0 mmol/L Final   03/24/2020 25.2 22.0 - 29.0 mmol/L Final   03/23/2020 20.2 (L) 22.0 - 29.0 mmol/L Final      BUN BUN   Date Value Ref Range Status   03/25/2020 35 (H) 6 - 20 mg/dL Final   03/24/2020 32 (H) 6 - 20 mg/dL Final   03/23/2020 50 (H) 6 - 20 mg/dL Final      Creatinine Creatinine   Date Value Ref Range Status   03/25/2020 4.95 (H) 0.76 - 1.27 mg/dL Final   03/24/2020 5.04 (H) 0.76 - 1.27 mg/dL Final   03/23/2020 6.45 (H) 0.76 - 1.27 mg/dL Final      Calcium Calcium   Date Value Ref Range Status   03/25/2020 7.5 (L) 8.6 - 10.5 mg/dL Final   03/24/2020 8.1 (L) 8.6 - 10.5 mg/dL Final   03/23/2020 7.6 (L) 8.6 - 10.5 mg/dL Final      Magnesium Magnesium   Date Value Ref Range Status   03/23/2020 2.0 1.6 - 2.6 mg/dL Final            aspirin 81 mg Oral Daily   erythromycin base 250 mg Oral 4x Daily With Meals & Nightly   influenza vaccine 0.5 mL Intramuscular Once   mupirocin  Each Nare BID   pantoprazole 40 mg Oral Q AM   sennosides-docusate 2 tablet Oral Nightly   warfarin 6 mg Oral Once       sodium chloride 30 mL/hr Last Rate: Stopped (03/20/20 0801)           Patient Active Problem List   Diagnosis Code   • Nonrheumatic mitral valve regurgitation, severe I34.0   • Cigarette smoker F17.210   • Essential hypertension I10   • ESRD on hemodialysis (CMS/[facility]) N18.6, Z99.2   • Foot callus L84   • Anasarca associated with disorder of kidney N04.9   • Diabetes mellitus (CMS/[facility]) E11.9   • Cellulitis of left leg L03.116   • Systolic  CHF, acute (CMS/[facility]) I50.21   • Anemia of chronic disease D63.8   • Hypocalcemia E83.51   • History of mitral valve replacement with mechanical valve Z95.2   • NSVT (nonsustained ventricular tachycardia) (CMS/HCC) I47.2   • Wenckebach I44.1   • Acute respiratory failure with hypoxia (CMS/HCC) J96.01       Assessment & Plan    -Severe mitral incompetence, severe tricuspid incompetence--s/p MVR (mechanical), TV repair, closure of PFO--POD#6 Melva  -Severe pulmonary hypertension-- intraoperatively PA pressures 70s-postop improved PA pressures 40s  -Dilated cardiomyopathy EF 30%  -ESRD on HD  -left leg cellulitis  -bilateral pleural effusions- intraoperatively 2L off right, 3L off left  -Anemia of chronic disease, post op anemia acutely worsened expected acute blood loss  -leukocytosis- probable reactive; trending down     Looks great this morning  Weaned to RA and tolerating  He appears in sinus rhythm this morning with intermittent wenckebach; wires removed yesterday  INR 1.9 this morning--will give 6mg tonight  Mobilize  Discontinue left pleural chest tube      MINGO Whitmore  20  07:32.    Electronically signed by Jackeline Cyr APRN at 20 1059     Emily Yan APRN at 20 1237            Progress Note    Name: Nico King ADMIT: 3/12/2020   : 1970  PCP: Provider, No Known    MRN: [id number] LOS: 12 days   AGE/SEX: 49 y.o. male  ROOM: [facility]   Date of Encounter Visit: 20    Subjective:     Interval History: AV wires removed yesterday and still having some mild intermittent block. 4 L removed with HD yesterday.   Right chest tube removed with morning and CXR showed small right apical ptx.     REVIEW OF SYSTEMS:   no fever or chills.  Typical post op chest pains. no palpitations. Edema improved some.   SOA improving. dry cough.   Nausea and vomiting after ambulating today. No abdominal pain. Had a BM  Today.   Dizziness when getting up.   Urinating ok since  catheter removed.     Objective:   Temp:  [97.5 °F (36.4 °C)-98.2 °F (36.8 °C)] 97.5 °F (36.4 °C)  Heart Rate:  [57-84] 84  Resp:  [16-17] 17  BP: (119-150)/(65-90) 126/72   SpO2:  [96 %-97 %] 96 %  on    Device (Oxygen Therapy): room air    Intake/Output Summary (Last 24 hours) at 3/24/2020 1238  Last data filed at 3/24/2020 0800  Gross per 24 hour   Intake 642 ml   Output 4745 ml   Net -4103 ml     Body mass index is 36.99 kg/m².      03/23/20  0604 03/24/20  0551 03/24/20  0700   Weight: 123 kg (272 lb 3.2 oz) 74.8 kg (165 lb) 120 kg (265 lb 3.2 oz)     Weight change: -48.6 kg (-107 lb 3.2 oz)    Physical Exam   Constitutional: No distress.   HENT:   Head: Atraumatic.   Nose: Nose normal.   Eyes: Conjunctivae are normal.   Cardiovascular: Normal rate, regular rhythm and intact distal pulses.   Chest incision-CDI   Pulmonary/Chest: Effort normal. He has no wheezes. He has no rales.   Diminished breath sounds.  Chest tube in place with serosanginous drainage   Abdominal: Soft. He exhibits no distension. There is no tenderness.   Hypoactive bowel sounds   Musculoskeletal: He exhibits edema (1-2+BLE (L>R)).   Skin: Skin is warm and dry. He is not diaphoretic. There is erythema (mild left shin- improved from friday).       Results Review:      Results from last 7 days   Lab Units 03/24/20  0727 03/23/20  0332 03/22/20  0345 03/21/20  0357 03/20/20  0213 03/19/20  1514 03/19/20  1132  03/18/20  0551   SODIUM mmol/L 133* 133* 133* 134* 140 139 139   < > 135*   POTASSIUM mmol/L 4.0 4.4 5.1 4.6 4.4 3.9 3.9   < > 4.3   CHLORIDE mmol/L 96* 99 100 101 104 103 102   < > 103   CO2 mmol/L 25.2 20.2* 17.5* 20.4* 20.6* 21.4* 22.3   < > 20.6*   BUN mg/dL 32* 50* 40* 32* 43* 38* 37*   < > 57*   CREATININE mg/dL 5.04* 6.45* 5.31* 4.17* 4.84* 4.38* 4.37*   < > 5.68*   GLUCOSE mg/dL 90 94 100* 114* 143* 113* 120*   < > 113*   CALCIUM mg/dL 8.1* 7.6* 7.5* 7.8* 7.3* 6.9* 6.9*   < > 7.4*   AST (SGOT) U/L  --   --   --   --   --   --   --    --  9   ALT (SGPT) U/L  --   --   --   --   --   --   --   --  18    < > = values in this interval not displayed.     Estimated Creatinine Clearance: 23.6 mL/min (A) (by C-G formula based on SCr of 5.04 mg/dL (H)).      Results from last 7 days   Lab Units 03/24/20  0541 03/23/20 2003 03/23/20  1535 03/23/20  1047 03/21/20 2001 03/21/20  1540 03/21/20  1048 03/21/20  0536   GLUCOSE mg/dL 117 207* 105 135* 136* 127 108 115         Results from last 7 days   Lab Units 03/18/20  0551   PROBNP pg/mL 57,642.0*     Results from last 7 days   Lab Units 03/20/20  0213   TSH uIU/mL 2.550     Results from last 7 days   Lab Units 03/23/20  0332 03/20/20  0213 03/19/20  1514 03/19/20  1132 03/19/20  0516   MAGNESIUM mg/dL 2.0 1.9 1.8 1.9 1.7     Results from last 7 days   Lab Units 03/18/20  0551   CHOLESTEROL mg/dL 120   TRIGLYCERIDES mg/dL 110   HDL CHOL mg/dL 30*     Results from last 7 days   Lab Units 03/24/20  0337 03/23/20  0825 03/23/20  0332 03/22/20  0403 03/21/20  0357 03/20/20  0210 03/19/20  1514   WBC 10*3/mm3 7.93 9.85 10.68 11.94* 14.37* 12.95* 8.93   HEMOGLOBIN g/dL 8.7* 9.7* 9.5* 9.8* 9.9* 8.9* 9.7*   HEMATOCRIT % 29.1* 32.1* 29.8* 32.1* 32.5* 29.6* 31.0*   PLATELETS 10*3/mm3 158 161 156 144 131* 123* 119*   MCV fL 77.6* 78.5* 77.0* 77.5* 77.4* 77.3* 76.5*   MCH pg 23.2* 23.7* 24.5* 23.7* 23.6* 23.2* 24.0*   MCHC g/dL 29.9* 30.2* 31.9 30.5* 30.5* 30.1* 31.3*   RDW % 16.7* 16.7* 17.1* 16.9* 16.6* 16.3* 16.7*   RDW-SD fl 46.7 46.5 47.3 47.7 46.4 45.6 46.2   MPV fL 9.5 10.5 10.3 10.1 10.3 10.0 9.9   NEUTROPHIL % % 71.3 77.3* 74.2 76.2* 83.6* 88.1*  --    LYMPHOCYTE % % 10.8* 9.0* 10.6* 9.0* 6.3* 4.4*  --    MONOCYTES % % 12.7* 8.8 10.6 11.6 8.0 6.3  --    EOSINOPHIL % % 4.0 3.7 3.3 1.9 1.2 0.2*  --    BASOPHIL % % 0.6 0.7 0.7 0.7 0.5 0.3  --    IMM GRAN % % 0.6* 0.5 0.6* 0.6* 0.4 0.7*  --    NEUTROS ABS 10*3/mm3 5.64 7.61* 7.94* 9.10* 12.01* 11.42*  --    LYMPHS ABS 10*3/mm3 0.86 0.89 1.13 1.07 0.91 0.57*  --     MONOS ABS 10*3/mm3 1.01* 0.87 1.13* 1.39* 1.15* 0.81  --    EOS ABS 10*3/mm3 0.32 0.36 0.35 0.23 0.17 0.02  --    BASOS ABS 10*3/mm3 0.05 0.07 0.07 0.08 0.07 0.04  --    IMMATURE GRANS (ABS) 10*3/mm3 0.05 0.05 0.06* 0.07* 0.06* 0.09*  --    NRBC /100 WBC 0.0 0.0 0.0 0.0 0.1 0.0  --      Results from last 7 days   Lab Units 03/24/20  0727 03/24/20  0337 03/23/20  0825 03/23/20  0332 03/22/20  0403 03/20/20  0210 03/19/20  1132 03/18/20  0550   INR   --  1.91* 1.67* 1.60* 1.31* 1.37* 1.58* 1.28*   APTT seconds 50.3* 42.4* 44.9*  --   --   --  38.0* 39.3*     Results from last 7 days   Lab Units 03/19/20  2005 03/19/20  1533 03/19/20  1145 03/19/20  1009 03/19/20  0917 03/19/20  0847 03/19/20  0842  03/17/20  2300   PH, ARTERIAL pH units 7.322* 7.354 7.374 7.38 7.34* 7.33* 7.36   < > 7.418   PO2 ART mm Hg 114.7* 128.1* 608.6*  --   --   --   --   --  67.9*   PCO2, ARTERIAL mm Hg 46.9* 44.7 45.2*  --   --   --   --   --  33.4*   HCO3 ART mmol/L 24.3 24.9 26.4  --   --   --   --   --  21.6*    < > = values in this interval not displayed.                         Results from last 7 days   Lab Units 03/17/20  1355   NITRITE UA  Negative   WBC UA /HPF 3-5*   BACTERIA UA /HPF None Seen   SQUAM EPITHEL UA /HPF None Seen           Imaging:  Imaging Results (Last 24 Hours)     Procedure Component Value Units Date/Time    XR Chest 1 View [[id number]] Collected:  03/24/20 0904     Updated:  03/24/20 0908    Narrative:       XR CHEST 1 VW-  03/24/2020     HISTORY: Chest tube removal.     A tiny right apical pneumothorax is seen with approximately 5 mm to 6 mm  pleural separation in the right apex. There is a mild increased density  in the right lung base likely combination of pleural fluid and  atelectasis. Left lung appears relatively clear.     There is mild cardiomegaly. Sternotomy wires prosthetic valve central  venous catheter and left chest tube are again seen. The right chest tube  appears to been removed.        Impression:       Tiny right apical pneumothorax.     This report was finalized on 3/24/2020 9:05 AM by Dr. Robin Sanchez M.D.       XR Chest 1 View [[id number]] Collected:  03/23/20 1317     Updated:  03/23/20 1324    Narrative:       XR CHEST 1 VW-     HISTORY: Male who is 49 years-old,  chest tube removal     TECHNIQUE: Frontal views of the chest     COMPARISON: 03/22/2020     FINDINGS: Mediastinal drain has been removed. Bilateral chest tubes  remain. Sternotomy wires, cardiac valve marker, stable appearing  right-sided central venous catheter noted. The heart is enlarged.  Pulmonary vasculature is unremarkable. Small likely atelectasis at the  lung bases. Suggestion of minimal, 2 mm right apical pneumothorax. No  pleural effusion, or left pneumothorax. No acute osseous process.       Impression:       Drain removal. Suggestion of minimal, 2 mm right apical  pneumothorax.     This report was finalized on 3/23/2020 1:21 PM by Dr. Michael Platt M.D.             I reviewed the patient's new clinical results and medications.         Medication Review:   Scheduled Meds:    aspirin 81 mg Oral Daily   enoxaparin 30 mg Subcutaneous Q24H   erythromycin base 250 mg Oral 4x Daily With Meals & Nightly   influenza vaccine 0.5 mL Intramuscular Once   [START ON 3/25/2020] iron sucrose 100 mg Intravenous Once   mupirocin  Each Nare BID   pantoprazole 40 mg Oral Q AM   sennosides-docusate 2 tablet Oral Nightly   warfarin 4 mg Oral Daily     Continuous Infusions:    sodium chloride 30 mL/hr Last Rate: Stopped (03/20/20 0801)     PRN Meds:.•  acetaminophen **OR** acetaminophen **OR** acetaminophen  •  ALPRAZolam  •  bisacodyl  •  cyclobenzaprine  •  docusate sodium  •  heparin (porcine)  •  HYDROcodone-acetaminophen  •  melatonin  •  Morphine **AND** naloxone  •  ondansetron  •  polyethylene glycol  •  promethazine **OR** promethazine  •  sodium chloride  •  sodium chloride  •  traMADol    Problem List:     Active  Hospital Problems    Diagnosis  POA   • **ESRD on hemodialysis (CMS/[facility]) [N18.6, Z99.2]  Not Applicable   • Wenckebach [I44.1]  Unknown   • History of mitral valve replacement with mechanical valve [Z95.2]  Not Applicable   • NSVT (nonsustained ventricular tachycardia) (CMS/[facility]) [I47.2]  Unknown   • Hypocalcemia [E83.51]  Unknown   • Anemia of chronic disease [D63.8]  Unknown   • Systolic CHF, acute (CMS/[facility]) [I50.21]  Unknown   • Anasarca associated with disorder of kidney [N04.9]  Yes   • Diabetes mellitus (CMS/[facility]) [E11.9]  Unknown   • Cellulitis of left leg [L03.116]  Unknown   • Essential hypertension [I10]  Yes   • Nonrheumatic mitral valve regurgitation, severe [I34.0]  Yes      Resolved Hospital Problems   No resolved problems to display.       Assessment and Plan:     Anasarca/ acute systolic CHF/ severe MR/ CAD/ mechanical MVR  -Echo showed EF reduced to 33% and severe MR and moderate TR  -minimal CAD on cath  -s/p mechanical MVR, PFO closure, TR repair on 3/19/20  -started coumadin on 3/21. CTS managing dosing, Goal INR 2.5-3.5. INR 1.91. Plan to get 4 mg coumadin tonight and heparin on hold. would consider holding coumadin and switching to heparin drip if plan to get AVG soon.     DESHAWN/CKD4 now ESRD  -tunneled cath placed and HD started on 3/17/20   -vein mapping completed, will eventually need AV fistula. Discussed with Dr. Mcneil and will have vascular re-evaluate for placement while here.   -nephrology following, HD tomorrow    Cellulitis, left leg  -cellulitis vs dermatitis. Completed 5 day course of doxycycline preoperatively.    -repeat LLE doppler on 3/20 was negative.   -redness improved. Will monitor.     DM  -A1c 6.5. blood sugars well controlled.     Anemia  -Hgb dropped from 9.7 to 8.7 today.  -iron low and s/p IV iron 3/18/20 and will repeat IV iron tomorrow    NSVT/ wenckebach  - cardiology following. No further VT off amiodarone.   -still having intermittent episodes of wenckebach off  coreg, mostly when sleeping.     Hypoxia  -wean O2 to keep sats greater than 90%  -encouraged hourly deep breathing and IS.  -will need overnight oximetry closer to discharge.      Ambulate with PT.     VTE prophylaxis: SCDs  CODE status: full code  Disposition: TBD- plan to go to [facility] rehab with HD once stable and approved by insurance.     I discussed the patients findings and my recommendations with patient and nursing staff. Discussed with Dr. Mcneil who reviewed case with CTS and vascular surgery.     MINGO De La Rosa  03/24/20              Electronically signed by Emily Yan APRN at 03/24/20 5311                                  All medication doses during the admission are shown, including meds that are no longer on order.                   Scheduled Meds Sorted by Name      for Nico King as of 3/23/20 through 3/25/20         1 Day 3 Days 7 Days 10 Days < Today >      Legend:                                                Medications 03/23/20 03/24/20 03/25/20     acetaminophen (OFIRMEV) injection 1,000 mg   Dose: 1,000 mg  Freq: Once Route: IV  Start: 03/19/20 1230 End: 03/19/20 1240    Admin Instructions:                 Order specific questions:   Is the medication for pre-op use? No  Is the medication used for post-op use? Yes  Is the patient NPO & NPR? Yes             acetaminophen (TYLENOL) tablet 650 mg   Dose: 650 mg  Freq: Every 4 Hours Route: PO  Start: 03/19/20 1800 End: 03/20/20 0959    Admin Instructions:                        Or  acetaminophen (TYLENOL) 160 MG/5ML solution 650 mg   Dose: 650 mg  Freq: Every 4 Hours Route: PO  Start: 03/19/20 1800 End: 03/20/20 0959    Admin Instructions:                        Or  acetaminophen (TYLENOL) suppository 650 mg   Dose: 650 mg  Freq: Every 4 Hours Route: RE  Start: 03/19/20 1800 End: 03/20/20 0959    Admin Instructions:                        aspirin EC tablet 81 mg   Dose: 81 mg  Freq: Daily Route: PO  Start: 03/20/20  0900    Admin Instructions:                     0933              0806              0816                aspirin EC tablet 81 mg   Dose: 81 mg  Freq: Daily Route: PO  Start: 03/13/20 0900 End: 03/18/20 1827    Admin Instructions:                          atorvastatin (LIPITOR) tablet 40 mg   Dose: 40 mg  Freq: Nightly Route: PO  Start: 03/19/20 2100 End: 03/21/20 0756    Admin Instructions:              bumetanide (BUMEX) injection 4 mg   Dose: 4 mg  Freq: Every 8 Hours Route: IV  Start: 03/13/20 1400 End: 03/14/20 1033    Admin Instructions:              bumetanide (BUMEX) tablet 4 mg   Dose: 4 mg  Freq: Daily Route: PO  Start: 03/18/20 0900 End: 03/19/20 1109    Admin Instructions:              calcium gluconate 1 g/100 mL (10 mg/mL) NS IVPB (VTB)   Dose: 1 g  Freq: Once Route: IV  Start: 03/20/20 1015 End: 03/20/20 1007           calcium gluconate 1 g/100 mL (10 mg/mL) NS IVPB (VTB)   Dose: 1 g  Freq: Once Route: IV  Start: 03/20/20 0845 End: 03/20/20 0809           calcium gluconate 2 g in sodium chloride 0.9 % 100 mL IVPB   Dose: 2 g  Freq: Once Route: IV  Start: 03/21/20 0815 End: 03/21/20 0908           carvedilol (COREG) tablet 25 mg   Dose: 25 mg  Freq: 2 Times Daily With Meals Route: PO  Start: 03/12/20 1654 End: 03/19/20 1109    Admin Instructions:              carvedilol (COREG) tablet 3.125 mg   Dose: 3.125 mg  Freq: Every 12 Hours Route: PO  Start: 03/22/20 2100 End: 03/22/20 0923    Admin Instructions:                ceFAZolin in dextrose (ANCEF) IVPB solution 2 g   Dose: 2 g  Freq: Once Route: IV  Indications of Use: PERIOPERATIVE PHARMACOPROPHYLAXIS  Start: 03/17/20 0948 End: 03/17/20 1036    Admin Instructions:                ceFAZolin in Sodium Chloride (ANCEF) IVPB solution 3 g   Dose: 3 g  Freq: Every 24 Hours Route: IV  Indications of Use: PERIOPERATIVE PHARMACOPROPHYLAXIS  Last Dose: 3 g (03/21/20 2212)  Start: 03/19/20 2200 End: 03/21/20 2242    Admin Instructions:                  ceFAZolin  in Sodium Chloride (ANCEF) IVPB solution 3 g   Dose: 3 g  Freq: Every 8 Hours Route: IV  Indications of Use: PERIOPERATIVE PHARMACOPROPHYLAXIS  Start: 03/19/20 1200 End: 03/19/20 1213    Admin Instructions:                ceFAZolin in Sodium Chloride (ANCEF) IVPB solution 3 g   Dose: 3 g  Freq: On Call to O.R. Route: IV  Indications of Use: PERIOPERATIVE PHARMACOPROPHYLAXIS  Start: 03/19/20 0600 End: 03/19/20 0954    Admin Instructions:                ceFAZolin in Sodium Chloride (ANCEF) IVPB solution 3 g   Dose: 3 g  Freq: On Call to O.R. Route: IV  Indications of Use: PERIOPERATIVE PHARMACOPROPHYLAXIS  Start: 03/18/20 0600 End: 03/18/20 0849    Admin Instructions:                chlorhexidine (PERIDEX) 0.12 % solution 15 mL   Dose: 15 mL  Freq: Every 12 Hours Route: MT  Start: 03/19/20 1800 End: 03/21/20 1950    Admin Instructions:                chlorhexidine (PERIDEX) 0.12 % solution 15 mL   Dose: 15 mL  Freq: Every 12 Hours Scheduled Route: MT  Start: 03/18/20 1900 End: 03/19/20 0554    Admin Instructions:                chlorhexidine (PERIDEX) 0.12 % solution 15 mL   Dose: 15 mL  Freq: Every 12 Hours Scheduled Route: MT  Start: 03/18/20 0900 End: 03/18/20 0850    Admin Instructions:                Chlorhexidine Gluconate Cloth 2 % pads 1 application   Dose: 1 application  Freq: Every 12 Hours Route: TOP  Start: 03/18/20 1900 End: 03/19/20 1109    Admin Instructions:              Chlorhexidine Gluconate Cloth 2 % pads 1 application   Dose: 1 application  Freq: Every 12 Hours Route: TOP  Start: 03/18/20 0000 End: 03/18/20 0850    Admin Instructions:              clotrimazole-betamethasone (LOTRISONE) 1-0.05 % cream   Freq: Every 12 Hours Scheduled Route: TOP  Start: 03/12/20 2300 End: 03/19/20 1109    Admin Instructions:              doxycycline (MONODOX) capsule 100 mg   Dose: 100 mg  Freq: Every 12 Hours Scheduled Route: PO  Indications of Use: SKIN AND SOFT TISSUE INFECTION  Start: 03/14/20 1300 End: 03/18/20  2119    Admin Instructions:              enoxaparin (LOVENOX) syringe 30 mg   Dose: 30 mg  Freq: Every 24 Hours Route: SC  Indications of Use: PROPHYLAXIS OF VENOUS THROMBOEMBOLISM  Start: 03/23/20 1800 End: 03/25/20 0706    Admin Instructions:         1743              1812              0706-D/C'd            enoxaparin (LOVENOX) syringe 30 mg   Dose: 30 mg  Freq: Every 24 Hours Route: SC  Indications of Use: PROPHYLAXIS OF VENOUS THROMBOEMBOLISM  Start: 03/20/20 1800 End: 03/23/20 0739    Admin Instructions:         0739-D/C'd              erythromycin base (E-MYCIN) tablet 250 mg   Dose: 250 mg  Freq: 4 Times Daily With Meals & Nightly Route: PO  Indications of Use: GASTROPARESIS  Start: 03/21/20 0845 End: 03/28/20 0759    Admin Instructions:         0933     1308     1743       2039              0806     1141 (5136) 1560              0879     1200     1800       2100                famotidine (PEPCID) injection 20 mg   Dose: 20 mg  Freq: Once Route: IV  Start: 03/18/20 2015 End: 03/19/20 0555    Admin Instructions:              famotidine (PEPCID) injection 20 mg   Dose: 20 mg  Freq: Once Route: IV  Start: 03/17/20 1012 End: 03/17/20 1113    Admin Instructions:              ferrous sulfate tablet 325 mg   Dose: 325 mg  Freq: Daily With Breakfast Route: PO  Start: 03/18/20 0800 End: 03/19/20 1109    Admin Instructions:              furosemide (LASIX) injection 40 mg   Dose: 40 mg  Freq: Once Route: IV  Start: 03/12/20 1526 End: 03/12/20 1546           furosemide (LASIX) injection 80 mg   Dose: 80 mg  Freq: Every 6 Hours Route: IV  Start: 03/12/20 1600 End: 03/13/20 1235           heparin (porcine) injection 4,000 Units   Dose: 4,000 Units  Freq: Once Route: IK  Indications of Use: Other - full anticoagulation  Indications Comment: cvc dwell  Start: 03/18/20 1915 End: 03/18/20 1855           heparin (porcine) injection 4,000 Units   Dose: 4,000 Units  Freq: Once Route: IK  Indications of Use: Other - full  anticoagulation  Start: 03/17/20 1930 End: 03/17/20 1915           heparin (porcine) injection 4,000 Units   Dose: 4,000 Units  Freq: Once Route: IK  Indications of Use: Other - full anticoagulation  Start: 03/17/20 1211 End: 03/17/20 1839    Admin Instructions:              hydrALAZINE (APRESOLINE) injection 10 mg   Dose: 10 mg  Freq: Once Route: IV  Start: 03/12/20 1526 End: 03/12/20 1639    Admin Instructions:                hydrALAZINE (APRESOLINE) tablet 10 mg   Dose: 10 mg  Freq: Every 8 Hours Scheduled Route: PO  Start: 03/15/20 1600 End: 03/17/20 1432    Admin Instructions:              hydrALAZINE (APRESOLINE) tablet 25 mg   Dose: 25 mg  Freq: Every 8 Hours Scheduled Route: PO  Start: 03/17/20 2200 End: 03/19/20 1109    Admin Instructions:              influenza vac split quad (FLUZONE,FLUARIX,AFLURIA) injection 0.5 mL   Dose: 0.5 mL  Freq: Once Route: IM  Start: 03/21/20 1200    Admin Instructions:           1056                influenza vac split quad (FLUZONE,FLUARIX,AFLURIA) injection 0.5 mL   Dose: 0.5 mL  Freq: Once Route: IM  Start: 03/13/20 1200 End: 03/19/20 1109    Admin Instructions:                insulin lispro (humaLOG) injection 0-9 Units   Dose: 0-9 Units  Freq: 4 Times Daily With Meals & Nightly Route: SC  Start: 03/12/20 2300 End: 03/19/20 1109    Admin Instructions:                              iron sucrose (VENOFER) 100 mg in sodium chloride 0.9 % 100 mL IVPB   Dose: 100 mg  Freq: Once Route: IV  Start: 03/25/20 0000 End: 03/24/20 2349    Admin Instructions:            2349                 iron sucrose (VENOFER) 100 mg in sodium chloride 0.9 % 100 mL IVPB   Dose: 100 mg  Freq: Once Route: IV  Start: 03/18/20 0600 End: 03/18/20 1800    Admin Instructions:                isosorbide mononitrate (IMDUR) 24 hr tablet 60 mg   Dose: 60 mg  Freq: Every 24 Hours Scheduled Route: PO  Start: 03/13/20 0900 End: 03/19/20 1109    Admin Instructions:              metoclopramide (REGLAN) injection 10 mg    Dose: 10 mg  Freq: Every 6 Hours Route: IV  Start: 03/19/20 1200 End: 03/20/20 1159           metoprolol tartrate (LOPRESSOR) tablet 12.5 mg   Dose: 12.5 mg  Freq: Every 12 Hours Scheduled Route: PO  Start: 03/20/20 0900 End: 03/21/20 0749    Admin Instructions:              metoprolol tartrate (LOPRESSOR) tablet 12.5 mg   Dose: 12.5 mg  Freq: On Call to O.R. Route: PO  Start: 03/19/20 0600 End: 03/19/20 0554    Admin Instructions:              metoprolol tartrate (LOPRESSOR) tablet 12.5 mg   Dose: 12.5 mg  Freq: On Call to O.R. Route: PO  Start: 03/18/20 0600 End: 03/18/20 0851    Admin Instructions:              mupirocin (BACTROBAN) 2 % nasal ointment   Freq: 2 Times Daily Route: EACH NARE  Start: 03/19/20 2100    Admin Instructions:         0933     2039            0806     2104            0816     2100              mupirocin (BACTROBAN) 2 % nasal ointment 1 application   Dose: 1 application  Freq: Every 12 Hours Scheduled Route: EACH NARE  Start: 03/18/20 1900 End: 03/19/20 0554    Admin Instructions:                mupirocin (BACTROBAN) 2 % nasal ointment 1 application   Dose: 1 application  Freq: Every 12 Hours Scheduled Route: EACH NARE  Start: 03/18/20 0900 End: 03/18/20 0851    Admin Instructions:                NON FORMULARY   Dose: 1,000 mg  Freq: Once Route: IV  Start: 03/19/20 1200 End: 03/19/20 1129    Order specific questions:   Drug Name: ofirm  Reason for Non-Formulary NPO/NPR             ondansetron (ZOFRAN) injection 4 mg   Dose: 4 mg  Freq: Every 2 Hours Route: IV  Start: 03/19/20 2320 End: 03/19/20 2323           pantoprazole (PROTONIX) EC tablet 40 mg   Dose: 40 mg  Freq: Every Early Morning Route: PO  Start: 03/20/20 0600    Admin Instructions:         0611              0615              0739                perflutren (DEFINITY) 8.476 mg in sodium chloride 0.9 % 10 mL injection   Dose: 2.5 mL  Freq: Once Route: IV  Start: 03/13/20 1330 End: 03/13/20 1230    Admin Instructions:               sennosides-docusate (PERICOLACE) 8.6-50 MG per tablet 2 tablet   Dose: 2 tablet  Freq: Nightly Route: PO  Start: 03/20/20 2100 2039 2104 2100                sodium chloride 0.9 % flush 10 mL   Dose: 10 mL  Freq: Every 12 Hours Scheduled Route: IV  Start: 03/12/20 2300 End: 03/19/20 1109           sodium chloride 0.9 % flush 3 mL   Dose: 3 mL  Freq: Every 12 Hours Scheduled Route: IV  Start: 03/17/20 1012 End: 03/17/20 1113           vancomycin 2250 mg/500 mL 0.9% NS IVPB ([facility])   Dose: 15 mg/kg  Weight Dosing Info: 145 kg  Freq: Once Route: IV  Indications of Use: SKIN AND SOFT TISSUE INFECTION  Start: 03/12/20 2100 End: 03/13/20 0019           vancomycin 750 mg/250 mL 0.9% NS add-vantage   Dose: 5 mg/kg  Weight Dosing Info: 145 kg  Freq: Once Route: IV  Indications of Use: SKIN AND SOFT TISSUE INFECTION  Start: 03/13/20 1245 End: 03/13/20 1501           Vancomycin Pharmacy Intermittent Dosing   Freq: Daily Route: XX  Indications of Use: SKIN AND SOFT TISSUE INFECTION  Start: 03/13/20 0900 End: 03/14/20 1124    Admin Instructions:              warfarin (COUMADIN) tablet 2 mg   Dose: 2 mg  Freq: Daily Warfarin Route: PO  Indications of Use: PRESENCE OF MECHANICAL PROSTHETIC HEART VALVE  Start: 03/23/20 1800 End: 03/23/20 1101    Admin Instructions:         Order specific questions:   Target INR 2.5 - 3.5        1101-D/C'd              warfarin (COUMADIN) tablet 2 mg   Dose: 2 mg  Freq: Daily Warfarin Route: PO  Indications of Use: PRESENCE OF MECHANICAL PROSTHETIC HEART VALVE  Start: 03/21/20 1800 End: 03/22/20 0909    Admin Instructions:         Order specific questions:   Target INR 2.5 - 3.5             warfarin (COUMADIN) tablet 3 mg   Dose: 3 mg  Freq: Daily Warfarin Route: PO  Indications of Use: PRESENCE OF MECHANICAL PROSTHETIC HEART VALVE  Start: 03/22/20 1800 End: 03/23/20 0858    Admin Instructions:         Order specific questions:   Target INR 2.5 - 3.5        0858-D/C'd               warfarin (COUMADIN) tablet 4 mg   Dose: 4 mg  Freq: Daily Warfarin Route: PO  Indications of Use: PRESENCE OF MECHANICAL PROSTHETIC HEART VALVE  Start: 03/23/20 1800 End: 03/25/20 0706    Admin Instructions:         Order specific questions:   Target INR 2.5 - 3.5        1743              1812              0706-D/C'd            warfarin (COUMADIN) tablet 4 mg   Dose: 4 mg  Freq: Daily Warfarin Route: PO  Indications of Use: PRESENCE OF MECHANICAL PROSTHETIC HEART VALVE  Start: 03/23/20 1800 End: 03/23/20 1024    Admin Instructions:         Order specific questions:   Target INR 2.5 - 3.5        1024-D/C'd              warfarin (COUMADIN) tablet 6 mg   Dose: 6 mg  Freq: Once (Warfarin) Route: PO  Indications of Use: PRESENCE OF MECHANICAL PROSTHETIC HEART VALVE  Start: 03/25/20 1700    Admin Instructions:         Order specific questions:   Target INR 2.5 - 3.5          1700              Medications 03/23/20 03/24/20 03/25/20                       Continuous Meds Sorted by Name      for Nico King as of 3/23/20 through 3/25/20        Legend:                                                Medications 03/23/20 03/24/20 03/25/20     amiodarone (NEXTERONE) 360 mg/200 mL (1.8 mg/mL) infusion   Rate: 33.3 mL/hr Dose: 1 mg/min  Freq: Continuous Route: IV  Last Dose: Stopped (03/20/20 1230)  Start: 03/20/20 0700 End: 03/20/20 1810    Admin Instructions:              bumetanide (BUMEX) 12.5 mg in sodium chloride 0.9 % 125 mL (0.1 mg/mL) infusion   Rate: 10 mL/hr Dose: 1 mg/hr  Freq: Continuous Route: IV  Last Dose: 1 mg/hr (03/16/20 0621)  Start: 03/14/20 1345 End: 03/16/20 1836           clevidipine (CLEVIPREX) infusion 0.5 mg/mL   Rate: 4-64 mL/hr Dose: 2-32 mg/hr  Freq: Continuous PRN Route: IV  PRN Comment: See Admin Instructions  Start: 03/19/20 1109 End: 03/21/20 0717    Admin Instructions:                  dexmedetomidine (PRECEDEX) 400 mcg/100mL (4 mcg/mL) NS infusion kit   Rate: 6.8-51 mL/hr  Dose: 0.2-1.5 mcg/kg/hr  Weight Dosing Info: 136 kg  Freq: Titrated Route: IV  Last Dose: 0.2 mcg/kg/hr (03/19/20 1450)  Start: 03/19/20 1200 End: 03/20/20 1810    Admin Instructions:                       Order specific questions:   RASS Goal: -1 TO -2             dexmedetomidine HCl (PRECEDEX) 4 mcg/mL in sodium chloride 0.9 % 100 mL infusion   Freq: Continuous PRN  Last Dose: 0.4 mcg/kg/hr (03/19/20 0808)  Start: 03/19/20 0659 End: 03/19/20 1119           DOPamine 400 mg in 250 mL D5W (1.6 mg/mL) infusion   Rate: 14.29 mL/hr Dose: 3 mcg/kg/min  Weight Dosing Info: 127 kg  Freq: Titrated Route: IV  Last Dose: Stopped (03/21/20 1300)  Start: 03/21/20 0845 End: 03/22/20 0745    Admin Instructions:                DOPamine 400 mg in 250 mL D5W (1.6 mg/mL) infusion   Rate: 10.2-102 mL/hr Dose: 2-20 mcg/kg/min  Weight Dosing Info: 136 kg  Freq: Continuous PRN Route: IV  PRN Comment: CVR Protocol  Start: 03/19/20 1109 End: 03/20/20 1810    Admin Instructions:                  EPINEPHrine (ADRENALIN) 5 mg in sodium chloride 0.9 % 250 mL (0.02 mg/mL) infusion   Rate: 8..4 mL/hr Dose: 0.02-0.3 mcg/kg/min  Weight Dosing Info: 136 kg  Freq: Continuous PRN Route: IV  PRN Comment: See Admin Instructions  Start: 03/19/20 1109 End: 03/20/20 1810    Admin Instructions:                  heparin 71892 units/250 mL (100 units/mL) in 0.45 % NaCl infusion   Rate: 9.99 mL/hr Dose: 8.13 Units/kg/hr  Weight Dosing Info: 123 kg  Freq: Titrated Route: IV  Indications of Use: PRESENCE OF MECHANICAL PROSTHETIC HEART VALVE  Start: 03/23/20 1400 End: 03/23/20 1024    Admin Instructions:                           1024-D/C'd              heparin 11506 units/250 mL (100 units/mL) in 0.45 % NaCl infusion   Rate: 9.99 mL/hr Dose: 8.13 Units/kg/hr  Weight Dosing Info: 123 kg  Freq: Titrated Route: IV  Indications of Use: PRESENCE OF MECHANICAL PROSTHETIC HEART VALVE  Start: 03/23/20 1200 End: 03/23/20 0854    Admin Instructions:                            0854-D/C'd              insulin regular (HumuLIN R,NovoLIN R) 100 Units in sodium chloride 0.9 % 100 mL (1 Units/mL) infusion   Rate: 0-50 mL/hr Dose: 0-50 Units/hr  Freq: Continuous PRN Route: IV  PRN Comment: Start if BG greater that 150 mg/dL  Last Dose: Stopped (03/20/20 0800)  Start: 03/19/20 1109 End: 03/21/20 0717    Admin Instructions:              milrinone 20 mg/100 mL (0.2 mg/mL) in 5 % dextrose infusion   Rate: 10.2-30.6 mL/hr Dose: 0.25-0.75 mcg/kg/min  Weight Dosing Info: 136 kg  Freq: Continuous PRN Route: IV  PRN Comment: See Admin Instructions  Last Dose: Stopped (03/20/20 0800)  Start: 03/19/20 1109 End: 03/20/20 1810    Admin Instructions:                milrinone lactate (PRIMACOR) 200 mcg/mL in sodium chloride 0.9 % 312.5 mL infusion   Freq: Continuous PRN  Last Dose: 0.25 mcg/kg/min (03/19/20 0852)  Start: 03/19/20 0852 End: 03/19/20 1119           niCARdipine (CARDENE) 25 mg in 250 mL NS (0.1 mg/mL) infusion kit   Rate:  mL/hr Dose: 5-15 mg/hr  Freq: Continuous PRN Route: IV  PRN Comment: See Admin Instructions  Start: 03/19/20 1109 End: 03/20/20 1810    Admin Instructions:                nitroglycerin 50 mg/250 mL (0.2 mg/mL) infusion   Rate: 1.5-60 mL/hr Dose: 5-200 mcg/min  Freq: Titrated Route: IV  Start: 03/19/20 1200 End: 03/20/20 1810    Admin Instructions:                norepinephrine (LEVOPHED) 8 mg/250 mL (32 mcg/mL) in sodium chloride 0.9% infusion (premix)   Rate: 5.1-76.5 mL/hr Dose: 0.02-0.3 mcg/kg/min  Weight Dosing Info: 136 kg  Freq: Continuous PRN Route: IV  PRN Comment: See Admin Instructions  Start: 03/19/20 1109 End: 03/20/20 1810    Admin Instructions:                norepinephrine (LEVOPHED) injection   Freq: Continuous PRN  Last Dose: Stopped (03/19/20 1057)  Start: 03/19/20 0924 End: 03/19/20 1119           Pharmacy to dose vancomycin   Freq: Continuous PRN Route: XX  PRN Reason: Consult  Indications of Use: SKIN AND SOFT TISSUE  INFECTION  Start: 03/12/20 1851 End: 03/14/20 1124           phenylephrine (MARGUERITE-SYNEPHRINE) 50 mg in sodium chloride 0.9 % 250 mL (0.2 mg/mL) infusion   Rate: 8..4 mL/hr Dose: 0.2-3 mcg/kg/min  Weight Dosing Info: 136 kg  Freq: Continuous PRN Route: IV  PRN Comment: See Admin Instructions  Last Dose: Stopped (03/20/20 1450)  Start: 03/19/20 1109 End: 03/20/20 1810    Admin Instructions:                phenylephrine (MARGUERITE-SYNEPHRINE) 50 mg in sodium chloride 0.9 % 250 mL infusion   Freq: Continuous PRN Route: IV  Last Dose: Stopped (03/19/20 0830)  Start: 03/19/20 0659 End: 03/19/20 1119           propofol (DIPRIVAN) infusion 10 mg/mL 100 mL   Rate: 4.08-40.8 mL/hr Dose: 5-50 mcg/kg/min  Weight Dosing Info: 136 kg  Freq: Continuous PRN Route: IV  PRN Comment: See Admin Instructions  Last Dose: Stopped (03/19/20 1421)  Start: 03/19/20 1109 End: 03/20/20 1810    Admin Instructions:                       Order specific questions:   RASS Goal: -1 TO -2             Propofol (DIPRIVAN) injection   Freq: Continuous PRN Route: IV  Last Dose: 50 mcg/kg/min (03/19/20 0808)  Start: 03/19/20 0808 End: 03/19/20 1119           Propofol (DIPRIVAN) injection   Freq: Continuous PRN  Last Dose: Stopped (03/17/20 1059)  Start: 03/17/20 1037 End: 03/17/20 1114           sodium chloride 0.9 % infusion   Freq: Continuous PRN  Last Dose: Stopped (03/19/20 1119)  Start: 03/19/20 0718 End: 03/19/20 1119           sodium chloride 0.9 % infusion   Freq: Continuous PRN Route: IV  Last Dose: Stopped (03/19/20 1118)  Start: 03/19/20 0645 End: 03/19/20 1119           sodium chloride 0.9 % infusion   Rate: 30 mL/hr Dose: 30 mL/hr  Freq: Continuous PRN Route: IV  PRN Comment: if insulin infusion rate is insufficient to maintain IV patency.  Last Dose: Stopped (03/20/20 0801)  Start: 03/19/20 1109           sodium chloride 0.9 % infusion   Rate: 30 mL/hr Dose: 30 mL/hr  Freq: Continuous Route: IV  Last Dose: 30 mL/hr (03/19/20 1214)  Start:  03/19/20 1200 End: 03/24/20 0752       0752-D/C'd             sodium chloride 0.9 % infusion   Rate: 9 mL/hr Dose: 9 mL/hr  Freq: Continuous PRN Route: IV  PRN Comment: Start Prior to Surgery  Start: 03/17/20 1010 End: 03/17/20 1238           sodium chloride 0.9 % infusion   Freq: Continuous PRN  Last Dose: 50 mL/hr (03/16/20 1548)  Start: 03/16/20 1548 End: 03/16/20 1548           sodium chloride 0.9 % infusion   Freq: Code / Trauma / Sedation Continuous Med Route: IV  Last Dose: Stopped (03/16/20 1252)  Start: 03/16/20 1210 End: 03/16/20 1252           Vasopressin (PITRESSIN) 20 Units in sodium chloride 0.9 % 100 mL (0.2 Units/mL) infusion   Rate: 6-30 mL/hr Dose: 0.02-0.1 Units/min  Freq: Continuous PRN Route: IV  PRN Comment: See Admin Instructions  Start: 03/19/20 1109 End: 03/20/20 1810    Admin Instructions:              Medications 03/23/20 03/24/20 03/25/20                       PRN Meds Sorted by Name      for Nico King as of 3/23/20 through 3/25/20        Legend:                                                Medications 03/23/20 03/24/20 03/25/20     acetaminophen (TYLENOL) tablet 650 mg   Dose: 650 mg  Freq: Every 4 Hours PRN Route: PO  PRN Reason: Mild Pain   Start: 03/20/20 0353    Admin Instructions:                        Or  acetaminophen (TYLENOL) 160 MG/5ML solution 650 mg   Dose: 650 mg  Freq: Every 4 Hours PRN Route: PO  PRN Reason: Mild Pain   Start: 03/20/20 0353    Admin Instructions:                        Or  acetaminophen (TYLENOL) suppository 650 mg   Dose: 650 mg  Freq: Every 4 Hours PRN Route: RE  PRN Reason: Mild Pain   Start: 03/20/20 0353    Admin Instructions:                        acetaminophen (TYLENOL) tablet 650 mg   Dose: 650 mg  Freq: Once As Needed Route: PO  PRN Reason: Mild Pain   Start: 03/17/20 1112 End: 03/17/20 1238           Or  acetaminophen (TYLENOL) suppository 650 mg   Dose: 650 mg  Freq: Once As Needed Route: RE  PRN Reason: Mild Pain   Start:  03/17/20 1112 End: 03/17/20 1238           albumin human 25 % IV SOLN 12.5 g   Dose: 12.5 g  Freq: As Needed Route: IV  PRN Comment: Hypotension During Dialysis  Indications of Use: HEMODIALYSIS PROCEDURE  Start: 03/24/20 2109 End: 03/24/20 2359    Admin Instructions:          2359-D/C'd             albumin human 25 % IV SOLN 12.5 g   Dose: 12.5 g  Freq: As Needed Route: IV  PRN Comment: Hypotension During Dialysis  Indications of Use: HEMODIALYSIS PROCEDURE  Start: 03/22/20 0000 End: 03/22/20 0745    Admin Instructions:              albumin human 25 % IV SOLN 12.5 g   Dose: 12.5 g  Freq: As Needed Route: IV  PRN Comment: Hypotension During Dialysis  Indications of Use: HEMODIALYSIS PROCEDURE  Start: 03/20/20 1816 End: 03/20/20 2359    Admin Instructions:              albumin human 25 % IV SOLN 12.5 g   Dose: 12.5 g  Freq: As Needed Route: IV  PRN Comment: Hypotension During Dialysis  Indications of Use: HEMODIALYSIS PROCEDURE  Start: 03/17/20 1240 End: 03/17/20 2359    Admin Instructions:              albumin human 5 % solution 1,500 mL   Dose: 1,500 mL  Freq: As Needed Route: IV  PRN Comment: CVR Protocol  Indications of Use: CARDIAC SURGERY  Start: 03/19/20 1109 End: 03/20/20 1108           ALPRAZolam (XANAX) tablet 0.25 mg   Dose: 0.25 mg  Freq: Every 8 Hours PRN Route: PO  PRN Reason: Anxiety  Start: 03/19/20 1109 End: 03/29/20 1108    Admin Instructions:              aminocaproic acid (AMICAR) injection   Freq: As Needed Route: IV  Start: 03/19/20 0756 End: 03/19/20 1119           bisacodyl (DULCOLAX) suppository 10 mg   Dose: 10 mg  Freq: Daily PRN Route: RE  PRN Reason: Constipation  Start: 03/20/20 0000           bupivacaine (PF) 0.25 % 30 mL, lidocaine 1 % 30 mL mixture   Freq: As Needed  Start: 03/17/20 1052 End: 03/17/20 1109           butamben-tetracaine-benzocaine (CETACAINE) 2-2-14 % spray   Freq: Code / Trauma / Sedation Medication Route: MT  Start: 03/16/20 1211 End: 03/16/20 1211            clevidipine (CLEVIPREX) infusion 0.5 mg/mL   Rate: 4-64 mL/hr Dose: 2-32 mg/hr  Freq: Continuous PRN Route: IV  PRN Comment: See Admin Instructions  Start: 03/19/20 1109 End: 03/21/20 0717    Admin Instructions:                  cyclobenzaprine (FLEXERIL) tablet 10 mg   Dose: 10 mg  Freq: Every 8 Hours PRN Route: PO  PRN Reason: Muscle Spasms  Start: 03/20/20 0000           dexmedetomidine HCl (PRECEDEX) 4 mcg/mL in sodium chloride 0.9 % 100 mL infusion   Freq: Continuous PRN  Start: 03/19/20 0659 End: 03/19/20 1119           dextrose (D50W) 25 g/ 50mL Intravenous Solution 25 g   Dose: 25 g  Freq: Every 15 Minutes PRN Route: IV  PRN Reason: Low Blood Sugar  PRN Comment: Blood Sugar Less Than 70  Start: 03/12/20 2158 End: 03/19/20 1109    Admin Instructions:              dextrose (GLUTOSE) oral gel 15 g   Dose: 15 g  Freq: Every 15 Minutes PRN Route: PO  PRN Reason: Low Blood Sugar  PRN Comment: Blood sugar less than 70  Start: 03/12/20 2158 End: 03/19/20 1109    Admin Instructions:              diphenhydrAMINE (BENADRYL) capsule 25 mg   Dose: 25 mg  Freq: Every 30 Minutes PRN Route: PO  PRN Reason: Itching  PRN Comment: May repeat x 1  Indications of Use: EXTRAPYRAMIDAL REACTION,PRURITUS  Start: 03/17/20 1112 End: 03/17/20 1238    Admin Instructions:                diphenhydrAMINE (BENADRYL) injection 12.5 mg   Dose: 12.5 mg  Freq: Every 15 Minutes PRN Route: IV  PRN Reason: Itching  PRN Comment: May repeat x 1  Start: 03/17/20 1112 End: 03/17/20 1238    Admin Instructions:                docusate sodium (COLACE) capsule 100 mg   Dose: 100 mg  Freq: 2 Times Daily PRN Route: PO  PRN Reason: Constipation  Start: 03/19/20 1109    Admin Instructions:              DOPamine 400 mg in 250 mL D5W (1.6 mg/mL) infusion   Rate: 10.2-102 mL/hr Dose: 2-20 mcg/kg/min  Weight Dosing Info: 136 kg  Freq: Continuous PRN Route: IV  PRN Comment: CVR Protocol  Start: 03/19/20 1109 End: 03/20/20 1810    Admin Instructions:                   ePHEDrine injection 5 mg   Dose: 5 mg  Freq: Once As Needed Route: IV  PRN Comment: symptomatic hypotension - Notify attending anesthesiologist if this needs to be given  Start: 03/17/20 1112 End: 03/17/20 1238    Admin Instructions:              EPINEPHrine (ADRENALIN) 5 mg in sodium chloride 0.9 % 250 mL (0.02 mg/mL) infusion   Rate: 8..4 mL/hr Dose: 0.02-0.3 mcg/kg/min  Weight Dosing Info: 136 kg  Freq: Continuous PRN Route: IV  PRN Comment: See Admin Instructions  Start: 03/19/20 1109 End: 03/20/20 1810    Admin Instructions:                  fentaNYL citrate (PF) (SUBLIMAZE) injection   Freq: As Needed Route: IV  Start: 03/19/20 0649 End: 03/19/20 1119           fentaNYL citrate (PF) (SUBLIMAZE) injection   Freq: As Needed  Start: 03/16/20 1556 End: 03/16/20 1646           fentaNYL citrate (PF) (SUBLIMAZE) injection   Freq: Code / Trauma / Sedation Medication Route: IV  Start: 03/16/20 1215 End: 03/16/20 1219           fentaNYL citrate (PF) (SUBLIMAZE) injection 50 mcg   Dose: 50 mcg  Freq: Every 10 Minutes PRN Route: IV  PRN Reason: Severe Pain   Start: 03/18/20 1917 End: 03/19/20 1108    Admin Instructions:                      fentaNYL citrate (PF) (SUBLIMAZE) injection 50 mcg   Dose: 50 mcg  Freq: Every 5 Minutes PRN Route: IV  PRN Reasons: Moderate Pain ,Severe Pain   Start: 03/17/20 1112 End: 03/17/20 1238    Admin Instructions:                          fentaNYL citrate (PF) (SUBLIMAZE) injection 50 mcg   Dose: 50 mcg  Freq: Every 10 Minutes PRN Route: IV  PRN Reason: Severe Pain   Start: 03/17/20 1010 End: 03/17/20 1113    Admin Instructions:                      flumazenil (ROMAZICON) injection 0.2 mg   Dose: 0.2 mg  Freq: As Needed Route: IV  PRN Comment: for benzodiazepine induced unresponsiveness or sedation  Indications of Use: BENZODIAZEPINE-INDUCED SEDATION  Start: 03/17/20 1112 End: 03/17/20 1238    Admin Instructions:                furosemide (LASIX) injection 40 mg   Dose: 40  mg  Freq: Every 6 Hours PRN Route: IV  PRN Comment: For PCWP or PAD greater than 15  Start: 03/19/20 1109 End: 03/21/20 0717           gelatin absorbable (GELFOAM) sponge   Freq: As Needed  Start: 03/19/20 0817 End: 03/19/20 1106           glucagon (human recombinant) (GLUCAGEN DIAGNOSTIC) injection 1 mg   Dose: 1 mg  Freq: Every 15 Minutes PRN Route: SC  PRN Reason: Low Blood Sugar  PRN Comment: Blood Glucose Less Than 70  Start: 03/12/20 2158 End: 03/19/20 1109    Admin Instructions:              heparin (porcine) injection   Freq: As Needed Route: IV  Start: 03/19/20 0807 End: 03/19/20 1119           heparin (porcine) injection   Freq: As Needed  Start: 03/17/20 1052 End: 03/17/20 1109           heparin (porcine) injection 3,800 Units   Dose: 3,800 Units  Freq: As Needed Route: IK  PRN Comment: Lock HD Cath Ports post Hemodialysis  Indications Comment: Lock HD Cath Ports post Hemodialysis  Start: 03/20/20 1319      1740               0152                hydrALAZINE (APRESOLINE) injection 5 mg   Dose: 5 mg  Freq: Every 10 Minutes PRN Route: IV  PRN Reason: High Blood Pressure  PRN Comment: for systolic blood pressure greater than 180 mmHg or diastolic blood pressure greater than 105 mmHg  Start: 03/17/20 1112 End: 03/17/20 1238    Admin Instructions:                    HYDROcodone-acetaminophen (NORCO) 5-325 MG per tablet 1 tablet   Dose: 1 tablet  Freq: Every 4 Hours PRN Route: PO  PRN Reason: Moderate Pain   Start: 03/21/20 0736 End: 03/29/20 1108      1048                  HYDROcodone-acetaminophen (NORCO) 5-325 MG per tablet 2 tablet   Dose: 2 tablet  Freq: Every 4 Hours PRN Route: PO  PRN Reason: Moderate Pain   Start: 03/19/20 1109 End: 03/21/20 0749           HYDROcodone-acetaminophen (NORCO) 7.5-325 MG per tablet 1 tablet   Dose: 1 tablet  Freq: Once As Needed Route: PO  PRN Reason: Mild Pain   Start: 03/17/20 1112 End: 03/17/20 1238           HYDROmorphone (DILAUDID) injection 0.5 mg   Dose: 0.5 mg  Freq:  Every 5 Minutes PRN Route: IV  PRN Reasons: Moderate Pain ,Severe Pain   Start: 03/17/20 1112 End: 03/17/20 1238    Admin Instructions:                          insulin regular (HumuLIN R,NovoLIN R) 100 Units in sodium chloride 0.9 % 100 mL (1 Units/mL) infusion   Rate: 0-50 mL/hr Dose: 0-50 Units/hr  Freq: Continuous PRN Route: IV  PRN Comment: Start if BG greater that 150 mg/dL  Start: 03/19/20 1109 End: 03/21/20 0717    Admin Instructions:              iopamidol (ISOVUE-370) 76 % injection   Freq: As Needed  Start: 03/16/20 1641 End: 03/16/20 1646           labetalol (NORMODYNE,TRANDATE) injection 5 mg   Dose: 5 mg  Freq: Every 5 Minutes PRN Route: IV  PRN Reason: High Blood Pressure  PRN Comment: for systolic blood pressure greater than 180 mmHg or diastolic blood pressure greater than 105 mmHg  Start: 03/17/20 1112 End: 03/17/20 1238    Admin Instructions:                        lidocaine (XYLOCAINE) 2% injection   Freq: As Needed Route: IV  Start: 03/17/20 1037 End: 03/17/20 1114           lidocaine (XYLOCAINE) 2% injection   Freq: As Needed  Start: 03/16/20 1621 End: 03/16/20 1646           lidocaine PF 1% (XYLOCAINE) injection 0.5 mL   Dose: 0.5 mL  Freq: Once As Needed Route: IJ  PRN Comment: IV Start  Start: 03/17/20 1010 End: 03/17/20 1113           Lidocaine Viscous HCl (XYLOCAINE) 2 % mouth solution   Freq: Code / Trauma / Sedation Medication Route: MT  Start: 03/16/20 1205 End: 03/16/20 1205           melatonin tablet 5 mg   Dose: 5 mg  Freq: Nightly PRN Route: PO  PRN Reason: Sleep  Start: 03/23/20 0812           meperidine (DEMEROL) injection 25 mg   Dose: 25 mg  Freq: Every 4 Hours PRN Route: IV  PRN Reason: Shivering  Start: 03/19/20 1109 End: 03/19/20 2308    Admin Instructions:              midazolam (VERSED) injection   Freq: As Needed Route: IV  Start: 03/19/20 0648 End: 03/19/20 1119           midazolam (VERSED) injection   Freq: As Needed Route: IV  Start: 03/17/20 1033 End: 03/17/20 1114            midazolam (VERSED) injection   Freq: As Needed  Start: 03/16/20 1555 End: 03/16/20 1646           midazolam (VERSED) injection   Freq: Code / Trauma / Sedation Medication Route: IV  Start: 03/16/20 1215 End: 03/16/20 1219           midazolam (VERSED) injection 1 mg   Dose: 1 mg  Freq: Every 5 Minutes PRN Route: IV  PRN Reason: Sedation  PRN Comment: preoperative sedation  Start: 03/17/20 1010 End: 03/17/20 1113    Admin Instructions:                Or  midazolam (VERSED) injection 2 mg   Dose: 2 mg  Freq: Every 5 Minutes PRN Route: IV  PRN Reason: Sedation  PRN Comment: preoperative sedation  Start: 03/17/20 1010 End: 03/17/20 1113    Admin Instructions:                midazolam (VERSED) injection 2 mg   Dose: 2 mg  Freq: Every 1 Hour PRN Route: IV  PRN Reason: Sedation  PRN Comment: while on ventilator  Start: 03/19/20 1109 End: 03/21/20 0717    Admin Instructions:                milrinone 20 mg/100 mL (0.2 mg/mL) in 5 % dextrose infusion   Rate: 10.2-30.6 mL/hr Dose: 0.25-0.75 mcg/kg/min  Weight Dosing Info: 136 kg  Freq: Continuous PRN Route: IV  PRN Comment: See Admin Instructions  Start: 03/19/20 1109 End: 03/20/20 1810    Admin Instructions:                milrinone lactate (PRIMACOR) 200 mcg/mL in sodium chloride 0.9 % 312.5 mL infusion   Freq: Continuous PRN  Start: 03/19/20 0852 End: 03/19/20 1119           milrinone lactate (PRIMACOR) injection   Freq: As Needed  Start: 03/19/20 0836 End: 03/19/20 1119           morphine injection 1 mg   Dose: 1 mg  Freq: Every 4 Hours PRN Route: IV  PRN Reason: Moderate Pain   Start: 03/19/20 1109 End: 03/29/20 1108    Admin Instructions:                    And  naloxone (NARCAN) injection 0.4 mg   Dose: 0.4 mg  Freq: Every 5 Minutes PRN Route: IV  PRN Reason: Respiratory Depression  Start: 03/19/20 1109    Admin Instructions:                morphine injection 4 mg   Dose: 4 mg  Freq: Every 30 Minutes PRN Route: IV  PRN Reason: Severe Pain   PRN Comment: while on  ventilator  Start: 03/19/20 1109 End: 03/20/20 1810    Admin Instructions:                    naloxone (NARCAN) injection   Freq: Code / Trauma / Sedation Medication  Start: 03/16/20 1227 End: 03/16/20 1227           naloxone (NARCAN) injection 0.2 mg   Dose: 0.2 mg  Freq: As Needed Route: IV  PRN Reasons: Opioid Reversal,Respiratory Depression  PRN Comment: unresponsiveness, decrease oxygen saturation  Indications of Use: ACUTE RESPIRATORY FAILURE,OPIOID-INDUCED RESPIRATORY DEPRESSION  Start: 03/17/20 1112 End: 03/17/20 1238    Admin Instructions:              niCARdipine (CARDENE) 25 mg in 250 mL NS (0.1 mg/mL) infusion kit   Rate:  mL/hr Dose: 5-15 mg/hr  Freq: Continuous PRN Route: IV  PRN Comment: See Admin Instructions  Start: 03/19/20 1109 End: 03/20/20 1810    Admin Instructions:                nitroglycerin (NITROSTAT) SL tablet 0.4 mg   Dose: 0.4 mg  Freq: Every 5 Minutes PRN Route: SL  PRN Reason: Chest Pain  PRN Comment: Only if SBP Greater Than 100  Start: 03/12/20 2158 End: 03/19/20 1109    Admin Instructions:              norepinephrine (LEVOPHED) 8 mg/250 mL (32 mcg/mL) in sodium chloride 0.9% infusion (premix)   Rate: 5.1-76.5 mL/hr Dose: 0.02-0.3 mcg/kg/min  Weight Dosing Info: 136 kg  Freq: Continuous PRN Route: IV  PRN Comment: See Admin Instructions  Start: 03/19/20 1109 End: 03/20/20 1810    Admin Instructions:                norepinephrine (LEVOPHED) injection   Freq: Continuous PRN  Start: 03/19/20 0924 End: 03/19/20 1119           ondansetron (ZOFRAN) injection   Freq: As Needed Route: IV  Start: 03/19/20 1022 End: 03/19/20 1119           ondansetron (ZOFRAN) injection 4 mg   Dose: 4 mg  Freq: Every 2 Hours PRN Route: IV  PRN Reasons: Nausea,Vomiting  Start: 03/19/20 2323 End: 03/20/20 [id number]           ondansetron (ZOFRAN) injection 4 mg   Dose: 4 mg  Freq: Every 6 Hours PRN Route: IV  PRN Reasons: Nausea,Vomiting  Start: 03/19/20 1109           ondansetron (ZOFRAN) injection 4 mg    Dose: 4 mg  Freq: Once As Needed Route: IV  PRN Reasons: Nausea,Vomiting  Indications of Use: POSTOPERATIVE NAUSEA AND VOMITING  Start: 03/17/20 1112 End: 03/17/20 1238    Admin Instructions:              oxyCODONE (ROXICODONE) immediate release tablet 10 mg   Dose: 10 mg  Freq: Every 4 Hours PRN Route: PO  PRN Reason: Severe Pain   Start: 03/19/20 1109 End: 03/21/20 0749    Admin Instructions:                        oxyCODONE-acetaminophen (PERCOCET) 7.5-325 MG per tablet 1 tablet   Dose: 1 tablet  Freq: Once As Needed Route: PO  PRN Reason: Moderate Pain   Start: 03/17/20 1112 End: 03/17/20 1238           Pharmacy to dose vancomycin   Freq: Continuous PRN Route: XX  PRN Reason: Consult  Indications of Use: SKIN AND SOFT TISSUE INFECTION  Start: 03/12/20 1851 End: 03/14/20 1124           phenylephrine (MARGUERITE-SYNEPHRINE) 50 mg in sodium chloride 0.9 % 250 mL (0.2 mg/mL) infusion   Rate: 8..4 mL/hr Dose: 0.2-3 mcg/kg/min  Weight Dosing Info: 136 kg  Freq: Continuous PRN Route: IV  PRN Comment: See Admin Instructions  Start: 03/19/20 1109 End: 03/20/20 1810    Admin Instructions:                phenylephrine (MARGUERITE-SYNEPHRINE) 50 mg in sodium chloride 0.9 % 250 mL infusion   Freq: Continuous PRN Route: IV  Start: 03/19/20 0659 End: 03/19/20 1119           polyethylene glycol (MIRALAX) powder 17 g   Dose: 17 g  Freq: Daily PRN Route: PO  PRN Reason: Constipation  Start: 03/19/20 1109    Admin Instructions:              potassium chloride (MICRO-K) CR capsule 40 mEq   Dose: 40 mEq  Freq: As Needed Route: PO  PRN Comment: potassium replacement.  see admin instructions  Start: 03/19/20 1109 End: 03/24/20 0752    Admin Instructions:                                        0752-D/C'd             Or  potassium chloride (KLOR-CON) packet 40 mEq   Dose: 40 mEq  Freq: As Needed Route: PO  PRN Comment: potassium replacement, see admin instructions  Start: 03/19/20 1109 End: 03/24/20 0752    Admin Instructions:                                       0752-D/C'd             potassium chloride 10 mEq in 100 mL IVPB   Dose: 10 mEq  Freq: Every 1 Hour PRN Route: IV  PRN Comment: for K+ less than or equal to 3.1  Start: 03/19/20 1109 End: 03/24/20 0752    Admin Instructions:                  0752-D/C'd             Or  potassium chloride 10 mEq in 100 mL IVPB   Dose: 10 mEq  Freq: Every 1 Hour PRN Route: IV  PRN Comment: for K+ 3.2 - 3.6  Start: 03/19/20 1109 End: 03/24/20 0752    Admin Instructions:                  0752-D/C'd             potassium chloride 20 mEq in 50 mL IVPB   Dose: 20 mEq  Freq: Every 1 Hour PRN Route: IV  PRN Comment: for K+ 3.7 - 4.0  Start: 03/19/20 1109 End: 03/20/20 1810    Admin Instructions:                    potassium chloride 20 mEq in 50 mL IVPB   Dose: 20 mEq  Freq: Every 1 Hour PRN Route: IV  PRN Comment: for K+ 3.2 - 3.6  Start: 03/19/20 1109 End: 03/20/20 1810    Admin Instructions:                    potassium chloride 20 mEq in 50 mL IVPB   Dose: 20 mEq  Freq: Every 1 Hour PRN Route: IV  PRN Comment: for K+ less than or equal to 3.1  Start: 03/19/20 1109 End: 03/20/20 1810    Admin Instructions:                  promethazine (PHENERGAN) tablet 12.5 mg   Dose: 12.5 mg  Freq: Every 6 Hours PRN Route: PO  PRN Reasons: Nausea,Vomiting  Start: 03/19/20 1109    Admin Instructions:           0945-Not Given:  See Alt              1812-Not Given:  See Alt                 Or  promethazine (PHENERGAN) injection 12.5 mg   Dose: 12.5 mg  Freq: Every 6 Hours PRN Route: IV  PRN Reasons: Nausea,Vomiting  Start: 03/19/20 1109    Admin Instructions:           0945              1812                 promethazine (PHENERGAN) injection 6.25 mg   Dose: 6.25 mg  Freq: Every 10 Minutes PRN Route: IV  PRN Reasons: Nausea,Vomiting  Start: 03/17/20 1112 End: 03/17/20 1238    Admin Instructions:                    Or  promethazine (PHENERGAN) injection 12.5 mg   Dose: 12.5 mg  Freq: Once As Needed Route: IM  PRN Reasons:  Nausea,Vomiting  Start: 03/17/20 1112 End: 03/17/20 1238    Admin Instructions:                Or  promethazine (PHENERGAN) suppository 25 mg   Dose: 25 mg  Freq: Once As Needed Route: RE  PRN Reasons: Nausea,Vomiting  Start: 03/17/20 1112 End: 03/17/20 1238    Admin Instructions:              Or  promethazine (PHENERGAN) tablet 25 mg   Dose: 25 mg  Freq: Once As Needed Route: PO  PRN Reasons: Nausea,Vomiting  Start: 03/17/20 1112 End: 03/17/20 1238    Admin Instructions:                propofol (DIPRIVAN) infusion 10 mg/mL 100 mL   Rate: 4.08-40.8 mL/hr Dose: 5-50 mcg/kg/min  Weight Dosing Info: 136 kg  Freq: Continuous PRN Route: IV  PRN Comment: See Admin Instructions  Start: 03/19/20 1109 End: 03/20/20 1810    Admin Instructions:                       Order specific questions:   RASS Goal: -1 TO -2             Propofol (DIPRIVAN) injection   Freq: Continuous PRN Route: IV  Start: 03/19/20 0808 End: 03/19/20 1119           Propofol (DIPRIVAN) injection   Freq: As Needed Route: IV  Start: 03/19/20 0702 End: 03/19/20 1119           Propofol (DIPRIVAN) injection   Freq: Continuous PRN  Start: 03/17/20 1037 End: 03/17/20 1114           protamine injection   Freq: As Needed  Start: 03/19/20 1049 End: 03/19/20 1119           rocuronium (ZEMURON) injection   Freq: As Needed Route: IV  Start: 03/19/20 0702 End: 03/19/20 1119           sodium chloride (NS) irrigation solution   Freq: As Needed  Start: 03/19/20 0818 End: 03/19/20 1106           sodium chloride 0.9 % flush 10 mL   Dose: 10 mL  Freq: As Needed Route: IV  PRN Reason: Line Care  Start: 03/12/20 2158 End: 03/19/20 1109           sodium chloride 0.9 % flush 10 mL   Dose: 10 mL  Freq: As Needed Route: IV  PRN Reason: Line Care  Start: 03/12/20 1349 End: 03/19/20 1109           sodium chloride 0.9 % flush 3-10 mL   Dose: 3-10 mL  Freq: As Needed Route: IV  PRN Reason: Line Care  Start: 03/17/20 1010 End: 03/17/20 1113           sodium chloride 0.9 % flush 30 mL    Dose: 30 mL  Freq: Once As Needed Route: IV  PRN Reason: Line Care  Start: 03/19/20 1109    Admin Instructions:              sodium chloride 0.9 % infusion   Freq: Continuous PRN  Start: 03/19/20 0718 End: 03/19/20 1119           sodium chloride 0.9 % infusion   Freq: Continuous PRN Route: IV  Start: 03/19/20 0645 End: 03/19/20 1119           sodium chloride 0.9 % infusion   Rate: 30 mL/hr Dose: 30 mL/hr  Freq: Continuous PRN Route: IV  PRN Comment: if insulin infusion rate is insufficient to maintain IV patency.  Start: 03/19/20 1109           sodium chloride 0.9 % infusion   Rate: 9 mL/hr Dose: 9 mL/hr  Freq: Continuous PRN Route: IV  PRN Comment: Start Prior to Surgery  Start: 03/17/20 1010 End: 03/17/20 1238           sodium chloride 0.9 % infusion   Freq: Continuous PRN  Start: 03/16/20 1548 End: 03/16/20 1548           sodium chloride 0.9 % infusion   Freq: Code / Trauma / Sedation Continuous Med Route: IV  Start: 03/16/20 1210 End: 03/16/20 1252           sodium chloride 250 mL with heparin (porcine) 5000 UNIT/ML 2,500 Units mixture   Freq: As Needed  Start: 03/17/20 1052 End: 03/17/20 1109           SUFentanil (SUFENTA) injection   Freq: As Needed Route: IV  Start: 03/19/20 0755 End: 03/19/20 1119           traMADol (ULTRAM) tablet 50 mg   Dose: 50 mg  Freq: Every 12 Hours PRN Route: PO  PRN Reason: Moderate Pain   Start: 03/20/20 1728 End: 03/30/20 1727    Admin Instructions:                            Vasopressin (PITRESSIN) 20 Units in sodium chloride 0.9 % 100 mL (0.2 Units/mL) infusion   Rate: 6-30 mL/hr Dose: 0.02-0.1 Units/min  Freq: Continuous PRN Route: IV  PRN Comment: See Admin Instructions  Start: 03/19/20 1109 End: 03/20/20 1810    Admin Instructions:                verapamil (ISOPTIN) 500 mcg, nitroglycerin (TRIDIL) 200 mcg, heparin (porcine) 3,000 Units radial artery injection   Freq: As Needed  Start: 03/16/20 1627 End: 03/16/20 1646         Medications 03/23/20 03/24/20 03/25/20

## 2020-03-25 NOTE — PROGRESS NOTES
LOS: 13 days   Patient Care Team:  Provider, No Known as PCP - General    Chief Complaint: post op    Subjective  No new complaints    Vital Signs  Temp:  [97.5 °F (36.4 °C)-98.3 °F (36.8 °C)] 98.3 °F (36.8 °C)  Heart Rate:  [84-89] 89  Resp:  [16-18] 16  BP: (121-134)/(71-96) 133/71  Body mass index is 35.59 kg/m².    Intake/Output Summary (Last 24 hours) at 3/25/2020 0732  Last data filed at 3/25/2020 0531  Gross per 24 hour   Intake 570 ml   Output 3358 ml   Net -2788 ml     No intake/output data recorded.            03/24/20  0551 03/24/20  0700 03/25/20  0531   Weight: 74.8 kg (165 lb) 120 kg (265 lb 3.2 oz) 116 kg (255 lb 3.2 oz)         Objective    Results Review:        WBC WBC   Date Value Ref Range Status   03/25/2020 7.38 3.40 - 10.80 10*3/mm3 Final   03/24/2020 7.93 3.40 - 10.80 10*3/mm3 Final   03/23/2020 9.85 3.40 - 10.80 10*3/mm3 Final   03/23/2020 10.68 3.40 - 10.80 10*3/mm3 Final      HGB Hemoglobin   Date Value Ref Range Status   03/25/2020 9.3 (L) 13.0 - 17.7 g/dL Final   03/24/2020 8.7 (L) 13.0 - 17.7 g/dL Final   03/23/2020 9.7 (L) 13.0 - 17.7 g/dL Final   03/23/2020 9.5 (L) 13.0 - 17.7 g/dL Final      HCT Hematocrit   Date Value Ref Range Status   03/25/2020 30.2 (L) 37.5 - 51.0 % Final   03/24/2020 29.1 (L) 37.5 - 51.0 % Final   03/23/2020 32.1 (L) 37.5 - 51.0 % Final   03/23/2020 29.8 (L) 37.5 - 51.0 % Final      Platelets Platelets   Date Value Ref Range Status   03/25/2020 179 140 - 450 10*3/mm3 Final   03/24/2020 158 140 - 450 10*3/mm3 Final   03/23/2020 161 140 - 450 10*3/mm3 Final   03/23/2020 156 140 - 450 10*3/mm3 Final        PT/INR:    Protime   Date Value Ref Range Status   03/25/2020 21.5 (H) 11.7 - 14.2 Seconds Final   03/24/2020 21.6 (H) 11.7 - 14.2 Seconds Final   03/23/2020 19.3 (H) 11.7 - 14.2 Seconds Final   03/23/2020 18.7 (H) 11.7 - 14.2 Seconds Final   /  INR   Date Value Ref Range Status   03/25/2020 1.90 (H) 0.90 - 1.10 Final   03/24/2020 1.91 (H) 0.90 - 1.10 Final    03/23/2020 1.67 (H) 0.90 - 1.10 Final   03/23/2020 1.60 (H) 0.90 - 1.10 Final       Sodium Sodium   Date Value Ref Range Status   03/25/2020 134 (L) 136 - 145 mmol/L Final   03/24/2020 133 (L) 136 - 145 mmol/L Final   03/23/2020 133 (L) 136 - 145 mmol/L Final      Potassium Potassium   Date Value Ref Range Status   03/25/2020 3.6 3.5 - 5.2 mmol/L Final   03/24/2020 4.0 3.5 - 5.2 mmol/L Final   03/23/2020 4.4 3.5 - 5.2 mmol/L Final      Chloride Chloride   Date Value Ref Range Status   03/25/2020 97 (L) 98 - 107 mmol/L Final   03/24/2020 96 (L) 98 - 107 mmol/L Final   03/23/2020 99 98 - 107 mmol/L Final      Bicarbonate CO2   Date Value Ref Range Status   03/25/2020 23.7 22.0 - 29.0 mmol/L Final   03/24/2020 25.2 22.0 - 29.0 mmol/L Final   03/23/2020 20.2 (L) 22.0 - 29.0 mmol/L Final      BUN BUN   Date Value Ref Range Status   03/25/2020 35 (H) 6 - 20 mg/dL Final   03/24/2020 32 (H) 6 - 20 mg/dL Final   03/23/2020 50 (H) 6 - 20 mg/dL Final      Creatinine Creatinine   Date Value Ref Range Status   03/25/2020 4.95 (H) 0.76 - 1.27 mg/dL Final   03/24/2020 5.04 (H) 0.76 - 1.27 mg/dL Final   03/23/2020 6.45 (H) 0.76 - 1.27 mg/dL Final      Calcium Calcium   Date Value Ref Range Status   03/25/2020 7.5 (L) 8.6 - 10.5 mg/dL Final   03/24/2020 8.1 (L) 8.6 - 10.5 mg/dL Final   03/23/2020 7.6 (L) 8.6 - 10.5 mg/dL Final      Magnesium Magnesium   Date Value Ref Range Status   03/23/2020 2.0 1.6 - 2.6 mg/dL Final            aspirin 81 mg Oral Daily   erythromycin base 250 mg Oral 4x Daily With Meals & Nightly   influenza vaccine 0.5 mL Intramuscular Once   mupirocin  Each Nare BID   pantoprazole 40 mg Oral Q AM   sennosides-docusate 2 tablet Oral Nightly   warfarin 6 mg Oral Once       sodium chloride 30 mL/hr Last Rate: Stopped (03/20/20 0801)           Patient Active Problem List   Diagnosis Code   • Nonrheumatic mitral valve regurgitation, severe I34.0   • Cigarette smoker F17.210   • Essential hypertension I10   • ESRD  on hemodialysis (CMS/[facility]) N18.6, Z99.2   • Foot callus L84   • Anasarca associated with disorder of kidney N04.9   • Diabetes mellitus (CMS/[facility]) E11.9   • Cellulitis of left leg L03.116   • Systolic CHF, acute (CMS/[facility]) I50.21   • Anemia of chronic disease D63.8   • Hypocalcemia E83.51   • History of mitral valve replacement with mechanical valve Z95.2   • NSVT (nonsustained ventricular tachycardia) (CMS/[facility]) I47.2   • Wenckebach I44.1   • Acute respiratory failure with hypoxia (CMS/[facility]) J96.01       Assessment & Plan    -Severe mitral incompetence, severe tricuspid incompetence--s/p MVR (mechanical), TV repair, closure of PFO--POD#6 Melva  -Severe pulmonary hypertension-- intraoperatively PA pressures 70s-postop improved PA pressures 40s  -Dilated cardiomyopathy EF 30%  -ESRD on HD  -left leg cellulitis  -bilateral pleural effusions- intraoperatively 2L off right, 3L off left  -Anemia of chronic disease, post op anemia acutely worsened expected acute blood loss  -leukocytosis- probable reactive; trending down     Looks great this morning  Weaned to RA and tolerating  He appears in sinus rhythm this morning with intermittent wenckebach; wires removed yesterday  INR 1.9 this morning--will give 6mg tonight  Mobilize  Discontinue left pleural chest tube      Jackeline Altamirano, APRN  03/25/20  07:32.

## 2020-03-25 NOTE — PROGRESS NOTES
Continued Stay Note  [facility]     Patient Name: Nico King  MRN: [id number]  Today's Date: 3/25/2020    Admit Date: 3/12/2020    Discharge Plan     Row Name 03/25/20 1519       Plan    Plan  [facility] approved and HD chair confirmed.      Plan Comments  Received an inbound call from [place] and she confirmed Pt Khai KUMARI is approved and a hemodialysis chair is confirmed.  Approval relayed to [facility] Prather.  Packet on CCP desk.  CCP following.  [facility]UE KIMBROUGH/CCP        Discharge Codes    No documentation.             Sierra Aceves RN

## 2020-03-25 NOTE — PROGRESS NOTES
LOS: 13 days   Patient Care Team:  Provider, No Known as PCP - General    Chief Complaint:     F/u valve disease, chf.     Interval History:     He denies orthopnea, chest pain.  Had one episode of vomiting yesterday.  He has a cough but no fevers.  He is bringing up clear sputum.  He does not feels heart racing or skipping.  He has mild dizziness with walking.  He has had no falls or syncope.  He denies feeling short winded.  He is still making some urine.    Objective   Vital Signs  Temp:  [98 °F (36.7 °C)-98.3 °F (36.8 °C)] 98.3 °F (36.8 °C)  Heart Rate:  [84-94] 94  Resp:  [16-18] 16  BP: (121-134)/(71-96) 133/86    Intake/Output Summary (Last 24 hours) at 3/25/2020 0848  Last data filed at 3/25/2020 0700  Gross per 24 hour   Intake 450 ml   Output 3368 ml   Net -2918 ml       Comfortable NAD  PERRL, conjunctivae clear  Neck supple, elevated JVD or thyromegaly appreciated  S1/S2 RRR, no m/r/g, mechanical click  Lungs decreased throughout, no breath sounds right base, normal effort  Abdomen S/NT/ND (+) BS, no HSM appreciated  Extremities warm, no clubbing, cyanosis, 1+ left lower extremity edema  No visible or palpable skin lesions  A/Ox4, mood and affect appropriate    Results Review:      Results from last 7 days   Lab Units 03/25/20 0323 03/24/20  0727 03/23/20  0332   SODIUM mmol/L 134* 133* 133*   POTASSIUM mmol/L 3.6 4.0 4.4   CHLORIDE mmol/L 97* 96* 99   CO2 mmol/L 23.7 25.2 20.2*   BUN mg/dL 35* 32* 50*   CREATININE mg/dL 4.95* 5.04* 6.45*   GLUCOSE mg/dL 114* 90 94   CALCIUM mg/dL 7.5* 8.1* 7.6*         Results from last 7 days   Lab Units 03/25/20  0323 03/24/20  0337 03/23/20  0825   WBC 10*3/mm3 7.38 7.93 9.85   HEMOGLOBIN g/dL 9.3* 8.7* 9.7*   HEMATOCRIT % 30.2* 29.1* 32.1*   PLATELETS 10*3/mm3 179 158 161     Results from last 7 days   Lab Units 03/25/20  0323 03/24/20  0727 03/24/20  0337 03/23/20  0825   INR  1.90*  --  1.91* 1.67*   APTT seconds 54.3* 50.3* 42.4* 44.9*         Results from  last 7 days   Lab Units 03/23/20  0332   MAGNESIUM mg/dL 2.0           I reviewed the patient's new clinical results.  I personally viewed and interpreted the patient's EKG/Telemetry data        Medication Review:     aspirin 81 mg Oral Daily   erythromycin base 250 mg Oral 4x Daily With Meals & Nightly   influenza vaccine 0.5 mL Intramuscular Once   mupirocin  Each Nare BID   pantoprazole 40 mg Oral Q AM   sennosides-docusate 2 tablet Oral Nightly   warfarin 6 mg Oral Once         sodium chloride 30 mL/hr Last Rate: Stopped (03/20/20 0801)       Assessment/Plan       ESRD on hemodialysis (CMS/[facility])    Nonrheumatic mitral valve regurgitation, severe    Essential hypertension    Anasarca associated with disorder of kidney    Diabetes mellitus (CMS/[facility])    Cellulitis of left leg    Systolic CHF, acute (CMS/[facility])    Anemia of chronic disease    Hypocalcemia    History of mitral valve replacement with mechanical valve    NSVT (nonsustained ventricular tachycardia) (CMS/[facility])    Elaine    1.  Valvular heart disease -s/p mechanical mitral valve replacement mechanical and tricuspid valve annuloplasty with surgical PFO closure done 3/19/20.  warfarin target 2.5-3.5  2.  ESRD -electrolytes are within normal range, hypervolemia improving and hemodialysis per nephrology.  3.  Chronic systolic heart failure -EF 35 to 40% secondary to valvular heart disease.  Minimal coronary artery disease on cardiac catheterization.  Volume optimization with hemodialysis.   4.  CAD -nonobstructive.  On aspirin   5.  NSVT - no further VT.   6. Wenckebach only when really relaxed or sleeping, not when active or talking,  off coreg.   7. Anemia, watch for now - is expected.     Warfarin increased today. cxr now worsening vascular congestion with infiltrate vs effusion right base.  Clinically though he looks better.     Spoke with nephrology, Dr. Mcneil and had ultrafiltration last night, removing 3kg.  To get HD today with CT of chest  afterwards.        No med changes for now.     Meggan Hinton MD  03/25/20  08:48

## 2020-03-25 NOTE — PLAN OF CARE
Problem: Patient Care Overview  Goal: Plan of Care Review  Flowsheets (Taken 3/25/2020 1101)  Plan of Care Reviewed With: patient  Outcome Summary: Pt continues to make steady progress with PT as he was able to walk 120ft with walker and was able to participate in protocol exercises. Pt continues to be unsteady during gait and pushes heavily throught the walker. Pt also required increased assistance when walking a few feet without AD. PT will continue to follow to address strength, mobility, and gait.

## 2020-03-25 NOTE — PROGRESS NOTES
Progress Note    Name: Nico King ADMIT: 3/12/2020   : 1970  PCP: Provider, No Known    MRN: [id number] LOS: 13 days   AGE/SEX: 49 y.o. male  ROOM: [facility]/   Date of Encounter Visit: 20    Subjective:     Interval History: left chest tube removed with morning and CXR showed some improvements in airspace.     REVIEW OF SYSTEMS:   no fever or chills.  Typical post op chest pains worse after coughing. no palpitations. Edema improved some.   SOA better. Cough with occasional white secretions. .   Mild Nausea after lunch. No vomiting.  No abdominal pain. Had a BM Today.   Dizziness when getting up.   Urinating ok. Ambulated in gil today.     Objective:   Temp:  [98 °F (36.7 °C)-98.3 °F (36.8 °C)] 98.3 °F (36.8 °C)  Heart Rate:  [84-97] 97  Resp:  [16-18] 16  BP: (121-134)/(71-96) 133/87   SpO2:  [93 %-98 %] 95 %  on  Flow (L/min):  [2] 2 Device (Oxygen Therapy): room air    Intake/Output Summary (Last 24 hours) at 3/25/2020 1257  Last data filed at 3/25/2020 1110  Gross per 24 hour   Intake 450 ml   Output 3650 ml   Net -3200 ml     Body mass index is 35.59 kg/m².      20  0551 20  0700 20  0531   Weight: 74.8 kg (165 lb) 120 kg (265 lb 3.2 oz) 116 kg (255 lb 3.2 oz)     Weight change: 40.9 kg (90 lb 3.2 oz)    Physical Exam   Constitutional: No distress.   HENT:   Head: Atraumatic.   Nose: Nose normal.   Eyes: Conjunctivae are normal.   Cardiovascular: Normal rate, regular rhythm and intact distal pulses.   Chest incision-CDI   Pulmonary/Chest: Effort normal. He has no wheezes. He has no rales.   Diminished bases with improving breath sounds   Abdominal: Soft. Bowel sounds are normal. He exhibits no distension. There is no tenderness.   Musculoskeletal: He exhibits edema (2+BLE (L>R)).   Skin: Skin is warm and dry. He is not diaphoretic. There is erythema (mild left shin).       Results Review:      Results from last 7 days   Lab Units 20  0323 20  6828  03/23/20  0332 03/22/20  0345 03/21/20  0357 03/20/20  0213 03/19/20  1514   SODIUM mmol/L 134* 133* 133* 133* 134* 140 139   POTASSIUM mmol/L 3.6 4.0 4.4 5.1 4.6 4.4 3.9   CHLORIDE mmol/L 97* 96* 99 100 101 104 103   CO2 mmol/L 23.7 25.2 20.2* 17.5* 20.4* 20.6* 21.4*   BUN mg/dL 35* 32* 50* 40* 32* 43* 38*   CREATININE mg/dL 4.95* 5.04* 6.45* 5.31* 4.17* 4.84* 4.38*   GLUCOSE mg/dL 114* 90 94 100* 114* 143* 113*   CALCIUM mg/dL 7.5* 8.1* 7.6* 7.5* 7.8* 7.3* 6.9*     Estimated Creatinine Clearance: 23.6 mL/min (A) (by C-G formula based on SCr of 4.95 mg/dL (H)).      Results from last 7 days   Lab Units 03/25/20  0552 03/24/20 2006 03/24/20  1541 03/24/20  0541 03/23/20 2003 03/23/20  1535 03/23/20  1047 03/21/20 2001   GLUCOSE mg/dL 125 236* 146* 117 207* 105 135* 136*             Results from last 7 days   Lab Units 03/20/20  0213   TSH uIU/mL 2.550     Results from last 7 days   Lab Units 03/23/20  0332 03/20/20  0213 03/19/20  1514 03/19/20  1132 03/19/20  0516   MAGNESIUM mg/dL 2.0 1.9 1.8 1.9 1.7           Invalid input(s): LDLCALC  Results from last 7 days   Lab Units 03/25/20  0323 03/24/20  0337 03/23/20  0825 03/23/20  0332 03/22/20  0403 03/21/20  0357 03/20/20  0210   WBC 10*3/mm3 7.38 7.93 9.85 10.68 11.94* 14.37* 12.95*   HEMOGLOBIN g/dL 9.3* 8.7* 9.7* 9.5* 9.8* 9.9* 8.9*   HEMATOCRIT % 30.2* 29.1* 32.1* 29.8* 32.1* 32.5* 29.6*   PLATELETS 10*3/mm3 179 158 161 156 144 131* 123*   MCV fL 76.1* 77.6* 78.5* 77.0* 77.5* 77.4* 77.3*   MCH pg 23.4* 23.2* 23.7* 24.5* 23.7* 23.6* 23.2*   MCHC g/dL 30.8* 29.9* 30.2* 31.9 30.5* 30.5* 30.1*   RDW % 16.5* 16.7* 16.7* 17.1* 16.9* 16.6* 16.3*   RDW-SD fl 45.4 46.7 46.5 47.3 47.7 46.4 45.6   MPV fL 9.7 9.5 10.5 10.3 10.1 10.3 10.0   NEUTROPHIL % % 71.3 71.3 77.3* 74.2 76.2* 83.6* 88.1*   LYMPHOCYTE % % 10.8* 10.8* 9.0* 10.6* 9.0* 6.3* 4.4*   MONOCYTES % % 13.0* 12.7* 8.8 10.6 11.6 8.0 6.3   EOSINOPHIL % % 3.9 4.0 3.7 3.3 1.9 1.2 0.2*   BASOPHIL % % 0.5 0.6 0.7 0.7  0.7 0.5 0.3   IMM GRAN % % 0.5 0.6* 0.5 0.6* 0.6* 0.4 0.7*   NEUTROS ABS 10*3/mm3 5.25 5.64 7.61* 7.94* 9.10* 12.01* 11.42*   LYMPHS ABS 10*3/mm3 0.80 0.86 0.89 1.13 1.07 0.91 0.57*   MONOS ABS 10*3/mm3 0.96* 1.01* 0.87 1.13* 1.39* 1.15* 0.81   EOS ABS 10*3/mm3 0.29 0.32 0.36 0.35 0.23 0.17 0.02   BASOS ABS 10*3/mm3 0.04 0.05 0.07 0.07 0.08 0.07 0.04   IMMATURE GRANS (ABS) 10*3/mm3 0.04 0.05 0.05 0.06* 0.07* 0.06* 0.09*   NRBC /100 WBC 0.0 0.0 0.0 0.0 0.0 0.1 0.0     Results from last 7 days   Lab Units 03/25/20  0323 03/24/20  0727 03/24/20  0337 03/23/20  0825 03/23/20  0332 03/22/20  0403 03/20/20  0210 03/19/20  1132   INR  1.90*  --  1.91* 1.67* 1.60* 1.31* 1.37* 1.58*   APTT seconds 54.3* 50.3* 42.4* 44.9*  --   --   --  38.0*     Results from last 7 days   Lab Units 03/19/20 2005 03/19/20  1533 03/19/20  1145 03/19/20  1009 03/19/20  0917 03/19/20  0847 03/19/20  0842   PH, ARTERIAL pH units 7.322* 7.354 7.374 7.38 7.34* 7.33* 7.36   PO2 ART mm Hg 114.7* 128.1* 608.6*  --   --   --   --    PCO2, ARTERIAL mm Hg 46.9* 44.7 45.2*  --   --   --   --    HCO3 ART mmol/L 24.3 24.9 26.4  --   --   --   --                                    Imaging:  Imaging Results (Last 24 Hours)     Procedure Component Value Units Date/Time    XR Chest 1 View [[id number]] Collected:  03/25/20 1040     Updated:  03/25/20 1044    Narrative:       XR CHEST 1 VW-     Clinical: Chest tube removal     COMPARISON examination 0400 hours, current examination 1027 hours     FINDINGS: Left base chest tube has been removed, no pneumothorax.  Central venous catheter position remains stable. Stable cardiac  enlargement. Improved aeration at the right lung base with decreasing  infiltrate/atelectasis. The remainder is unremarkable.     CONCLUSION: No pneumothorax status post left chest tube removal.  Diminished opacity right lung base.     This report was finalized on 3/25/2020 10:41 AM by Dr. Eulalio Arvizu M.D.       XR Chest 1 View  [[id number]] Collected:  03/25/20 0457     Updated:  03/25/20 0504    Narrative:       PORTABLE CHEST RADIOGRAPH     HISTORY: Pneumothorax     COMPARISON: 03/24/2020     FINDINGS:  Cardiomegaly is present. There is persistent vascular congestion. This  is increased when compared to prior exam. Tubes and lines are stable in  position. Previously identified right apical pneumothorax is not  well-seen on this examination. Patient does have a small left apical  pneumothorax. There is persistent left basilar consolidation. There is  increasing consolidation at the right lung base, worrisome for  pneumonia.       Impression:          1. Worsening vascular congestion.  2. Small left apical pneumothorax.  3. No pneumothorax seen on the right.  4. Worsening consolidation seen at the right lung base, worrisome for  pneumonia.     This report was finalized on 3/25/2020 5:01 AM by Dr. Rhoda Rao M.D.             I reviewed the patient's new clinical results and medications.         Medication Review:   Scheduled Meds:    aspirin 81 mg Oral Daily   erythromycin base 250 mg Oral 4x Daily With Meals & Nightly   influenza vaccine 0.5 mL Intramuscular Once   mupirocin  Each Nare BID   pantoprazole 40 mg Oral Q AM   sennosides-docusate 2 tablet Oral Nightly   warfarin 6 mg Oral Once     Continuous Infusions:    sodium chloride 30 mL/hr Last Rate: Stopped (03/20/20 0801)     PRN Meds:.•  acetaminophen **OR** acetaminophen **OR** acetaminophen  •  ALPRAZolam  •  bisacodyl  •  cyclobenzaprine  •  docusate sodium  •  heparin (porcine)  •  HYDROcodone-acetaminophen  •  melatonin  •  Morphine **AND** naloxone  •  ondansetron  •  polyethylene glycol  •  promethazine **OR** promethazine  •  sodium chloride  •  sodium chloride  •  traMADol    Problem List:     Active Hospital Problems    Diagnosis  POA   • **ESRD on hemodialysis (CMS/[facility]) [N18.6, Z99.2]  Not Applicable   • Elaine [I44.1]  Unknown   • History of mitral valve  replacement with mechanical valve [Z95.2]  Not Applicable   • NSVT (nonsustained ventricular tachycardia) (CMS/HCC) [I47.2]  Unknown   • Hypocalcemia [E83.51]  Unknown   • Anemia of chronic disease [D63.8]  Unknown   • Systolic CHF, acute (CMS/HCC) [I50.21]  Unknown   • Anasarca associated with disorder of kidney [N04.9]  Yes   • Diabetes mellitus (CMS/HCC) [E11.9]  Unknown   • Cellulitis of left leg [L03.116]  Unknown   • Essential hypertension [I10]  Yes   • Nonrheumatic mitral valve regurgitation, severe [I34.0]  Yes      Resolved Hospital Problems   No resolved problems to display.       Assessment and Plan:     Anasarca/ acute systolic CHF/ severe MR/ CAD/ mechanical MVR  -Echo showed EF reduced to 33% and severe MR and moderate TR  -minimal CAD on cath  -s/p mechanical MVR, PFO closure, TR repair on 3/19/20  -started coumadin on 3/21. CTS managing dosing, Goal INR 2.5-3.5. INR 1.9. Plan to get 6 mg coumadin today    DESHAWN/CKD4 now ESRD  -tunneled cath placed and HD started on 3/17/20   -vein mapping completed, will eventually need AV fistula. Discussed with Dr. Mcneil and will have vascular re-evaluate for placement while here.   -nephrology following, HD tomorrow    Cellulitis, left leg  -cellulitis vs dermatitis. Completed 5 day course of doxycycline preoperatively.    -repeat LLE doppler on 3/20 was negative.   -redness improved. Will monitor.     DM  -A1c 6.5. blood sugars increased at times.   -continue with diet modifications     Anemia  -Hgb 9.3 today  -iron low and s/p IV iron 3/18/20 and plan to repeat IV iron today    NSVT/ wenckebach  - cardiology following. No further VT off amiodarone.   -less episodes of wenckebach today and mostly with sleeping. off coreg, -cardiology following    Hypoxia  -wean O2 to keep sats greater than 90%  -encouraged hourly deep breathing and IS.  -will need overnight oximetry closer to discharge.      Ambulate with PT.     VTE prophylaxis: SCDs  CODE status: full  code  Disposition: TBD- plan to go to [facility] rehab with HD once stable, INR greater than 2.5 and ok with all providers.     I discussed the patients findings and my recommendations with patient and nursing staff.     Emily Yan, APRN  03/25/20

## 2020-03-25 NOTE — PLAN OF CARE
Chest tube d/c'd  as per order. No c/o pain ambulated per PT. Pt is in dialysis right now the patient to go to CT once back from HD for CT chest .

## 2020-03-26 PROBLEM — E83.51 HYPOCALCEMIA: Status: RESOLVED | Noted: 2020-01-01 | Resolved: 2020-01-01

## 2020-03-26 PROBLEM — Z95.2 STATUS POST MITRAL VALVE REPLACEMENT: Status: ACTIVE | Noted: 2020-01-01

## 2020-03-26 PROBLEM — D63.8 ANEMIA OF CHRONIC DISEASE: Status: RESOLVED | Noted: 2020-01-01 | Resolved: 2020-01-01

## 2020-03-26 PROBLEM — I44.1 WENCKEBACH: Status: RESOLVED | Noted: 2020-01-01 | Resolved: 2020-01-01

## 2020-03-26 PROBLEM — I47.29 NSVT (NONSUSTAINED VENTRICULAR TACHYCARDIA) (HCC): Status: RESOLVED | Noted: 2020-01-01 | Resolved: 2020-01-01

## 2020-03-26 PROBLEM — Z98.890 STATUS POST TRICUSPID VALVE REPAIR: Status: ACTIVE | Noted: 2020-01-01

## 2020-03-26 NOTE — PROGRESS NOTES
"   LOS: 14 days    Patient Care Team:  Provider, No Known as PCP - General    Chief Complaint:    Chief Complaint   Patient presents with   • Edema     Follow Up ESRD.   Subjective     Interval History:   Feeling better.  Eating.  Bowels moving. Breathing fine. Weight down after dialysis.   Review of Systems:   As noted above    Objective     Vital Signs  Temp:  [97.6 °F (36.4 °C)-98.5 °F (36.9 °C)] 98.5 °F (36.9 °C)  Heart Rate:  [87-98] 87  Resp:  [16] 16  BP: (126-151)/(75-92) 126/79    Flowsheet Rows      First Filed Value   Admission Height  180.3 cm (71\") Documented at 03/12/2020 1350   Admission Weight  136 kg (300 lb) Documented at 03/12/2020 1350          I/O this shift:  In: 220 [P.O.:220]  Out: -   I/O last 3 completed shifts:  In: 450 [P.O.:450]  Out: 3650 [Urine:600; Other:3000; Chest Tube:50]    Intake/Output Summary (Last 24 hours) at 3/26/2020 1225  Last data filed at 3/26/2020 1140  Gross per 24 hour   Intake 220 ml   Output --   Net 220 ml       Physical Exam:  General Appearance: alert, oriented x 3, no acute distress,  chronically ill. Looks much stronger.   Skin: warm and dry  HEENT: pupils round and reactive to light, oral mucosa normal, nonicteric sclerae  Neck: supple, no JVD, trachea midline. RIJ TDC.  Lungs: Decreased bs bases.   Unlabored.   Heart: RRR, no S3, no rub,  3/6 systolic murmur  Abdomen: soft, non-tender, +bs. Improved body wall edema .  :  scrotal and penile edema improved.   Extremities:  1+ LE edema bilaterally; chronic  venous stasis changes. Erythema LLE less.  No warmth.   Neuro: normal speech and mental status. Brighter affect.       Results Review:    Results from last 7 days   Lab Units 03/26/20  0333 03/25/20  0323 03/24/20  0727   SODIUM mmol/L 136 134* 133*   POTASSIUM mmol/L 3.7 3.6 4.0   CHLORIDE mmol/L 100 97* 96*   CO2 mmol/L 27.2 23.7 25.2   BUN mg/dL 20 35* 32*   CREATININE mg/dL 3.43* 4.95* 5.04*   CALCIUM mg/dL 7.4* 7.5* 8.1*   GLUCOSE mg/dL 75 114* 90 "       Estimated Creatinine Clearance: 33.8 mL/min (A) (by C-G formula based on SCr of 3.43 mg/dL (H)).    Results from last 7 days   Lab Units 03/24/20  0727 03/23/20  0332 03/20/20  0213 03/19/20  1514   MAGNESIUM mg/dL  --  2.0 1.9 1.8   PHOSPHORUS mg/dL 3.5 5.3* 5.2* 3.9             Results from last 7 days   Lab Units 03/26/20  0333 03/25/20  0323 03/24/20  0337 03/23/20  0825 03/23/20  0332   WBC 10*3/mm3 7.81 7.38 7.93 9.85 10.68   HEMOGLOBIN g/dL 8.7* 9.3* 8.7* 9.7* 9.5*   PLATELETS 10*3/mm3 192 179 158 161 156       Results from last 7 days   Lab Units 03/26/20  0333 03/25/20  0323 03/24/20  0337 03/23/20  0825 03/23/20  0332   INR  2.16* 1.90* 1.91* 1.67* 1.60*         Imaging Results (Last 24 Hours)     Procedure Component Value Units Date/Time    CT Chest Without Contrast [[id number]] Collected:  03/25/20 2048     Updated:  03/25/20 2105    Narrative:       CT CHEST WO CONTRAST-     INDICATIONS: Possible pneumonia or pleural effusion. Recent mitral valve  repair/replacement     TECHNIQUE: Radiation dose reduction techniques were utilized, including  automated exposure control and exposure modulation based on body size.  Unenhanced CHEST CT     COMPARISON: None available      FINDINGS:           The heart size is enlarged with mild pericardial effusion. Cardiac valve  markers are apparent. Right-sided central venous catheter extends to the  cavoatrial junction region. Sternotomy changes are seen. Fluid and gas  are apparent in the anterior mediastinum, as well as in the subcutaneous  soft tissues of the anterior chest, presumably representing residual  postsurgical change. An oblong density in the anterior mediastinum  measuring 3.5 x 3.5 x 5.8 cm, for example image 21 of series 1, image  100 of series 4, shows CT density of 47, suggesting hematoma, follow-up  CT can be obtained to characterize resolution/exclude any possibility of  a lesion or adenopathy.     A few small subcentimeter short axis  mediastinal lymph nodes are seen  that are not significant by size criteria.     The airways appear clear.     Mild right pleural effusion is present, minimal left pleural effusion is  noted. Small anterior pneumothorax is noted on the right, 8 mm in  thickness on image 33 of series 2. Trace left pneumothorax is also  present at the apex and peripherally.     The lungs show patchy infiltrate in the right lower lobe posterior right  middle lobe, for example image 33 of series 2; atelectasis could also  contribute to this appearance, follow-up recommended. Small likely focal  atelectasis or consolidation is also seen posteriorly in the left lower  lobe, for example image 147 of series 4.     Upper abdominal structures show enlargement of the spleen, 14.2 cm.     Degenerative changes are seen in the spine. No acute fracture is  identified.       Impression:          Mild right, minimal left pleural effusions. Patchy and consolidative  densities in the bilateral lower lungs, may represent developing  pneumonia. Small right anterior pneumothorax, trace left pneumothorax.  Postsurgical changes of the mediastinum with probable hematoma in the  anterior mediastinum. Follow-up recommended.     Discussed by telephone with the patient's nurse, Parish, at time of  interpretation, 2055, 03/25/2020.     This report was finalized on 3/25/2020 9:02 PM by Dr. Michael Platt M.D.             aspirin 81 mg Oral Daily   erythromycin base 250 mg Oral 4x Daily With Meals & Nightly   influenza vaccine 0.5 mL Intramuscular Once   metoprolol succinate XL 12.5 mg Oral Q24H   mupirocin  Each Nare BID   pantoprazole 40 mg Oral Q AM   sennosides-docusate 2 tablet Oral Nightly   warfarin 5 mg Oral Once       sodium chloride 30 mL/hr Last Rate: Stopped (03/20/20 0801)       Medication Review:   Current Facility-Administered Medications   Medication Dose Route Frequency Provider Last Rate Last Dose   • acetaminophen (TYLENOL) tablet 650 mg   650 mg Oral Q4H PRN Jr Ze Frye MD   650 mg at 03/20/20 1114    Or   • acetaminophen (TYLENOL) 160 MG/5ML solution 650 mg  650 mg Oral Q4H PRN Jr Ze Frye MD        Or   • acetaminophen (TYLENOL) suppository 650 mg  650 mg Rectal Q4H PRN Jr Ze Frye MD       • ALPRAZolam (XANAX) tablet 0.25 mg  0.25 mg Oral Q8H PRN Jr Ze Frye MD   0.25 mg at 03/20/20 1743   • aspirin EC tablet 81 mg  81 mg Oral Daily Jr Ze Frye MD   81 mg at 03/26/20 0847   • bisacodyl (DULCOLAX) suppository 10 mg  10 mg Rectal Daily PRN Jr Ze Frye MD       • cyclobenzaprine (FLEXERIL) tablet 10 mg  10 mg Oral Q8H PRN Jr Ze Frye MD       • docusate sodium (COLACE) capsule 100 mg  100 mg Oral BID PRN Jr Ze Frye MD       • erythromycin base (E-MYCIN) tablet 250 mg  250 mg Oral 4x Daily With Meals & Nightly Jackeline Cyr APRN   250 mg at 03/26/20 0847   • heparin (porcine) injection 3,800 Units  3,800 Units Intracatheter PRN Stephen Eng MD   3,800 Units at 03/25/20 0152   • HYDROcodone-acetaminophen (NORCO) 5-325 MG per tablet 1 tablet  1 tablet Oral Q4H PRN Jackeline Cyr APRN   1 tablet at 03/23/20 1048   • influenza vac split quad (FLUZONE,FLUARIX,AFLURIA) injection 0.5 mL  0.5 mL Intramuscular Once Jr Ze Frye MD       • melatonin tablet 5 mg  5 mg Oral Nightly PRN Niya Dobson APRN       • metoprolol succinate XL (TOPROL-XL) 24 hr tablet 12.5 mg  12.5 mg Oral Q24H Niya Dobson, MINGO       • morphine injection 1 mg  1 mg Intravenous Q4H PRN Jr Ze Frye MD        And   • naloxone (NARCAN) injection 0.4 mg  0.4 mg Intravenous Q5 Min PRN Jr Ze Frye MD       • mupirocin (BACTROBAN) 2 % nasal ointment   Each Nare BID Jr Ze Frye MD   1 application at 03/26/20 0847   • ondansetron (ZOFRAN) injection 4 mg  4 mg Intravenous Q6H PRN Jr Ze Frye MD   4 mg at 03/22/20 1958   •  pantoprazole (PROTONIX) EC tablet 40 mg  40 mg Oral Q AM Jr Ze Frye MD   40 mg at 03/26/20 0555   • polyethylene glycol (MIRALAX) powder 17 g  17 g Oral Daily PRN Jr Ze Frye MD       • promethazine (PHENERGAN) tablet 12.5 mg  12.5 mg Oral Q6H PRN Jr Ze Frye MD        Or   • promethazine (PHENERGAN) injection 12.5 mg  12.5 mg Intravenous Q6H PRN Jr Ze Frye MD   12.5 mg at 03/24/20 1812   • sennosides-docusate (PERICOLACE) 8.6-50 MG per tablet 2 tablet  2 tablet Oral Nightly Jr Ze Frye MD   2 tablet at 03/24/20 2104   • sodium chloride 0.9 % flush 30 mL  30 mL Intravenous Once PRN Jr Ze Frye MD       • sodium chloride 0.9 % infusion  30 mL/hr Intravenous Continuous PRN Jr Ze Frye MD   Stopped at 03/20/20 0801   • traMADol (ULTRAM) tablet 50 mg  50 mg Oral Q12H PRN Jr Ze Frye MD   50 mg at 03/20/20 1743   • warfarin (COUMADIN) tablet 5 mg  5 mg Oral Once Niya Dobson APRN           Assessment/Plan   1.  ESRD: due to HTN and DM2.  Dialysis yesterday.  CT chest with bibasilar atelectasis. Orders called to [place]. Continue to challenge weight.   2.  MV replacement, mechanical, PFO closure,  TV repair 3/19/20.  3. Systolic non-ischemic cardiomyopathy EF 33%.   4.  Anasarca and fluid excess due to right-sided heart failure and ESRD.  Improving .  5.  Hypertension,  Controlled.  6. LLE cellulitis improved.   7.  DM2. Controlled .  8. Anemia CKD.  Hg8.7.   Iron deficient. SP IV iron with dialysis 3/18,3/24.    Plan:  1. Ok for dc today.  2. Dialysis orders called to [facility].         Laurita Mcneil MD  03/26/20  12:25

## 2020-03-26 NOTE — PROGRESS NOTES
"Continued Stay Note  [facility]     Patient Name: Nico King  MRN: [id number]  Today's Date: 3/26/2020    Admit Date: 3/12/2020    Discharge Plan     Row Name 03/26/20 1339       Plan    Plan  [facility] via [facility] at 4:00PM today, 3/26/20.    Plan Comments  Per [facility], Pt will be going to \"[facility] Room 182\".  Per Patient, he has no family or friends to transport him to [facility].  Pt reports he has no monies to pay for transportation.  CCP S/W CHIN Forbes MGR and was instructed to set up [employer] and bill to [facility].  CCP called [facility] [phone] at 1:35 PM and S/W Crystal and Pt  time is at 4:00PM conf # [id number].  [facility] informed HUE Anton that staff would need to be ready to wheel Pt down to entrance A once [employer] arrives.  [facility] notified Rebeca Ruffin Hospitalist RN that Pt transport for rehab has been arranged for 4:00PM.  HUE KIMBROUGH/MARIAJOSE        Discharge Codes    No documentation.             Sierra Aceves RN    "

## 2020-03-26 NOTE — PLAN OF CARE
Problem: Patient Care Overview  Goal: Plan of Care Review  Flowsheets (Taken 3/26/2020 1135)  Progress: improving  Plan of Care Reviewed With: patient  Outcome Summary: patient continues to demonstrate improvement with mobility, able to tolerate increased ambulation distance and leaning less of rwx. anticipate d/c to SNU soon

## 2020-03-26 NOTE — PROGRESS NOTES
LOS: 14 days   Patient Care Team:  Provider, No Known as PCP - General    Chief Complaint:     F/u mech MV and chf    Interval History:     He has chest pain, pressure, tightness.  His breathing is good.  Does have a cough.  He has no fevers.  He does not feels heart racing or skipping.  he has had no dizziness or nausea.  His legs are looking better.  He is overall feeling good.    Objective   Vital Signs  Temp:  [97.6 °F (36.4 °C)-98.4 °F (36.9 °C)] 98.4 °F (36.9 °C)  Heart Rate:  [89-98] 89  Resp:  [16] 16  BP: (127-151)/(75-92) 127/75    Intake/Output Summary (Last 24 hours) at 3/26/2020 1007  Last data filed at 3/25/2020 1110  Gross per 24 hour   Intake --   Output 300 ml   Net -300 ml       Comfortable NAD  PERRL, conjunctivae clear  Neck supple, no JVD or thyromegaly appreciated  S1/S2 mechanical click, regular rate and rhythm no m/r/g  Lungs CTA B, normal effort  Abdomen S/NT/ND (+) BS, no HSM appreciated  Extremities warm, no clubbing, cyanosis, 1+ left lower extremity edema edema  Normal gait  No visible or palpable skin lesions  A/Ox4, mood and affect appropriate    Results Review:      Results from last 7 days   Lab Units 03/26/20 0333 03/25/20 0323 03/24/20  0727   SODIUM mmol/L 136 134* 133*   POTASSIUM mmol/L 3.7 3.6 4.0   CHLORIDE mmol/L 100 97* 96*   CO2 mmol/L 27.2 23.7 25.2   BUN mg/dL 20 35* 32*   CREATININE mg/dL 3.43* 4.95* 5.04*   GLUCOSE mg/dL 75 114* 90   CALCIUM mg/dL 7.4* 7.5* 8.1*         Results from last 7 days   Lab Units 03/26/20  0333 03/25/20  0323 03/24/20  0337   WBC 10*3/mm3 7.81 7.38 7.93   HEMOGLOBIN g/dL 8.7* 9.3* 8.7*   HEMATOCRIT % 29.0* 30.2* 29.1*   PLATELETS 10*3/mm3 192 179 158     Results from last 7 days   Lab Units 03/26/20  0333 03/25/20  0323 03/24/20  0727 03/24/20  0337   INR  2.16* 1.90*  --  1.91*   APTT seconds 52.2* 54.3* 50.3* 42.4*         Results from last 7 days   Lab Units 03/23/20  0332   MAGNESIUM mg/dL 2.0           I reviewed the patient's new  clinical results.  I personally viewed and interpreted the patient's EKG/Telemetry data        Medication Review:     aspirin 81 mg Oral Daily   erythromycin base 250 mg Oral 4x Daily With Meals & Nightly   influenza vaccine 0.5 mL Intramuscular Once   metoprolol succinate XL 12.5 mg Oral Q24H   mupirocin  Each Nare BID   pantoprazole 40 mg Oral Q AM   sennosides-docusate 2 tablet Oral Nightly   warfarin 5 mg Oral Once         sodium chloride 30 mL/hr Last Rate: Stopped (03/20/20 0801)       Assessment/Plan       ESRD on hemodialysis (CMS/[facility])    Nonrheumatic mitral valve regurgitation, severe    Essential hypertension    Anasarca associated with disorder of kidney    Diabetes mellitus (CMS/[facility])    Cellulitis of left leg    Systolic CHF, acute (CMS/[facility])    Anemia of chronic disease    Hypocalcemia    History of mitral valve replacement with mechanical valve    NSVT (nonsustained ventricular tachycardia) (CMS/[facility])    Wenckebach       1.  Valvular heart disease -s/p mechanical mitral valve replacement mechanical and tricuspid valve annuloplasty with surgical PFO closure done 3/19/20.  warfarin target 2.5-3.5  2.  ESRD -electrolytes are within normal range, hypervolemia improving and hemodialysis per nephrology.  3.  Chronic systolic heart failure -EF 35 to 40% secondary to valvular heart disease.  Minimal coronary artery disease on cardiac catheterization.  Volume optimization with hemodialysis.   4.  CAD -nonobstructive.  On aspirin   5.  NSVT - no further VT.   6. Wenckebach only when really relaxed or sleeping, not when active or talking,  off coreg.   7. Anemia, watch for now - is expected.     Chest tubes are out.  CT of the chest done 3/25/2020 shows patchy consolidative densities in the bilateral lower lung fields consistent with possible pneumonia versus atelectasis.  He is afebrile.  He does not have an elevated white count.  I really think that overseeing his atelectasis.  INR is coming up.  He is doing well  with dialysis.  Overall making progress.    His INR is not quite at target, really should be at 2.5.  If he can get those drawn tomorrow as an outpatient he could go today.  We just need to make sure that he had set target given his mechanical valve.  He needs a referral to  coagulation clinic if they are operating at this time. If not then we can adjust thru home health.     We will see as needed.  Will arrange for follow-up in the office in 4 to 6 weeks with my nurse practitioner.    Meggan Hinton MD  03/26/20  10:07

## 2020-03-26 NOTE — PROGRESS NOTES
He underwent a CT of the chest last night.  There is a mild right pleural effusion with some compressive atelectasis.  There is no pneumonia.  His white count is normal.

## 2020-03-26 NOTE — DISCHARGE SUMMARY
[facility]    Date of Discharge:  3/26/2020    PCP: Provider, No Known    Discharge Diagnosis:   Active Hospital Problems    Diagnosis  POA   • **ESRD on hemodialysis (CMS/[facility]) [N18.6, Z99.2]  Not Applicable   • Status post tricuspid valve repair [Z98.890]  Not Applicable   • Status post mitral valve replacement [Z95.2]  Not Applicable   • History of mitral valve replacement with mechanical valve [Z95.2]  Not Applicable   • Systolic CHF, acute (CMS/[facility]) [I50.21]  Yes   • Anasarca associated with disorder of kidney [N04.9]  Yes   • Diabetes mellitus (CMS/[facility]) [E11.9]  Yes   • Cellulitis of left leg [L03.116]  Yes   • Essential hypertension [I10]  Yes   • Nonrheumatic mitral valve regurgitation, severe [I34.0]  Yes      Resolved Hospital Problems    Diagnosis Date Resolved POA   • Wenckebach [I44.1] 03/26/2020 Yes   • NSVT (nonsustained ventricular tachycardia) (CMS/[facility]) [I47.2] 03/26/2020 Yes   • Hypocalcemia [E83.51] 03/26/2020 Yes   • Anemia of chronic disease [D63.8] 03/26/2020 Yes     Procedures Performed  Procedure(s):  MEGHA, STERNOTOMY, MITRAL VALVE REPLACEMENT, TRICUSPID VALVE REPAIR, PFO CLOSURE, PRP     Consults     Date and Time Order Name Status Description    3/23/2020 0854 Cardiac Electrophysiologist Inpatient Consult      3/16/2020 1837 Inpatient Vascular Surgery Consult Completed     3/14/2020 1124 Inpatient Cardiology Consult Completed     3/12/2020 1524 Nephrology (on -call MD unless specified) Completed     3/12/2020 1524 LHA (on-call MD unless specified) Details Completed       Hospital Course  Please see history and physical for details. Patient is a 49 y.o. male with a history of HTN, severe MR, CKDIV, DM2 and tobacco abuse.  He was admitted 3/12 with generalized anasarca and acute respiratory failure thought to be multifactorial from worsening renal disease, valvular disease, right-sided heart failure.  Cardiology and nephrology were  consulted.  Patient was also treated for cellulitis left lower extremity with a course of doxycycline.  Echocardiogram demonstrated reduced ejection fraction to 30.3% and severe MR and moderate TR.  He underwent cardiac catheterization 3/16 demonstrating minimal coronary artery disease.  Patient initially placed on Bumex drip per nephrology.  Hemodialysis was initiated for progression to ESRD.  After tunnel catheter placement by vascular surgery, dialysis was started on 3/18.  Patient was optimized from medical standpoint prior to mitral valve replacement, PFO closure, and TV repair on 3/19 per Dr. Frye.  Postoperative complications included bilateral pleural effusions, anemia of chronic disease with postop anemia, and intermittent Wenckebach.  Coumadin was started on 3/21 with a bridge to heparin when AV wires were removed 3/23. Chest tubes were removed on 3/24.  Follow-up CT chest on 3/25 shows patchy consolidative densities bilateral lower lung fields likely atelectasis given he is afebrile with normal WBC. Patient remains in sinus rhythm with no episodes of heart block in 48 hours  He did have some mild tachycardia so a low-dose Toprol was started per cardiothoracic surgery.  INR increasing but remains below target of 2.5. Will have repeat INR as an outpatient and refer patient to [facility] coagulation clinic. Vein mapping completed, will eventually need AV fistula. Last dialysis performed yesterday. Nephrology has placed orders for continued dialysis following discharge to [facility].  Hemoglobin is stable and he is has received iron with dialysis on 3/18 and 3/24. Patient is on room air with no complaints. Today cardiothoracic surgery, cardiology, and nephrology have approved discharge to rehab facility follow-up with all specialist as directed.  He lives alone and patient is discharged to [facility] for rehab.  All questions answered the patient stated satisfaction.    Condition on Discharge:  Improved/ stable.      Temp:  [97.6 °F (36.4 °C)-98.5 °F (36.9 °C)] 98.5 °F (36.9 °C)  Heart Rate:  [87-98] 87  Resp:  [16] 16  BP: (126-151)/(75-92) 126/79  Body mass index is 35.04 kg/m².    Physical Exam   Constitutional: He is oriented to person, place, and time. He appears well-developed and well-nourished. No distress.   obese   HENT:   Head: Normocephalic and atraumatic.   Mouth/Throat: Oropharynx is clear and moist.   RIJ TDC   Eyes: Conjunctivae and EOM are normal.   Neck: Normal range of motion. No JVD present. No tracheal deviation present.   Cardiovascular: Normal rate and regular rhythm.   Murmur heard.  Pulmonary/Chest: Effort normal and breath sounds normal. No stridor. No respiratory distress. He has no wheezes. He has no rales.   Decreased BS in bases but NAD on room air   Abdominal: Soft. Bowel sounds are normal. He exhibits no distension and no mass. There is no tenderness. There is no rebound and no guarding.   Musculoskeletal: Normal range of motion. He exhibits edema (+1 BLE ).   Chronic venous stasis bilateral lower extremities.  LLE erythema is improved since last evaluation.   Neurological: He is alert and oriented to person, place, and time.   Skin: Skin is warm and dry.   Psychiatric: He has a normal mood and affect. Judgment normal.   Nursing note and vitals reviewed.       Discharge Medications      New Medications      Instructions Start Date   acetaminophen 325 MG tablet  Commonly known as:  TYLENOL   650 mg, Oral, Every 4 Hours PRN      bisacodyl 10 MG suppository  Commonly known as:  DULCOLAX   10 mg, Rectal, Daily PRN      cyclobenzaprine 10 MG tablet  Commonly known as:  FLEXERIL   10 mg, Oral, Every 8 Hours PRN      docusate sodium 100 MG capsule   100 mg, Oral, 2 Times Daily PRN      HYDROcodone-acetaminophen 5-325 MG per tablet  Commonly known as:  NORCO   1 tablet, Oral, Every 4 Hours PRN      melatonin 5 MG tablet tablet   5 mg, Oral, Nightly PRN      metoprolol succinate XL  "25 MG 24 hr tablet  Commonly known as:  TOPROL-XL   12.5 mg, Oral, Every 24 Hours Scheduled   Start Date:  March 27, 2020     pantoprazole 40 MG EC tablet  Commonly known as:  PROTONIX   40 mg, Oral, Daily      polyethylene glycol packet  Commonly known as:  MIRALAX   17 g, Oral, Daily PRN      traMADol 50 MG tablet  Commonly known as:  ULTRAM   50 mg, Oral, Every 12 Hours PRN      warfarin 5 MG tablet  Commonly known as:  COUMADIN   Repeat INR tomorrow 3/27         Continue These Medications      Instructions Start Date   aspirin 81 MG EC tablet   81 mg, Oral, Daily   Start Date:  March 27, 2020        Stop These Medications    amLODIPine 5 MG tablet  Commonly known as:  NORVASC     carvedilol 25 MG tablet  Commonly known as:  COREG     furosemide 40 MG tablet  Commonly known as:  LASIX     hydrALAZINE 100 MG tablet  Commonly known as:  APRESOLINE     isosorbide mononitrate 60 MG 24 hr tablet  Commonly known as:  IMDUR           Diet Instructions     Diet: Regular, Cardiac, Consistent Carbohydrate, Renal      Discharge Diet:   Regular  Cardiac  Consistent Carbohydrate  Renal            Activity Instructions     Activity as Tolerated           Additional Instructions for the Follow-ups that You Need to Schedule     Ambulatory Referral to Cardiac Rehab   As directed      Call MD With Problems / Concerns   As directed      Instructions:  Call office at [phone] for any drainage, increased redness, or fever over 100.5    Order Comments:  Instructions:  Call office at [phone] for any drainage, increased redness, or fever over 100.5          Discharge Follow-up with Specified Provider: Dr. Frye on 4/22/20 at 11:15am   As directed      To:  Dr. Frye on 4/22/20 at 11:15am    Follow Up Details:  bring all current medications to appointment         Referral to Anticoagulation Monitoring   As directed       Select To DEPT SPEC to filter TO DEPT       Send INR reminders to the \"clinical pool\" of the TO DEPT     " (ie:  [facility]  or [facility])     Referral to Home Health   As directed      Face to Face Visit Date:  3/26/2020    Follow-up provider for Plan of Care?:  I treated the patient in an acute care facility and will not continue treatment after discharge.    Follow-up provider:  DYLAN SALMON [[id number]]    Reason/Clinical Findings:  S/p MVR    Describe mobility limitations that make leaving home difficult:  Requires assistance of another to leave the home.    Nursing/Therapeutic Services Requested:  Physical Therapy (start at discharge from rehab) Skilled Nursing    Skilled nursing orders:  CHF management (post valve surgery repair) Medication education    PT orders:  Gait Training Strengthening Home safety assessment Transfer training Therapeutic exercise    Weight Bearing Status:  As Tolerated    Frequency:  1 Week 1 (3 x per week)         Protime-INR    Mar 27, 2020 (Approximate)      INR follow up.    Order Comments:  INR follow up.             Contact information for follow-up providers     [facility] .    Specialty:  Cardiac Rehabilitation  Contact information:  [address] [facility] [address] [phone]           Provider, No Known .    Contact information:  [facility] [address]             Jake Bains MD. Schedule an appointment as soon as possible for a visit in 6 week(s).    Specialty:  Vascular Surgery  Contact information:  [address]  [facility] [id number] [phone]             Laurita Mcneil MD Follow up.    Specialty:  Nephrology  Why:  call for appt   Contact information:  [address]  [facility] [address]  [phone]             [facility] Follow up.    Specialty:  Home Health Services  Contact information:  [address]  [facility] [address] [phone]           Jr Ze Frye MD  Follow up.    Specialty:  Cardiothoracic Surgery  Why:  Follow up with Dr. Frye on 4/22/20 at 11:15am  Contact information:  [address]  [place] [address]  [phone]             Meggan Hinton MD Follow up.    Specialty:  Cardiology  Why:  4 weeks with APRN   Contact information:  [address]  [place] [address]  [phone]             [facility]ARDI[facility] .    Specialty:  Cardiology  Contact information:  [address] [facility] [address] [phone]                 Contact information for after-discharge care     Destination     [facility] .    Service:  Skilled Nursing  Contact information:  [address] [facility] [address] [facility] [address] [phone]                           Test Results Pending at Discharge     Mercedes Armenta, APRN  03/26/20  14:12    Discharge time spent greater than 35 minutes.

## 2020-03-26 NOTE — THERAPY TREATMENT NOTE
Patient Name: Nico King  : 1970    MRN: [id number]                              Today's Date: 3/26/2020       Admit Date: 3/12/2020    Visit Dx:     ICD-10-CM ICD-9-CM   1. Anasarca associated with disorder of kidney N04.9 581.9   2. DESHAWN (acute kidney injury) (CMS/[facility]) N17.9 584.9   3. Acute on chronic systolic congestive heart failure (CMS/[facility]) I50.23 428.23     428.0   4. Nonrheumatic mitral valve regurgitation, severe I34.0 424.0   5. Nonrheumatic mitral valve regurgitation I34.0 424.0   6. S/P MVR (mitral valve replacement) Z95.2 V43.3     Patient Active Problem List   Diagnosis   • Nonrheumatic mitral valve regurgitation, severe   • Cigarette smoker   • Essential hypertension   • ESRD on hemodialysis (CMS/[facility])   • Foot callus   • Anasarca associated with disorder of kidney   • Diabetes mellitus (CMS/[facility])   • Cellulitis of left leg   • Systolic CHF, acute (CMS/[facility])   • Anemia of chronic disease   • Hypocalcemia   • History of mitral valve replacement with mechanical valve   • NSVT (nonsustained ventricular tachycardia) (CMS/[facility])   • Wenckebach   • Acute respiratory failure with hypoxia (CMS/[facility])     Past Medical History:   Diagnosis Date   • DESHAWN (acute kidney injury) (CMS/[facility])    • CHF (congestive heart failure) (CMS/[facility])    • Cigarette smoker    • Diabetes mellitus (CMS/[facility])    • Foot callus    • Hypertension    • Mitral regurgitation     severe eccentric per echo 2018   • Pleural effusion 2018    Bilateral, small per x-ray   • Stage III chronic kidney disease (CMS/[facility])      Past Surgical History:   Procedure Laterality Date   • CARDIAC CATHETERIZATION N/A 3/16/2020    Procedure: CORONARY ANGIOGRAPHY;  Surgeon: Aguilar Morataya MD;  Location[facility]  [facility];  Service: Cardiovascular;  Laterality: N/A;   • CARDIAC CATHETERIZATION N/A 3/16/2020    Procedure: Left Heart Cath;  Surgeon: Aguilar Morataya MD;  Location:  [facility];  Service:  Cardiovascular;  Laterality: N/A;   • HIP SURGERY     • INSERTION HEMODIALYSIS CATHETER N/A 3/17/2020    Procedure: HEMODIALYSIS CATHETER INSERTION;  Surgeon: Renard Ro MD;  Location: [facility];  Service: Vascular;  Laterality: N/A;   • MITRAL VALVE REPAIR/REPLACEMENT N/A 3/19/2020    Procedure: MEGHA, STERNOTOMY, MITRAL VALVE REPLACEMENT, TRICUSPID VALVE REPAIR, PFO CLOSURE, PRP;  Surgeon: Jr Ze Frye MD;  Location: [facility];  Service: Cardiothoracic;  Laterality: N/A;     General Information     Row Name 03/26/20 1135          PT Evaluation Time/Intention    Document Type  therapy note (daily note)  -EM     Mode of Treatment  individual therapy;physical therapy  -EM     Row Name 03/26/20 1135          General Information    Existing Precautions/Restrictions  cardiac;sternal  -EM       User Key  (r) = Recorded By, (t) = Taken By, (c) = Cosigned By    Initials Name Provider Type    Mercedes Sawyer PT Physical Therapist        Mobility     Row Name 03/26/20 1135          Bed Mobility Assessment/Treatment    [place] Level (Bed Mobility)  conditional independence  -EM     Assistive Device (Bed Mobility)  head of bed elevated  -EM     Row Name 03/26/20 1135          Sit-Stand Transfer    Sit-Stand [place] (Transfers)  supervision  -EM     Assistive Device (Sit-Stand Transfers)  walker, front-wheeled  -EM     Row Name 03/26/20 1135          Gait/Stairs Assessment/Training    [place] Level (Gait)  supervision  -EM     Assistive Device (Gait)  walker, front-wheeled  -EM     Distance in Feet (Gait)  200  -EM     Deviations/Abnormal Patterns (Gait)  stride length decreased;gait speed decreased  -EM     Comment (Gait/Stairs)  pt ambulated in room without rwx with wide DOM and CGA   -EM       User Key  (r) = Recorded By, (t) = Taken By, (c) = Cosigned By    Initials Name Provider Type    Mercedes Sawyer PT Physical Therapist        Obj/Interventions    No  documentation.       Goals/Plan    No documentation.       Clinical Impression     Row Name 03/26/20 1137          Pain Scale: Numbers Pre/Post-Treatment    Pain Scale: Numbers, Pretreatment  0/10 - no pain  -EM     Row Name 03/26/20 1137          Plan of Care Review    Plan of Care Reviewed With  patient  -EM     Progress  improving  -EM     Outcome Summary  patient continues to demonstrate improvement with mobility, able to tolerate increased ambulation distance and leaning less of rwx. anticipate d/c to SNU soon   -EM     Row Name 03/26/20 1137          Positioning and Restraints    Pre-Treatment Position  in bed  -EM     Post Treatment Position  chair  -EM     In Chair  sitting;call light within reach  -EM       User Key  (r) = Recorded By, (t) = Taken By, (c) = Cosigned By    Initials Name Provider Type    Mercedes Sawyer PT Physical Therapist        Outcome Measures     Row Name 03/26/20 1138          How much help from another person do you currently need...    Turning from your back to your side while in flat bed without using bedrails?  4  -EM     Moving from lying on back to sitting on the side of a flat bed without bedrails?  4  -EM     Moving to and from a bed to a chair (including a wheelchair)?  3  -EM     Standing up from a chair using your arms (e.g., wheelchair, bedside chair)?  3  -EM     Climbing 3-5 steps with a railing?  2  -EM     To walk in hospital room?  3  -EM     AM-PAC 6 Clicks Score (PT)  19  -EM       User Key  (r) = Recorded By, (t) = Taken By, (c) = Cosigned By    Initials Name Provider Type    Mercedes Sawyer, PT Physical Therapist          PT Recommendation and Plan     Plan of Care Reviewed With: patient  Progress: improving  Outcome Summary: patient continues to demonstrate improvement with mobility, able to tolerate increased ambulation distance and leaning less of rwx. anticipate d/c to SNU soon      Time Calculation:   PT Charges     Row Name 03/26/20 113              Time Calculation    Start Time  1018  -EM      Stop Time  1028  -EM      Time Calculation (min)  10 min  -EM      PT Received On  03/26/20  -EM      PT - Next Appointment  03/28/20  -EM         Time Calculation- PT    Total Timed Code Minutes- PT  10 minute(s)  -EM        User Key  (r) = Recorded By, (t) = Taken By, (c) = Cosigned By    Initials Name Provider Type    EM Mercedes Hernandez PT Physical Therapist        Therapy Charges for Today     Code Description Service Date Service Provider Modifiers Qty    36693530204 HC PT THER PROC EA 15 MIN 3/26/2020 Mercedes Hernandez PT GP 1          PT G-Codes  Outcome Measure Options: AM-PAC 6 Clicks Basic Mobility (PT)  AM-PAC 6 Clicks Score (PT): 19    Mercedes Hernandez PT  3/26/2020

## 2020-03-26 NOTE — PROGRESS NOTES
Continued Stay Note  [facility]     Patient Name: Nico King  MRN: [id number]  Today's Date: 3/26/2020    Admit Date: 3/12/2020    Discharge Plan     Row Name 03/26/20 1029       Plan    Plan  3/26- PT APPROVED FOR SNF AT [place];  HD CHAIR IS CONFIRMED.          Discharge Codes    No documentation.             Sirera Aceves RN

## 2020-03-26 NOTE — PLAN OF CARE
VSS. SR. RA. Up with standby assistance. Denies pain. BLE edema L>R. To rehab today, per MD order. CT sutures removed prior to D/C. Sternal precautions reviewed. CTM.

## 2020-03-26 NOTE — PROGRESS NOTES
" LOS: 14 days   Patient Care Team:  Provider, No Known as PCP - General    Chief Complaint: post op    Subjective:  Symptoms:  No shortness of breath, cough or chest pain.    Diet:  Adequate intake.  No nausea or vomiting.    Activity level: Returning to normal.    Pain:  He complains of pain that is mild.  Pain is well controlled and requiring pain medication.      Feeling good this morning, no new c/o    Vital Signs  Temp:  [97.6 °F (36.4 °C)-98 °F (36.7 °C)] 97.6 °F (36.4 °C)  Heart Rate:  [97-98] 98  Resp:  [16] 16  BP: (133-151)/(87-92) 151/92  Body mass index is 35.04 kg/m².    Intake/Output Summary (Last 24 hours) at 3/26/2020 0755  Last data filed at 3/25/2020 1110  Gross per 24 hour   Intake --   Output 300 ml   Net -300 ml     No intake/output data recorded.          03/24/20  0700 03/25/20  0531 03/26/20  0542   Weight: 120 kg (265 lb 3.2 oz) 116 kg (255 lb 3.2 oz) 114 kg (251 lb 3.2 oz)     Objective:  General Appearance:  Comfortable and in no acute distress.    Vital signs: (most recent): Blood pressure 127/75, pulse 89, temperature 98.4 °F (36.9 °C), temperature source Oral, resp. rate 16, height 180.3 cm (71\"), weight 114 kg (251 lb 3.2 oz), SpO2 98 %.  Vital signs are normal.  No fever.    Output: Producing urine and producing stool.    Lungs:  Normal effort and normal respiratory rate.  There are decreased breath sounds.    Heart: Normal rate.  Regular rhythm.  (SR/ST on tele monitor)  Abdomen: Abdomen is soft.  Bowel sounds are normal.     Extremities: There is dependent edema.  (+2 edema bilateral LE)  Pulses: Distal pulses are intact.    Neurological: Patient is alert and oriented to person, place and time.    Skin:  Warm and dry.  (Sternal wound clean, dry and intact)      Results Review:        WBC WBC   Date Value Ref Range Status   03/26/2020 7.81 3.40 - 10.80 10*3/mm3 Final   03/25/2020 7.38 3.40 - 10.80 10*3/mm3 Final   03/24/2020 7.93 3.40 - 10.80 10*3/mm3 Final   03/23/2020 9.85 3.40 - " 10.80 10*3/mm3 Final      HGB Hemoglobin   Date Value Ref Range Status   03/26/2020 8.7 (L) 13.0 - 17.7 g/dL Final   03/25/2020 9.3 (L) 13.0 - 17.7 g/dL Final   03/24/2020 8.7 (L) 13.0 - 17.7 g/dL Final   03/23/2020 9.7 (L) 13.0 - 17.7 g/dL Final      HCT Hematocrit   Date Value Ref Range Status   03/26/2020 29.0 (L) 37.5 - 51.0 % Final   03/25/2020 30.2 (L) 37.5 - 51.0 % Final   03/24/2020 29.1 (L) 37.5 - 51.0 % Final   03/23/2020 32.1 (L) 37.5 - 51.0 % Final      Platelets Platelets   Date Value Ref Range Status   03/26/2020 192 140 - 450 10*3/mm3 Final   03/25/2020 179 140 - 450 10*3/mm3 Final   03/24/2020 158 140 - 450 10*3/mm3 Final   03/23/2020 161 140 - 450 10*3/mm3 Final        PT/INR:    Protime   Date Value Ref Range Status   03/26/2020 23.7 (H) 11.7 - 14.2 Seconds Final   03/25/2020 21.5 (H) 11.7 - 14.2 Seconds Final   03/24/2020 21.6 (H) 11.7 - 14.2 Seconds Final   03/23/2020 19.3 (H) 11.7 - 14.2 Seconds Final   /  INR   Date Value Ref Range Status   03/26/2020 2.16 (H) 0.90 - 1.10 Final   03/25/2020 1.90 (H) 0.90 - 1.10 Final   03/24/2020 1.91 (H) 0.90 - 1.10 Final   03/23/2020 1.67 (H) 0.90 - 1.10 Final       Sodium Sodium   Date Value Ref Range Status   03/26/2020 136 136 - 145 mmol/L Final   03/25/2020 134 (L) 136 - 145 mmol/L Final   03/24/2020 133 (L) 136 - 145 mmol/L Final      Potassium Potassium   Date Value Ref Range Status   03/26/2020 3.7 3.5 - 5.2 mmol/L Final   03/25/2020 3.6 3.5 - 5.2 mmol/L Final   03/24/2020 4.0 3.5 - 5.2 mmol/L Final      Chloride Chloride   Date Value Ref Range Status   03/26/2020 100 98 - 107 mmol/L Final   03/25/2020 97 (L) 98 - 107 mmol/L Final   03/24/2020 96 (L) 98 - 107 mmol/L Final      Bicarbonate CO2   Date Value Ref Range Status   03/26/2020 27.2 22.0 - 29.0 mmol/L Final   03/25/2020 23.7 22.0 - 29.0 mmol/L Final   03/24/2020 25.2 22.0 - 29.0 mmol/L Final      BUN BUN   Date Value Ref Range Status   03/26/2020 20 6 - 20 mg/dL Final   03/25/2020 35 (H) 6 - 20  mg/dL Final   03/24/2020 32 (H) 6 - 20 mg/dL Final      Creatinine Creatinine   Date Value Ref Range Status   03/26/2020 3.43 (H) 0.76 - 1.27 mg/dL Final   03/25/2020 4.95 (H) 0.76 - 1.27 mg/dL Final   03/24/2020 5.04 (H) 0.76 - 1.27 mg/dL Final      Calcium Calcium   Date Value Ref Range Status   03/26/2020 7.4 (L) 8.6 - 10.5 mg/dL Final   03/25/2020 7.5 (L) 8.6 - 10.5 mg/dL Final   03/24/2020 8.1 (L) 8.6 - 10.5 mg/dL Final      Magnesium No results found for: MG         aspirin 81 mg Oral Daily   erythromycin base 250 mg Oral 4x Daily With Meals & Nightly   influenza vaccine 0.5 mL Intramuscular Once   mupirocin  Each Nare BID   pantoprazole 40 mg Oral Q AM   sennosides-docusate 2 tablet Oral Nightly       sodium chloride 30 mL/hr Last Rate: Stopped (03/20/20 0801)           Patient Active Problem List   Diagnosis Code   • Nonrheumatic mitral valve regurgitation, severe I34.0   • Cigarette smoker F17.210   • Essential hypertension I10   • ESRD on hemodialysis (CMS/[facility]) N18.6, Z99.2   • Foot callus L84   • Anasarca associated with disorder of kidney N04.9   • Diabetes mellitus (CMS/[facility]) E11.9   • Cellulitis of left leg L03.116   • Systolic CHF, acute (CMS/HCC) I50.21   • Anemia of chronic disease D63.8   • Hypocalcemia E83.51   • History of mitral valve replacement with mechanical valve Z95.2   • NSVT (nonsustained ventricular tachycardia) (CMS/HCC) I47.2   • Wenckebach I44.1   • Acute respiratory failure with hypoxia (CMS/[facility]) J96.01       Assessment & Plan   -Severe mitral incompetence, severe tricuspid incompetence--s/p MVR (mechanical), TV repair, closure of PFO--POD#7 Melva  -Severe pulmonary hypertension-- intraoperatively PA pressures 70s-postop improved PA pressures 40s  -Dilated cardiomyopathy EF 30%  -ESRD on HD  -left leg cellulitis  -bilateral pleural effusions- intraoperatively 2L off right, 3L off left  -Anemia of chronic disease, post op anemia acutely worsened expected acute blood loss--IV  iron  -leukocytosis- probable reactive; resolved     Looks great this morning  Weaned to RA and tolerating  He appears in sinus rhythm this morning; no episodes of heart block in 48 hours  Some tachycardia--will start low dose toprol  CT of chest reviewed by Dr. Frye--right pleural effusion with compressive atelectasis  INR 2.1 this morning--will give 5 mg tonight-would continue this dose daily upon d/c; will need recheck in 2 days if discharged  Okay for discharge from our standpoint to [place]  Follow up with Dr. Frye on 4/22/20 at 11:15am  Pain Rx on chart  Sternal precautions reviewed     Niya Dobson, APRN  03/26/20  07:55

## 2020-03-27 NOTE — PAYOR COMM NOTE
"Nico Washington (49 y.o. Male)       PLEASE SEE ATTACHED DC SUMMARY    REF#[id number]    THANK YOU    AUSTNI WALKER LPN CCP    Date of Birth Social Security Number Address Home Phone MRN    1970  [address] [facility] [address] [phone] [id number]    Buddhist Marital Status          None Single       Admission Date Admission Type Admitting Provider Attending Provider Department, Room/Bed    3/12/20 Emergency Mark Pal MD  [facility] CARDIOVASC UNIT, 2223/1    Discharge Date Discharge Disposition Discharge Destination        3/26/2020 Rehab Facility or Unit (DC - External)              Attending Provider:  (none)   Allergies:  No Known Allergies    Isolation:  None   Infection:  None   Code Status:  Prior    Ht:  180.3 cm (71\")   Wt:  114 kg (251 lb 3.2 oz)    Admission Cmt:  None   Principal Problem:  ESRD on hemodialysis (CMS/HCC) [N18.6,Z99.2]                 Active Insurance as of 3/12/2020     Primary Coverage     Payor Plan Insurance Group Employer/Plan Group    [employer] PPO [id number]     Payor Plan Address Payor Plan Phone Number Payor Plan Fax Number Effective Dates    [address] [phone]  8/1/2018 - None Entered    [place] [address]       Subscriber Name Subscriber Birth Date Member ID       NICO WASHINGTON 1970 [id number]                 Emergency Contacts      (Rel.) Home Phone Work Phone Mobile Phone    Eleazar Washington (Father) [phone] -- --               Discharge Summary      Mercedes Armenta APRN at 03/26/20 1313                              [facility]    Date of Discharge:  3/26/2020    PCP: Provider, No Known    Discharge Diagnosis:   Active Hospital Problems    Diagnosis  POA   • **ESRD on hemodialysis (CMS/HCC) [N18.6, Z99.2]  Not Applicable   • Status post tricuspid valve repair [Z98.890]  Not Applicable   • Status " post mitral valve replacement [Z95.2]  Not Applicable   • History of mitral valve replacement with mechanical valve [Z95.2]  Not Applicable   • Systolic CHF, acute (CMS/[facility]) [I50.21]  Yes   • Anasarca associated with disorder of kidney [N04.9]  Yes   • Diabetes mellitus (CMS/HCC) [E11.9]  Yes   • Cellulitis of left leg [L03.116]  Yes   • Essential hypertension [I10]  Yes   • Nonrheumatic mitral valve regurgitation, severe [I34.0]  Yes      Resolved Hospital Problems    Diagnosis Date Resolved POA   • Wenckebach [I44.1] 03/26/2020 Yes   • NSVT (nonsustained ventricular tachycardia) (CMS/[facility]) [I47.2] 03/26/2020 Yes   • Hypocalcemia [E83.51] 03/26/2020 Yes   • Anemia of chronic disease [D63.8] 03/26/2020 Yes     Procedures Performed  Procedure(s):  MEGHA, STERNOTOMY, MITRAL VALVE REPLACEMENT, TRICUSPID VALVE REPAIR, PFO CLOSURE, PRP     Consults     Date and Time Order Name Status Description    3/23/2020 0854 Cardiac Electrophysiologist Inpatient Consult      3/16/2020 1837 Inpatient Vascular Surgery Consult Completed     3/14/2020 1124 Inpatient Cardiology Consult Completed     3/12/2020 1524 Nephrology (on -call MD unless specified) Completed     3/12/2020 1524 LHA (on-call MD unless specified) Details Completed       Hospital Course  Please see history and physical for details. Patient is a 49 y.o. male with a history of HTN, severe MR, CKDIV, DM2 and tobacco abuse.  He was admitted 3/12 with generalized anasarca and acute respiratory failure thought to be multifactorial from worsening renal disease, valvular disease, right-sided heart failure.  Cardiology and nephrology were consulted.  Patient was also treated for cellulitis left lower extremity with a course of doxycycline.  Echocardiogram demonstrated reduced ejection fraction to 30.3% and severe MR and moderate TR.  He underwent cardiac catheterization 3/16 demonstrating minimal coronary artery disease.  Patient initially placed on Bumex drip per nephrology.   Hemodialysis was initiated for progression to ESRD.  After tunnel catheter placement by vascular surgery, dialysis was started on 3/18.  Patient was optimized from medical standpoint prior to mitral valve replacement, PFO closure, and TV repair on 3/19 per Dr. Frye.  Postoperative complications included bilateral pleural effusions, anemia of chronic disease with postop anemia, and intermittent Wenckebach.  Coumadin was started on 3/21 with a bridge to heparin when AV wires were removed 3/23. Chest tubes were removed on 3/24.  Follow-up CT chest on 3/25 shows patchy consolidative densities bilateral lower lung fields likely atelectasis given he is afebrile with normal WBC. Patient remains in sinus rhythm with no episodes of heart block in 48 hours  He did have some mild tachycardia so a low-dose Toprol was started per cardiothoracic surgery.  INR increasing but remains below target of 2.5. Will have repeat INR as an outpatient and refer patient to [facility] coagulation clinic. Vein mapping completed, will eventually need AV fistula. Last dialysis performed yesterday. Nephrology has placed orders for continued dialysis following discharge to [facility].  Hemoglobin is stable and he is has received iron with dialysis on 3/18 and 3/24. Patient is on room air with no complaints. Today cardiothoracic surgery, cardiology, and nephrology have approved discharge to rehab facility follow-up with all specialist as directed.  He lives alone and patient is discharged to [facility] for rehab.  All questions answered the patient stated satisfaction.    Condition on Discharge: Improved/ stable.      Temp:  [97.6 °F (36.4 °C)-98.5 °F (36.9 °C)] 98.5 °F (36.9 °C)  Heart Rate:  [87-98] 87  Resp:  [16] 16  BP: (126-151)/(75-92) 126/79  Body mass index is 35.04 kg/m².    Physical Exam   Constitutional: He is oriented to person, place, and time. He appears well-developed and well-nourished. No distress.   obese   HENT:      Head: Normocephalic and atraumatic.   Mouth/Throat: Oropharynx is clear and moist.   RIJ TDC   Eyes: Conjunctivae and EOM are normal.   Neck: Normal range of motion. No JVD present. No tracheal deviation present.   Cardiovascular: Normal rate and regular rhythm.   Murmur heard.  Pulmonary/Chest: Effort normal and breath sounds normal. No stridor. No respiratory distress. He has no wheezes. He has no rales.   Decreased BS in bases but NAD on room air   Abdominal: Soft. Bowel sounds are normal. He exhibits no distension and no mass. There is no tenderness. There is no rebound and no guarding.   Musculoskeletal: Normal range of motion. He exhibits edema (+1 BLE ).   Chronic venous stasis bilateral lower extremities.  LLE erythema is improved since last evaluation.   Neurological: He is alert and oriented to person, place, and time.   Skin: Skin is warm and dry.   Psychiatric: He has a normal mood and affect. Judgment normal.   Nursing note and vitals reviewed.       Discharge Medications      New Medications      Instructions Start Date   acetaminophen 325 MG tablet  Commonly known as:  TYLENOL   650 mg, Oral, Every 4 Hours PRN      bisacodyl 10 MG suppository  Commonly known as:  DULCOLAX   10 mg, Rectal, Daily PRN      cyclobenzaprine 10 MG tablet  Commonly known as:  FLEXERIL   10 mg, Oral, Every 8 Hours PRN      docusate sodium 100 MG capsule   100 mg, Oral, 2 Times Daily PRN      HYDROcodone-acetaminophen 5-325 MG per tablet  Commonly known as:  NORCO   1 tablet, Oral, Every 4 Hours PRN      melatonin 5 MG tablet tablet   5 mg, Oral, Nightly PRN      metoprolol succinate XL 25 MG 24 hr tablet  Commonly known as:  TOPROL-XL   12.5 mg, Oral, Every 24 Hours Scheduled   Start Date:  March 27, 2020     pantoprazole 40 MG EC tablet  Commonly known as:  PROTONIX   40 mg, Oral, Daily      polyethylene glycol packet  Commonly known as:  MIRALAX   17 g, Oral, Daily PRN      traMADol 50 MG tablet  Commonly known as:   "ULTRAM   50 mg, Oral, Every 12 Hours PRN      warfarin 5 MG tablet  Commonly known as:  COUMADIN   Repeat INR tomorrow 3/27         Continue These Medications      Instructions Start Date   aspirin 81 MG EC tablet   81 mg, Oral, Daily   Start Date:  March 27, 2020        Stop These Medications    amLODIPine 5 MG tablet  Commonly known as:  NORVASC     carvedilol 25 MG tablet  Commonly known as:  COREG     furosemide 40 MG tablet  Commonly known as:  LASIX     hydrALAZINE 100 MG tablet  Commonly known as:  APRESOLINE     isosorbide mononitrate 60 MG 24 hr tablet  Commonly known as:  IMDUR           Diet Instructions     Diet: Regular, Cardiac, Consistent Carbohydrate, Renal      Discharge Diet:   Regular  Cardiac  Consistent Carbohydrate  Renal            Activity Instructions     Activity as Tolerated           Additional Instructions for the Follow-ups that You Need to Schedule     Ambulatory Referral to Cardiac Rehab   As directed      Call MD With Problems / Concerns   As directed      Instructions:  Call office at [phone] for any drainage, increased redness, or fever over 100.5    Order Comments:  Instructions:  Call office at [phone] for any drainage, increased redness, or fever over 100.5          Discharge Follow-up with Specified Provider: Dr. Frye on 4/22/20 at 11:15am   As directed      To:  Dr. Frye on 4/22/20 at 11:15am    Follow Up Details:  bring all current medications to appointment         Referral to Anticoagulation Monitoring   As directed       Select To DEPT SPEC to filter TO DEPT       Send INR reminders to the \"clinical pool\" of the TO DEPT     (ie:  [facility] CLINIC  or [facility] CLINICAL POOL)     Referral to Home Health   As directed      Face to Face Visit Date:  3/26/2020    Follow-up provider for Plan of Care?:  I treated the patient in an acute care facility and will not continue treatment after discharge.    Follow-up provider:  DYLAN SALMON" [[id number]]    Reason/Clinical Findings:  S/p MVR    Describe mobility limitations that make leaving home difficult:  Requires assistance of another to leave the home.    Nursing/Therapeutic Services Requested:  Physical Therapy (start at discharge from rehab) Skilled Nursing    Skilled nursing orders:  CHF management (post valve surgery repair) Medication education    PT orders:  Gait Training Strengthening Home safety assessment Transfer training Therapeutic exercise    Weight Bearing Status:  As Tolerated    Frequency:  1 Week 1 (3 x per week)         Protime-INR    Mar 27, 2020 (Approximate)      INR follow up.    Order Comments:  INR follow up.             Contact information for follow-up providers     [facility] CARD REHAB .    Specialty:  Cardiac Rehabilitation  Contact information:  [address] [facility] [id number] [phone]           Provider, No Known .    Contact information:  [facility] [address]             Jake Bains MD. Schedule an appointment as soon as possible for a visit in 6 week(s).    Specialty:  Vascular Surgery  Contact information:  [address] [facility] [address] [facility] [address]  [facility] [address]  [phone]             Laurita Mcneil MD Follow up.    Specialty:  Nephrology  Why:  call for appt   Contact information:  [address]  [facility] [address]  [phone]             [facility] Follow up.    Specialty:  Home Health Services  Contact information:  [address]  [facility] [id number] [phone]           Jr Ze Frye MD Follow up.    Specialty:  Cardiothoracic Surgery  Why:  Follow up with Dr. Frye on 4/22/20 at 11:15am  Contact information:  [address]  [facility] [address]  [facility] [address]  [phone]             Meggan Hinton MD Follow up.    Specialty:  Cardiology  Why:  4 weeks with APRN   Contact information:  [address] [facility]  [address]  [facility] [address]  [phone]             [facility] CARDIOLOGY .    Specialty:  Cardiology  Contact information:  [address]  [facility] [address] [phone]                 Contact information for after-discharge care     Destination     [facility] .    Service:  Skilled Nursing  Contact information:  [address]  [facility] [address] [phone]                           Test Results Pending at Discharge     MINGO Yang  03/26/20  14:12    Discharge time spent greater than 35 minutes.    Electronically signed by Mercedes Armenta APRN at 03/26/20 7482

## 2020-03-27 NOTE — PROGRESS NOTES
Anticoagulation Clinic Progress Note    Anticoagulation Summary  As of 3/27/2020    INR goal:   2.5-3.5   TTR:   --   INR used for dosin.10! (3/27/2020)   Warfarin maintenance plan:   No maintenance plan   Next INR check:   3/30/2020   Target end date:   Indefinite    Indications    Status post mitral valve replacement [Z95.2]             Anticoagulation Episode Summary     INR check location:       Preferred lab:       Send INR reminders to:    [facility] CLINICAL POOL    Comments:   HD      Anticoagulation Care Providers     Provider Role Specialty Phone number    Meggan Hinton MD Referring Cardiology [phone]          Clinic Interview:      INR History:  Anticoagulation Monitoring 3/27/2020   INR 2.10   INR Date 3/27/2020   INR Goal 2.5-3.5   Last Week Total 24 mg   Next Week Total 17.5 mg   Sun 5 mg (3/29)   Mon -   Tue -   Wed -   Thu -   Fri 7.5 mg (3/27)   Sat 5 mg (3/28)   Visit Report -       Plan:  1. INR is Subtherapeutic today- see above in Anticoagulation Summary.   [facility] to have  Nico Niceham  Change his warfarin regimen- see above in Anticoagulation Summary.  2. Follow up in 3 days  3. Spoke with April at [facility] have been instructed to call if any changes in medications, doses, concerns, etc.    Winnie Castillo RPH

## 2020-03-27 NOTE — PROGRESS NOTES
Case Management Discharge Note      Final Note: Pt d/c to [facility] skilled bed via [employer] wheelchair transport paid for by [facility] [employer].      Provided Post Acute Provider List?: Yes  Post Acute Provider List: Nursing Home  Delivered To: Patient  Method of Delivery: In person    Destination - Selection Complete      Service Provider Request Status Selected Services Address Phone Number Fax Number    [facility] Selected Skilled Nursing [address] [place] [address] [phone]      Durable Medical Equipment      No service has been selected for the patient.      Dialysis/Infusion      No service has been selected for the patient.      Home Medical Care      No service has been selected for the patient.      Therapy      No service has been selected for the patient.      Community Resources      No service has been selected for the patient.        Transportation Services  [facility]    Final Discharge Disposition Code: 03 - skilled nursing facility (SNF)

## 2020-03-30 NOTE — TELEPHONE ENCOUNTER
"[facility] called today to report that patient's INR was 1.8 and he \"elected to leave\" the facility today.  There is no warfarin prescription on his chart so he likely has none at home.  Despite multiple phone calls I have been unable to contact him--he only has a home phone.  Contacted his father but he is in [place] and is trying to contact his son also.    We just started managing his warfarin on Friday. Will order warfarin and adjust dosing as soon as we can reach patient.  Will try to catch him at dialysis Wed am.  "

## 2020-03-31 NOTE — TELEPHONE ENCOUNTER
Please send Rx for metoprolol if appropriate --he left facility on Monday and did not have prescriptions.

## 2020-03-31 NOTE — PROGRESS NOTES
Anticoagulation Clinic Progress Note    Anticoagulation Summary  As of 3/30/2020    INR goal:   2.5-3.5   TTR:   --   INR used for dosin.80! (3/30/2020)   Warfarin maintenance plan:   No maintenance plan   Next INR check:   4/3/2020   Target end date:   Indefinite    Indications    Status post mitral valve replacement [Z95.2]             Anticoagulation Episode Summary     INR check location:       Preferred lab:       Send INR reminders to:    [facility]    Comments:   HD      Anticoagulation Care Providers     Provider Role Specialty Phone number    Meggan Hinton MD Referring Cardiology [phone]        INR History:  Anticoagulation Monitoring 3/27/2020 3/30/2020   INR 2.10 1.80   INR Date 3/27/2020 3/30/2020   INR Goal 2.5-3.5 2.5-3.5   Last Week Total 24 mg 36.5 mg   Next Week Total 17.5 mg 25 mg   Sun 5 mg (3/29) -   Mon - -   Tue - 10 mg (3/31)   Wed - 7.5 mg ()   u - 7.5 mg ()   Fri 7.5 mg (3/27) -   Sat 5 mg (3/28) -   Visit Report - -       Plan:  1. INR is Subtherapeutic from Monday and he did not have any warfarin Monday- see above in Anticoagulation Summary.   Will instruct Tobiasmyrna King to Increase their warfarin regimen to 10mg today, 7.5mg x 2 days- see above in Anticoagulation Summary.   2. Follow up in 3 days  3. Spoke with pt today on phone.  Rx sent to [facility] and he will  today. Sent message to MD for metoprolol Rx. They have been instructed to call if any changes in medications, doses, concerns, etc. Patient expresses understanding and has no further questions at this time.    Winnie Castillo Formerly [facility]

## 2020-04-03 NOTE — PROGRESS NOTES
Anticoagulation Clinic Progress Note  Anticoagulation Summary  As of 4/3/2020    INR goal:   2.5-3.5   TTR:   --   INR used for dosin.6! (4/3/2020)   Warfarin maintenance plan:   No maintenance plan   Next INR check:   2020   Target end date:   Indefinite    Indications    Status post mitral valve replacement [Z95.2]             Anticoagulation Episode Summary     INR check location:       Preferred lab:       Send INR reminders to:   [facility]    Comments:   HD      Anticoagulation Care Providers     Provider Role Specialty Phone number    Meggan Hinton MD Referring Cardiology [phone]          Clinic Interview:  Patient Findings     Negatives:   Signs/symptoms of thrombosis, Signs/symptoms of bleeding,   Laboratory test error suspected, Change in health, Change in alcohol use,   Change in activity, Upcoming invasive procedure, Emergency department   visit, Upcoming dental procedure, Missed doses, Extra doses, Change in   medications, Change in diet/appetite, Hospital admission, Bruising, Other   complaints      Clinical Outcomes     Negatives:   Major bleeding event, Thromboembolic event,   Anticoagulation-related hospital admission, Anticoagulation-related ED   visit, Anticoagulation-related fatality        Education:  Nico King is a new start in the [facility]. We discussed the followin) Warfarin's indication, mechanism, and dosing  2) Enforced the importance of taking warfarin as instructed and at the same time every day, preferably in the evening so that we can make dose adjustments more easily following subsequent clinic visits  3) What he should do about a missed dose; pts can take missed doses within about 12 hours of their usual scheduled dose, but he was instructed on the importance of not doubling up on doses unless told to do so by the [facility]  4) Explained possible side effects of warfarin therapy,  including increased risk of bleeding, s/sx of bleeding and s/sx of any additional clots/PE/CVA.   5) Discussed monitoring of warfarin, the INR, goal INR range, and the frequency of monitoring  6) Reviewed drug/food/tobacco/EtOH interactions and provided written information covering these topics in more detail, explaining that green, leafy vegetables interact most heavily with warfarin  7) Instructed the pt not to take or discontinue any medications without informing his physician/pharmacist and reminded him to inform us of any dietary changes, as well  8) Explained that he would be coming into the clinic more frequently in these first few weeks of therapy as we try to adjust his dose and achieve a therapeutic INR x 2 consecutive readings. Once that is achieved, patient will follow up in clinic every 4 weeks, on average.    He stated no problems with transportation or scheduling clinic appts in this manner. he expressed understanding of the information provided and has no additional questions at this time.    Nico King was presented with a copy of the Patients Rights and Responsibilities. he expressed verbal consent and agreement to receive care in the [facility] under the current collaborative care agreement with [facility].       INR History:  1.6 (4/3/20)    Plan:  1. INR is Subtherapeutic today- see above in Anticoagulation Summary.   Will instruct Nico King to Change their warfarin regimen- see above in Anticoagulation Summary. Will not use enoxaparin due to ESRD on dialysis.  2. Follow up in 3 days  3. Patient declines warfarin refills.  4. Verbal and written information provided. Patient expresses understanding and has no further questions at this time.    Stephen Hewitt Formerly [facility]

## 2020-04-06 NOTE — PROGRESS NOTES
Anticoagulation Clinic Progress Note    Anticoagulation Summary  As of 2020    INR goal:   2.5-3.5   TTR:   0.0 % (1 d)   INR used for dosin.9! (2020)   Warfarin maintenance plan:   5 mg every Tue, Fri; 7.5 mg all other days   Weekly warfarin total:   47.5 mg   Plan last modified:   Winnie Castillo RPH (2020)   Next INR check:   2020   Target end date:   Indefinite    Indications    Status post mitral valve replacement [Z95.2]             Anticoagulation Episode Summary     INR check location:       Preferred lab:       Send INR reminders to:    [facility] [place]    Comments:         Anticoagulation Care Providers     Provider Role Specialty Phone number    Meggan Hinton MD Referring Cardiology [phone]          Clinic Interview:      INR History:  Anticoagulation Monitoring 3/30/2020 4/3/2020 2020   INR 1.80 1.6 1.9   INR Date 3/30/2020 4/3/2020 2020   INR Goal 2.5-3.5 2.5-3.5 2.5-3.5   Last Week Total 36.5 mg 37.5 mg 45 mg   Next Week Total 25 mg 25 mg 45 mg   Sun - 7.5 mg () -   Mon - - 7.5 mg ()   Tue 10 mg (3/31) - 5 mg ()   Wed 7.5 mg () - 7.5 mg ()   Thu 7.5 mg () - -   Fri - 10 mg (4/3) -   Sat - 7.5 mg () -   Visit Report - - -   Some recent data might be hidden       Plan:  1. INR is Subtherapeutic today- see above in Anticoagulation Summary.  Will instruct Nico King to Increase their warfarin regimen- see above in Anticoagulation Summary.  2. Follow up in 3 days  3. Patient declines warfarin refills.  4. Verbal and written information provided. Patient expresses understanding and has no further questions at this time.    Winnie Castillo RPH

## 2020-04-09 NOTE — PROGRESS NOTES
Anticoagulation Clinic Progress Note    Anticoagulation Summary  As of 2020    INR goal:   2.5-3.5   TTR:   0.0 % (4 d)   INR used for dosin.7! (2020)   Warfarin maintenance plan:   10 mg every Sun, Thu; 7.5 mg all other days   Weekly warfarin total:   57.5 mg   Plan last modified:   Stephen Hewitt RPH (2020)   Next INR check:   2020   Target end date:   Indefinite    Indications    Status post mitral valve replacement [Z95.2]             Anticoagulation Episode Summary     INR check location:       Preferred lab:       Send INR reminders to:    [facility]    Comments:         Anticoagulation Care Providers     Provider Role Specialty Phone number    Meggan Hinton MD Referring Cardiology [phone]          Clinic Interview:  Patient Findings     Negatives:   Signs/symptoms of thrombosis, Signs/symptoms of bleeding,   Laboratory test error suspected, Change in health, Change in alcohol use,   Change in activity, Upcoming invasive procedure, Emergency department   visit, Upcoming dental procedure, Missed doses, Extra doses, Change in   medications, Change in diet/appetite, Hospital admission, Bruising, Other   complaints      Clinical Outcomes     Negatives:   Major bleeding event, Thromboembolic event,   Anticoagulation-related hospital admission, Anticoagulation-related ED   visit, Anticoagulation-related fatality        INR History:  Anticoagulation Monitoring 4/3/2020 2020 2020   INR 1.6 1.9 1.7   INR Date 4/3/2020 2020 2020   INR Goal 2.5-3.5 2.5-3.5 2.5-3.5   Trend - - Up   Last Week Total 37.5 mg 45 mg 50 mg   Next Week Total 25 mg 45 mg 57.5 mg   Sun 7.5 mg () - 10 mg   Mon - 7.5 mg () 7.5 mg   Tue - 5 mg () -   Wed - 7.5 mg () -   Thu - - 10 mg   Fri 10 mg (4/3) - 7.5 mg   Sat 7.5 mg () - 7.5 mg   Visit Report - - -   Some recent data might be hidden       Plan:  1. INR is Subtherapeutic today- see above in Anticoagulation  Summary.  Will instruct Nico King to Increase their warfarin regimen- see above in Anticoagulation Summary.  2. Follow up in 5 days  3. Patient desires warfarin refills.  4. Verbal and written information provided. Patient expresses understanding and has no further questions at this time.    Stephen Hewitt [facility]

## 2020-04-14 NOTE — PROGRESS NOTES
Anticoagulation Clinic Progress Note    Anticoagulation Summary  As of 2020    INR goal:   2.5-3.5   TTR:   0.0 % (1.3 wk)   INR used for dosin.9! (2020)   Warfarin maintenance plan:   7.5 mg every Mon, Fri; 10 mg all other days   Weekly warfarin total:   65 mg   Plan last modified:   Winnie Castillo RPH (2020)   Next INR check:   2020   Target end date:   Indefinite    Indications    Status post mitral valve replacement [Z95.2]             Anticoagulation Episode Summary     INR check location:       Preferred lab:       Send INR reminders to:    [facility]    Comments:         Anticoagulation Care Providers     Provider Role Specialty Phone number    Meggan Hinton MD Referring Cardiology [phone]          Clinic Interview:      INR History:  Anticoagulation Monitoring 2020   INR 1.9 1.7 1.9   INR Date 2020   INR Goal 2.5-3.5 2.5-3.5 2.5-3.5   Trend - Up Up   Last Week Total 45 mg 50 mg 55 mg   Next Week Total 45 mg 57.5 mg 67.5 mg   Sun - 10 mg 10 mg   Mon 7.5 mg () 7.5 mg -   Tue 5 mg () - 12.5 mg ()   Wed 7.5 mg () - 10 mg   Thu - 10 mg 10 mg   Fri - 7.5 mg 7.5 mg   Sat - 7.5 mg 10 mg   Visit Report - - -   Some recent data might be hidden       Plan:  1. INR is Subtherapeutic today- see above in Anticoagulation Summary.  Will instruct Nico King to Increase their warfarin regimen- see above in Anticoagulation Summary.  2. Follow up in 1 week  3. Patient declines warfarin refills.  4. Verbal and written information provided. Patient expresses understanding and has no further questions at this time.    Winnie Castillo RPH

## 2020-04-20 NOTE — PROGRESS NOTES
Anticoagulation Clinic Progress Note    Anticoagulation Summary  As of 2020    INR goal:   2.5-3.5   TTR:   0.0 % (2.1 wk)   INR used for dosin.2! (2020)   Warfarin maintenance plan:   7.5 mg every Mon, Fri; 10 mg all other days   Weekly warfarin total:   65 mg   No change documented:   Analilia Welch RPH   Plan last modified:   Winnie Castillo RPH (2020)   Next INR check:   2020   Target end date:   Indefinite    Indications    Status post mitral valve replacement [Z95.2]             Anticoagulation Episode Summary     INR check location:       Preferred lab:       Send INR reminders to:    [facility]    Comments:         Anticoagulation Care Providers     Provider Role Specialty Phone number    Meggan Hinton MD Referring Cardiology [phone]          Clinic Interview:  Patient Findings     Positives:   Missed doses    Negatives:   Signs/symptoms of thrombosis, Signs/symptoms of bleeding,   Laboratory test error suspected, Change in health, Change in alcohol use,   Change in activity, Upcoming invasive procedure, Emergency department   visit, Upcoming dental procedure, Extra doses, Change in medications,   Change in diet/appetite, Hospital admission, Bruising, Other complaints    Comments:   Took lower doses than scheduled, INR increased, missed   Saturday took within 12 hours on  AM.       Clinical Outcomes     Negatives:   Major bleeding event, Thromboembolic event,   Anticoagulation-related hospital admission, Anticoagulation-related ED   visit, Anticoagulation-related fatality    Comments:   Took lower doses than scheduled, INR increased, missed   Saturday took within 12 hours on  AM.         INR History:  Anticoagulation Monitoring 2020   INR 1.7 1.9 2.2   INR Date 2020   INR Goal 2.5-3.5 2.5-3.5 2.5-3.5   Trend Up Up Same   Last Week Total 50 mg 55 mg 62.5 mg   Next Week Total 57.5 mg 67.5 mg 65  mg   Sun 10 mg 10 mg -   Mon 7.5 mg - 7.5 mg   Tue - 12.5 mg (4/14) 10 mg   Wed - 10 mg 10 mg   Thu 10 mg 10 mg -   Fri 7.5 mg 7.5 mg -   Sat 7.5 mg 10 mg -   Visit Report - - -   Some recent data might be hidden       Plan:  1. INR is Subtherapeutic today- see above in Anticoagulation Summary. Took lower doses than scheduled, INR increased, missed Saturday took within 12 hours on Sunday AM. Will anticipate further inc in INR. Will instruct Nico King to resume their warfarin regimen- see above in Anticoagulation Summary.  2. Follow up in 3 days  3. Verbal and written information provided. Patient expresses understanding and has no further questions at this time.    Analilia Welch [facility]

## 2020-04-22 NOTE — PROGRESS NOTES
"CARDIOVASCULAR SURGERY FOLLOW-UP PROGRESS NOTE  Chief Complaint: post op    HPI:   Dear Dr. Hansen, No Known and colleagues:    It was nice to see Nico King in follow up 5 weeks after surgery.  As you know, he is a 49 y.o. male with a history of severe mitral valve incompetency, severe tricuspid valve incompetency, severe pulmonary hypertension, dilated cardiomyopathy, ESRD requiring hemodialysis, left LE cellulitis, bilateral pleural effusions, anemia of chronic disease, hypertension, congestive heart failure, and second degree heart block who underwent mitral valve replacement (mechanical), tricuspid valve repair, and PFO closure by Dr. Frye. He did well postoperatively and continues to do well. He continues to take his warfarin, which is managed per cardiology and the anticoagulation clinic. He recieves outpatient dialysis on Monday, Wednesday and Friday. He was initially discharged to [facility], but opted to leave the [facility] and return to his house in the midst of the coronavirus outbreak. He reports that he has not had any metoprolol since coming home. Per the chart it appears that a prescription for this was called in by Dr. Hinton recently. I will reach out to the pharmacy to ensure that this was received. He comes in today complaining of nothing. He denies any shortness of breath, chest pain, lower extremity swelling, cough, or fever. He denies any drainage or redness of incision, as well as any clicking, popping or shifting of sternum with cough or deep inspiration. His activity level has been good. He states that he is walking around his house daily, without issues. He states that overall he is \"feeling much better.\"     Physical Exam:         BP (!) 166/109 (BP Location: Right arm, Patient Position: Sitting, Cuff Size: Adult)   Pulse 91   Ht 180.3 cm (71\")   Wt 113 kg (248 lb 14.4 oz)   BMI 34.71 kg/m²   Heart: Unable to assess-patient denies any slow heart rates or " "palpitations  Lungs:  Unable to assess- patient denies any dypsnea  Extremities: Unable to assess-- patient denies edema  Incision(s):  Unable to assess- patient states that incision is \"well healed\"    Assessment/Plan:     S/P MVR, tricuspid valve repair and PFO closure. Overall, he is doing well.    Post-op pleural effusion  Post-op 2nd degree heart block    Blood pressure is elevated today. He has not been taking his metoprolol because he has not had any. It appears that a refill was recently called in by . Our office will reach out to the pharmacy to ensure that it is ready for the patient. I instructed him to record daily blood pressure and heart rate in preparation for his appointment with cardiology.     No heavy lifting > 10 pounds for 1 more weeks; then do not lift more than 50lbs for an additional 2 months  Keep incisions clean and dry  OK to drive if not taking narcotic pain medicine  OK to begin cardiac rehab  Follow-up as scheduled with cardiology  Follow-up with CT surgery prn    Instructed to call the office with any reports of sternal instability or issues with incision.    No restrictions of activity.    All vitals recorded were reported by the patient.    The patient consented to telehealth visit. This visit was completed via the telephone due to the global COVID-19 pandemic. A total of 25 minutes were spent involved in patient care, including but not limited to 12 minutes of direct conversation with the patient via the telephone.    Thank you for allowing me to participate in the care of your patient.    Regards,  MINGO Dasilva    "

## 2020-04-22 NOTE — TELEPHONE ENCOUNTER
Patient had a video visit with Niya AGUILA today and checked his blood pressure prior to the visit It was high He was asked about his medication and reports he is out of metoprolol and has not been taking it. I called the pharmacy and reordered it after speaking with Niya AGUILA I have tried repeatedly to reach him by phone to convey this message but have been unable to leave a message I will attempt to reach him again tomorrow.

## 2020-04-23 NOTE — PROGRESS NOTES
Anticoagulation Clinic Progress Note    Anticoagulation Summary  As of 2020    INR goal:   2.5-3.5   TTR:   0.0 % (2.6 wk)   INR used for dosin.6! (2020)   Warfarin maintenance plan:   7.5 mg every Mon, Fri; 10 mg all other days   Weekly warfarin total:   65 mg   Plan last modified:   Winnie Castillo RPH (2020)   Next INR check:   2020   Target end date:   Indefinite    Indications    Status post mitral valve replacement [Z95.2]             Anticoagulation Episode Summary     INR check location:       Preferred lab:       Send INR reminders to:   [facility]    Comments:         Anticoagulation Care Providers     Provider Role Specialty Phone number    Meggan Hinton MD Referring Cardiology [phone]          Clinic Interview:  Patient Findings     Negatives:   Signs/symptoms of thrombosis, Signs/symptoms of bleeding,   Laboratory test error suspected, Change in health, Change in alcohol use,   Change in activity, Upcoming invasive procedure, Emergency department   visit, Upcoming dental procedure, Missed doses, Extra doses, Change in   medications, Change in diet/appetite, Hospital admission, Bruising, Other   complaints      Clinical Outcomes     Negatives:   Major bleeding event, Thromboembolic event,   Anticoagulation-related hospital admission, Anticoagulation-related ED   visit, Anticoagulation-related fatality        INR History:  Anticoagulation Monitoring 2020   INR 1.9 2.2 1.6   INR Date 2020   INR Goal 2.5-3.5 2.5-3.5 2.5-3.5   Trend Up Same Same   Last Week Total 55 mg 62.5 mg 62.5 mg   Next Week Total 67.5 mg 65 mg 75 mg   Sun 10 mg - 10 mg   Mon - 7.5 mg 10 mg ()   Tue 12.5 mg () 10 mg -   Wed 10 mg 10 mg -   Thu 10 mg - 15 mg ()   Fri 7.5 mg - 10 mg ()   Sat 10 mg - 10 mg   Visit Report - - -   Some recent data might be hidden       Plan:  1. INR is Subtherapeutic today- see above in  Anticoagulation Summary.  Will instruct Nico King to Change their warfarin regimen with boost of 15mg today and then 10mg daily until next check- see above in Anticoagulation Summary.  2. Follow up in 5 days  3. Patient declines warfarin refills.  4. Verbal and written information provided. Patient expresses understanding and has no further questions at this time.    Analilia Welch [facility]

## 2020-04-28 NOTE — PROGRESS NOTES
Anticoagulation Clinic Progress Note    Anticoagulation Summary  As of 4/28/2020    INR goal:   2.5-3.5   TTR:   7.7 % (3.3 wk)   INR used for dosing:   3.0 (4/28/2020)   Warfarin maintenance plan:   7.5 mg every Mon, Fri; 10 mg all other days   Weekly warfarin total:   65 mg   No change documented:   Jamila Barboza   Plan last modified:   Winnie Castillo RPH (4/14/2020)   Next INR check:   5/4/2020   Target end date:   Indefinite    Indications    Status post mitral valve replacement [Z95.2]             Anticoagulation Episode Summary     INR check location:       Preferred lab:       Send INR reminders to:    [facility]    Comments:         Anticoagulation Care Providers     Provider Role Specialty Phone number    Meggan Hinton MD Referring Cardiology [phone]          Clinic Interview:  Patient Findings     Negatives:   Signs/symptoms of thrombosis, Signs/symptoms of bleeding,   Laboratory test error suspected, Change in health, Change in alcohol use,   Change in activity, Upcoming invasive procedure, Emergency department   visit, Upcoming dental procedure, Missed doses, Extra doses, Change in   medications, Change in diet/appetite, Hospital admission, Bruising, Other   complaints      Clinical Outcomes     Negatives:   Major bleeding event, Thromboembolic event,   Anticoagulation-related hospital admission, Anticoagulation-related ED   visit, Anticoagulation-related fatality        INR History:  Anticoagulation Monitoring 4/20/2020 4/23/2020 4/28/2020   INR 2.2 1.6 3.0   INR Date 4/20/2020 4/23/2020 4/28/2020   INR Goal 2.5-3.5 2.5-3.5 2.5-3.5   Trend Same Same Same   Last Week Total 62.5 mg 62.5 mg 75 mg   Next Week Total 65 mg 75 mg 65 mg   Sun - 10 mg 10 mg   Mon 7.5 mg 10 mg (4/27) -   Tue 10 mg - 10 mg   Wed 10 mg - 10 mg   Thu - 15 mg (4/23) 10 mg   Fri - 10 mg (4/24) 7.5 mg   Sat - 10 mg 10 mg   Visit Report - - -   Some recent data might be hidden       Plan:  1.  INR is therapeutic today- see above in Anticoagulation Summary.   Will instruct Nico King to continue their warfarin regimen- see above in Anticoagulation Summary.  2. Follow up in 1 weeks.  3. Patient declines warfarin refills.  4. Verbal and written information provided. Patient expresses understanding and has no further questions at this time.    Jamila Barboza

## 2020-05-05 NOTE — PROGRESS NOTES
Anticoagulation Clinic Progress Note    Anticoagulation Summary  As of 2020    INR goal:   2.5-3.5   TTR:   28.8 % (1 mo)   INR used for dosin.7 (2020)   Warfarin maintenance plan:   7.5 mg every Mon; 10 mg all other days   Weekly warfarin total:   67.5 mg   Plan last modified:   Winnie Castillo RPH (2020)   Next INR check:   2020   Target end date:   Indefinite    Indications    Status post mitral valve replacement [Z95.2]             Anticoagulation Episode Summary     INR check location:       Preferred lab:       Send INR reminders to:   [facility] [place]    Comments:         Anticoagulation Care Providers     Provider Role Specialty Phone number    Meggan Hinton MD Referring Cardiology [phone]          Clinic Interview:      INR History:  Anticoagulation Monitoring 2020   INR 1.6 3.0 2.7   INR Date 2020   INR Goal 2.5-3.5 2.5-3.5 2.5-3.5   Trend Same Same Up   Last Week Total 62.5 mg 75 mg 65 mg   Next Week Total 75 mg 65 mg 67.5 mg   Sun 10 mg 10 mg 10 mg   Mon 10 mg () - 7.5 mg   Tue - 10 mg 10 mg   Wed - 10 mg 10 mg   Thu 15 mg () 10 mg 10 mg   Fri 10 mg () 7.5 mg 10 mg   Sat 10 mg 10 mg 10 mg   Visit Report - - -   Some recent data might be hidden       Plan:  1. INR is Therapeutic today- see above in Anticoagulation Summary.  Will instruct Nico King to Increase their warfarin regimen- see above in Anticoagulation Summary.  2. Follow up in 2 weeks  3. Patient declines warfarin refills.  4. Verbal and written information provided. Patient expresses understanding and has no further questions at this time.    Winnie Castillo RPH

## 2020-05-13 NOTE — TELEPHONE ENCOUNTER
With the recent concern of Covid-19 virus, our office is trying to decrease the risk of unnecessary exposure. We have recommended cancelling the in person office appointment and scheduling for a video or telephone visit instead.     Tried calling pt no answer and no vm will try back later   Appt on Friday 5/22/2020

## 2020-05-19 NOTE — PROGRESS NOTES
Anticoagulation Clinic Progress Note    Anticoagulation Summary  As of 2020    INR goal:   2.5-3.5   TTR:   51.2 % (1.5 mo)   INR used for dosin.7 (2020)   Warfarin maintenance plan:   7.5 mg every Mon; 10 mg all other days   Weekly warfarin total:   67.5 mg   No change documented:   Lucille Pascal   Plan last modified:   Winnie Castillo RPH (2020)   Next INR check:   2020   Priority:   Maintenance   Target end date:   Indefinite    Indications    Status post mitral valve replacement [Z95.2]             Anticoagulation Episode Summary     INR check location:       Preferred lab:       [facility]    Comments:         Anticoagulation Care Providers     Provider Role Specialty Phone number    Meggan Hinton MD Referring Cardiology [phone]          Clinic Interview:      INR History:  Anticoagulation Monitoring 2020   INR 3.0 2.7 2.7   INR Date 2020   INR Goal 2.5-3.5 2.5-3.5 2.5-3.5   Trend Same Up Same   Last Week Total 75 mg 65 mg 67.5 mg   Next Week Total 65 mg 67.5 mg 67.5 mg   Sun 10 mg 10 mg 10 mg   Mon - 7.5 mg 7.5 mg   Tue 10 mg 10 mg 10 mg   Wed 10 mg 10 mg 10 mg   Thu 10 mg 10 mg 10 mg   Fri 7.5 mg 10 mg 10 mg   Sat 10 mg 10 mg 10 mg   Visit Report - - -   Some recent data might be hidden       Plan:  1. INR is therapeutic today- see above in Anticoagulation Summary.   Will instruct Nico King to continue their warfarin regimen- see above in Anticoagulation Summary.  2. Follow up in 4 weeks.  3. Patient desires warfarin refills.  4. Verbal and written information provided. Patient expresses understanding and has no further questions at this time.    Lucille Pascal

## 2020-05-29 PROBLEM — L03.116 CELLULITIS OF LEFT LEG: Status: RESOLVED | Noted: 2020-01-01 | Resolved: 2020-01-01

## 2020-05-29 PROBLEM — J96.01 ACUTE RESPIRATORY FAILURE WITH HYPOXIA (HCC): Status: RESOLVED | Noted: 2020-01-01 | Resolved: 2020-01-01

## 2020-05-29 PROBLEM — Z95.2 HISTORY OF MITRAL VALVE REPLACEMENT WITH MECHANICAL VALVE: Status: RESOLVED | Noted: 2020-01-01 | Resolved: 2020-01-01

## 2020-05-29 NOTE — PROGRESS NOTES
Telehealth Visit    Date of Visit: 2020  Encounter Provider: MINGO Mancini  Place of Service: [facility] CARDIOLOGY  Patient Name: Nico King  :1970  Primary Cardiologist: Dr. Meggan Hinton     Chief Complaint   Patient presents with   • Follow-up     Hospital    :     HPI: Nico King is a pleasant 49 y.o. male who is an established patient of our practice. Due to COVID-19 virus, I am conducting a telehealth visit via telephone with patient and he has consented to this visit today.      He has a longstanding history of hypertension, diabetes mellitus, and nicotine use.  He has been diagnosed with end-stage kidney disease and is currently on hemodialysis 3 days/week followed by Dr. Laurita Mcneil.     In 2018, he was noted to have severe mitral regurgitation per echocardiogram.  In 2018, he followed up with me in the office and was recommended to have a repeat echocardiogram and sleep apnea evaluation.  He never had the echocardiogram completed and never followed up in our office as scheduled.    On 3/12/2020, he presented to [facility] ED with anasarca, shortness of breath, nausea and vomiting.  Chest x-ray showed bilateral pleural effusion and he underwent diuresis with IV furosemide.  Echocardiogram completed showed EF of 36-40%, global hypokinesis, severe thickening of the noncoronary cusp of the aortic valve, mild mitral annular calcification, moderate to severe mitral regurgitation, and moderate size PFO.  On 3/16/2020 he underwent cardiac catheterization which demonstrated minimal coronary artery disease.  Once he was optimized from a medical standpoint he underwent mechanical mitral valve replacement, PFO closure, and tricuspid valve repair by Dr. Frye on 3/19/2020.  He was noted to have ESRD and a tunnel catheter was placed for hemodialysis and initiated during that hospitalization.  He was recommended to  follow-up outpatient with vascular surgery for AV fistula placement.  He was discharged to the [facility] for rehab.    Today is his follow-up hospital visit.  He is currently residing at home and receiving no services such as home health or physical therapy.  He is undergoing dialysis Monday, Wednesday, and Friday and doing well with that.  He still has a tunnel catheter placed and has not followed up with vascular surgery as recommended.  He feels that his incision has healed well.  His lower extremity edema has improved dramatically.  He denies chest pain, shortness of breath, PND, orthopnea, palpitations, dizziness, syncope, or bleeding.  On 5/19/2020-INR was 2.7.      Past Medical History:   Diagnosis Date   • Anasarca associated with disorder of kidney    • Cellulitis of left leg 3/12/2020   • CHF (congestive heart failure) (CMS/[facility])    • Cigarette smoker    • Diabetes mellitus (CMS/[facility])    • ESRD on hemodialysis (CMS/[facility])    • Foot callus    • Hypertension    • Mitral regurgitation     severe eccentric per echo 7/2018   • Pleural effusion 06/30/2018    Bilateral, small per x-ray       Past Surgical History:   Procedure Laterality Date   • CARDIAC CATHETERIZATION N/A 3/16/2020    Procedure: CORONARY ANGIOGRAPHY;  Surgeon: Aguilar Morataya MD;  Location: [facility] CATH INVASIVE LOCATION;  Service: Cardiovascular;  Laterality: N/A;   • CARDIAC CATHETERIZATION N/A 3/16/2020    Procedure: Left Heart Cath;  Surgeon: Aguilar Morataya MD;  Location: [facility] CATH INVASIVE LOCATION;  Service: Cardiovascular;  Laterality: N/A;   • HIP SURGERY     • INSERTION HEMODIALYSIS CATHETER N/A 3/17/2020    Procedure: HEMODIALYSIS CATHETER INSERTION;  Surgeon: Renard Ro MD;  Location: [facility] MAIN OR;  Service: Vascular;  Laterality: N/A;   • MITRAL VALVE REPAIR/REPLACEMENT N/A 3/19/2020    Procedure: MEGHA, STERNOTOMY, MITRAL VALVE REPLACEMENT, TRICUSPID VALVE REPAIR, PFO CLOSURE, PRP;  Surgeon: Jr Ze Frye MD;   Location: [facility] MAIN OR;  Service: Cardiothoracic;  Laterality: N/A;       Social History     Socioeconomic History   • Marital status: Single     Spouse name: Not on file   • Number of children: Not on file   • Years of education: Not on file   • Highest education level: Not on file   Tobacco Use   • Smoking status: Former Smoker     Packs/day: 0.50     Types: Cigarettes   • Smokeless tobacco: Current User     Types: Chew   Substance and Sexual Activity   • Alcohol use: Yes     Comment: Rare//caffeine use: 1-2 cups daily   • Drug use: No   • Sexual activity: Defer       Family History   Problem Relation Age of Onset   • Hypertension Mother    • Atrial fibrillation Father    • Hypertension Father        The following portion of the patient's history were reviewed and updated as appropriate: past medical history, past surgical history, past social history, past family history, allergies, current medications, and problem list.    Review of Systems   Constitution: Negative.   Cardiovascular: Positive for leg swelling. Negative for chest pain, cyanosis, dyspnea on exertion, irregular heartbeat, near-syncope, orthopnea, palpitations, paroxysmal nocturnal dyspnea and syncope.   Respiratory: Negative.    Hematologic/Lymphatic: Negative for bleeding problem.   Neurological: Negative.        No Known Allergies      Current Outpatient Medications:   •  aspirin 81 MG EC tablet, Take 1 tablet by mouth Daily., Disp: , Rfl:   •  metoprolol succinate XL (TOPROL-XL) 25 MG 24 hr tablet, Take 1 tablet by mouth Daily., Disp: 90 tablet, Rfl: 2  •  vitamin B-12 (CYANOCOBALAMIN) 100 MCG tablet, Take 50 mcg by mouth Daily., Disp: , Rfl:   •  warfarin (Coumadin) 5 MG tablet, Take 1.5 tablets (7.5mg) by mouth on Monday and take 2 tablets (10mg) by mouth on all other days or as directed, Disp: 180 tablet, Rfl: 0        Objective:     Vitals:    05/29/20 1034   BP: (!) 158/102   BP Location: Left arm   Pulse: 89   Weight: 112 kg (248 lb)  "  Height: 180.3 cm (71\")     Body mass index is 34.59 kg/m².    Due to telehealth visit, there was no EKG, vitals, or weight performed in our office.  Vitals/Weight were reported by the patient and conducted at home.         Assessment:       Diagnosis Plan   1. Nonrheumatic mitral valve regurgitation, severe  Ambulatory Referral to Cardiac Rehab    Adult Transthoracic Echo Complete W/ Cont if Necessary Per Protocol   2. Status post mitral valve replacement  Ambulatory Referral to Cardiac Rehab    Adult Transthoracic Echo Complete W/ Cont if Necessary Per Protocol   3. Status post tricuspid valve repair  Ambulatory Referral to Cardiac Rehab    Adult Transthoracic Echo Complete W/ Cont if Necessary Per Protocol   4. Systolic CHF, acute (CMS/HCC)  Adult Transthoracic Echo Complete W/ Cont if Necessary Per Protocol   5. Essential hypertension     6. ESRD on hemodialysis (CMS/HCC)            Plan:       1/2/3.  Status post chemical mitral valve replacement, tricuspid valve repair, and PFO closure: He feels that he is done well from a cardiac standpoint.  He denies any further heart failure symptoms.  He remains on metoprolol succinate and the warfarin for the mechanical mitral valve with INRs followed at the [facility].  I recommended a follow-up appointment with Dr. Hinton and repeat echocardiogram in July.  I have placed a cardiac rehab referral.    4.  Acute Systolic Heart Failure: NYHA class III at the time of his hospitalization.  EF 36-40%.  He currently denies shortness of breath and his lower extremity edema has improved.  He remains on metoprolol succinate and is not on ACE/ARB therapy due to end-stage kidney disease.  Repeat echocardiogram July 2020.    5.  Hypertension: Blood pressure elevated today.  I will defer to his nephrologist as he is currently undergoing hemodialysis.    6.  ESRD: Currently undergoing hemodialysis 3 days weekly.    7.  I recommended follow-up with Dr. Hinton " and same-day echocardiogram in July 2020.    This patient has consented to a telehealth visit via telephone. The visit was scheduled as a telephone visit to comply with patient safety concerns in accordance with CDC recommendations.  All vitals recorded within this visit are reported by the patient.  I spent 25 minutes in total including but not limited to the 15 minutes spent in direct conversation with this patient.      As always, it has been a pleasure to participate in your patient's care. Thank you.       Sincerely,         MINGO Campoverde        Dictated utilizing Dragon dictation

## 2020-06-08 NOTE — TELEPHONE ENCOUNTER
Warfarin refill already sent to [facility] earlier today. Thank you!

## 2020-06-08 NOTE — TELEPHONE ENCOUNTER
Cece,   I was unable to reach pt:  6/8/20- LVM x 3    6/4/20- LVM x 2    6/1/20- LVM x 1    However, I did mail a letter asking pt to contact our office.  Thanks,  Reza

## 2020-06-08 NOTE — TELEPHONE ENCOUNTER
----- Message from MINGO Bueno sent at 5/29/2020  7:44 PM EDT -----    Please schedule same day appt with Dr. Esqueda and echo July 2020

## 2020-06-16 NOTE — PROGRESS NOTES
Anticoagulation Clinic Progress Note    Anticoagulation Summary  As of 2020    INR goal:   2.5-3.5   TTR:   53.5 % (2.4 mo)   INR used for dosin.1! (2020)   Warfarin maintenance plan:   7.5 mg every Mon; 10 mg all other days   Weekly warfarin total:   67.5 mg   Plan last modified:   Winnie Castillo RPH (2020)   Next INR check:   2020   Priority:   Maintenance   Target end date:   Indefinite    Indications    Status post mitral valve replacement [Z95.2]             Anticoagulation Episode Summary     INR check location:       Preferred lab:       Send INR reminders to:   [facility]    Comments:         Anticoagulation Care Providers     Provider Role Specialty Phone number    Meggan Hinton MD Referring Cardiology [phone]          Clinic Interview:  Patient Findings     Positives:   Change in health, Missed doses, Extra doses, Change in   diet/appetite    Negatives:   Signs/symptoms of thrombosis, Signs/symptoms of bleeding,   Laboratory test error suspected, Change in alcohol use, Change in   activity, Upcoming invasive procedure, Emergency department visit,   Upcoming dental procedure, Change in medications, Hospital admission,   Bruising, Other complaints    Comments:   Missed dose 6/8, so took extra 5 mg 6/13. Half cup cooked   spinach yesterday. Diarrhea yesterday/today, notes improvement; slightly   lower appetite.      Clinical Outcomes     Negatives:   Major bleeding event, Thromboembolic event,   Anticoagulation-related hospital admission, Anticoagulation-related ED   visit, Anticoagulation-related fatality    Comments:   Missed dose 6/8, so took extra 5 mg 6/13. Half cup cooked   spinach yesterday. Diarrhea yesterday/today, notes improvement; slightly   lower appetite.        INR History:  Anticoagulation Monitoring 2020   INR 2.7 2.7 4.1   INR Date 2020   INR Goal 2.5-3.5 2.5-3.5 2.5-3.5   Trend Up Same  Same   Last Week Total 65 mg 67.5 mg 72.5 mg   Next Week Total 67.5 mg 67.5 mg 62.5 mg   Sun 10 mg 10 mg 10 mg   Mon 7.5 mg 7.5 mg 7.5 mg   Tue 10 mg 10 mg 5 mg (6/16); Otherwise 10 mg   Wed 10 mg 10 mg 10 mg   Thu 10 mg 10 mg 10 mg   Fri 10 mg 10 mg 10 mg   Sat 10 mg 10 mg 10 mg   Visit Report - - -   Some recent data might be hidden       Plan:  1. INR is Supratherapeutic today- see above in Anticoagulation Summary.  Will instruct Nico King to Change their warfarin regimen- see above in Anticoagulation Summary.  2. Follow up in 2 weeks  3. Patient declines warfarin refills.  4. Verbal and written information provided. Patient expresses understanding and has no further questions at this time.    Stephen Hewitt [facility]

## 2020-06-30 NOTE — PROGRESS NOTES
Anticoagulation Clinic Progress Note    Anticoagulation Summary  As of 2020    INR goal:   2.5-3.5   TTR:   53.4 % (2.9 mo)   INR used for dosin.2! (2020)   Warfarin maintenance plan:   7.5 mg every Mon; 10 mg all other days   Weekly warfarin total:   67.5 mg   Plan last modified:   Winnie Castillo RPH (2020)   Next INR check:   2020   Priority:   Maintenance   Target end date:   Indefinite    Indications    Status post mitral valve replacement [Z95.2]             Anticoagulation Episode Summary     INR check location:       Preferred lab:       Send INR reminders to:   [facility]    Comments:         Anticoagulation Care Providers     Provider Role Specialty Phone number    Meggan Hinton MD Referring Cardiology [phone]          Clinic Interview:  Patient Findings     Positives:   Missed doses    Negatives:   Signs/symptoms of thrombosis, Signs/symptoms of bleeding,   Laboratory test error suspected, Change in health, Change in alcohol use,   Change in activity, Upcoming invasive procedure, Emergency department   visit, Upcoming dental procedure, Extra doses, Change in medications,   Change in diet/appetite, Hospital admission, Bruising, Other complaints    Comments:   Missed dose last night.      Clinical Outcomes     Negatives:   Major bleeding event, Thromboembolic event,   Anticoagulation-related hospital admission, Anticoagulation-related ED   visit, Anticoagulation-related fatality    Comments:   Missed dose last night.        INR History:  Anticoagulation Monitoring 2020   INR 2.7 4.1 2.2   INR Date 2020   INR Goal 2.5-3.5 2.5-3.5 2.5-3.5   Trend Same Same Same   Last Week Total 67.5 mg 72.5 mg 60 mg   Next Week Total 67.5 mg 62.5 mg 72.5 mg   Sun 10 mg 10 mg 10 mg   Mon 7.5 mg 7.5 mg 7.5 mg   Tue 10 mg 5 mg (); Otherwise 10 mg 15 mg (); Otherwise 10 mg   Wed 10 mg 10 mg 10 mg   Thu 10 mg 10 mg  10 mg   Fri 10 mg 10 mg 10 mg   Sat 10 mg 10 mg 10 mg   Visit Report - - -   Some recent data might be hidden       Plan:  1. INR is Subtherapeutic today- see above in Anticoagulation Summary.  Will instruct Nico King to Change their warfarin regimen- see above in Anticoagulation Summary.  2. Follow up in 2 weeks  3. Patient declines warfarin refills.  4. Verbal and written information provided. Patient expresses understanding and has no further questions at this time.    Stephen Hewitt [facility]

## 2020-07-13 PROBLEM — I44.4 LAFB (LEFT ANTERIOR FASCICULAR BLOCK): Status: ACTIVE | Noted: 2020-01-01

## 2020-07-13 PROBLEM — R11.2 NAUSEA & VOMITING: Status: ACTIVE | Noted: 2020-01-01

## 2020-07-13 PROBLEM — T45.511A COUMADIN TOXICITY: Status: ACTIVE | Noted: 2020-01-01

## 2020-07-13 PROBLEM — E87.1 HYPONATREMIA: Status: ACTIVE | Noted: 2020-01-01

## 2020-07-13 PROBLEM — D73.3 SPLENIC ABSCESS: Status: ACTIVE | Noted: 2020-01-01

## 2020-07-13 PROBLEM — E11.9 TYPE 2 DIABETES MELLITUS, WITHOUT LONG-TERM CURRENT USE OF INSULIN (HCC): Chronic | Status: ACTIVE | Noted: 2020-01-01

## 2020-07-13 PROBLEM — I50.22 CHRONIC SYSTOLIC CHF (CONGESTIVE HEART FAILURE) (HCC): Chronic | Status: ACTIVE | Noted: 2020-01-01

## 2020-07-13 PROBLEM — N18.9 ANEMIA OF CHRONIC KIDNEY FAILURE: Chronic | Status: ACTIVE | Noted: 2020-01-01

## 2020-07-13 PROBLEM — A41.9 SEPSIS (HCC): Status: ACTIVE | Noted: 2020-01-01

## 2020-07-13 PROBLEM — D69.6 THROMBOCYTOPENIA (HCC): Status: ACTIVE | Noted: 2020-01-01

## 2020-07-13 PROBLEM — D63.1 ANEMIA OF CHRONIC KIDNEY FAILURE: Chronic | Status: ACTIVE | Noted: 2020-01-01

## 2020-07-23 PROBLEM — I69.30 CHRONIC ISCHEMIC LEFT MCA STROKE: Status: ACTIVE | Noted: 2020-01-01

## 2022-04-18 NOTE — TELEPHONE ENCOUNTER
No-show.  No see.  He will have to get established with someone else   Split-Thickness Skin Graft Text: The defect edges were debeveled with a #15 scalpel blade.  Given the location of the defect, shape of the defect and the proximity to free margins a split thickness skin graft was deemed most appropriate.  Using a sterile surgical marker, the primary defect shape was transferred to the donor site. The split thickness graft was then harvested.  The skin graft was then placed in the primary defect and oriented appropriately.

## 2023-12-07 NOTE — ED NOTES
Iv team at bedside with patient, patient was a very hard stick, two nurses unable to get iv on patient.      Tiara Begum, RN  10/17/19 5037    
MD Daniel notified of critical troponin value.     Patty Wiley RN  10/17/19 8726    
none

## 2024-08-02 NOTE — TELEPHONE ENCOUNTER
Left VM for patient to return my call to discuss cardiac rehab per referral for the program.  Noted pt is also ESRD.  Not sure if he will be able to attend secondary to his dialysis schedule.     see above

## 2024-12-17 NOTE — PLAN OF CARE
Problem: Patient Care Overview  Goal: Plan of Care Review  Outcome: Ongoing (interventions implemented as appropriate)      Problem: Cardiac: Heart Failure (Adult)  Goal: Signs and Symptoms of Listed Potential Problems Will be Absent, Minimized or Managed (Cardiac: Heart Failure)  Outcome: Ongoing (interventions implemented as appropriate)      Problem: Diabetes, Type 2 (Adult)  Goal: Signs and Symptoms of Listed Potential Problems Will be Absent, Minimized or Managed (Diabetes, Type 2)  Outcome: Ongoing (interventions implemented as appropriate)      Problem: Hypertensive Disease/Crisis (Arterial) (Adult)  Goal: Signs and Symptoms of Listed Potential Problems Will be Absent, Minimized or Managed (Hypertensive Disease/Crisis)  Outcome: Ongoing (interventions implemented as appropriate)        
85.7

## 2025-05-01 NOTE — PLAN OF CARE
Problem: Patient Care Overview  Goal: Plan of Care Review  Flowsheets  Taken 3/20/2020 0923  Plan of Care Reviewed With: patient (Pended)  Taken 3/20/2020 0918  Outcome Summary: Pt is a 48 y/o M that presents to therapy following post-op mitral valve replacement and tricuspid valve repair. Pt is drowsy this morning, but was agreeable to PT. Pt was able to stand with mod A x2 and step towards the HOB. Pt exhibits deficits in strength, endurance, balance, and coordination that impact gait and functional mobility. PT will follow pt to address these deficits. PT anticipates pt will be able to discharge home with home health, but will monitor progress closely to plan appropriately.  (Pended)      Detail Level: Simple Detail Level: Detailed

## (undated) DEVICE — GW EMR FIX EXCHG J STD .035 3MM 260CM

## (undated) DEVICE — NEEDLE, QUINCKE, 20GX3.5": Brand: MEDLINE

## (undated) DEVICE — TOWEL,OR,DSP,ST,WHITE,DLX,4/PK,20PK/CS: Brand: MEDLINE

## (undated) DEVICE — SYS PERFUS SEP PLATLT W TIPS CUST

## (undated) DEVICE — Device

## (undated) DEVICE — INTENDED FOR TISSUE SEPARATION, AND OTHER PROCEDURES THAT REQUIRE A SHARP SURGICAL BLADE TO PUNCTURE OR CUT.: Brand: BARD-PARKER ® CARBON RIB-BACK BLADES

## (undated) DEVICE — DRP SLUSH WARMR MACH CIR 44X44IN

## (undated) DEVICE — GLV SURG BIOGEL SENSR LTX PF SZ7.5

## (undated) DEVICE — SYR LUERLOK 5CC

## (undated) DEVICE — 32 FR STRAIGHT – SOFT PVC CATHETER: Brand: PVC THORACIC CATHETERS

## (undated) DEVICE — CANN VENI DLP SGL/STAGE RT/ANGL MTL/TP 28F

## (undated) DEVICE — SPNG DISECTOR KTNER XRAY COTN 1/4X9/16IN PK/5

## (undated) DEVICE — ST TOURNI COMPL A/ 7IN

## (undated) DEVICE — PK ATS CUST W CARDIOTOMY RESEVOIR

## (undated) DEVICE — DRSNG WND GEL FIBR OPTICELL AG PLS W/SLV LF 4X5IN  STRL

## (undated) DEVICE — GLV SURG BIOGEL LTX PF 7 1/2

## (undated) DEVICE — ADHS SKIN DERMABOND TOP ADVANCED

## (undated) DEVICE — PK CATH CARD 40

## (undated) DEVICE — LOU MINOR PROCEDURE: Brand: MEDLINE INDUSTRIES, INC.

## (undated) DEVICE — SUT SILK 0 CT1 CR8 18IN C021D

## (undated) DEVICE — DRSNG SURESITE WNDW 2.38X2.75

## (undated) DEVICE — SUT VIC 2 TP 1MS/4 27IN DYED J649G

## (undated) DEVICE — SPNG GZ WOVN 4X4IN 12PLY 10/BX STRL

## (undated) DEVICE — SUT PROLN 4/0 BB D/A 36IN 8581H

## (undated) DEVICE — GW HITORQUE/BAL MID/WT J W/HCOAT .014 3X190CM

## (undated) DEVICE — CATH DIAG IMPULSE FR4 5F 100CM

## (undated) DEVICE — SOL ISO/ALC RUB 70PCT 4OZ

## (undated) DEVICE — CATH DIAG IMPULSE FL3.5 5F 100CM

## (undated) DEVICE — TUBING EXTENSION SET: Brand: ARGYLE

## (undated) DEVICE — GLV SURG SIGNATURE ESSENTIAL PF LTX SZ7.5

## (undated) DEVICE — DRP C/ARM 41X74IN

## (undated) DEVICE — IRRIGATOR BULB ASEPTO 60CC STRL

## (undated) DEVICE — SUT ETHLN 2/0 PS 18IN 585H

## (undated) DEVICE — CATHETER,URETHRAL,REDRUBBER,STERILE,20FR: Brand: MEDLINE

## (undated) DEVICE — GLIDESHEATH BASIC HYDROPHILIC COATED INTRODUCER SHEATH: Brand: GLIDESHEATH

## (undated) DEVICE — SUT VIC 0 CT1 CR8 27IN JJ41G

## (undated) DEVICE — CVR PROB 96IN LF STRL

## (undated) DEVICE — ANTIBACTERIAL UNDYED BRAIDED (POLYGLACTIN 910), SYNTHETIC ABSORBABLE SUTURE: Brand: COATED VICRYL

## (undated) DEVICE — SUT PROLN 3/0 SH D/A 36IN 8522H

## (undated) DEVICE — Device: Brand: LEVEL 1

## (undated) DEVICE — MEDICINE CUP, GRADUATED, STER: Brand: MEDLINE

## (undated) DEVICE — NDL HYPO PRECISIONGLIDE REG 25G 1 1/2

## (undated) DEVICE — SUT PROLN 5/0 RB1 D/A 36IN 8556H

## (undated) DEVICE — ST. SORBAVIEW ULTIMATE IJ SYSTEM A,C: Brand: CENTURION

## (undated) DEVICE — JACKSON-PRATT 100CC BULB RESERVOIR: Brand: CARDINAL HEALTH

## (undated) DEVICE — 12 FOOT DISPOSABLE EXTENSION CABLE WITH SAFE CONNECT / SCREW-DOWN

## (undated) DEVICE — TP UMB COTN 1/8X36 U12T

## (undated) DEVICE — SYR LUERLOK 20CC BX/50

## (undated) DEVICE — CATH VENT MIV RADL PIG ST TIP 4F 110CM

## (undated) DEVICE — KT MANIFLD CARDIAC

## (undated) DEVICE — KT CATH TAL PALINDROME SAPPHIRE 14.5F23CM

## (undated) DEVICE — BIOPATCH™ ANTIMICROBIAL DRESSING WITH CHLORHEXIDINE GLUCONATE IS A HYDROPHILLIC POLYURETHANE ABSORPTIVE FOAM WITH CHLORHEXIDINE GLUCONATE (CHG) WHICH INHIBITS BACTERIAL GROWTH UNDER THE DRESSING. THE DRESSING IS INTENDED TO BE USED TO ABSORB EXUDATE, COVER A WOUND CAUSED BY VASCULAR AND NONVASCULAR PERCUTANEOUS MEDICAL DEVICES DURING SURGERY, AS WELL AS REDUCE LOCAL INFECTION AND COLONIZATION OF MICROORGANISMS.: Brand: BIOPATCH

## (undated) DEVICE — PK PERFUS CUST W/CARDIOPLEGIA

## (undated) DEVICE — OPTIFOAM GENTLE SA, POSTOP, 4X12: Brand: MEDLINE

## (undated) DEVICE — BALN PRESS WEDGE 5F 110CM

## (undated) DEVICE — PK HEART OPN 40

## (undated) DEVICE — GLV SURG BIOGEL M LTX PF 7 1/2

## (undated) DEVICE — GOWN,NON-REINFORCED,SIRUS,SET IN SLV,XXL: Brand: MEDLINE

## (undated) DEVICE — SENSR CERBRL O2 PK/2

## (undated) DEVICE — DECANT BG O JET

## (undated) DEVICE — CANN ART EOPA 3D NV W/CONN 22F

## (undated) DEVICE — 32 FR RIGHT ANGLE – SOFT PVC CATHETER: Brand: PVC THORACIC CATHETERS

## (undated) DEVICE — LOU OPEN HEART DR POLLOCK: Brand: MEDLINE INDUSTRIES, INC.

## (undated) DEVICE — HEMOCONCENTRATOR PERFUS LPS06